# Patient Record
Sex: MALE | Race: WHITE | NOT HISPANIC OR LATINO | Employment: OTHER | URBAN - METROPOLITAN AREA
[De-identification: names, ages, dates, MRNs, and addresses within clinical notes are randomized per-mention and may not be internally consistent; named-entity substitution may affect disease eponyms.]

---

## 2023-08-15 ENCOUNTER — OFFICE VISIT (OUTPATIENT)
Dept: PRIMARY CARE | Facility: CLINIC | Age: 78
End: 2023-08-15
Payer: MEDICARE

## 2023-08-15 ENCOUNTER — LAB (OUTPATIENT)
Dept: LAB | Facility: LAB | Age: 78
End: 2023-08-15
Payer: MEDICARE

## 2023-08-15 VITALS
TEMPERATURE: 97.9 F | SYSTOLIC BLOOD PRESSURE: 138 MMHG | HEIGHT: 68 IN | DIASTOLIC BLOOD PRESSURE: 70 MMHG | HEART RATE: 73 BPM | WEIGHT: 219 LBS | OXYGEN SATURATION: 94 % | BODY MASS INDEX: 33.19 KG/M2

## 2023-08-15 DIAGNOSIS — R60.9 EDEMA, UNSPECIFIED TYPE: Primary | ICD-10-CM

## 2023-08-15 DIAGNOSIS — R60.9 EDEMA, UNSPECIFIED TYPE: ICD-10-CM

## 2023-08-15 PROBLEM — M17.0 OSTEOARTHRITIS OF KNEES, BILATERAL: Status: ACTIVE | Noted: 2023-08-15

## 2023-08-15 PROBLEM — M48.061 SPINAL STENOSIS OF LUMBAR REGION WITH RADICULOPATHY: Status: ACTIVE | Noted: 2023-08-15

## 2023-08-15 PROBLEM — M10.9 ACUTE GOUTY ARTHROPATHY: Status: ACTIVE | Noted: 2018-12-05

## 2023-08-15 PROBLEM — E78.5 HLD (HYPERLIPIDEMIA): Status: ACTIVE | Noted: 2023-08-15

## 2023-08-15 PROBLEM — I10 ESSENTIAL HYPERTENSION: Status: ACTIVE | Noted: 2023-08-15

## 2023-08-15 PROBLEM — M54.16 SPINAL STENOSIS OF LUMBAR REGION WITH RADICULOPATHY: Status: ACTIVE | Noted: 2023-08-15

## 2023-08-15 PROBLEM — I25.119 CORONARY ARTERY DISEASE INVOLVING NATIVE CORONARY ARTERY OF NATIVE HEART WITH ANGINA PECTORIS (CMS-HCC): Status: ACTIVE | Noted: 2023-08-15

## 2023-08-15 LAB
ANION GAP IN SER/PLAS: 12 MMOL/L (ref 10–20)
CALCIUM (MG/DL) IN SER/PLAS: 9.2 MG/DL (ref 8.6–10.6)
CARBON DIOXIDE, TOTAL (MMOL/L) IN SER/PLAS: 25 MMOL/L (ref 21–32)
CHLORIDE (MMOL/L) IN SER/PLAS: 107 MMOL/L (ref 98–107)
CREATININE (MG/DL) IN SER/PLAS: 1.24 MG/DL (ref 0.5–1.3)
GFR MALE: 59 ML/MIN/1.73M2
GLUCOSE (MG/DL) IN SER/PLAS: 89 MG/DL (ref 74–99)
POTASSIUM (MMOL/L) IN SER/PLAS: 4 MMOL/L (ref 3.5–5.3)
SODIUM (MMOL/L) IN SER/PLAS: 140 MMOL/L (ref 136–145)
UREA NITROGEN (MG/DL) IN SER/PLAS: 15 MG/DL (ref 6–23)

## 2023-08-15 PROCEDURE — 36415 COLL VENOUS BLD VENIPUNCTURE: CPT

## 2023-08-15 PROCEDURE — 1159F MED LIST DOCD IN RCRD: CPT | Performed by: FAMILY MEDICINE

## 2023-08-15 PROCEDURE — 1160F RVW MEDS BY RX/DR IN RCRD: CPT | Performed by: FAMILY MEDICINE

## 2023-08-15 PROCEDURE — 99214 OFFICE O/P EST MOD 30 MIN: CPT | Performed by: FAMILY MEDICINE

## 2023-08-15 PROCEDURE — 3075F SYST BP GE 130 - 139MM HG: CPT | Performed by: FAMILY MEDICINE

## 2023-08-15 PROCEDURE — 80048 BASIC METABOLIC PNL TOTAL CA: CPT

## 2023-08-15 PROCEDURE — 1036F TOBACCO NON-USER: CPT | Performed by: FAMILY MEDICINE

## 2023-08-15 PROCEDURE — 3078F DIAST BP <80 MM HG: CPT | Performed by: FAMILY MEDICINE

## 2023-08-15 RX ORDER — FUROSEMIDE 40 MG/1
1 TABLET ORAL DAILY
COMMUNITY
Start: 2017-08-13

## 2023-08-15 RX ORDER — ATORVASTATIN CALCIUM 10 MG/1
1 TABLET, FILM COATED ORAL DAILY
COMMUNITY
Start: 2016-05-06

## 2023-08-15 RX ORDER — METOPROLOL SUCCINATE 25 MG/1
25 TABLET, EXTENDED RELEASE ORAL DAILY
COMMUNITY
Start: 2018-04-26

## 2023-08-15 RX ORDER — PANTOPRAZOLE SODIUM 40 MG/1
1 TABLET, DELAYED RELEASE ORAL 2 TIMES DAILY
COMMUNITY
Start: 2016-05-06

## 2023-08-15 RX ORDER — PREGABALIN 150 MG/1
150 CAPSULE ORAL 2 TIMES DAILY
COMMUNITY
Start: 2023-05-09 | End: 2024-01-31 | Stop reason: ALTCHOICE

## 2023-08-15 RX ORDER — RAMIPRIL 10 MG/1
1 CAPSULE ORAL DAILY
COMMUNITY
Start: 2016-05-06

## 2023-08-15 RX ORDER — NITROGLYCERIN 0.4 MG/1
0.4 TABLET SUBLINGUAL EVERY 5 MIN PRN
COMMUNITY
Start: 2016-12-19

## 2023-08-15 ASSESSMENT — LIFESTYLE VARIABLES
SKIP TO QUESTIONS 9-10: 1
AUDIT-C TOTAL SCORE: 4
HOW OFTEN DO YOU HAVE A DRINK CONTAINING ALCOHOL: 4 OR MORE TIMES A WEEK
HOW MANY STANDARD DRINKS CONTAINING ALCOHOL DO YOU HAVE ON A TYPICAL DAY: 1 OR 2
HOW OFTEN DO YOU HAVE SIX OR MORE DRINKS ON ONE OCCASION: NEVER

## 2023-08-15 ASSESSMENT — PATIENT HEALTH QUESTIONNAIRE - PHQ9
2. FEELING DOWN, DEPRESSED OR HOPELESS: NOT AT ALL
1. LITTLE INTEREST OR PLEASURE IN DOING THINGS: NOT AT ALL
SUM OF ALL RESPONSES TO PHQ9 QUESTIONS 1 & 2: 0

## 2023-08-15 NOTE — PROGRESS NOTES
"      /70   Pulse 73   Temp 36.6 °C (97.9 °F)   Ht 1.727 m (5' 8\")   Wt 99.3 kg (219 lb)   SpO2 94%   BMI 33.30 kg/m²       Appears comfortable  No retractions  Skin without pallor petechia icterus cyanosis  Neck without JVD thyromegaly bruits  Chest wall nontender  Chest clear to auscultation without rales rhonchi wheeze  Heart regular rate and rhythm without murmur  Abdomen soft nondistended nontender without organomegaly or mass  Extremities without erythema edema Homans or cord  Peripheral pulses palpable  Neuro intact      "

## 2023-10-05 ENCOUNTER — TELEPHONE (OUTPATIENT)
Dept: PRIMARY CARE | Facility: CLINIC | Age: 78
End: 2023-10-05
Payer: MEDICARE

## 2023-10-05 NOTE — TELEPHONE ENCOUNTER
Patient still having swelling in his legs, he would like to know what should he do for this since his labs came back as normal

## 2023-10-09 DIAGNOSIS — M10.9 GOUT, UNSPECIFIED CAUSE, UNSPECIFIED CHRONICITY, UNSPECIFIED SITE: ICD-10-CM

## 2023-10-10 RX ORDER — ALLOPURINOL 300 MG/1
TABLET ORAL
Qty: 90 TABLET | Refills: 1 | Status: SHIPPED | OUTPATIENT
Start: 2023-10-10 | End: 2024-01-31 | Stop reason: SDUPTHER

## 2023-10-27 PROBLEM — N18.2 STAGE 2 CHRONIC KIDNEY DISEASE: Status: ACTIVE | Noted: 2017-11-21

## 2023-10-27 PROBLEM — K43.2 INCISIONAL HERNIA: Status: ACTIVE | Noted: 2023-10-27

## 2023-10-27 PROBLEM — L03.116 CELLULITIS OF FOOT, LEFT: Status: ACTIVE | Noted: 2023-10-27

## 2023-10-27 PROBLEM — K21.9 ACID REFLUX: Status: ACTIVE | Noted: 2023-10-27

## 2023-10-27 PROBLEM — M51.26 LUMBAR HERNIATED DISC: Status: ACTIVE | Noted: 2023-10-27

## 2023-10-27 PROBLEM — I27.21 PULMONARY ARTERIAL HYPERTENSION (MULTI): Status: ACTIVE | Noted: 2023-10-27

## 2023-10-27 PROBLEM — J44.9 CHRONIC OBSTRUCTIVE PULMONARY DISEASE (MULTI): Status: ACTIVE | Noted: 2017-11-21

## 2023-10-27 PROBLEM — L02.11 ABSCESS, NECK: Status: ACTIVE | Noted: 2023-10-27

## 2023-10-27 PROBLEM — M54.16 LUMBAR RADICULOPATHY: Status: ACTIVE | Noted: 2023-10-27

## 2023-10-27 PROBLEM — J43.1: Status: ACTIVE | Noted: 2017-11-21

## 2023-10-27 PROBLEM — L03.114 CELLULITIS OF LEFT HAND: Status: ACTIVE | Noted: 2018-12-05

## 2023-10-27 PROBLEM — I21.9 HEART ATTACK (MULTI): Status: ACTIVE | Noted: 2023-10-27

## 2023-10-27 RX ORDER — PREDNISONE 50 MG/1
1 TABLET ORAL DAILY
COMMUNITY
Start: 2022-04-10 | End: 2023-10-30 | Stop reason: ALTCHOICE

## 2023-10-27 RX ORDER — CELECOXIB 200 MG/1
1 CAPSULE ORAL AS NEEDED
COMMUNITY
End: 2023-10-30 | Stop reason: ALTCHOICE

## 2023-10-27 RX ORDER — CLOPIDOGREL BISULFATE 75 MG/1
1 TABLET ORAL DAILY
COMMUNITY
End: 2023-10-30 | Stop reason: ALTCHOICE

## 2023-10-27 RX ORDER — TRIAMCINOLONE ACETONIDE 1 MG/G
CREAM TOPICAL 2 TIMES DAILY
COMMUNITY
Start: 2019-02-06 | End: 2023-10-30 | Stop reason: ALTCHOICE

## 2023-10-27 RX ORDER — GABAPENTIN 300 MG/1
2 CAPSULE ORAL 3 TIMES DAILY
COMMUNITY
Start: 2022-04-12 | End: 2023-12-20 | Stop reason: ALTCHOICE

## 2023-10-27 RX ORDER — METOPROLOL TARTRATE 25 MG/1
1 TABLET, FILM COATED ORAL DAILY
COMMUNITY
End: 2023-10-30 | Stop reason: ALTCHOICE

## 2023-10-27 RX ORDER — PREDNISONE 10 MG/1
1 TABLET ORAL DAILY
COMMUNITY
Start: 2022-04-08 | End: 2023-10-30 | Stop reason: ALTCHOICE

## 2023-10-27 RX ORDER — CYCLOBENZAPRINE HCL 10 MG
1 TABLET ORAL 3 TIMES DAILY PRN
COMMUNITY
Start: 2022-04-10 | End: 2023-10-30 | Stop reason: ALTCHOICE

## 2023-10-27 RX ORDER — ACETAMINOPHEN AND CODEINE PHOSPHATE 300; 30 MG/1; MG/1
1 TABLET ORAL EVERY 8 HOURS
COMMUNITY
End: 2023-10-30 | Stop reason: ALTCHOICE

## 2023-10-27 RX ORDER — TIZANIDINE 2 MG/1
1-2 TABLET ORAL 3 TIMES DAILY PRN
COMMUNITY
Start: 2022-04-12 | End: 2023-10-30 | Stop reason: ALTCHOICE

## 2023-10-27 RX ORDER — CIPROFLOXACIN 500 MG/1
1 TABLET ORAL 2 TIMES DAILY
COMMUNITY
Start: 2023-05-19 | End: 2023-10-30 | Stop reason: ALTCHOICE

## 2023-10-27 RX ORDER — ATENOLOL 25 MG/1
TABLET ORAL DAILY
COMMUNITY
Start: 2017-01-29 | End: 2023-10-30 | Stop reason: ALTCHOICE

## 2023-10-27 RX ORDER — METHYLPREDNISOLONE 4 MG/1
TABLET ORAL
COMMUNITY
Start: 2023-04-11 | End: 2023-10-30 | Stop reason: ALTCHOICE

## 2023-10-27 RX ORDER — TRAMADOL HYDROCHLORIDE 50 MG/1
1 TABLET ORAL EVERY 6 HOURS PRN
COMMUNITY
Start: 2022-04-10 | End: 2023-10-30 | Stop reason: ALTCHOICE

## 2023-10-27 RX ORDER — ASPIRIN 325 MG
1 TABLET ORAL DAILY
COMMUNITY
End: 2023-10-30 | Stop reason: ALTCHOICE

## 2023-10-27 RX ORDER — LIDOCAINE HYDROCHLORIDE 20 MG/ML
JELLY TOPICAL
COMMUNITY
Start: 2018-09-18 | End: 2023-10-30 | Stop reason: ALTCHOICE

## 2023-10-30 ENCOUNTER — ANCILLARY PROCEDURE (OUTPATIENT)
Dept: RADIOLOGY | Facility: CLINIC | Age: 78
End: 2023-10-30
Payer: MEDICARE

## 2023-10-30 ENCOUNTER — OFFICE VISIT (OUTPATIENT)
Dept: PRIMARY CARE | Facility: CLINIC | Age: 78
End: 2023-10-30
Payer: MEDICARE

## 2023-10-30 ENCOUNTER — LAB (OUTPATIENT)
Dept: LAB | Facility: LAB | Age: 78
End: 2023-10-30
Payer: MEDICARE

## 2023-10-30 VITALS
OXYGEN SATURATION: 93 % | TEMPERATURE: 97.3 F | SYSTOLIC BLOOD PRESSURE: 136 MMHG | HEIGHT: 68 IN | DIASTOLIC BLOOD PRESSURE: 79 MMHG | WEIGHT: 221 LBS | HEART RATE: 75 BPM | BODY MASS INDEX: 33.49 KG/M2

## 2023-10-30 DIAGNOSIS — M25.472 LEFT ANKLE SWELLING: ICD-10-CM

## 2023-10-30 DIAGNOSIS — M10.9 GOUT OF FOOT, UNSPECIFIED CAUSE, UNSPECIFIED CHRONICITY, UNSPECIFIED LATERALITY: ICD-10-CM

## 2023-10-30 DIAGNOSIS — M25.472 LEFT ANKLE SWELLING: Primary | ICD-10-CM

## 2023-10-30 LAB — URATE SERPL-MCNC: 2.5 MG/DL (ref 4–7.5)

## 2023-10-30 PROCEDURE — 3078F DIAST BP <80 MM HG: CPT | Performed by: FAMILY MEDICINE

## 2023-10-30 PROCEDURE — 3075F SYST BP GE 130 - 139MM HG: CPT | Performed by: FAMILY MEDICINE

## 2023-10-30 PROCEDURE — 73610 X-RAY EXAM OF ANKLE: CPT | Mod: LEFT SIDE | Performed by: RADIOLOGY

## 2023-10-30 PROCEDURE — 99214 OFFICE O/P EST MOD 30 MIN: CPT | Performed by: FAMILY MEDICINE

## 2023-10-30 PROCEDURE — 1036F TOBACCO NON-USER: CPT | Performed by: FAMILY MEDICINE

## 2023-10-30 PROCEDURE — 84550 ASSAY OF BLOOD/URIC ACID: CPT

## 2023-10-30 PROCEDURE — 1160F RVW MEDS BY RX/DR IN RCRD: CPT | Performed by: FAMILY MEDICINE

## 2023-10-30 PROCEDURE — 1159F MED LIST DOCD IN RCRD: CPT | Performed by: FAMILY MEDICINE

## 2023-10-30 PROCEDURE — 73610 X-RAY EXAM OF ANKLE: CPT | Mod: LT,FY

## 2023-10-30 PROCEDURE — 36415 COLL VENOUS BLD VENIPUNCTURE: CPT

## 2023-10-30 RX ORDER — ASPIRIN 81 MG/1
81 TABLET ORAL DAILY
COMMUNITY
End: 2024-01-03 | Stop reason: HOSPADM

## 2023-10-30 ASSESSMENT — PATIENT HEALTH QUESTIONNAIRE - PHQ9
2. FEELING DOWN, DEPRESSED OR HOPELESS: NOT AT ALL
SUM OF ALL RESPONSES TO PHQ9 QUESTIONS 1 & 2: 0
1. LITTLE INTEREST OR PLEASURE IN DOING THINGS: NOT AT ALL

## 2023-10-30 ASSESSMENT — COLUMBIA-SUICIDE SEVERITY RATING SCALE - C-SSRS
2. HAVE YOU ACTUALLY HAD ANY THOUGHTS OF KILLING YOURSELF?: NO
6. HAVE YOU EVER DONE ANYTHING, STARTED TO DO ANYTHING, OR PREPARED TO DO ANYTHING TO END YOUR LIFE?: NO
1. IN THE PAST MONTH, HAVE YOU WISHED YOU WERE DEAD OR WISHED YOU COULD GO TO SLEEP AND NOT WAKE UP?: NO

## 2023-10-30 NOTE — PROGRESS NOTES
"78-year-old male with a history of left lower extremity DVT over a year ago treated with Eliquis for 3 months complaining of left ankle swelling for months.  Without redness calf pain chest pain shortness of breath or hemoptysis.  He has a history of hypertension coronary artery disease status post stent placement and sees cardiology on a regular basis was recently evaluated for clearance for left total knee arthroplasty.  Apparently left lower extremity ultrasound was done which was negative for DVT he also had a stress test and a echocardiogram done.  These were all reportedly negative although I do not have access to these results.  He used compression stockings without improvement.  He denies orthopnea dyspnea on exertion chest pain diaphoresis palpitations syncope presyncope joint pain or swelling.  He had hyaluronic acid injections left knee 2 months ago he noticed the swelling became worse after the injection but denied redness or swelling of the knee or ankle      /79   Pulse 75   Temp 36.3 °C (97.3 °F)   Ht 1.727 m (5' 8\")   Wt 100 kg (221 lb)   SpO2 93%   BMI 33.60 kg/m²       Appears comfortable  No retractions  Skin without pallor petechia icterus cyanosis  Neck without JVD thyromegaly bruits  Chest wall nontender  Chest clear to auscultation without rales rhonchi wheeze  Heart regular rate and rhythm without murmur  Abdomen soft nondistended nontender without organomegaly or mass  Extremities without erythema or cord bilaterally  A few healed scabs without erythema bogginess or drainage on the left lower extremity  He has unilateral left ankle edema without tenderness erythema there is some synovial thickening  Drawer negative bony tenderness ankle  Peripheral pulses palpable  Neuro intact    Left lower extremity swelling likely related to post thrombotic syndrome  "

## 2023-10-30 NOTE — PATIENT INSTRUCTIONS
Records from his cardiologist.  Labs as ordered  X-ray left ankle  Compression stockings still recommended

## 2023-11-01 ENCOUNTER — TELEPHONE (OUTPATIENT)
Dept: PRIMARY CARE | Facility: CLINIC | Age: 78
End: 2023-11-01
Payer: MEDICARE

## 2023-11-01 NOTE — TELEPHONE ENCOUNTER
Lm to return my call.        ----- Message from Josué Kay MD sent at 10/31/2023  3:33 PM EDT -----  Notify no acute bony abnormality.

## 2023-11-05 ENCOUNTER — TRANSCRIBE ORDERS (OUTPATIENT)
Dept: OPERATING ROOM | Facility: HOSPITAL | Age: 78
End: 2023-11-05
Payer: MEDICARE

## 2023-11-05 DIAGNOSIS — M17.12 UNILATERAL PRIMARY OSTEOARTHRITIS, LEFT KNEE: Primary | ICD-10-CM

## 2023-11-06 PROBLEM — M17.12 UNILATERAL PRIMARY OSTEOARTHRITIS, LEFT KNEE: Status: ACTIVE | Noted: 2023-11-05

## 2023-11-16 ENCOUNTER — APPOINTMENT (OUTPATIENT)
Dept: ORTHOPEDIC SURGERY | Facility: CLINIC | Age: 78
End: 2023-11-16
Payer: MEDICARE

## 2023-11-17 ENCOUNTER — OFFICE VISIT (OUTPATIENT)
Dept: ORTHOPEDIC SURGERY | Facility: CLINIC | Age: 78
End: 2023-11-17
Payer: MEDICARE

## 2023-11-17 ENCOUNTER — ANCILLARY PROCEDURE (OUTPATIENT)
Dept: RADIOLOGY | Facility: CLINIC | Age: 78
End: 2023-11-17
Payer: MEDICARE

## 2023-11-17 DIAGNOSIS — M89.8X5 PAIN OF LEFT FEMUR: ICD-10-CM

## 2023-11-17 DIAGNOSIS — M17.12 PRIMARY OSTEOARTHRITIS OF LEFT KNEE: Primary | ICD-10-CM

## 2023-11-17 PROCEDURE — 1036F TOBACCO NON-USER: CPT | Performed by: ORTHOPAEDIC SURGERY

## 2023-11-17 PROCEDURE — 99214 OFFICE O/P EST MOD 30 MIN: CPT | Performed by: ORTHOPAEDIC SURGERY

## 2023-11-17 PROCEDURE — 73552 X-RAY EXAM OF FEMUR 2/>: CPT | Mod: LEFT SIDE | Performed by: RADIOLOGY

## 2023-11-17 PROCEDURE — 1159F MED LIST DOCD IN RCRD: CPT | Performed by: ORTHOPAEDIC SURGERY

## 2023-11-17 PROCEDURE — 73552 X-RAY EXAM OF FEMUR 2/>: CPT | Mod: LT,FY

## 2023-11-17 PROCEDURE — 1160F RVW MEDS BY RX/DR IN RCRD: CPT | Performed by: ORTHOPAEDIC SURGERY

## 2023-11-17 PROCEDURE — 99214 OFFICE O/P EST MOD 30 MIN: CPT | Mod: 25 | Performed by: ORTHOPAEDIC SURGERY

## 2023-11-17 NOTE — PROGRESS NOTES
Patient is a 70-year-old gentleman who presents today for discussion of upcoming left total knee arthroplasty.  Is now failed greater than 3-month trial reasonable conservative treatment including Tylenol, cortisone injections and viscosupplementation.  He has difficulty walking certain distance going downstairs.  He is feeling fed up with his quality of life.  He would like to proceed with total knee arthroplasty which is indicated at this time.  He does have a history of a femur fracture.  Hardware removed many years ago.  We will obtain full-length femur films today.  He does have a significant cardiac history including 7 stents.  He is currently not on anticoagulation.  He has seen his cardiologist recently.    AAOx3, NAD, walks with a moderate antalgic gait  Varus allignment  Range of motion lacks 10 degrees of full extension and flexes to 110 degrees  Stable to varus/valgus/anterior/posterior stress through out the range of motion  Slight laxity with varus stress  Diffuse medial  joint line tenderness to palpation  Moderate effusion  SILT in a pawan/saph/per/tib distribution  5/5 knee extension/df/pf/ehl  ½ dorsalis pedis and posterior tibial pulse  no popliteal lymphadenopathy  no other overlying lesions  mood: euthymic  Respirations non labored    Plain films were reviewed by myself in clinic today.  They have advanced osteoarthritis of their knee with significant joint space narrowing, bone on bone changes, underlying sclerosis and tricompartmental osteophytic change.    Patient is now failed a greater than 3-month trial of reasonable conservative treatment and wishes proceed with surgery which is indicated at this time.  Discussed the risk benefits alternatives to surgery.  All of their questions were answered.    Procedure: left total knee  Location: [facility]  ICD10: M17.12  CPT: 72413  Stay: overnight  Equipment: [employer]    I talked with the patient at length about risks, limitations, benefits  and alternatives to total knee replacement today. I reviewed concerns about implant wear, loosening, breakage, infection and infection prophylaxis, DVT, PE, death and other medical and anesthetic complications of surgery. We talked about the potential for persistent pain following surgery since there are many possible causes for knee and leg pain. The patient was advised that knee replacement will only relieve pain that is coming from the knee. We talked about limited range of motion following knee replacement and the importance of physical therapy and their motivation. We talked about improvements in pain management post-operatively and our accelerated rehab program. We talked about wound healing issues and neurovascular complications of surgery. I reviewed functional and activity restrictions in detail. We discussed the possible need for a homologous blood transfusion. We discussed the fact that many of our patients are able to go home in 1 day or less depending on their health, mobility, pre-op preparation, individual home situation and personal preference.  The patient has identified their personal goals of their joint replacement surgery and recovery and we have discussed them. These are documented in our [employer] patient engagement platform. In addition, we have discussed the advantages and disadvantages of various implant and fixation options, as well as various surgical approaches.  The basic concepts of the joint replacement procedure has been reviewed with the patient and the patient has been provided the opportunity to see an actual implant either in the office or in our pre-op education class.  All of the patients questions were answered. The patient can call my office to schedule surgery and the pre-op teaching class. I told the patient that they should contact their primary care physician to discuss fitness for surgery.    This note was created using voice recognition software and was not corrected  for typographical or grammatical errors.

## 2023-12-05 ENCOUNTER — TELEPHONE (OUTPATIENT)
Dept: PREADMISSION TESTING | Facility: HOSPITAL | Age: 78
End: 2023-12-05
Payer: MEDICARE

## 2023-12-06 NOTE — TELEPHONE ENCOUNTER
Pt wants to schedule PAT but not reviewed explained scheduling December cases currently.  When RN review we will call to schedule

## 2023-12-08 ENCOUNTER — TELEPHONE (OUTPATIENT)
Dept: PREADMISSION TESTING | Facility: HOSPITAL | Age: 78
End: 2023-12-08
Payer: MEDICARE

## 2023-12-08 DIAGNOSIS — M17.12 UNILATERAL PRIMARY OSTEOARTHRITIS, LEFT KNEE: Primary | ICD-10-CM

## 2023-12-14 ENCOUNTER — HOSPITAL ENCOUNTER (OUTPATIENT)
Dept: RADIOLOGY | Facility: HOSPITAL | Age: 78
Discharge: HOME | End: 2023-12-14
Payer: MEDICARE

## 2023-12-14 DIAGNOSIS — M17.12 UNILATERAL PRIMARY OSTEOARTHRITIS, LEFT KNEE: ICD-10-CM

## 2023-12-14 PROCEDURE — 77073 BONE LENGTH STUDIES: CPT | Performed by: RADIOLOGY

## 2023-12-14 PROCEDURE — 73562 X-RAY EXAM OF KNEE 3: CPT | Mod: LT,FY

## 2023-12-14 PROCEDURE — 77073 BONE LENGTH STUDIES: CPT | Mod: FY

## 2023-12-20 ENCOUNTER — TELEMEDICINE CLINICAL SUPPORT (OUTPATIENT)
Dept: PREADMISSION TESTING | Facility: HOSPITAL | Age: 78
End: 2023-12-20
Payer: MEDICARE

## 2023-12-26 ENCOUNTER — LAB (OUTPATIENT)
Dept: LAB | Facility: LAB | Age: 78
End: 2023-12-26
Payer: MEDICARE

## 2023-12-26 ENCOUNTER — ANCILLARY PROCEDURE (OUTPATIENT)
Dept: RADIOLOGY | Facility: CLINIC | Age: 78
End: 2023-12-26
Payer: MEDICARE

## 2023-12-26 ENCOUNTER — PRE-ADMISSION TESTING (OUTPATIENT)
Dept: PREADMISSION TESTING | Facility: HOSPITAL | Age: 78
End: 2023-12-26
Payer: MEDICARE

## 2023-12-26 VITALS
BODY MASS INDEX: 32.63 KG/M2 | DIASTOLIC BLOOD PRESSURE: 51 MMHG | OXYGEN SATURATION: 96 % | WEIGHT: 207.89 LBS | RESPIRATION RATE: 18 BRPM | SYSTOLIC BLOOD PRESSURE: 110 MMHG | HEIGHT: 67 IN | TEMPERATURE: 97.3 F | HEART RATE: 66 BPM

## 2023-12-26 DIAGNOSIS — Z01.818 PREOP EXAMINATION: ICD-10-CM

## 2023-12-26 DIAGNOSIS — Z01.818 PREOP EXAMINATION: Primary | ICD-10-CM

## 2023-12-26 LAB
ANION GAP SERPL CALC-SCNC: 13 MMOL/L (ref 10–20)
ATRIAL RATE: 68 BPM
BASOPHILS # BLD AUTO: 0.08 X10*3/UL (ref 0–0.1)
BASOPHILS NFR BLD AUTO: 1 %
BUN SERPL-MCNC: 33 MG/DL (ref 6–23)
CALCIUM SERPL-MCNC: 9 MG/DL (ref 8.6–10.3)
CHLORIDE SERPL-SCNC: 102 MMOL/L (ref 98–107)
CO2 SERPL-SCNC: 25 MMOL/L (ref 21–32)
CREAT SERPL-MCNC: 1.35 MG/DL (ref 0.5–1.3)
EOSINOPHIL # BLD AUTO: 0.07 X10*3/UL (ref 0–0.4)
EOSINOPHIL NFR BLD AUTO: 0.9 %
ERYTHROCYTE [DISTWIDTH] IN BLOOD BY AUTOMATED COUNT: 13.2 % (ref 11.5–14.5)
GFR SERPL CREATININE-BSD FRML MDRD: 54 ML/MIN/1.73M*2
GLUCOSE SERPL-MCNC: 111 MG/DL (ref 74–99)
HCT VFR BLD AUTO: 44.4 % (ref 41–52)
HGB BLD-MCNC: 15 G/DL (ref 13.5–17.5)
IMM GRANULOCYTES # BLD AUTO: 0.04 X10*3/UL (ref 0–0.5)
IMM GRANULOCYTES NFR BLD AUTO: 0.5 % (ref 0–0.9)
LYMPHOCYTES # BLD AUTO: 1.97 X10*3/UL (ref 0.8–3)
LYMPHOCYTES NFR BLD AUTO: 25.5 %
MCH RBC QN AUTO: 33.4 PG (ref 26–34)
MCHC RBC AUTO-ENTMCNC: 33.8 G/DL (ref 32–36)
MCV RBC AUTO: 99 FL (ref 80–100)
MONOCYTES # BLD AUTO: 0.64 X10*3/UL (ref 0.05–0.8)
MONOCYTES NFR BLD AUTO: 8.3 %
NEUTROPHILS # BLD AUTO: 4.92 X10*3/UL (ref 1.6–5.5)
NEUTROPHILS NFR BLD AUTO: 63.8 %
NRBC BLD-RTO: 0 /100 WBCS (ref 0–0)
P AXIS: -9 DEGREES
P OFFSET: 184 MS
P ONSET: 126 MS
PLATELET # BLD AUTO: 245 X10*3/UL (ref 150–450)
POTASSIUM SERPL-SCNC: 4.9 MMOL/L (ref 3.5–5.3)
PR INTERVAL: 170 MS
Q ONSET: 211 MS
QRS COUNT: 11 BEATS
QRS DURATION: 132 MS
QT INTERVAL: 426 MS
QTC CALCULATION(BAZETT): 452 MS
QTC FREDERICIA: 444 MS
R AXIS: -56 DEGREES
RBC # BLD AUTO: 4.49 X10*6/UL (ref 4.5–5.9)
SODIUM SERPL-SCNC: 135 MMOL/L (ref 136–145)
T AXIS: 34 DEGREES
T OFFSET: 424 MS
VENTRICULAR RATE: 68 BPM
WBC # BLD AUTO: 7.7 X10*3/UL (ref 4.4–11.3)

## 2023-12-26 PROCEDURE — 93010 ELECTROCARDIOGRAM REPORT: CPT | Performed by: INTERNAL MEDICINE

## 2023-12-26 PROCEDURE — 80048 BASIC METABOLIC PNL TOTAL CA: CPT

## 2023-12-26 PROCEDURE — 85025 COMPLETE CBC W/AUTO DIFF WBC: CPT

## 2023-12-26 PROCEDURE — 87081 CULTURE SCREEN ONLY: CPT | Mod: AHULAB | Performed by: ORTHOPAEDIC SURGERY

## 2023-12-26 PROCEDURE — 36415 COLL VENOUS BLD VENIPUNCTURE: CPT

## 2023-12-26 PROCEDURE — 99204 OFFICE O/P NEW MOD 45 MIN: CPT | Performed by: NURSE PRACTITIONER

## 2023-12-26 PROCEDURE — 93005 ELECTROCARDIOGRAM TRACING: CPT | Performed by: NURSE PRACTITIONER

## 2023-12-26 RX ORDER — CHLORHEXIDINE GLUCONATE ORAL RINSE 1.2 MG/ML
15 SOLUTION DENTAL 2 TIMES DAILY
Qty: 473 ML | Refills: 0 | Status: ON HOLD | OUTPATIENT
Start: 2023-12-26 | End: 2024-01-03 | Stop reason: ENTERED-IN-ERROR

## 2023-12-26 ASSESSMENT — ENCOUNTER SYMPTOMS
RESPIRATORY NEGATIVE: 1
ENDOCRINE NEGATIVE: 1
GASTROINTESTINAL NEGATIVE: 1
BRUISES/BLEEDS EASILY: 1
EYES NEGATIVE: 1
NECK NEGATIVE: 1
CONSTITUTIONAL NEGATIVE: 1
NEUROLOGICAL NEGATIVE: 1
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
## 2023-12-26 NOTE — PREPROCEDURE INSTRUCTIONS
Medication List            Accurate as of December 26, 2023  8:41 AM. Always use your most recent med list.                allopurinol 300 mg tablet  Commonly known as: Zyloprim  TAKE 1 TABLET EVERY DAY  Medication Adjustments for Surgery: Take morning of surgery with sip of water, no other fluids     aspirin 81 mg EC tablet  Medication Adjustments for Surgery: Take morning of surgery with sip of water, no other fluids     atorvastatin 10 mg tablet  Commonly known as: Lipitor  Medication Adjustments for Surgery: Take morning of surgery with sip of water, no other fluids     chlorhexidine 0.12 % solution  Commonly known as: Peridex  Use 15 mL in the mouth or throat 2 times a day. Use night before surgery and morning of surgery Swish and spit  Medication Adjustments for Surgery: Take morning of surgery with sip of water, no other fluids     furosemide 40 mg tablet  Commonly known as: Lasix  Medication Adjustments for Surgery: Continue until night before surgery     metoprolol succinate XL 25 mg 24 hr tablet  Commonly known as: Toprol-XL  Medication Adjustments for Surgery: Take morning of surgery with sip of water, no other fluids     nitroglycerin 0.4 mg SL tablet  Commonly known as: Nitrostat  Medication Adjustments for Surgery: Take morning of surgery with sip of water, no other fluids     pantoprazole 40 mg EC tablet  Commonly known as: ProtoNix  Medication Adjustments for Surgery: Take morning of surgery with sip of water, no other fluids     pregabalin 50 mg capsule  Commonly known as: Lyrica  Medication Adjustments for Surgery: Take morning of surgery with sip of water, no other fluids     ramipril 10 mg capsule  Commonly known as: Altace  Medication Adjustments for Surgery: Continue until night before surgery                      CONTACT SURGEON'S OFFICE IF YOU DEVELOP:  * Fever = 100.4 F   * New respiratory symptoms (e.g. cough, shortness of breath, respiratory distress, sore throat)  * Recent loss of  taste or smell  *Flu like symptoms such as headache, fatigue or gastrointestinal symptoms  * You develop any open sores, shingles, burning or painful urination   AND/OR:  * You no longer wish to have the surgery.  * Any other personal circumstances change that may lead to the need to cancel or defer this surgery.  *You were admitted to any hospital within one week of your planned procedure.    SMOKING:  *Quitting smoking can make a huge difference to your health and recovery from surgery.    *If you need help with quitting, call 2-601-QUIT-NOW.    THE DAY BEFORE SURGERY:  *Do not eat any food after midnight the night before surgery.   *You are permitted to drink clear liquids (i.e. water, black coffee, tea, clear broth, apple juice) up to 2 hours before your surgery.    DIABETICS:  If diabetic, nothing by mouth (no solids or liquids) for 8 hours prior to surgery.   Please check fasting blood sugar upon waking up.  If fasting sugar is <80 mg/dl, please drink 100ml/3oz of apple juice no later than 2 hours prior to surgery.      SURGICAL TIME  *You will be contacted between 2 p.m. and 6 p.m. the business day before your surgery with your arrival time.  *If you haven't received a call by 6pm, call [phone].  *Scheduled surgery times may change and you will be notified if this occurs-check your personal voicemail for any updates.    ON THE MORNING OF SURGERY:  *Wear comfortable, loose fitting clothing.   *Do not use moisturizers, creams, lotions or perfume.  *All jewelry and valuables should be left at home.  *Prosthetic devices such as contact lenses, hearing aids, dentures, eyelash extensions, hairpins and body piercing must be removed before surgery.    BRING WITH YOU:  *Photo ID and insurance card  *Current list of medicines and allergies  *Pacemaker/Defibrillator/Heart stent cards  *CPAP machine and mask  *Slings/splints/crutches  *Copy of your complete Advanced Directive/DHPOA-if applicable  *Neurostimulator  implant remote    PARKING AND ARRIVAL:  *Check in at the Main Entrance desk and let them know you are here for surgery.  *You will be directed to the 2nd floor surgical waiting area.    AFTER OUTPATIENT SURGERY:  *A responsible adult MUST accompany you at the time of discharge and stay with you for 24 hours after your surgery.  *You may NOT drive yourself home after surgery.  *You may use a taxi or ride sharing service ([employer], [employer]) to return home ONLY if you are accompanied by a friend or family member.  *Instructions for resuming your medications will be provided by your surgeon.

## 2023-12-28 LAB — STAPHYLOCOCCUS SPEC CULT: ABNORMAL

## 2023-12-29 ENCOUNTER — ANCILLARY PROCEDURE (OUTPATIENT)
Dept: RADIOLOGY | Facility: CLINIC | Age: 78
End: 2023-12-29
Payer: MEDICARE

## 2023-12-29 PROCEDURE — 73562 X-RAY EXAM OF KNEE 3: CPT | Mod: LT

## 2023-12-29 PROCEDURE — 77073 BONE LENGTH STUDIES: CPT | Performed by: RADIOLOGY

## 2023-12-29 PROCEDURE — 77073 BONE LENGTH STUDIES: CPT

## 2023-12-29 PROCEDURE — 73562 X-RAY EXAM OF KNEE 3: CPT | Mod: LEFT SIDE | Performed by: RADIOLOGY

## 2023-12-31 ENCOUNTER — ANESTHESIA EVENT (OUTPATIENT)
Dept: OPERATING ROOM | Facility: HOSPITAL | Age: 78
End: 2023-12-31
Payer: MEDICARE

## 2023-12-31 NOTE — ANESTHESIA PREPROCEDURE EVALUATION
Patient: Mikhail Moses    Procedure Information       Date/Time: 01/02/24 1100    Procedure: Left Knee Total Replacement (Left: Knee) - **Patient is MRSA positive and needs Vancomycin in pre-op**              1 min  SF    Location: [facility] OR  / [facility] A OR    Surgeons: Emmanuel Bradford MD            Relevant Problems   Anesthesia (within normal limits)      Cardiovascular   (+) Coronary artery disease involving native coronary artery of native heart with angina pectoris (CMS/HCC)   (+) Essential hypertension   (+) Familial hypercholesterolemia   (+) HLD (hyperlipidemia)   (+) Heart attack (CMS/HCC)   (+) Old myocardial infarction   (+) Pulmonary arterial hypertension (CMS/HCC)      Endocrine   (+) Obesity      GI   (+) Acid reflux      /Renal   (+) Stage 2 chronic kidney disease      Neuro/Psych   (+) Lumbar radiculopathy      Pulmonary   (+) Chronic obstructive pulmonary disease (CMS/HCC)   (+) Panacinar emphysema (CMS/HCC)   (+) Pulmonary arterial hypertension (CMS/HCC)      Musculoskeletal   (+) Lumbar herniated disc   (+) Osteoarthritis of knees, bilateral   (+) Spinal stenosis of lumbar region with radiculopathy   (+) Unilateral primary osteoarthritis, left knee      Other   (+) Acute gouty arthropathy       Clinical information reviewed:                   NPO Detail:  No data recorded     Physical Exam    Airway  Mallampati: II     Cardiovascular   Rhythm: regular  Rate: normal     Dental    Pulmonary   Breath sounds clear to auscultation     Abdominal            Anesthesia Plan    ASA 3     regional and spinal     Anesthetic plan and risks discussed with patient.    Plan discussed with CRNA and CAA.

## 2024-01-02 ENCOUNTER — ANESTHESIA (OUTPATIENT)
Dept: OPERATING ROOM | Facility: HOSPITAL | Age: 79
End: 2024-01-02
Payer: MEDICARE

## 2024-01-02 ENCOUNTER — DOCUMENTATION (OUTPATIENT)
Dept: HOME HEALTH SERVICES | Facility: HOME HEALTH | Age: 79
End: 2024-01-02

## 2024-01-02 ENCOUNTER — HOSPITAL ENCOUNTER (OUTPATIENT)
Facility: HOSPITAL | Age: 79
Discharge: HOME | End: 2024-01-03
Attending: ORTHOPAEDIC SURGERY | Admitting: ORTHOPAEDIC SURGERY
Payer: MEDICARE

## 2024-01-02 ENCOUNTER — HOME HEALTH ADMISSION (OUTPATIENT)
Dept: HOME HEALTH SERVICES | Facility: HOME HEALTH | Age: 79
End: 2024-01-02
Payer: MEDICARE

## 2024-01-02 DIAGNOSIS — M17.12 UNILATERAL PRIMARY OSTEOARTHRITIS, LEFT KNEE: ICD-10-CM

## 2024-01-02 DIAGNOSIS — M17.12 ARTHRITIS OF LEFT KNEE: Primary | ICD-10-CM

## 2024-01-02 PROCEDURE — 3700000001 HC GENERAL ANESTHESIA TIME - INITIAL BASE CHARGE: Performed by: ORTHOPAEDIC SURGERY

## 2024-01-02 PROCEDURE — 99100 ANES PT EXTEME AGE<1 YR&>70: CPT | Performed by: ANESTHESIOLOGY

## 2024-01-02 PROCEDURE — 2500000001 HC RX 250 WO HCPCS SELF ADMINISTERED DRUGS (ALT 637 FOR MEDICARE OP): Performed by: STUDENT IN AN ORGANIZED HEALTH CARE EDUCATION/TRAINING PROGRAM

## 2024-01-02 PROCEDURE — 2500000004 HC RX 250 GENERAL PHARMACY W/ HCPCS (ALT 636 FOR OP/ED): Performed by: ORTHOPAEDIC SURGERY

## 2024-01-02 PROCEDURE — G0378 HOSPITAL OBSERVATION PER HR: HCPCS

## 2024-01-02 PROCEDURE — A4217 STERILE WATER/SALINE, 500 ML: HCPCS | Performed by: ORTHOPAEDIC SURGERY

## 2024-01-02 PROCEDURE — C1776 JOINT DEVICE (IMPLANTABLE): HCPCS | Performed by: ORTHOPAEDIC SURGERY

## 2024-01-02 PROCEDURE — 2780000003 HC OR 278 NO HCPCS: Performed by: ORTHOPAEDIC SURGERY

## 2024-01-02 PROCEDURE — 2500000004 HC RX 250 GENERAL PHARMACY W/ HCPCS (ALT 636 FOR OP/ED): Performed by: ANESTHESIOLOGY

## 2024-01-02 PROCEDURE — RXMED WILLOW AMBULATORY MEDICATION CHARGE

## 2024-01-02 PROCEDURE — 2500000004 HC RX 250 GENERAL PHARMACY W/ HCPCS (ALT 636 FOR OP/ED): Performed by: STUDENT IN AN ORGANIZED HEALTH CARE EDUCATION/TRAINING PROGRAM

## 2024-01-02 PROCEDURE — 2500000005 HC RX 250 GENERAL PHARMACY W/O HCPCS: Performed by: ORTHOPAEDIC SURGERY

## 2024-01-02 PROCEDURE — 2720000007 HC OR 272 NO HCPCS: Performed by: ORTHOPAEDIC SURGERY

## 2024-01-02 PROCEDURE — A27447 PR TOTAL KNEE ARTHROPLASTY: Performed by: ANESTHESIOLOGY

## 2024-01-02 PROCEDURE — 97161 PT EVAL LOW COMPLEX 20 MIN: CPT | Mod: GP

## 2024-01-02 PROCEDURE — 7100000001 HC RECOVERY ROOM TIME - INITIAL BASE CHARGE: Performed by: ORTHOPAEDIC SURGERY

## 2024-01-02 PROCEDURE — 3600000005 HC OR TIME - INITIAL BASE CHARGE - PROCEDURE LEVEL FIVE: Performed by: ORTHOPAEDIC SURGERY

## 2024-01-02 PROCEDURE — A6213 FOAM DRG >16<=48 SQ IN W/BDR: HCPCS | Performed by: ORTHOPAEDIC SURGERY

## 2024-01-02 PROCEDURE — A27447 PR TOTAL KNEE ARTHROPLASTY: Performed by: ANESTHESIOLOGIST ASSISTANT

## 2024-01-02 PROCEDURE — 2500000005 HC RX 250 GENERAL PHARMACY W/O HCPCS: Performed by: PHYSICIAN ASSISTANT

## 2024-01-02 PROCEDURE — 7100000002 HC RECOVERY ROOM TIME - EACH INCREMENTAL 1 MINUTE: Performed by: ORTHOPAEDIC SURGERY

## 2024-01-02 PROCEDURE — 2500000004 HC RX 250 GENERAL PHARMACY W/ HCPCS (ALT 636 FOR OP/ED): Performed by: ANESTHESIOLOGIST ASSISTANT

## 2024-01-02 PROCEDURE — 2500000001 HC RX 250 WO HCPCS SELF ADMINISTERED DRUGS (ALT 637 FOR MEDICARE OP): Performed by: PHYSICIAN ASSISTANT

## 2024-01-02 PROCEDURE — 3600000010 HC OR TIME - EACH INCREMENTAL 1 MINUTE - PROCEDURE LEVEL FIVE: Performed by: ORTHOPAEDIC SURGERY

## 2024-01-02 PROCEDURE — 64447 NJX AA&/STRD FEMORAL NRV IMG: CPT | Performed by: ANESTHESIOLOGY

## 2024-01-02 PROCEDURE — 97110 THERAPEUTIC EXERCISES: CPT | Mod: GP

## 2024-01-02 PROCEDURE — 3700000002 HC GENERAL ANESTHESIA TIME - EACH INCREMENTAL 1 MINUTE: Performed by: ORTHOPAEDIC SURGERY

## 2024-01-02 PROCEDURE — 2500000001 HC RX 250 WO HCPCS SELF ADMINISTERED DRUGS (ALT 637 FOR MEDICARE OP): Performed by: ORTHOPAEDIC SURGERY

## 2024-01-02 PROCEDURE — 2500000004 HC RX 250 GENERAL PHARMACY W/ HCPCS (ALT 636 FOR OP/ED): Performed by: PHYSICIAN ASSISTANT

## 2024-01-02 PROCEDURE — 2500000005 HC RX 250 GENERAL PHARMACY W/O HCPCS: Performed by: ANESTHESIOLOGIST ASSISTANT

## 2024-01-02 PROCEDURE — 27447 TOTAL KNEE ARTHROPLASTY: CPT | Performed by: ORTHOPAEDIC SURGERY

## 2024-01-02 PROCEDURE — 97530 THERAPEUTIC ACTIVITIES: CPT | Mod: GP

## 2024-01-02 DEVICE — BONE CEMENT, SMART SET, HIGH VISCOSITY, 40GM: Type: IMPLANTABLE DEVICE | Site: KNEE | Status: FUNCTIONAL

## 2024-01-02 DEVICE — ATTUNE KNEE SYSTEM TIBIAL BASE FIXED BEARING SIZE 6 CEMENTED
Type: IMPLANTABLE DEVICE | Site: KNEE | Status: FUNCTIONAL
Brand: ATTUNE

## 2024-01-02 DEVICE — IMPLANTABLE DEVICE: Type: IMPLANTABLE DEVICE | Site: KNEE | Status: FUNCTIONAL

## 2024-01-02 DEVICE — DOME, PATELLA, MEDIALIZED, 38MM: Type: IMPLANTABLE DEVICE | Site: KNEE | Status: FUNCTIONAL

## 2024-01-02 RX ORDER — DIPHENHYDRAMINE HCL 12.5MG/5ML
12.5 LIQUID (ML) ORAL EVERY 6 HOURS PRN
Status: DISCONTINUED | OUTPATIENT
Start: 2024-01-02 | End: 2024-01-02

## 2024-01-02 RX ORDER — SODIUM CHLORIDE, SODIUM LACTATE, POTASSIUM CHLORIDE, CALCIUM CHLORIDE 600; 310; 30; 20 MG/100ML; MG/100ML; MG/100ML; MG/100ML
50 INJECTION, SOLUTION INTRAVENOUS CONTINUOUS
Status: ACTIVE | OUTPATIENT
Start: 2024-01-02 | End: 2024-01-03

## 2024-01-02 RX ORDER — ROPIVACAINE/EPI/CLONIDINE/KET 2.46-0.005
SYRINGE (ML) INJECTION AS NEEDED
Status: DISCONTINUED | OUTPATIENT
Start: 2024-01-02 | End: 2024-01-02 | Stop reason: HOSPADM

## 2024-01-02 RX ORDER — DOCUSATE SODIUM 100 MG/1
100 CAPSULE, LIQUID FILLED ORAL 2 TIMES DAILY
Status: DISCONTINUED | OUTPATIENT
Start: 2024-01-02 | End: 2024-01-03 | Stop reason: HOSPADM

## 2024-01-02 RX ORDER — BISACODYL 10 MG/1
10 SUPPOSITORY RECTAL DAILY PRN
Status: DISCONTINUED | OUTPATIENT
Start: 2024-01-02 | End: 2024-01-03 | Stop reason: HOSPADM

## 2024-01-02 RX ORDER — MELOXICAM 7.5 MG/1
7.5 TABLET ORAL ONCE
Status: COMPLETED | OUTPATIENT
Start: 2024-01-02 | End: 2024-01-02

## 2024-01-02 RX ORDER — TRANEXAMIC ACID 100 MG/ML
INJECTION, SOLUTION INTRAVENOUS AS NEEDED
Status: DISCONTINUED | OUTPATIENT
Start: 2024-01-02 | End: 2024-01-02

## 2024-01-02 RX ORDER — ONDANSETRON HYDROCHLORIDE 2 MG/ML
4 INJECTION, SOLUTION INTRAVENOUS EVERY 8 HOURS PRN
Status: DISCONTINUED | OUTPATIENT
Start: 2024-01-02 | End: 2024-01-03 | Stop reason: HOSPADM

## 2024-01-02 RX ORDER — BISACODYL 5 MG
10 TABLET, DELAYED RELEASE (ENTERIC COATED) ORAL DAILY PRN
Status: DISCONTINUED | OUTPATIENT
Start: 2024-01-02 | End: 2024-01-03 | Stop reason: HOSPADM

## 2024-01-02 RX ORDER — WATER 1 ML/ML
IRRIGANT IRRIGATION AS NEEDED
Status: DISCONTINUED | OUTPATIENT
Start: 2024-01-02 | End: 2024-01-02 | Stop reason: HOSPADM

## 2024-01-02 RX ORDER — NALOXONE HYDROCHLORIDE 0.4 MG/ML
0.2 INJECTION, SOLUTION INTRAMUSCULAR; INTRAVENOUS; SUBCUTANEOUS EVERY 5 MIN PRN
Status: DISCONTINUED | OUTPATIENT
Start: 2024-01-02 | End: 2024-01-03 | Stop reason: HOSPADM

## 2024-01-02 RX ORDER — CEFAZOLIN 1 G/1
INJECTION, POWDER, FOR SOLUTION INTRAVENOUS AS NEEDED
Status: DISCONTINUED | OUTPATIENT
Start: 2024-01-02 | End: 2024-01-02

## 2024-01-02 RX ORDER — POLYETHYLENE GLYCOL 3350 17 G/17G
17 POWDER, FOR SOLUTION ORAL DAILY
Status: DISCONTINUED | OUTPATIENT
Start: 2024-01-02 | End: 2024-01-03 | Stop reason: HOSPADM

## 2024-01-02 RX ORDER — ACETAMINOPHEN 325 MG/1
650 TABLET ORAL EVERY 6 HOURS PRN
Status: DISCONTINUED | OUTPATIENT
Start: 2024-01-02 | End: 2024-01-03 | Stop reason: HOSPADM

## 2024-01-02 RX ORDER — METOPROLOL SUCCINATE 25 MG/1
25 TABLET, EXTENDED RELEASE ORAL DAILY
Status: DISCONTINUED | OUTPATIENT
Start: 2024-01-03 | End: 2024-01-03 | Stop reason: HOSPADM

## 2024-01-02 RX ORDER — OXYCODONE HYDROCHLORIDE 5 MG/1
5 TABLET ORAL EVERY 4 HOURS PRN
Status: DISCONTINUED | OUTPATIENT
Start: 2024-01-02 | End: 2024-01-02 | Stop reason: HOSPADM

## 2024-01-02 RX ORDER — IPRATROPIUM BROMIDE 0.5 MG/2.5ML
500 SOLUTION RESPIRATORY (INHALATION) ONCE
Status: DISCONTINUED | OUTPATIENT
Start: 2024-01-02 | End: 2024-01-02 | Stop reason: HOSPADM

## 2024-01-02 RX ORDER — TRANEXAMIC ACID 650 MG/1
1950 TABLET ORAL ONCE
Status: COMPLETED | OUTPATIENT
Start: 2024-01-02 | End: 2024-01-02

## 2024-01-02 RX ORDER — MIDAZOLAM HYDROCHLORIDE 1 MG/ML
INJECTION INTRAMUSCULAR; INTRAVENOUS
Status: COMPLETED
Start: 2024-01-02 | End: 2024-01-02

## 2024-01-02 RX ORDER — TRAMADOL HYDROCHLORIDE 50 MG/1
50 TABLET ORAL EVERY 6 HOURS
Status: DISCONTINUED | OUTPATIENT
Start: 2024-01-02 | End: 2024-01-03 | Stop reason: HOSPADM

## 2024-01-02 RX ORDER — METOCLOPRAMIDE 10 MG/1
10 TABLET ORAL EVERY 6 HOURS PRN
Status: DISCONTINUED | OUTPATIENT
Start: 2024-01-02 | End: 2024-01-03 | Stop reason: HOSPADM

## 2024-01-02 RX ORDER — DIPHENHYDRAMINE HYDROCHLORIDE 50 MG/ML
12.5 INJECTION INTRAMUSCULAR; INTRAVENOUS EVERY 6 HOURS PRN
Status: DISCONTINUED | OUTPATIENT
Start: 2024-01-02 | End: 2024-01-03 | Stop reason: HOSPADM

## 2024-01-02 RX ORDER — MIDAZOLAM HYDROCHLORIDE 1 MG/ML
INJECTION INTRAMUSCULAR; INTRAVENOUS AS NEEDED
Status: DISCONTINUED | OUTPATIENT
Start: 2024-01-02 | End: 2024-01-02

## 2024-01-02 RX ORDER — PREGABALIN 75 MG/1
75 CAPSULE ORAL ONCE
Status: COMPLETED | OUTPATIENT
Start: 2024-01-02 | End: 2024-01-02

## 2024-01-02 RX ORDER — PHENYLEPHRINE HCL IN 0.9% NACL 1 MG/10 ML
SYRINGE (ML) INTRAVENOUS AS NEEDED
Status: DISCONTINUED | OUTPATIENT
Start: 2024-01-02 | End: 2024-01-02

## 2024-01-02 RX ORDER — CYCLOBENZAPRINE HCL 5 MG
5 TABLET ORAL 3 TIMES DAILY PRN
Status: DISCONTINUED | OUTPATIENT
Start: 2024-01-02 | End: 2024-01-03 | Stop reason: HOSPADM

## 2024-01-02 RX ORDER — SODIUM CHLORIDE 0.9 G/100ML
IRRIGANT IRRIGATION AS NEEDED
Status: DISCONTINUED | OUTPATIENT
Start: 2024-01-02 | End: 2024-01-02 | Stop reason: HOSPADM

## 2024-01-02 RX ORDER — MELOXICAM 7.5 MG/1
7.5 TABLET ORAL ONCE
Status: DISCONTINUED | OUTPATIENT
Start: 2024-01-02 | End: 2024-01-02

## 2024-01-02 RX ORDER — LIDOCAINE HYDROCHLORIDE 10 MG/ML
0.1 INJECTION, SOLUTION EPIDURAL; INFILTRATION; INTRACAUDAL; PERINEURAL ONCE
Status: DISCONTINUED | OUTPATIENT
Start: 2024-01-02 | End: 2024-01-02 | Stop reason: HOSPADM

## 2024-01-02 RX ORDER — PROPOFOL 10 MG/ML
INJECTION, EMULSION INTRAVENOUS CONTINUOUS PRN
Status: DISCONTINUED | OUTPATIENT
Start: 2024-01-02 | End: 2024-01-02

## 2024-01-02 RX ORDER — DIPHENHYDRAMINE HCL 25 MG
25 CAPSULE ORAL EVERY 6 HOURS PRN
Status: DISCONTINUED | OUTPATIENT
Start: 2024-01-02 | End: 2024-01-03 | Stop reason: HOSPADM

## 2024-01-02 RX ORDER — OXYCODONE HCL 10 MG/1
10 TABLET, FILM COATED, EXTENDED RELEASE ORAL ONCE
Status: DISCONTINUED | OUTPATIENT
Start: 2024-01-02 | End: 2024-01-02 | Stop reason: HOSPADM

## 2024-01-02 RX ORDER — KETOROLAC TROMETHAMINE 30 MG/ML
15 INJECTION, SOLUTION INTRAMUSCULAR; INTRAVENOUS EVERY 6 HOURS
Status: COMPLETED | OUTPATIENT
Start: 2024-01-02 | End: 2024-01-03

## 2024-01-02 RX ORDER — METOCLOPRAMIDE HYDROCHLORIDE 5 MG/ML
10 INJECTION INTRAMUSCULAR; INTRAVENOUS EVERY 6 HOURS PRN
Status: DISCONTINUED | OUTPATIENT
Start: 2024-01-02 | End: 2024-01-03 | Stop reason: HOSPADM

## 2024-01-02 RX ORDER — POLYETHYLENE GLYCOL 3350 17 G/17G
17 POWDER, FOR SOLUTION ORAL DAILY
Qty: 238 G | Refills: 0 | Status: SHIPPED | OUTPATIENT
Start: 2024-01-02 | End: 2024-01-31 | Stop reason: ALTCHOICE

## 2024-01-02 RX ORDER — PANTOPRAZOLE SODIUM 40 MG/1
40 TABLET, DELAYED RELEASE ORAL
Status: DISCONTINUED | OUTPATIENT
Start: 2024-01-03 | End: 2024-01-03 | Stop reason: HOSPADM

## 2024-01-02 RX ORDER — ALBUTEROL SULFATE 0.83 MG/ML
2.5 SOLUTION RESPIRATORY (INHALATION) ONCE AS NEEDED
Status: DISCONTINUED | OUTPATIENT
Start: 2024-01-02 | End: 2024-01-02 | Stop reason: HOSPADM

## 2024-01-02 RX ORDER — DOCUSATE SODIUM 100 MG/1
100 CAPSULE, LIQUID FILLED ORAL 2 TIMES DAILY
Qty: 60 CAPSULE | Refills: 0 | Status: SHIPPED | OUTPATIENT
Start: 2024-01-02 | End: 2024-02-02

## 2024-01-02 RX ORDER — SODIUM CHLORIDE, SODIUM LACTATE, POTASSIUM CHLORIDE, CALCIUM CHLORIDE 600; 310; 30; 20 MG/100ML; MG/100ML; MG/100ML; MG/100ML
100 INJECTION, SOLUTION INTRAVENOUS CONTINUOUS
Status: DISCONTINUED | OUTPATIENT
Start: 2024-01-02 | End: 2024-01-02 | Stop reason: HOSPADM

## 2024-01-02 RX ORDER — ACETAMINOPHEN 325 MG/1
975 TABLET ORAL ONCE
Status: COMPLETED | OUTPATIENT
Start: 2024-01-02 | End: 2024-01-02

## 2024-01-02 RX ORDER — PREGABALIN 75 MG/1
150 CAPSULE ORAL 2 TIMES DAILY
Status: DISCONTINUED | OUTPATIENT
Start: 2024-01-02 | End: 2024-01-03 | Stop reason: HOSPADM

## 2024-01-02 RX ORDER — SCOLOPAMINE TRANSDERMAL SYSTEM 1 MG/1
1 PATCH, EXTENDED RELEASE TRANSDERMAL
Status: DISCONTINUED | OUTPATIENT
Start: 2024-01-02 | End: 2024-01-02

## 2024-01-02 RX ORDER — MIDAZOLAM HYDROCHLORIDE 1 MG/ML
INJECTION, SOLUTION INTRAMUSCULAR; INTRAVENOUS AS NEEDED
Status: DISCONTINUED | OUTPATIENT
Start: 2024-01-02 | End: 2024-01-02

## 2024-01-02 RX ORDER — SODIUM CHLORIDE, SODIUM LACTATE, POTASSIUM CHLORIDE, CALCIUM CHLORIDE 600; 310; 30; 20 MG/100ML; MG/100ML; MG/100ML; MG/100ML
75 INJECTION, SOLUTION INTRAVENOUS CONTINUOUS
Status: DISCONTINUED | OUTPATIENT
Start: 2024-01-02 | End: 2024-01-02

## 2024-01-02 RX ORDER — VANCOMYCIN HYDROCHLORIDE 1 G/200ML
1 INJECTION, SOLUTION INTRAVENOUS ONCE
Status: COMPLETED | OUTPATIENT
Start: 2024-01-02 | End: 2024-01-02

## 2024-01-02 RX ORDER — OXYCODONE HYDROCHLORIDE 5 MG/1
10 TABLET ORAL EVERY 4 HOURS PRN
Status: DISCONTINUED | OUTPATIENT
Start: 2024-01-02 | End: 2024-01-03 | Stop reason: HOSPADM

## 2024-01-02 RX ORDER — ONDANSETRON 4 MG/1
4 TABLET, FILM COATED ORAL EVERY 8 HOURS PRN
Status: DISCONTINUED | OUTPATIENT
Start: 2024-01-02 | End: 2024-01-03 | Stop reason: HOSPADM

## 2024-01-02 RX ORDER — TRAMADOL HYDROCHLORIDE 50 MG/1
50 TABLET ORAL EVERY 6 HOURS PRN
Qty: 28 TABLET | Refills: 0 | Status: SHIPPED | OUTPATIENT
Start: 2024-01-02 | End: 2024-01-09 | Stop reason: SDUPTHER

## 2024-01-02 RX ORDER — OXYCODONE HYDROCHLORIDE 5 MG/1
5 TABLET ORAL EVERY 6 HOURS PRN
Qty: 28 TABLET | Refills: 0 | Status: SHIPPED | OUTPATIENT
Start: 2024-01-02 | End: 2024-01-09 | Stop reason: SDUPTHER

## 2024-01-02 RX ORDER — NITROGLYCERIN 0.4 MG/1
0.4 TABLET SUBLINGUAL EVERY 5 MIN PRN
Status: DISCONTINUED | OUTPATIENT
Start: 2024-01-02 | End: 2024-01-03 | Stop reason: HOSPADM

## 2024-01-02 RX ORDER — CEFAZOLIN SODIUM 2 G/100ML
2 INJECTION, SOLUTION INTRAVENOUS EVERY 6 HOURS
Status: COMPLETED | OUTPATIENT
Start: 2024-01-02 | End: 2024-01-03

## 2024-01-02 RX ORDER — DEXAMETHASONE SODIUM PHOSPHATE 4 MG/ML
INJECTION, SOLUTION INTRA-ARTICULAR; INTRALESIONAL; INTRAMUSCULAR; INTRAVENOUS; SOFT TISSUE
Status: DISPENSED
Start: 2024-01-02 | End: 2024-01-02

## 2024-01-02 RX ORDER — ACETAMINOPHEN 500 MG
1000 TABLET ORAL EVERY 6 HOURS PRN
Qty: 224 TABLET | Refills: 0 | Status: SHIPPED | OUTPATIENT
Start: 2024-01-02 | End: 2024-01-31

## 2024-01-02 RX ORDER — BUPIVACAINE HYDROCHLORIDE 7.5 MG/ML
INJECTION, SOLUTION EPIDURAL; RETROBULBAR
Status: DISPENSED
Start: 2024-01-02 | End: 2024-01-02

## 2024-01-02 RX ORDER — ACETAMINOPHEN 325 MG/1
650 TABLET ORAL EVERY 4 HOURS PRN
Status: DISCONTINUED | OUTPATIENT
Start: 2024-01-02 | End: 2024-01-02 | Stop reason: HOSPADM

## 2024-01-02 RX ORDER — FENTANYL CITRATE 50 UG/ML
INJECTION, SOLUTION INTRAMUSCULAR; INTRAVENOUS AS NEEDED
Status: DISCONTINUED | OUTPATIENT
Start: 2024-01-02 | End: 2024-01-02

## 2024-01-02 RX ORDER — ONDANSETRON HYDROCHLORIDE 2 MG/ML
4 INJECTION, SOLUTION INTRAVENOUS ONCE AS NEEDED
Status: DISCONTINUED | OUTPATIENT
Start: 2024-01-02 | End: 2024-01-02 | Stop reason: HOSPADM

## 2024-01-02 RX ORDER — MELOXICAM 15 MG/1
15 TABLET ORAL DAILY
Qty: 30 TABLET | Refills: 0 | Status: SHIPPED | OUTPATIENT
Start: 2024-01-02 | End: 2024-01-03 | Stop reason: HOSPADM

## 2024-01-02 RX ORDER — CEFAZOLIN SODIUM 2 G/100ML
2 INJECTION, SOLUTION INTRAVENOUS ONCE
Status: DISCONTINUED | OUTPATIENT
Start: 2024-01-02 | End: 2024-01-02 | Stop reason: HOSPADM

## 2024-01-02 RX ORDER — ASPIRIN 81 MG/1
81 TABLET ORAL 2 TIMES DAILY
Qty: 60 TABLET | Refills: 0 | Status: SHIPPED | OUTPATIENT
Start: 2024-01-02 | End: 2024-01-13 | Stop reason: HOSPADM

## 2024-01-02 RX ORDER — OXYCODONE HYDROCHLORIDE 5 MG/1
5 TABLET ORAL EVERY 4 HOURS PRN
Status: DISCONTINUED | OUTPATIENT
Start: 2024-01-02 | End: 2024-01-03 | Stop reason: HOSPADM

## 2024-01-02 RX ORDER — ALLOPURINOL 300 MG/1
300 TABLET ORAL DAILY
Status: DISCONTINUED | OUTPATIENT
Start: 2024-01-02 | End: 2024-01-03 | Stop reason: HOSPADM

## 2024-01-02 RX ORDER — ATORVASTATIN CALCIUM 10 MG/1
10 TABLET, FILM COATED ORAL DAILY
Status: DISCONTINUED | OUTPATIENT
Start: 2024-01-02 | End: 2024-01-03 | Stop reason: HOSPADM

## 2024-01-02 RX ORDER — HYDROMORPHONE HYDROCHLORIDE 1 MG/ML
1 INJECTION, SOLUTION INTRAMUSCULAR; INTRAVENOUS; SUBCUTANEOUS EVERY 2 HOUR PRN
Status: DISCONTINUED | OUTPATIENT
Start: 2024-01-02 | End: 2024-01-03 | Stop reason: HOSPADM

## 2024-01-02 RX ORDER — ASPIRIN 81 MG/1
81 TABLET ORAL 2 TIMES DAILY
Status: DISCONTINUED | OUTPATIENT
Start: 2024-01-03 | End: 2024-01-03 | Stop reason: HOSPADM

## 2024-01-02 RX ADMIN — DOCUSATE SODIUM 100 MG: 100 CAPSULE, LIQUID FILLED ORAL at 20:47

## 2024-01-02 RX ADMIN — HYDROMORPHONE HYDROCHLORIDE 0.5 MG: 1 INJECTION, SOLUTION INTRAMUSCULAR; INTRAVENOUS; SUBCUTANEOUS at 14:15

## 2024-01-02 RX ADMIN — KETOROLAC TROMETHAMINE 15 MG: 30 INJECTION, SOLUTION INTRAMUSCULAR; INTRAVENOUS at 20:47

## 2024-01-02 RX ADMIN — OXYCODONE HYDROCHLORIDE 10 MG: 5 TABLET ORAL at 22:11

## 2024-01-02 RX ADMIN — PROPOFOL 100 MCG/KG/MIN: 10 INJECTION, EMULSION INTRAVENOUS at 11:33

## 2024-01-02 RX ADMIN — FENTANYL CITRATE 50 MCG: 50 INJECTION, SOLUTION INTRAMUSCULAR; INTRAVENOUS at 11:19

## 2024-01-02 RX ADMIN — DIPHENHYDRAMINE HYDROCHLORIDE 25 MG: 25 CAPSULE ORAL at 18:17

## 2024-01-02 RX ADMIN — CEFAZOLIN 2 G: 1 INJECTION, POWDER, FOR SOLUTION INTRAMUSCULAR; INTRAVENOUS at 11:33

## 2024-01-02 RX ADMIN — TRAMADOL HYDROCHLORIDE 50 MG: 50 TABLET, COATED ORAL at 16:15

## 2024-01-02 RX ADMIN — Medication 100 MCG: at 11:39

## 2024-01-02 RX ADMIN — TRANEXAMIC ACID 1000 MG: 1 INJECTION, SOLUTION INTRAVENOUS at 11:33

## 2024-01-02 RX ADMIN — MIDAZOLAM HYDROCHLORIDE 2 MG: 1 INJECTION, SOLUTION INTRAMUSCULAR; INTRAVENOUS at 11:01

## 2024-01-02 RX ADMIN — VANCOMYCIN HYDROCHLORIDE 1 G: 1 INJECTION, SOLUTION INTRAVENOUS at 09:39

## 2024-01-02 RX ADMIN — KETOROLAC TROMETHAMINE 15 MG: 30 INJECTION, SOLUTION INTRAMUSCULAR; INTRAVENOUS at 16:13

## 2024-01-02 RX ADMIN — OXYCODONE HYDROCHLORIDE 10 MG: 5 TABLET ORAL at 16:14

## 2024-01-02 RX ADMIN — POLYETHYLENE GLYCOL 3350 17 G: 17 POWDER, FOR SOLUTION ORAL at 16:13

## 2024-01-02 RX ADMIN — CEFAZOLIN SODIUM 2 G: 2 INJECTION, SOLUTION INTRAVENOUS at 18:17

## 2024-01-02 RX ADMIN — MELOXICAM 7.5 MG: 7.5 TABLET ORAL at 09:19

## 2024-01-02 RX ADMIN — TRANEXAMIC ACID 1950 MG: 650 TABLET ORAL at 16:16

## 2024-01-02 RX ADMIN — FENTANYL CITRATE 25 MCG: 50 INJECTION, SOLUTION INTRAMUSCULAR; INTRAVENOUS at 11:32

## 2024-01-02 RX ADMIN — POVIDONE-IODINE 1 APPLICATION: 5 SOLUTION TOPICAL at 09:18

## 2024-01-02 RX ADMIN — PREGABALIN 150 MG: 75 CAPSULE ORAL at 20:47

## 2024-01-02 RX ADMIN — MIDAZOLAM HYDROCHLORIDE 2 MG: 1 INJECTION, SOLUTION INTRAMUSCULAR; INTRAVENOUS at 11:19

## 2024-01-02 RX ADMIN — TRAMADOL HYDROCHLORIDE 50 MG: 50 TABLET, COATED ORAL at 20:47

## 2024-01-02 RX ADMIN — ATORVASTATIN CALCIUM 10 MG: 10 TABLET, FILM COATED ORAL at 16:14

## 2024-01-02 RX ADMIN — FENTANYL CITRATE 25 MCG: 50 INJECTION, SOLUTION INTRAMUSCULAR; INTRAVENOUS at 13:09

## 2024-01-02 RX ADMIN — SODIUM CHLORIDE, POTASSIUM CHLORIDE, SODIUM LACTATE AND CALCIUM CHLORIDE 50 ML/HR: 600; 310; 30; 20 INJECTION, SOLUTION INTRAVENOUS at 16:20

## 2024-01-02 RX ADMIN — PREGABALIN 75 MG: 75 CAPSULE ORAL at 09:18

## 2024-01-02 RX ADMIN — SODIUM CHLORIDE, POTASSIUM CHLORIDE, SODIUM LACTATE AND CALCIUM CHLORIDE 75 ML/HR: 600; 310; 30; 20 INJECTION, SOLUTION INTRAVENOUS at 09:19

## 2024-01-02 RX ADMIN — ACETAMINOPHEN 975 MG: 325 TABLET ORAL at 09:18

## 2024-01-02 RX ADMIN — SCOPALAMINE 1 PATCH: 1 PATCH, EXTENDED RELEASE TRANSDERMAL at 09:19

## 2024-01-02 RX ADMIN — ALLOPURINOL 300 MG: 300 TABLET ORAL at 16:15

## 2024-01-02 SDOH — SOCIAL STABILITY: SOCIAL INSECURITY: DOES ANYONE TRY TO KEEP YOU FROM HAVING/CONTACTING OTHER FRIENDS OR DOING THINGS OUTSIDE YOUR HOME?: NO

## 2024-01-02 SDOH — SOCIAL STABILITY: SOCIAL INSECURITY: DO YOU FEEL UNSAFE GOING BACK TO THE PLACE WHERE YOU ARE LIVING?: NO

## 2024-01-02 SDOH — SOCIAL STABILITY: SOCIAL INSECURITY: ABUSE: ADULT

## 2024-01-02 SDOH — SOCIAL STABILITY: SOCIAL INSECURITY: ARE THERE ANY APPARENT SIGNS OF INJURIES/BEHAVIORS THAT COULD BE RELATED TO ABUSE/NEGLECT?: NO

## 2024-01-02 SDOH — SOCIAL STABILITY: SOCIAL INSECURITY: ARE YOU OR HAVE YOU BEEN THREATENED OR ABUSED PHYSICALLY, EMOTIONALLY, OR SEXUALLY BY ANYONE?: NO

## 2024-01-02 SDOH — SOCIAL STABILITY: SOCIAL INSECURITY: HAVE YOU HAD THOUGHTS OF HARMING ANYONE ELSE?: NO

## 2024-01-02 SDOH — SOCIAL STABILITY: SOCIAL INSECURITY: HAS ANYONE EVER THREATENED TO HURT YOUR FAMILY OR YOUR PETS?: NO

## 2024-01-02 SDOH — SOCIAL STABILITY: SOCIAL INSECURITY: DO YOU FEEL ANYONE HAS EXPLOITED OR TAKEN ADVANTAGE OF YOU FINANCIALLY OR OF YOUR PERSONAL PROPERTY?: NO

## 2024-01-02 SDOH — SOCIAL STABILITY: SOCIAL INSECURITY: WERE YOU ABLE TO COMPLETE ALL THE BEHAVIORAL HEALTH SCREENINGS?: YES

## 2024-01-02 ASSESSMENT — ACTIVITIES OF DAILY LIVING (ADL)
JUDGMENT_ADEQUATE_SAFELY_COMPLETE_DAILY_ACTIVITIES: YES
LACK_OF_TRANSPORTATION: PATIENT DECLINED
WALKS IN HOME: INDEPENDENT
ADL_ASSISTANCE: INDEPENDENT
ASSISTIVE_DEVICE: EYEGLASSES
HEARING - RIGHT EAR: FUNCTIONAL
TOILETING: INDEPENDENT
HEARING - LEFT EAR: FUNCTIONAL
GROOMING: INDEPENDENT
FEEDING YOURSELF: INDEPENDENT
BATHING: NEEDS ASSISTANCE
DRESSING YOURSELF: NEEDS ASSISTANCE
PATIENT'S MEMORY ADEQUATE TO SAFELY COMPLETE DAILY ACTIVITIES?: YES
ADEQUATE_TO_COMPLETE_ADL: YES

## 2024-01-02 ASSESSMENT — COGNITIVE AND FUNCTIONAL STATUS - GENERAL
MOBILITY SCORE: 19
TOILETING: A LITTLE
MOVING TO AND FROM BED TO CHAIR: A LITTLE
CLIMB 3 TO 5 STEPS WITH RAILING: TOTAL
TOILETING: A LITTLE
DAILY ACTIVITIY SCORE: 20
HELP NEEDED FOR BATHING: A LITTLE
MOBILITY SCORE: 12
TURNING FROM BACK TO SIDE WHILE IN FLAT BAD: A LITTLE
WALKING IN HOSPITAL ROOM: A LOT
WALKING IN HOSPITAL ROOM: TOTAL
MOBILITY SCORE: 16
CLIMB 3 TO 5 STEPS WITH RAILING: A LITTLE
STANDING UP FROM CHAIR USING ARMS: A LITTLE
TURNING FROM BACK TO SIDE WHILE IN FLAT BAD: A LITTLE
STANDING UP FROM CHAIR USING ARMS: A LOT
DRESSING REGULAR LOWER BODY CLOTHING: A LOT
CLIMB 3 TO 5 STEPS WITH RAILING: TOTAL
PATIENT BASELINE BEDBOUND: NO
MOVING TO AND FROM BED TO CHAIR: A LITTLE
MOVING FROM LYING ON BACK TO SITTING ON SIDE OF FLAT BED WITH BEDRAILS: A LITTLE
WALKING IN HOSPITAL ROOM: A LITTLE
DAILY ACTIVITIY SCORE: 20
HELP NEEDED FOR BATHING: A LITTLE
TURNING FROM BACK TO SIDE WHILE IN FLAT BAD: A LITTLE
MOVING TO AND FROM BED TO CHAIR: A LOT
DRESSING REGULAR LOWER BODY CLOTHING: A LOT
STANDING UP FROM CHAIR USING ARMS: A LITTLE

## 2024-01-02 ASSESSMENT — PAIN SCALES - GENERAL
PAINLEVEL_OUTOF10: 7
PAINLEVEL_OUTOF10: 0 - NO PAIN
PAINLEVEL_OUTOF10: 4
PAINLEVEL_OUTOF10: 5 - MODERATE PAIN
PAINLEVEL_OUTOF10: 0 - NO PAIN
PAINLEVEL_OUTOF10: 0 - NO PAIN
PAINLEVEL_OUTOF10: 8
PAINLEVEL_OUTOF10: 7
PAINLEVEL_OUTOF10: 5 - MODERATE PAIN
PAINLEVEL_OUTOF10: 6
PAINLEVEL_OUTOF10: 8
PAINLEVEL_OUTOF10: 0 - NO PAIN

## 2024-01-02 ASSESSMENT — LIFESTYLE VARIABLES
PRESCIPTION_ABUSE_PAST_12_MONTHS: NO
HOW OFTEN DURING THE LAST YEAR HAVE YOU FAILED TO DO WHAT WAS NORMALLY EXPECTED FROM YOU BECAUSE OF DRINKING: NEVER
HAVE YOU OR SOMEONE ELSE BEEN INJURED AS A RESULT OF YOUR DRINKING: NO
HOW OFTEN DO YOU HAVE A DRINK CONTAINING ALCOHOL: 4 OR MORE TIMES A WEEK
AUDIT-C TOTAL SCORE: 7
AUDIT TOTAL SCORE: 7
HOW OFTEN DURING THE LAST YEAR HAVE YOU FOUND THAT YOU WERE NOT ABLE TO STOP DRINKING ONCE YOU HAD STARTED: NEVER
HOW OFTEN DO YOU HAVE 6 OR MORE DRINKS ON ONE OCCASION: MONTHLY
HAS A RELATIVE, FRIEND, DOCTOR, OR ANOTHER HEALTH PROFESSIONAL EXPRESSED CONCERN ABOUT YOUR DRINKING OR SUGGESTED YOU CUT DOWN: NO
AUDIT TOTAL SCORE: 0
HOW OFTEN DURING THE LAST YEAR HAVE YOU NEEDED AN ALCOHOLIC DRINK FIRST THING IN THE MORNING TO GET YOURSELF GOING AFTER A NIGHT OF HEAVY DRINKING: NEVER
SUBSTANCE_ABUSE_PAST_12_MONTHS: NO
HOW OFTEN DURING THE LAST YEAR HAVE YOU BEEN UNABLE TO REMEMBER WHAT HAPPENED THE NIGHT BEFORE BECAUSE YOU HAD BEEN DRINKING: NEVER
SKIP TO QUESTIONS 9-10: 0
HOW MANY STANDARD DRINKS CONTAINING ALCOHOL DO YOU HAVE ON A TYPICAL DAY: 3 OR 4
HOW OFTEN DURING THE LAST YEAR HAVE YOU HAD A FEELING OF GUILT OR REMORSE AFTER DRINKING: NEVER
AUDIT-C TOTAL SCORE: 7

## 2024-01-02 ASSESSMENT — PAIN DESCRIPTION - ORIENTATION: ORIENTATION: LEFT

## 2024-01-02 ASSESSMENT — PATIENT HEALTH QUESTIONNAIRE - PHQ9
SUM OF ALL RESPONSES TO PHQ9 QUESTIONS 1 & 2: 1
2. FEELING DOWN, DEPRESSED OR HOPELESS: NOT AT ALL
1. LITTLE INTEREST OR PLEASURE IN DOING THINGS: SEVERAL DAYS

## 2024-01-02 ASSESSMENT — PAIN DESCRIPTION - LOCATION: LOCATION: KNEE

## 2024-01-02 NOTE — ANESTHESIA PROCEDURE NOTES
Spinal Block    Patient location during procedure: OR  Start time: 1/2/2024 11:23 AM  End time: 1/2/2024 11:28 AM  Reason for block: primary anesthetic  Staffing  Performed: NELLI   Authorized by: Jeremi Marley MD    Performed by: NELLI Lares    Preanesthetic Checklist  Completed: patient identified, IV checked, monitors and equipment checked, timeout performed and sterile techniques followed  Block Timeout  RN/Licensed healthcare professional reads aloud to the Anesthesia provider and entire team: Patient identity, procedure with side and site, patient position, and as applicable the availability of implants/special equipment/special requirements.  Patient on coagulant treatment: no  Timeout performed at: 1/2/2024 11:23 AM  Spinal Block  Patient position: sitting  Prep: Betadine  Sterility prep: drape, cap, gloves and mask  Sedation level: light sedation  Patient monitoring: continuous pulse oximetry  Approach: midline  Vertebral space: L3-4  Injection technique: single-shot  Needle  Needle type: pencil-point   Needle gauge: 24 G  Needle length: 4 in  Free flowing CSF: yes    Assessment  Sensory level: T6  Procedure assessment: patient tolerated procedure well with no immediate complications  Additional Notes  3.5 mL 1.5% mepivacaine

## 2024-01-02 NOTE — DISCHARGE INSTRUCTIONS
MD Hamida Prince MPAS, FREDRICK, ATC  Adult Reconstruction and Joint Replacement Surgery  Phone: [phone]     Fax:[phone]              DISCHARGE INSTRUCTIONS      PLEASE READ CAREFULLY BEFORE CONTACTING YOUR PROVIDER.    WE WORK COLLABORATIVELY AS A TEAM. CALLING MULTIPLE STAFF MEMBERS REGARDING THE SAME ISSUE WILL DELAY YOUR CARE.    [employer] IS THE PREFERRED COMMUNICATION FOR ALL TEAM MEMBERS.    POSTOPERATIVE INSTRUCTIONS: TOTAL HIP & TOTAL KNEE ARTHROPLASTY    JOINT CARE TEAM  Please use the information below to contact your care team following surgery.  If you are leaving a message or using the [employer] Chart portal, please include your full name, date of birth and date of surgery so that we can correctly identify you.  Your call will be returned within 1-2 business days, please do not leave multiple messages with other staff regarding a single issue while you are awaiting a return call.     Who to call Contact Information Matters needing handled   Hamida Padilla PA-C  Physician Assistant [facility]  [phone] opt. 2 Prescription Refills   Medical questions/concerns       Lashonda Han MBA, BSN, RN-BC  Ortho Coordinator Alida Nuno, RN, BSN  Ortho Coordinator Ann DURANN-BC  Ortho Nurse Navigator   [phone] Nursing, medical question related questions or concerns within 6 weeks of surgery   Orders for Outpatient Physical Therapy  Prescription refills         Marshall Medel-    [email or web address]           [phone]  opt. 2    [phone] fax  [phone]         Prescription Refills  Scheduling office Visits  Medical questions/concerns  Leave of Absence or other paperwork  Any concerns more than 6 weeks from surgery - an appointment will need to be made     MEDICATION REFILLS - Hamida Padilla PA-C ([employer], pharmacy request or Main Office [phone].    -You  will NOT receive a call indicating that your prescription has been filled.  Please contact your pharmacy with any questions.    Medication refills will be filled Monday-Friday 7am to 1pm ONLY. Please call the office or send a Edgar message for a refill request.  Any requests received outside of this timeframe will be handled on the next business day.  Please do not call multiple times or call other members of the care team for medication needs, this will cause the refill to take longer.    Per State and Institutional policy, pain medications can only be refilled every 7 days for up to six weeks following surgery.    My Chart Portal: If you are using the My Chart portal and are requesting a medication refill, please list what type of surgery you had and left or right side, medication that needs refilled, and pharmacy you would like your medication sent.     WEIGHT BEARING as tolerated left lower extremity    ACTIVITY-As Tolerated    DRIVING & TRAVEL AFTER SURGERY   Patients should anticipate waiting at least 4-6 weeks before traveling long distances after surgery.  You will need to stop to walk around ever 1 hour during your travel to help with blood clot prevention.    Patients may not drive until cleared by the joint nurse or the office and you are off of all narcotics.    DENTAL PROCEDURES & CLEANINGS  You must wait a minimum of 3 months for elective dental appointments after a total joint replacement, including routine cleanings or dental work including bridges, crowns, extractions, etc.. Unless, it is an emergency. You will need a prophylactic antibiotic lifelong prior to any dental visit, cleaning or procedure. Your surgeons office or your dentist may provide a prescription antibiotic. Antibiotics are a lifelong need before dental appointments.      You do not need antibiotics for endoscopic procedures such as colononoscopy or EGD, dematologic biopsies or eye surgeries.    WOUND CARE  If you experience  continued drainage or bleeding, you may cover with abdominal/Maxi pads (purchase at local drug store).  Knee replacements should wrap with an ace wrap.  You may shower with waterproof dressing on. Your surgical bandage will be removed by your home therapist 1 week after surgery. If you have staples intact, home care will remove in 2 weeks. If you have sutures intact, you will need to return to the office in 2 weeks for suture removal. Once the dressing is removed by home care, you may continue to shower. Let soap and water wash over the wound. DO NOT SCRUB.  Steri-strips under the bandage will remain in place until they fall off on their own.  If they are loose, you may gently remove.  If they have not fallen off in two weeks, gently peel them off. Do not remove if pulling causes resistance against the incision.  You will see suture tails sticking out of the ends of the incision.  DO NOT CUT THEM.  They will fall off when the sutures dissolve.  If they are bothersome, cover with a band aid.  Do not soak in a bath tub, hot tub, pool or lake until you are 8 weeks out from surgery.  Do not apply lotions, creams or ointments until you are 6 weeks out from surgery,    PAIN, SWELLING, BRUISING & CLICKING  Pain and swelling are a natural part of your recovery which is considered normal for up to a year after surgery.  Symptoms may be treated with movement, ice, compression stockings, elevating your leg, and by following the pain medication regimen as prescribed.  Bruising is normal for several weeks after surgery. You may also have leg swelling and pain in your shin.  You may ice areas that are tender to help with discomfort.  You are required to wear the provided compression stockings, every day, for 4 weeks following surgery.  Remove the stockings at night and place them back on in the morning.  Pain and swelling may temporarily increase with an increase in activity or exercise.  Use ice after activity.  Audible clicking  with movement or exercises is considered normal following joint replacement.  If this persists at 6 months or 1 year, please notify your surgeon.  You may also feel decreased sensation or numbness near the incision site.  This is normal and sensation may or may not return.    PERSONAL HYGIENE  You may shower upon discharging from the hospital.  Soap and water is permitted to run over the surgical dressing, steri-strips and incision.  Do not scrub directly over these items.  DO NOT soak your incision in a bath, hot tub, pool or pond/lake for a minimum of 8 weeks following your surgery.  DO NOT use lotions, creams, ointments on your wound for a minimum of 6 weeks following your surgery. At that time you may use vitamin E to assist with softening of your incision.      RESTARTING HOME ROUTINE - DIET & MEDICATIONS  Post-operative constipation can result due to a combination of inactivity, anesthesia and pain medication. To help prevent this, you should increase your water and fiber intake. Physical activity such as walking will also help stimulate the bowels.   You may resume your normal diet when you discharge home.  Choose foods that help promote good bowel habits and prevent constipation, such as foods high in fiber.  You may restart your home medications the following day after your surgery UNLESS you have been given alternate instructions.  Follow the instructions given to you on your hospital discharge instructions for more information regarding your home medications.      IN-HOME PHYSICAL THERAPY & OUTPATIENT PHYSICAL THERAPY  In-home physical therapy will start 1-2 days after you get home from the hospital.    The home care agency will call within the first 24-48 hours to set up their first visit.  Please do not call your care team to inquire during this timeframe.  Continue the exercises you were given in the hospital until you have been seen by in-home therapy.  Make sure to provide a phone number with the  ability for the home care staff to leave a message if you do not answer your phone.    Outpatient physical therapy following knee replacement surgery should begin 2-3 weeks after surgery.  You will be given physical therapy order prior to discharge from the hospital. You should call to schedule this appointment ASAP if not already scheduled before surgery.  Waiting until you are ready for outpatient physical therapy will cause a delay in your care.  You may choose any outpatient physical therapy location.      EMERGENCIES - WHEN TO CONTACT THE SURGEON'S OFFICE IMMEDIATELY  Fever >101 with chills that has been present for at least 48 hours.   Excessive bleeding from incision that will not slow down. A small amount of drainage is normal and expected.  Once pressure is applied and the area is covered, do not continue to check the area regularly.  This will remove pressure and bleeding will continue.  Leave in place for 4-6 hours.  Signs of infection of incision-excessive drainage that is soaking through your dressing (especially if it is pus-like), redness that is spreading out from the edges of your incision, or increased warmth around the area.  Excruciating pain for which the pain medication, taken as instructed, is not helping.  Severe calf pain.  Go directly to the emergency room or call 911, if you are experiencing chest pain or difficulty breathing.    ICE & COLD THERAPY INSTRUCTIONS    To assist with pain control and post-op swelling, you should be using ice regularly throughout recovery, especially for the first 6 weeks, regardless of the cold therapy method you use.      Always make sure there is a layer of protection between the cold pad and your skin.    If you are using ICE PACKS or GEL PACKS, you will need to alternate 20 minutes on, 20 minutes off twice per hour.    If you are using an ICE MACHINE, please follow the provided ice machine instructions.  These devices differ from ice or ice packs whereas  the mechanism circulates water through tubing and a pad to provide longer periods of cold therapy to the desired site.  You can use your cold devices around the clock for optimal comfort.  We recommend using cold therapy after working with therapy or completing exercises on your own.  There is no set schedule in which you must follow while using cold therapy.  Below are a few points to remember when using a cold therapy device:    You do not need to need to use the 20 on, 20 off method.  Detach the pad from the cooler and ambulate at least once every hour.  You can check your skin under the pad at this time.  You may wear the cold therapy device during periods of sleep including overnight.  If you wake up during the night, you can check the skin at this time.  You do not need to wake up specifically to perform skin checks.  Empty the cooler and pad when device is not in use.  Follow 's instructions for cleaning your cold therapy device.    Travel Program-If you are a [employer], [place], [employer] travel patient, the above instructions apply BUT you will need to follow-up with your surgeon 1 week prior to traveling home. If you have any questions, please reach out to Corina Miramontes RN, BSN,ONC at [phone].    DISCHARGE MEDICATIONS - Please reference the sample schedule on the reverse side for instructions on how to best schedule medications.    PAIN MEDICATION    ___X_ Tramadol / Oxycodone  Tramadol and Oxycodone have been prescribed for post-operative pain control.    These medications will only be refilled ONCE every 7 days for a period of up to 6 weeks following surgery.  After 6 weeks, you will transition to acetaminophen and over -the- counter anti-inflammatories such as Ibuprofen, Advil or Aleve in conjunction with ICE/COLD THERAPY.   Side effects may be constipation and nausea, vomiting, sleepiness, dizziness, lightheadedness, headache, blurred vision, dry mouth sweating, itching (if you have itching,  over-the -counter Benadryl can be used as needed).  You may NOT operate a motor vehicle while taking these medications or have been cleared by your care team.     ___X_ Acetaminophen (Tylenol)  Acetaminophen has been prescribed as an adjunct for pain control. Take two 500 mg tablets every 6 hours for 4 weeks. You will not receive a refill on this medication.  Do not exceed 4000mg of acetaminophen within a 24 hour period.  Side effects may include nausea, heartburn, drowsiness, and headache.    ___X_ Meloxicam (Mobic)-Meloxicam has been prescribed as an adjunct anti-inflammatory to assist in pain control.    Take one 15mg tablet once daily for 4 weeks.  You will not receive refills on this medication.   Side effects may include nausea.  May not be prescribed if you are on a more potent blood thinner than aspirin or have chronic kidney disease.    BLOOD THINNER    ___X_ Blood Thinner   A blood thinner has been prescribed to prevent blood clots in your leg or lungs. Take as prescribed on the bottle for 4 weeks. You will not receive a refill on this medication.        ANTI NAUSEA    ___X_ Proton Pump Inhibitor (PPI)-Stomach Acid Reduction Medication  If you are already on a PPI, you will continue your regular medication. If you are not, you will be prescribed Pantoprazole to help with nausea and protect your stomach while taking pain medication.  You will not receive a refill on this medication.    STOOL SOFTENERS    ___X_ Colace (Docusate Sodium) & Miralax (polyethylene glycol)  Take both medications to help with constipation while using the Oxycodone and Tramadol for pain control.  You will not receive a refill on this medication.    Continued Constipation  If you continue to be constipated despite daily use of Miralax and colace, you try an over-the-counter Dulcolax Suppository and use per instructions on the package.     SPECIAL INSTRUCTIONS   None    You will not receive refills on the following medications.    Acetaminophen (Tylenol  Meloxicam  Miralax  Colace  Pantoprazole  Blood Thinner  Pain Medication Refills -[phone] or MyChart- Monday through Friday 7am-1pm    FOLLOW-UP- You should have an appointment with either Dr. Bradford or MURPHY Davalos in 6 weeks.         SAMPLE              The times below are an example of how to organize medications to optimize pain control  Your actual medication schedule may vary based on your last dose taken IN THE HOSPITAL      Time 3:00 am 6:00 am 9:00 am 12:00 pm 3:00 pm 6:00 pm 9:00 pm 12:00 am   Medications Tramadol Tylenol  Oxycodone  Miralax   Blood Thinner  Colace  Pantoprazole or other PPI  Tramadol  Meloxicam Tylenol  Oxycodone Tramadol Tylenol  Oxycodone  Miralax Blood Thinner  Colace  Tramadol   Tylenol  Oxycodone            You may begin to wean off the pain medication as your pain remains controlled with increased activity.  The schedules provided are meant to serve as an example.  You may wean off based on your pain control.  Please note that pain medications are not filled beyond 6 weeks after surgery.              The times below are an example of how to WEAN OFF medications WHILE CONTINUING TO OPTIMIZE PAIN CONTROL.  Your actual medication schedule may vary based on your last dose taken.  Time 12:00am 4:00am 8:00am 12:00pm 4:00pm 8:00pm   Med Tramadol Oxycodone   Tramadol Oxycodone Tramadol Oxycodone     Time 12:00am 6:00am 12:00pm 6:00pm   Med Tramadol Oxycodone   Tramadol Oxycodone     Time 12:00am 8:00am 4:00pm   Med Tramadol Oxycodone   Tramadol     Time 12:00am 12:00pm   Med Tramadol Tramadol

## 2024-01-02 NOTE — OP NOTE
Left Knee Total Replacement (L) Operative Note     Date: 2024  OR Location: [facility] A OR    Name: Mikhail Moses, : 1945, Age: 78 y.o., MRN: [id number], Sex: male    Diagnosis  Pre-op Diagnosis     * Unilateral primary osteoarthritis, left knee [M17.12] Post-op Diagnosis     * Unilateral primary osteoarthritis, left knee [M17.12]     Procedures  Left Knee Total Replacement  51841 - ND ARTHRP KNE CONDYLE&PLATU MEDIAL&LAT COMPARTMENTS      Surgeons      * Emmanuel Bradford - Primary    Resident/Fellow/Other Assistant:  Surgeon(s) and Role:     * Almas Guzman MD - Resident - Assisting    Procedure Summary  Anesthesia: Consult  ASA: III  Anesthesia Staff: Anesthesiologist: Jeremi Marley MD  CRNA: RENEA Caballero-CRNA  C-AA: NELLI Lares  Estimated Blood Loss: 25mL  Intra-op Medications:   Medication Name Total Dose   ropivacaine-epinephrine-clonidine-ketorolac 2.46-0.005- 0.0008-0.3mg/mL periarticular syringe 50 mL   sodium chloride 0.9 % irrigation solution 4,000 mL   sterile water irrigation solution 1,000 mL   lactated Ringer's infusion Cannot be calculated              Anesthesia Record               Intraprocedure I/O Totals          Intake    Propofol Drip 0.00 mL    The total shown is the total volume documented since Anesthesia Start was filed.    Total Intake 0 mL          Specimen: No specimens collected     Staff:   Circulator: Tony Jack RN  Relief Circulator: Oly VOSS RN  Scrub Person: Tato Nye; Mehreen Page         Drains and/or Catheters:   Closed/Suction Drain Left;Anterior Knee Accordion 10 Fr. (Active)       Urethral Catheter (Active)       Tourniquet Times:   * Missing tourniquet times found for documented tourniquets in lo *     Implants:  Implants       Type Name Action Serial No.      Joint Knee BONE CEMENT, SMART SET, HIGH VISCOSITY, 40GM - SN/A - [id number] Implanted N/A     Joint Knee INSERT, ATTUNE PS FB, SZ 8, 8MM - SN/A - [id number]  Implanted N/A     Joint Knee DOME, PATELLA, MEDIALIZED, 38MM - SN/A - [id number] Implanted N/A     Joint Knee FEMORAL, ATTUNE PS, RAJINDER, SZ 8, LT - SN/A - [id number] Implanted N/A     Joint Knee TIBAL BASE ATTUNE FB, SZ 6 RAJINDER - SN/A - [id number] Implanted N/A              Findings: advanced OA    Indications: Mikhail Moses is an 78 y.o. male who is having surgery for Unilateral primary osteoarthritis, left knee [M17.12].     The patient was seen in the preoperative area. The risks, benefits, complications, treatment options, non-operative alternatives, expected recovery and outcomes were discussed with the patient. The possibilities of reaction to medication, pulmonary aspiration, injury to surrounding structures, bleeding, recurrent infection, the need for additional procedures, failure to diagnose a condition, and creating a complication requiring transfusion or operation were discussed with the patient. The patient concurred with the proposed plan, giving informed consent.  The site of surgery was properly noted/marked if necessary per policy. The patient has been actively warmed in preoperative area. Preoperative antibiotics have been ordered and given within 1 hours of incision. Venous thrombosis prophylaxis have been ordered including bilateral sequential compression devices    Procedure Details: L TKA  Complications:  None; patient tolerated the procedure well.    Disposition: PACU - hemodynamically stable.  Condition: stable     PREOPERATIVE DIAGNOSIS:  left knee osteoarthritis     POSTOPERATIVE DIAGNOSIS:  left knee osteoarthritis     OPERATION/PROCEDURE:  left total knee arthroplasty     SURGEON:  Emmanuel Bradford MD     ASSISTANT(S):  Thomas    ANESTHESIA:  spinal     LOCATION:  [facility]     ESTIMATED BLOOD LOSS AND INTRAVENOUS FLUIDS:  Please see Anesthesia record.     COMPONENTS USED:  Optisenseuy Manpacksune knee system  1. 8 femur  2. 6 tibia  3. 38 patella  4. 8mm insert     BRIEF CLINICAL NOTE:  The patient is a  77 yo male with advanced osteoarthritis of  their left knee.  He has a history of a remote midshaft femur fracture with minimal angular deformity. They failed conservative treatment and wished to proceed with total knee arthroplasty which is indicated at this time.  We discussed the risks, benefits, and alternatives of surgery  including but not limited to infection, damage to vessel or nerve,  bleeding, soft tissue pain, DVT, PE, problems with anesthesia, lack  of range of motion, continued soft tissue pain, need for further  surgery, etc.  Consent was obtained.  They were taken to the operating  room in order to undergo the procedure.    PRE-OP ROM: 15-95     OPERATIVE REPORT:  The patient was transferred to the operating room table.  Time-out  was performed confirming patient name, medical record number,  surgical site, and adequate and appropriate imaging.  The patient  received appropriate IV antibiotics as well as tranexamic acid.  Once we were prepped and draped,midline skin incision was performed.  Hemostasis was obtained using electrocautery.  Underlying extensor mechanism was easily identified and entered using a standard medial parapatellar arthrotomy performedsharply.  The infrapatellar and suprapatellar fat pads were debrided.Initial medial release was performed.  The patella was subluxed laterally.  Distal femur was entered using a step drill.  We were able to instrument the canal with a  standard guide.  Distal  femoral cut was performed at 7 degrees based of pre-operative templating. The femur was sized and prepared.  The tibia was subluxed forward using a double-prong PCL retractor.  The proximal tibia was cut in neutral mechanical axis using an  extramedullary tibial guide.  We then used the lamina  to clear out the posterior osteophytes and clear the meniscal remnants. We then sized the tibia and prepared it. We cut the patella freehand using a caliper to restore the native patellar  height. We then trialed with multiple polyethylene inserts.  Once I was happy with the position of components and stability of the knee, all trial components were removed.  The  cut bony surfaces were thoroughly irrigated and dried.  We then cemented into place the real components.  The knee was held in extension until all cement was hardened.  All extraneous cement was  removed.  We then closed the extensor mechanism over a drain using interrupted #2 Ethibond as well as interrupted 0 Vicryl.  The subcu was closed with interrupted 2-0 Vicryl.  The skin was closed with  running 3-0 Biosyn followed by Dermabond and Steri-Strips.  Dry sterile dressing was placed.  The patient was transferred back to the hospital bed without incident or complication.  She will be  weightbearing as tolerated.  They will be on ASA and SCDs for DVT  prophylaxis.     Additional Details: WBAT, ASA    Attending Attestation: I was present and scrubbed for the key portions of the procedure.

## 2024-01-02 NOTE — ANESTHESIA PROCEDURE NOTES
Peripheral Block    Patient location during procedure: pre-op  Start time: 1/2/2024 10:02 AM  End time: 1/2/2024 10:12 AM  Reason for block: procedure for pain, at surgeon's request and post-op pain management  Staffing  Performed: attending   Authorized by: Jeremi Marley MD    Performed by: Jeremi Marley MD  Preanesthetic Checklist  Completed: patient identified, IV checked, site marked, risks and benefits discussed, surgical consent, monitors and equipment checked, pre-op evaluation and timeout performed   Timeout performed at: 1/2/2024 10:02 AM  Peripheral Block  Patient position: laying flat  Prep: alcohol swabs  Patient monitoring: continuous pulse ox  Block type: adductor canal  Injection technique: single-shot  Guidance: ultrasound guided  Local infiltration: lidocaine  Needle  Needle type: short-bevel   Needle gauge: 22 G  Needle length: 8 cm  Needle localization: ultrasound guidance     image stored in chart  Test dose: negative  Assessment  Injection assessment: negative aspiration for heme, no paresthesia on injection, incremental injection and local visualized surrounding nerve on ultrasound  Heart rate change: no  Slow fractionated injection: yes  Additional Notes  40 ml 0.375% bupivacaine with 1:200K epi and 4 mg dexamethasone injected

## 2024-01-02 NOTE — PROGRESS NOTES
Orthopaedic Joints Service Progress Note    Subjective:  Patient was evaluated on the floor postoperatively. Pain controlled on current regimen. Numbness and tingling still present but improving.    Objective:  Vitals:  Vitals:    01/02/24 1516   BP: 159/88   Pulse: 62   Resp: 18   Temp: 36.1 °C (97 °F)   SpO2: 93%       Physical Examination:  - Constitutional: no acute distress, alert, cooperative  - Eyes: anicteric  - Head/Neck: normocephalic, atraumatic  - Respiratory/Thorax: normal work of breathing  - Cardiovascular: extremities warm and well perfused  - Gastrointestinal: non-distended  - Psychological: appropriate mood/behavior  - Skin: warm and dry; additional findings in musculoskeletal evaluation  - Musculoskeletal:    LEFT Lower Extremity Exam:  -Appearance: dressing clean/dry/intact; exposed skin intact; no gross deformity, ecchymosis, erythema  -HV drain to suction with bloody output  -Appropriately TTP about surgical site  -Motor intact DF/PF/EHL/FHL  -SILT in saph/sural/SPN/DPN/tibial nerve distributions (slightly diminished in setting of spinal anesthetic)  -Foot wwp, 2+ DP/PT pulse    Imaging:  No postoperative imaging obtained     Assessment: 78 y.o. male with PMHx HTN, HLD, CAD, gout, GERD, COPD, urethral stricture, pulmonary arterial HTN, CKD 2 with L knee arthritis s/p L TKA by Dr. Bradford on 1/2/24, with routine postoperative recovery.    Plan:  - Weightbearing status: WBAT LLE  - Precautions: none  - Imaging: none indicated  - Pain: multimodal regimen  - Perioperative antibiotics: vancomycin preop, Ancef 2g q8h x24 hrs  - DVT prophylaxis: SCDs, ASA 81 mg BID  - Dressing: maintain mepilex dressing  - Drain: HV drain x1, please empty and record output every 8 hours. Plan to remove POD 1.  - Paul: not indicated from orthopaedic standpoint  - FEN: maintenance IV fluids; HLIV with good PO intake  - Diet: ADAT to regular diet  - Pulm: encourage IS, maintain O2 sat >92%  - Bowel Regimen  -  Continue home medications  - PT/OT consult  - Daily CBC, BMP    Dispo: anticipate home with [facility] POD 1 pending PT/OT    Patient was staffed with attending surgeon, Dr. Bradford.    Almas Guzman MD  Orthopaedic Surgery PGY-4    Please Vocera the on-call Orthopaedic HEATHER with questions/concerns.    Orthopaedic Joints Service  Mik De Leon MD (PGY-1)  Francisco Hemphill MD (PGY-2)  Almas Guzman MD (PGY-4)  Available by Epic Chat

## 2024-01-02 NOTE — HH CARE COORDINATION
Home Care received a Referral for Physical Therapy and Occupational Therapy. We have processed the referral for a Start of Care on 1/3/24.     If you have any questions or concerns, please feel free to contact us at [phone]. Follow the prompts, enter your five digit zip code, and you will be directed to your care team on WEST 3.

## 2024-01-02 NOTE — DISCHARGE SUMMARY
MD Hamida Prince, DAGMARS, PALevarC, ATC  Adult Reconstruction and Joint Replacement Surgery  Phone: [phone]     Fax:[phone]                      Discharge Summary    Discharge Diagnosis  Left Total Knee Arthroplasty    Issues Requiring Follow-Up  Home care services to start within 48 hours. Outpatient PT to start 2 weeks  S/P total Joint for Knees only.    Test Results Pending At Discharge  Pending Labs       No current pending labs.            Hospital Course  Patient underwent Left Total Knee Arthroplasty on 1/2/2024 without complications. The patient was then taken to the PACU in stable condition. Patient was then transferred to the or.  Pain was appropriately controlled. Diet was advanced as tolerated. Patient progressed adequately through their recovery during hospital stay including PT/ OT and were recommended for discharge. Patient was then discharged on  to home in stable condition. Patient had uneventful hospital course. Patient was instructed on the use of pain medications as needed for pain. The signs and symptoms of infection were discussed and the patient was given our number to call should they have any questions or concerns following discharge.    Based on my clinical judgment, the patient was provided with a 7-day prescription for opioid medication at 30 MED, indicated for treatment of acute pain in the setting of recent Total Joint Arthroplasty. OARRS report was run and has demonstrated an appropriate time course.  The patient has been provided with counseling pertaining to safe use of opioid medication.    Patient may use operative extremity WBAT with use of walker for assistance with ambulation .  Mepilex dressing to be removed POD # 7 by home care and incision left open to air  OAC for DVT prophylaxis started on POD #1 and to be taken for 30 days    Patient is to follow-up in 6 weeks at scheduled post-op visit.     Face-to Face  Pertinent Physical Exam At  Time of Discharge  Physical Exam  Vitals and nursing note reviewed. VSS, Afebrile  Constitutional:       Appearance: Normal appearance, awake and alert.  HENT:      Head: Normocephalic and atraumatic.       Pupils: Pupils are equal, round, and reactive to light.   Cardiovascular:      Rate and Rhythm: Normal rate and regular rhythm.   Pulmonary:      Effort: Pulmonary effort is normal.     Abdominal:         Palpations: Abdomen is soft.   Musculoskeletal:   Sensation intact bilaterally, sural/saph/sp/tibal n.  Motor intact flexion/extension/DF/PF/EHL/FHL bilaterally. Palpable symmetric DP/PT pulse bilaterally.    Skin:      Bulky Dressing intact to the surgical extremity. No signs of gross bloody or purulent drainage.     General: Skin is warm and dry.      Capillary Refill: Capillary refill takes less than 2 seconds.   Neurological:      General: No focal deficit present.      Mental Status: She is alert and oriented to person, place, and time. Mental status is at baseline.   Psychiatric:         Mood and Affect: Mood normal.        Home Medications  Scheduled medications    Current Facility-Administered Medications:     acetaminophen (Tylenol) tablet 650 mg, 650 mg, oral, q6h PRN, Almas Guzman MD    allopurinol (Zyloprim) tablet 300 mg, 300 mg, oral, Daily, Almas Guzman MD, 300 mg at 01/02/24 1615    aspirin EC tablet 81 mg, 81 mg, oral, BID, Almas Guzman MD    atorvastatin (Lipitor) tablet 10 mg, 10 mg, oral, Daily, Almas Guzman MD, 10 mg at 01/02/24 1614    benzocaine-menthol (Cepastat Sore Throat) 15-3.6 mg lozenge 1 lozenge, 1 lozenge, Mouth/Throat, q4h PRN, Almas Guzman MD    bisacodyl (Dulcolax) EC tablet 10 mg, 10 mg, oral, Daily PRN, Almas Guzman MD    bisacodyl (Dulcolax) suppository 10 mg, 10 mg, rectal, Daily PRN, Almas Guzman MD    cyclobenzaprine (Flexeril) tablet 5 mg, 5 mg, oral, TID PRN, Almas Guzman MD    diphenhydrAMINE (BENADryl) capsule 25 mg, 25 mg, oral, q6h PRN,  Emmanuel Bradford MD, 25 mg at 01/02/24 1817    diphenhydrAMINE (BENADryl) injection 12.5 mg, 12.5 mg, intravenous, q6h PRN, Almas Guzman MD    docusate sodium (Colace) capsule 100 mg, 100 mg, oral, BID, Almas Guzman MD, 100 mg at 01/02/24 2047    HYDROmorphone (Dilaudid) injection 1 mg, 1 mg, intravenous, q2h PRN, Almas Guzman MD    ketorolac (Toradol) injection 15 mg, 15 mg, intravenous, q6h, Almas Guzman MD, 15 mg at 01/03/24 0411    lactated Ringer's infusion, 50 mL/hr, intravenous, Continuous, Almas Guzman MD, Last Rate: 50 mL/hr at 01/03/24 0552, 50 mL/hr at 01/03/24 0552    metoclopramide (Reglan) tablet 10 mg, 10 mg, oral, q6h PRN **OR** metoclopramide (Reglan) injection 10 mg, 10 mg, intravenous, q6h PRN, Almas Guzman MD    metoprolol succinate XL (Toprol-XL) 24 hr tablet 25 mg, 25 mg, oral, Daily, Almas Guzman MD    naloxone (Narcan) injection 0.2 mg, 0.2 mg, intravenous, q5 min PRN, Alams Guzman MD    nitroglycerin (Nitrostat) SL tablet 0.4 mg, 0.4 mg, sublingual, q5 min PRN, Almas Guzman MD    ondansetron (Zofran) tablet 4 mg, 4 mg, oral, q8h PRN **OR** ondansetron (Zofran) injection 4 mg, 4 mg, intravenous, q8h PRN, Almas Guzman MD    oxyCODONE (Roxicodone) immediate release tablet 10 mg, 10 mg, oral, q4h PRN, Almas Guzman MD, 10 mg at 01/03/24 0228    oxyCODONE (Roxicodone) immediate release tablet 5 mg, 5 mg, oral, q4h PRN, Almas Guzman MD    oxygen (O2) therapy, 2 L/min, inhalation, Continuous, Almas Guzman MD, Stopped at 01/02/24 1515    pantoprazole (ProtoNix) EC tablet 40 mg, 40 mg, oral, Daily before breakfast, Almas Guzman MD, 40 mg at 01/03/24 0602    polyethylene glycol (Glycolax, Miralax) packet 17 g, 17 g, oral, Daily, Almas Guzman MD, 17 g at 01/02/24 1613    pregabalin (Lyrica) capsule 150 mg, 150 mg, oral, BID, Almas Guzman MD, 150 mg at 01/02/24 2047    traMADol (Ultram) tablet 50 mg, 50 mg, oral, q6h, Almas Guzman MD, 50 mg at  01/03/24 0411     PRN medications      Discharge medications     Your medication list        START taking these medications        Instructions Last Dose Given Next Dose Due   acetaminophen 500 mg tablet  Commonly known as: Tylenol      Take 2 tablets (1,000 mg) by mouth every 6 hours if needed for mild pain (1 - 3) for up to 28 days.       docusate sodium 100 mg capsule  Commonly known as: Colace      Take 1 capsule (100 mg) by mouth 2 times a day.       meloxicam 15 mg tablet  Commonly known as: Mobic      Take 1 tablet (15 mg) by mouth once daily.       oxyCODONE 5 mg immediate release tablet  Commonly known as: Roxicodone      Take 1 tablet (5 mg) by mouth every 6 hours if needed for severe pain (7 - 10) for up to 7 days.       polyethylene glycol 17 gram/dose powder  Commonly known as: Glycolax, Miralax      Mix 1 capful (17 g) in 8 oz of water and drink by mouth once daily       traMADol 50 mg tablet  Commonly known as: Ultram      Take 1 tablet (50 mg) by mouth every 6 hours if needed for severe pain (7 - 10) for up to 7 days.              CHANGE how you take these medications        Instructions Last Dose Given Next Dose Due   aspirin 81 mg EC tablet  What changed: when to take this      Take 1 tablet (81 mg) by mouth 2 times a day.              CONTINUE taking these medications        Instructions Last Dose Given Next Dose Due   allopurinol 300 mg tablet  Commonly known as: Zyloprim      TAKE 1 TABLET EVERY DAY       atorvastatin 10 mg tablet  Commonly known as: Lipitor           furosemide 40 mg tablet  Commonly known as: Lasix           metoprolol succinate XL 25 mg 24 hr tablet  Commonly known as: Toprol-XL           nitroglycerin 0.4 mg SL tablet  Commonly known as: Nitrostat           pantoprazole 40 mg EC tablet  Commonly known as: ProtoNix           pregabalin 50 mg capsule  Commonly known as: Lyrica           ramipril 10 mg capsule  Commonly known as: Altace                  STOP taking these  medications      chlorhexidine 0.12 % solution  Commonly known as: Peridex                  Where to Get Your Medications        These medications were sent to [facility]  [address] , [address], [facility] [address]      Hours: 8 AM to 6 PM Mon-Fri, 9 AM to 1 PM Saturday Phone: [phone]   acetaminophen 500 mg tablet  aspirin 81 mg EC tablet  docusate sodium 100 mg capsule  meloxicam 15 mg tablet  oxyCODONE 5 mg immediate release tablet  polyethylene glycol 17 gram/dose powder  traMADol 50 mg tablet         You have not been prescribed any medications.     Review of Systems   Musculoskeletal:         Left knee discomfort   All other systems reviewed and are negative.      Outpatient Follow-Up  Patient to follow-up with /Hamida Padilla PA-C.  Thank you for trusting us with your care. You should be scheduled for a follow-up post-surgical visit in 6 weeks.    Special Instructions  None    Please read discharge instructions provided by your surgeon before calling with questions as this will delay care.    Medication refills-Oxycodone and Tramadol will be refilled every 7 days per state law. [facility] All medication requests may take up to 72 hours to refill and refills after Friday 1pm will be refilled on the next business day.    Almas Guzman MD  Orthopaedic Surgery PGY-4

## 2024-01-02 NOTE — PERIOPERATIVE NURSING NOTE
1321- PHASE I - Patient to PACU bay at this time in stable condition. Bedside monitors applied to patient upon arrival to PACU. Report received from OR Team at bedside.     1330- Patient tolerating ice chips at this time. Denies nausea    1450- Called and sent report to room 729 RN.     1455- Called patient's wife and gave updates at this time    1459- Patient transported to floor at this time in stable condition and with all belongings via stretcher.

## 2024-01-02 NOTE — ANESTHESIA POSTPROCEDURE EVALUATION
Patient: Mikhail Moses    Procedure Summary       Date: 01/02/24 Room / Location: [facility] A OR 11 / Virtual U A OR    Anesthesia Start: 1105 Anesthesia Stop: 1317    Procedure: Left Knee Total Replacement (Left: Knee) Diagnosis:       Unilateral primary osteoarthritis, left knee      (Unilateral primary osteoarthritis, left knee [M17.12])    Surgeons: Emmanuel Bradford MD Responsible Provider: Jeremi Marley MD    Anesthesia Type: regional, spinal ASA Status: 3            Anesthesia Type: regional, spinal    Vitals Value Taken Time   BP  01/02/24 1334   Temp  01/02/24 1334   Pulse  01/02/24 1334   Resp  01/02/24 1334   SpO2  01/02/24 1334       Anesthesia Post Evaluation    Patient location during evaluation: bedside  Patient participation: complete - patient participated  Level of consciousness: awake  Pain management: adequate  Airway patency: patent  Cardiovascular status: acceptable  Respiratory status: acceptable  Hydration status: acceptable  Postoperative Nausea and Vomiting: none        No notable events documented.

## 2024-01-02 NOTE — PROGRESS NOTES
Physical Therapy    Physical Therapy Evaluation & Treatment    Patient Name: Mikhail Moses  MRN: [id number]  Today's Date: 1/2/2024   Time Calculation  Start Time: 1632  Stop Time: 1714  Time Calculation (min): 42 min    Assessment/Plan   PT Assessment  PT Assessment Results: Decreased strength, Decreased range of motion, Impaired balance, Decreased mobility, Orthopedic restrictions, Pain  Rehab Prognosis: Excellent  Evaluation/Treatment Tolerance: Patient tolerated treatment well  Medical Staff Made Aware: Yes  Strengths: Ability to acquire knowledge  End of Session Communication: Bedside nurse  Assessment Comment: Pt is POD #0 s/p L TKA with total knee precautions and WBAT. The pt presents with decreased safety and decreased independence with bed mobility, transfers, and gait. The pt is also unable to ascend/descend stairs this session. Contributing to these impairment are post-op pain, decreased L knee ROM, decreased L knee strength, decreased balance, and poor stability. The pt would benefit from continued PT to assess progress and further therapy needs, address the above functional limitations and impairments to improve independence and safety.  End of Session Patient Position: Up in chair, Alarm on   IP OR SWING BED PT PLAN  Inpatient or Swing Bed: Inpatient  PT Plan  Treatment/Interventions: Bed mobility, Transfer training, Gait training, Stair training, Balance training, Neuromuscular re-education, Strengthening, Range of motion, Therapeutic exercise, Therapeutic activity, Home exercise program  PT Plan: Skilled PT  PT Frequency: BID  PT Discharge Recommendations: Low intensity level of continued care  PT Recommended Transfer Status: Assist x1  PT - OK to Discharge: Yes (PT POC established)      Subjective     General Visit Information:  General  Reason for Referral: Pt seen POD #0 s/p L TKR.  Past Medical History Relevant to Rehab: CAD, CKD, COPD, GERD, HLD, gout, HLD, HTN, MI  Family/Caregiver Present:  No  Prior to Session Communication: Bedside nurse  Patient Position Received: Bed, 3 rail up, Alarm on  Preferred Learning Style: auditory  General Comment: Pt is pleasant and cooperative, agreeable to PT session.  Home Living:  Home Living  Type of Home: House  Lives With: Spouse  Home Adaptive Equipment: Walker rolling or standard  Home Layout: Able to live on main level with bedroom/bathroom, 1/2 bath on main level (pt stays in basement usually)  Home Access: Stairs to enter with rails  Entrance Stairs-Rails: Left  Entrance Stairs-Number of Steps: 3  Home Living Comments: 10 steps to basement with HR  Prior Level of Function:  Prior Function Per Pt/Caregiver Report  Level of [place]: Independent with ADLs and functional transfers, Independent with homemaking with ambulation  ADL Assistance: Independent  Homemaking Assistance: Independent  Ambulatory Assistance: Independent  Precautions:  Precautions  LE Weight Bearing Status: Weight Bearing as Tolerated  Post-Surgical Precautions: Left total knee precautions  Precautions Comment: hemovac, IV  Vital Signs:  Vital Signs  BP: 138/68 (fwdxehd=948/71; standing=118/57; sitting 139/71)  BP Location: Left arm  Patient Position: Lying    Objective   Pain:  Pain Assessment  Pain Assessment: 0-10  Pain Score: 7 (decreased to 5/10 after PT)  Pain Type: Surgical pain  Pain Location: Knee  Pain Orientation: Left  Pain Interventions: Medication (See MAR), Cold applied, Therapeutic touch  Cognition:  Cognition  Overall Cognitive Status: Within Functional Limits    General Assessments:     Activity Tolerance  Endurance: Endurance does not limit participation in activity    Coordination  Movements are Fluid and Coordinated: Yes    Postural Control  Postural Control: Within Functional Limits    Static Sitting Balance  Static Sitting-Balance Support: Feet supported, Bilateral upper extremity supported  Static Sitting-Level of Assistance: Distant supervision    Static Standing  Balance  Static Standing-Balance Support: Bilateral upper extremity supported  Static Standing-Level of Assistance: Contact guard  Dynamic Standing Balance  Dynamic Standing-Balance Support: Bilateral upper extremity supported  Dynamic Standing-Balance: Turning  Dynamic Standing-Comments: CGA  Functional Assessments:  Bed Mobility  Bed Mobility: Yes  Bed Mobility 1  Bed Mobility 1: Supine to sitting  Level of Assistance 1: Close supervision  Bed Mobility Comments 1: Supine to Sit- Cueing to initially prop up onto elbows then come to long seated position with bilateral elbows fully extended.  Cueing for hand placement posterior to COG to advance hips to the EOB.    Transfers  Transfer: Yes  Transfer 1  Technique 1: Sit to stand  Transfer Device 1: Walker, Gait belt  Transfer Level of Assistance 1: Minimum assistance, Minimal verbal cues, Minimal tactile cues  Trials/Comments 1: Sit to Stand- Education on correct technique with sit to standing with emphasis on scooting towards the edge of the surface, bringing non-operative LE back while keeping the operative LE forward, and leaning slightly forward while coming up to a standing position.    Extremity/Trunk Assessments:  RUE   RUE : Within Functional Limits  LUE   LUE: Within Functional Limits  RLE   RLE : Within Functional Limits (although pt states he needs to get his right knee replaced too)  LLE   LLE : Exceptions to WFL  AROM LLE (degrees)  L Knee Flexion 0-130: 75  L Knee Extension 0-130: 3  Strength LLE  LLE Overall Strength: Greater than or equal to 3/5 as evidenced by functional mobility  Treatments:  Therapeutic Exercise  Therapeutic Exercise Performed: Yes  Therapeutic Exercise Activity 1: Therex per TKR protocol, handout issued/reviewed:  Ankle Pumps- x 10 reps bilaterally with cueing for full AROM and slow pace   Quad Sets- x 10 reps LLE with cueing for maximum quad contractility with a hold x 3-5 seconds to assist with gaining knee extension.   Glut  Sets- x 10 reps with cueing for technique/hold time of 3-5 seconds   Heelslides- x 10 reps LLE with CGA and cueing for relaxation, avoiding holding breath, and for correct hip/knee/foot alignment during ROM.   SAQ- x 10 reps LLE without assist and cueing for full knee extension, to avoid lifting upper thigh off of the pillow/roll which is under the patients posterior distal thigh, and for hold time 3-5 seconds.  SLR- x 10 reps LLE without assist  LAQ- x 10 reps LLE without assist with cues to avoid lifting upper thigh off surface  knee flexion- x 10 reps    Ambulation/Gait Training  Ambulation/Gait Training Performed: Yes  Ambulation/Gait Training 1  Surface 1: Level tile  Device 1: Rolling walker  Gait Support Devices: Gait belt  Assistance 1: Contact guard, Minimal verbal cues  Quality of Gait 1: Decreased step length (no knee buckling noted)  Comments/Distance (ft) 1: 3' sidestep to chair (Pt c/o feeling warm and starting to sweat. Deferred amb away from bed/chair at this time to assess pt. Pt felt better after sitting in chair, but deferred further amb at this time.)  Transfers 2  Technique 2: Stand to sit  Transfer Device 2: Walker  Transfer Level of Assistance 2: Minimum assistance, Minimal verbal cues, Minimal tactile cues  Trials/Comments 2: Stand to Sit- Pt encouraged to feel for the surface of the bed on the posterior aspect of the legs prior to sit down, bring the operative leg forward while reaching back to the surface he/she is about to sit on for safety.  Outcome Measures:  [facility] Basic Mobility  Turning from your back to your side while in a flat bed without using bedrails: None  Moving from lying on your back to sitting on the side of a flat bed without using bedrails: A little  Moving to and from bed to chair (including a wheelchair): A little  Standing up from a chair using your arms (e.g. wheelchair or bedside chair): A little  To walk in hospital room: A little  Climbing 3-5 steps with railing: A  sukhi  Basic Mobility - Total Score: 19    Encounter Problems       Encounter Problems (Active)       Balance       STG - Maintains dynamic standing balance without upper extremity support x 3 min with mod indep       Start:  01/02/24    Expected End:  01/04/24       INTERVENTIONS:  1. Practice standing with minimal support.  2. Educate patient about standing tolerance.  3. Educate patient about independence with gait, transfers, and ADL's.  4. Educate patient about use of assistive device.  5. Educate patient about self-directed care.            Mobility       STG - Patient will ambulate with ww with mod indep x >150'       Start:  01/02/24    Expected End:  01/04/24            STG - Patient will ascend and descend at least 4 steps with 1 HR with mod indep       Start:  01/02/24    Expected End:  01/04/24            Pt will complete TKR HEP x 20 reps indep to increase LLE strength       Start:  01/02/24    Expected End:  01/04/24            Pt will increase L knee ROM to 0-0-90       Start:  01/02/24    Expected End:  01/04/24               Transfers       STG - Patient to transfer to and from sit to supine indep       Start:  01/02/24    Expected End:  01/04/24            STG - Patient will transfer sit to and from stand to ww with mod indep       Start:  01/02/24    Expected End:  01/04/24                   Education Documentation  Handouts, taught by Mary Golden, PT at 1/2/2024  5:41 PM.  Learner: Patient  Readiness: Acceptance  Method: Explanation, Demonstration, Handout  Response: Verbalizes Understanding, Demonstrated Understanding    Precautions, taught by Mary Golden, PT at 1/2/2024  5:41 PM.  Learner: Patient  Readiness: Acceptance  Method: Explanation, Demonstration, Handout  Response: Verbalizes Understanding, Demonstrated Understanding    Body Mechanics, taught by Mary Golden, PT at 1/2/2024  5:41 PM.  Learner: Patient  Readiness: Acceptance  Method: Explanation, Demonstration,  Handout  Response: Verbalizes Understanding, Demonstrated Understanding    Home Exercise Program, taught by Mary Golden, PT at 1/2/2024  5:41 PM.  Learner: Patient  Readiness: Acceptance  Method: Explanation, Demonstration, Handout  Response: Verbalizes Understanding, Demonstrated Understanding    Mobility Training, taught by Mary Golden, PT at 1/2/2024  5:41 PM.  Learner: Patient  Readiness: Acceptance  Method: Explanation, Demonstration, Handout  Response: Verbalizes Understanding, Demonstrated Understanding    Education Comments  No comments found.

## 2024-01-03 ENCOUNTER — PHARMACY VISIT (OUTPATIENT)
Dept: PHARMACY | Facility: CLINIC | Age: 79
End: 2024-01-03
Payer: COMMERCIAL

## 2024-01-03 VITALS
WEIGHT: 214.95 LBS | BODY MASS INDEX: 32.58 KG/M2 | TEMPERATURE: 98.8 F | HEART RATE: 62 BPM | DIASTOLIC BLOOD PRESSURE: 63 MMHG | HEIGHT: 68 IN | SYSTOLIC BLOOD PRESSURE: 124 MMHG | RESPIRATION RATE: 16 BRPM | OXYGEN SATURATION: 94 %

## 2024-01-03 LAB
ANION GAP SERPL CALC-SCNC: 16 MMOL/L (ref 10–20)
BUN SERPL-MCNC: 32 MG/DL (ref 6–23)
CALCIUM SERPL-MCNC: 8.7 MG/DL (ref 8.6–10.3)
CHLORIDE SERPL-SCNC: 100 MMOL/L (ref 98–107)
CO2 SERPL-SCNC: 23 MMOL/L (ref 21–32)
CREAT SERPL-MCNC: 1.56 MG/DL (ref 0.5–1.3)
ERYTHROCYTE [DISTWIDTH] IN BLOOD BY AUTOMATED COUNT: 13.2 % (ref 11.5–14.5)
GFR SERPL CREATININE-BSD FRML MDRD: 45 ML/MIN/1.73M*2
GLUCOSE SERPL-MCNC: 154 MG/DL (ref 74–99)
HCT VFR BLD AUTO: 35.4 % (ref 41–52)
HGB BLD-MCNC: 11.8 G/DL (ref 13.5–17.5)
HOLD SPECIMEN: NORMAL
HOLD SPECIMEN: NORMAL
MCH RBC QN AUTO: 33.1 PG (ref 26–34)
MCHC RBC AUTO-ENTMCNC: 33.3 G/DL (ref 32–36)
MCV RBC AUTO: 99 FL (ref 80–100)
NRBC BLD-RTO: 0 /100 WBCS (ref 0–0)
PLATELET # BLD AUTO: 180 X10*3/UL (ref 150–450)
POTASSIUM SERPL-SCNC: 5 MMOL/L (ref 3.5–5.3)
RBC # BLD AUTO: 3.56 X10*6/UL (ref 4.5–5.9)
SODIUM SERPL-SCNC: 134 MMOL/L (ref 136–145)
WBC # BLD AUTO: 11.6 X10*3/UL (ref 4.4–11.3)

## 2024-01-03 PROCEDURE — 7100000011 HC EXTENDED STAY RECOVERY HOURLY - NURSING UNIT

## 2024-01-03 PROCEDURE — 97116 GAIT TRAINING THERAPY: CPT | Mod: GP,CQ

## 2024-01-03 PROCEDURE — 94760 N-INVAS EAR/PLS OXIMETRY 1: CPT

## 2024-01-03 PROCEDURE — G0378 HOSPITAL OBSERVATION PER HR: HCPCS

## 2024-01-03 PROCEDURE — RXMED WILLOW AMBULATORY MEDICATION CHARGE

## 2024-01-03 PROCEDURE — 2500000004 HC RX 250 GENERAL PHARMACY W/ HCPCS (ALT 636 FOR OP/ED): Performed by: STUDENT IN AN ORGANIZED HEALTH CARE EDUCATION/TRAINING PROGRAM

## 2024-01-03 PROCEDURE — 99232 SBSQ HOSP IP/OBS MODERATE 35: CPT | Performed by: NURSE PRACTITIONER

## 2024-01-03 PROCEDURE — 2500000001 HC RX 250 WO HCPCS SELF ADMINISTERED DRUGS (ALT 637 FOR MEDICARE OP): Performed by: STUDENT IN AN ORGANIZED HEALTH CARE EDUCATION/TRAINING PROGRAM

## 2024-01-03 PROCEDURE — 97530 THERAPEUTIC ACTIVITIES: CPT | Mod: GP,CQ

## 2024-01-03 PROCEDURE — 36415 COLL VENOUS BLD VENIPUNCTURE: CPT | Performed by: STUDENT IN AN ORGANIZED HEALTH CARE EDUCATION/TRAINING PROGRAM

## 2024-01-03 PROCEDURE — 85027 COMPLETE CBC AUTOMATED: CPT | Performed by: STUDENT IN AN ORGANIZED HEALTH CARE EDUCATION/TRAINING PROGRAM

## 2024-01-03 PROCEDURE — 97110 THERAPEUTIC EXERCISES: CPT | Mod: GP,CQ

## 2024-01-03 PROCEDURE — 80048 BASIC METABOLIC PNL TOTAL CA: CPT | Performed by: STUDENT IN AN ORGANIZED HEALTH CARE EDUCATION/TRAINING PROGRAM

## 2024-01-03 RX ADMIN — TRAMADOL HYDROCHLORIDE 50 MG: 50 TABLET, COATED ORAL at 04:11

## 2024-01-03 RX ADMIN — POLYETHYLENE GLYCOL 3350 17 G: 17 POWDER, FOR SOLUTION ORAL at 09:04

## 2024-01-03 RX ADMIN — KETOROLAC TROMETHAMINE 15 MG: 30 INJECTION, SOLUTION INTRAMUSCULAR; INTRAVENOUS at 04:11

## 2024-01-03 RX ADMIN — TRAMADOL HYDROCHLORIDE 50 MG: 50 TABLET, COATED ORAL at 15:27

## 2024-01-03 RX ADMIN — TRAMADOL HYDROCHLORIDE 50 MG: 50 TABLET, COATED ORAL at 09:04

## 2024-01-03 RX ADMIN — ASPIRIN 81 MG: 81 TABLET, COATED ORAL at 09:04

## 2024-01-03 RX ADMIN — ATORVASTATIN CALCIUM 10 MG: 10 TABLET, FILM COATED ORAL at 09:05

## 2024-01-03 RX ADMIN — PANTOPRAZOLE SODIUM 40 MG: 40 TABLET, DELAYED RELEASE ORAL at 06:02

## 2024-01-03 RX ADMIN — OXYCODONE HYDROCHLORIDE 10 MG: 5 TABLET ORAL at 10:32

## 2024-01-03 RX ADMIN — METOPROLOL SUCCINATE 25 MG: 25 TABLET, EXTENDED RELEASE ORAL at 09:04

## 2024-01-03 RX ADMIN — DOCUSATE SODIUM 100 MG: 100 CAPSULE, LIQUID FILLED ORAL at 09:04

## 2024-01-03 RX ADMIN — OXYCODONE HYDROCHLORIDE 10 MG: 5 TABLET ORAL at 02:28

## 2024-01-03 RX ADMIN — PREGABALIN 150 MG: 75 CAPSULE ORAL at 09:04

## 2024-01-03 RX ADMIN — ALLOPURINOL 300 MG: 300 TABLET ORAL at 09:05

## 2024-01-03 RX ADMIN — CEFAZOLIN SODIUM 2 G: 2 INJECTION, SOLUTION INTRAVENOUS at 05:53

## 2024-01-03 RX ADMIN — OXYCODONE HYDROCHLORIDE 5 MG: 5 TABLET ORAL at 15:27

## 2024-01-03 RX ADMIN — KETOROLAC TROMETHAMINE 15 MG: 30 INJECTION, SOLUTION INTRAMUSCULAR; INTRAVENOUS at 09:04

## 2024-01-03 RX ADMIN — CEFAZOLIN SODIUM 2 G: 2 INJECTION, SOLUTION INTRAVENOUS at 00:11

## 2024-01-03 RX ADMIN — CYCLOBENZAPRINE HYDROCHLORIDE 5 MG: 5 TABLET, FILM COATED ORAL at 09:04

## 2024-01-03 ASSESSMENT — COGNITIVE AND FUNCTIONAL STATUS - GENERAL
TURNING FROM BACK TO SIDE WHILE IN FLAT BAD: A LITTLE
DRESSING REGULAR UPPER BODY CLOTHING: A LITTLE
MOBILITY SCORE: 19
CLIMB 3 TO 5 STEPS WITH RAILING: A LITTLE
STANDING UP FROM CHAIR USING ARMS: A LITTLE
WALKING IN HOSPITAL ROOM: A LITTLE
DAILY ACTIVITIY SCORE: 19
WALKING IN HOSPITAL ROOM: A LITTLE
MOBILITY SCORE: 19
CLIMB 3 TO 5 STEPS WITH RAILING: A LITTLE
MOVING TO AND FROM BED TO CHAIR: A LITTLE
TURNING FROM BACK TO SIDE WHILE IN FLAT BAD: A LITTLE
PERSONAL GROOMING: A LITTLE
TOILETING: A LITTLE
MOVING TO AND FROM BED TO CHAIR: A LITTLE
STANDING UP FROM CHAIR USING ARMS: A LITTLE
DRESSING REGULAR LOWER BODY CLOTHING: A LITTLE
HELP NEEDED FOR BATHING: A LITTLE

## 2024-01-03 ASSESSMENT — PAIN SCALES - GENERAL
PAINLEVEL_OUTOF10: 4
PAINLEVEL_OUTOF10: 5 - MODERATE PAIN
PAINLEVEL_OUTOF10: 7
PAINLEVEL_OUTOF10: 8

## 2024-01-03 ASSESSMENT — PAIN - FUNCTIONAL ASSESSMENT
PAIN_FUNCTIONAL_ASSESSMENT: 0-10
PAIN_FUNCTIONAL_ASSESSMENT: 0-10

## 2024-01-03 NOTE — PROGRESS NOTES
01/03/24 1321   Discharge Planning   Living Arrangements Spouse/significant other   Support Systems Spouse/significant other   Assistance Needed Independent   Type of Residence Private residence   Number of Stairs to Enter Residence 3   Do you have animals or pets at home? Yes   Type of Animals or Pets dog   Home or Post Acute Services In home services   Type of Home Care Services Home PT   Patient expects to be discharged to: Home   Does the patient need discharge transport arranged? No     Met with patient at the bedside to discuss discharge.  Patient to be discharged with [facility] for PT when medically stable.  Referral placed for home care.  Possible ADOD today.  Wife will transport home when medically cleared.  Marsha Stephenson RN

## 2024-01-03 NOTE — NURSING NOTE
Interdisciplinary team present: NP, PT, NM, CC, SW, Orthopedic Coordinator, and bedside RN.  Pain - not controlled. Going to try flexeril today  Nausea - none  Discharge barrier - pending PT/ pain control  Discharge plan - home with home care  Discharge date/time - possibly today pending PT

## 2024-01-03 NOTE — PROGRESS NOTES
01/03/24 1324   [facility] Disability Status   Are you deaf or do you have serious difficulty hearing? N   Are you blind or do you have serious difficulty seeing, even when wearing glasses? N   Because of a physical, mental, or emotional condition, do you have serious difficulty concentrating, remembering, or making decisions? (5 years old or older) N   Do you have serious difficulty walking or climbing stairs? N   Do you have serious difficulty dressing or bathing? N   Because of a physical, mental, or emotional condition, do you have serious difficulty doing errands alone such as visiting the doctor? N

## 2024-01-03 NOTE — CARE PLAN
The patient's goals for the shift include      The clinical goals for the shift include pain management

## 2024-01-03 NOTE — DISCHARGE SUMMARY
Discharge Diagnosis  Unilateral primary osteoarthritis, left knee    Issues Requiring Follow-Up  Follow up with Dr Bradford    Test Results Pending At Discharge  Pending Labs       No current pending labs.            Hospital Course   .78 yr old male with Left knee OA s/p Left TKA on 1/2/2024 with Dr. Bradford. Please see operative report for full details. Patient tolerated the procedure well and recovered briefly in PACU before being transitioned to regular nursing floor. Post-op course was uncomplicated. Diet was advanced as tolerated.  IV medication transitioned to oral as diet advanced. On the day of discharge, the pt was tolerating a diet, pain was controlled on PO pain medication, and they were ambulating with FWW and voiding spontaneously. He was cleared by discharge by PT/OT. They were discharged home with [facility] in stable condition with instructions to follow up as outpatient.       Pertinent Physical Exam At Time of Discharge  Physical Exam - see daily note     Home Medications     Medication List      START taking these medications     acetaminophen 500 mg tablet; Commonly known as: Tylenol; Take 2 tablets   (1,000 mg) by mouth every 6 hours if needed for mild pain (1 - 3) for up   to 28 days.   docusate sodium 100 mg capsule; Commonly known as: Colace; Take 1   capsule (100 mg) by mouth 2 times a day.   oxyCODONE 5 mg immediate release tablet; Commonly known as: Roxicodone;   Take 1 tablet (5 mg) by mouth every 6 hours if needed for severe pain (7 -   10) for up to 7 days.   polyethylene glycol 17 gram/dose powder; Commonly known as: Glycolax,   Miralax; Mix 1 capful (17 g) in 8 oz of water and drink by mouth once   daily   traMADol 50 mg tablet; Commonly known as: Ultram; Take 1 tablet (50 mg)   by mouth every 6 hours if needed for severe pain (7 - 10) for up to 7   days.     CHANGE how you take these medications     aspirin 81 mg EC tablet; Take 1 tablet (81 mg) by mouth 2 times a day.;   What  changed: when to take this     CONTINUE taking these medications     allopurinol 300 mg tablet; Commonly known as: Zyloprim; TAKE 1 TABLET   EVERY DAY   atorvastatin 10 mg tablet; Commonly known as: Lipitor   furosemide 40 mg tablet; Commonly known as: Lasix   metoprolol succinate XL 25 mg 24 hr tablet; Commonly known as: Toprol-XL   nitroglycerin 0.4 mg SL tablet; Commonly known as: Nitrostat   pantoprazole 40 mg EC tablet; Commonly known as: ProtoNix   pregabalin 50 mg capsule; Commonly known as: Lyrica   ramipril 10 mg capsule; Commonly known as: Altace     STOP taking these medications     chlorhexidine 0.12 % solution; Commonly known as: Peridex       Outpatient Follow-Up  Future Appointments   Date Time Provider Department Center   1/4/2024 11:45 AM Arturo Patel, PT [facility]   1/5/2024 To Be Determined Nicol Gaines, SURINDER [facility]   1/16/2024  9:15 AM Den Butt, PT [id number] [place]   1/19/2024  9:15 AM Den Butt, PT [id number] [place]   1/23/2024  9:15 AM Denabel Butt, PT [id number] [place]   1/26/2024  9:15 AM Denabel Butt, PT [id number] [place]   1/30/2024  9:15 AM Denabel Butt, PT [id number] [place]   2/2/2024  9:15 AM Den Butt, PT [id number] [place]   2/15/2024 11:20 AM Hamida Padilla PA-C [id number] [facility]       Anais Lindsey, APRN-CNP

## 2024-01-03 NOTE — PROGRESS NOTES
01/03/24 1324   Current Planned Discharge Disposition   Current Planned Discharge Disposition Home H

## 2024-01-03 NOTE — PROGRESS NOTES
Pharmacy Medication History Review    Mikhail Moses is a 78 y.o. male admitted for Unilateral primary osteoarthritis, left knee. Pharmacy reviewed the patient's [id number] medications and allergies for accuracy.    The list below reflectives the updated PTA list. Please review each medication in order reconciliation for additional clarification and justification.  Medications Prior to Admission   Medication Sig Dispense Refill Last Dose    allopurinol (Zyloprim) 300 mg tablet TAKE 1 TABLET EVERY DAY (Patient taking differently: Take 1 tablet (300 mg) by mouth once daily.) 90 tablet 1 1/1/2024    aspirin 81 mg EC tablet Take 1 tablet (81 mg) by mouth once daily.   1/2/2024    furosemide (Lasix) 40 mg tablet Take 1 tablet (40 mg) by mouth once daily.   1/1/2024    metoprolol succinate XL (Toprol-XL) 25 mg 24 hr tablet Take 1 tablet (25 mg) by mouth once daily.   1/2/2024    pantoprazole (ProtoNix) 40 mg EC tablet Take 1 tablet (40 mg) by mouth 2 times a day.   1/2/2024    pregabalin (Lyrica) 150 mg capsule Take 1 capsule (150 mg) by mouth 2 times a day.   1/1/2024    ramipril (Altace) 10 mg capsule Take 1 capsule (10 mg) by mouth once daily.   1/1/2024    [DISCONTINUED] chlorhexidine (Peridex) 0.12 % solution Use 15 mL in the mouth or throat 2 times a day. Use night before surgery and morning of surgery Swish and spit 473 mL 0 1/2/2024    atorvastatin (Lipitor) 10 mg tablet Take 1 tablet (10 mg) by mouth once daily.       nitroglycerin (Nitrostat) 0.4 mg SL tablet Place 1 tablet (0.4 mg) under the tongue every 5 minutes if needed.   Unknown        The list below reflectives the updated allergy list. Please review each documented allergy for additional clarification and justification.  Allergies  Reviewed by Dolores Mayers RN on 1/2/2024   No Known Allergies         Below are additional concerns with the patient's PTA list.  Prior to Admission Medications   Prescriptions Last Dose Informant Patient Reported?  Taking?   allopurinol (Zyloprim) 300 mg tablet 1/1/2024  No Yes   Sig: TAKE 1 TABLET EVERY DAY   Patient taking differently: Take 1 tablet (300 mg) by mouth once daily.   aspirin 81 mg EC tablet 1/2/2024  Yes Yes   Sig: Take 1 tablet (81 mg) by mouth once daily.   atorvastatin (Lipitor) 10 mg tablet   Yes No   Sig: Take 1 tablet (10 mg) by mouth once daily.   chlorhexidine (Peridex) 0.12 % solution 1/2/2024  No Yes   Sig: Use 15 mL in the mouth or throat 2 times a day. Use night before surgery and morning of surgery Swish and spit   furosemide (Lasix) 40 mg tablet 1/1/2024  Yes Yes   Sig: Take 1 tablet (40 mg) by mouth once daily.   metoprolol succinate XL (Toprol-XL) 25 mg 24 hr tablet 1/2/2024  Yes Yes   Sig: Take 1 tablet (25 mg) by mouth once daily.   nitroglycerin (Nitrostat) 0.4 mg SL tablet Unknown  Yes No   Sig: Place 1 tablet (0.4 mg) under the tongue every 5 minutes if needed.   pantoprazole (ProtoNix) 40 mg EC tablet 1/2/2024  Yes Yes   Sig: Take 1 tablet (40 mg) by mouth 2 times a day.   pregabalin (Lyrica) 150 mg capsule 1/1/2024  Yes Yes   Sig: Take 1 capsule (150 mg) by mouth 2 times a day.   ramipril (Altace) 10 mg capsule 1/1/2024  Yes Yes   Sig: Take 1 capsule (10 mg) by mouth once daily.      Facility-Administered Medications: None      Per pharmacy.     Quiana Cruz, CORAhT

## 2024-01-04 ENCOUNTER — HOME CARE VISIT (OUTPATIENT)
Dept: HOME HEALTH SERVICES | Facility: HOME HEALTH | Age: 79
End: 2024-01-04
Payer: MEDICARE

## 2024-01-04 VITALS — DIASTOLIC BLOOD PRESSURE: 60 MMHG | HEART RATE: 72 BPM | OXYGEN SATURATION: 93 % | SYSTOLIC BLOOD PRESSURE: 140 MMHG

## 2024-01-04 VITALS — HEART RATE: 77 BPM | TEMPERATURE: 97.3 F

## 2024-01-04 PROCEDURE — G0151 HHCP-SERV OF PT,EA 15 MIN: HCPCS | Mod: HHH

## 2024-01-04 PROCEDURE — 169592 NO-PAY CLAIM PROCEDURE

## 2024-01-04 PROCEDURE — 1090000002 HH PPS REVENUE DEBIT

## 2024-01-04 PROCEDURE — G0152 HHCP-SERV OF OT,EA 15 MIN: HCPCS | Mod: HHH

## 2024-01-04 PROCEDURE — 1090000001 HH PPS REVENUE CREDIT

## 2024-01-04 PROCEDURE — 0023 HH SOC

## 2024-01-04 ASSESSMENT — ENCOUNTER SYMPTOMS
PAIN: 1
PERSON REPORTING PAIN: PATIENT
PAIN: 1
PAIN LOCATION - PAIN SEVERITY: 7/10
HIGHEST PAIN SEVERITY IN PAST 24 HOURS: 10/10
PAIN LOCATION - RELIEVING FACTORS: REST
PAIN LOCATION - PAIN SEVERITY: 7/10
LOWEST PAIN SEVERITY IN PAST 24 HOURS: 3/10
PAIN LOCATION - PAIN FREQUENCY: CONSTANT
OCCASIONAL FEELINGS OF UNSTEADINESS: 0
HIGHEST PAIN SEVERITY IN PAST 24 HOURS: 10/10
PAIN LOCATION: LEFT KNEE
PAIN LOCATION - EXACERBATING FACTORS: MOVEMENT
SUBJECTIVE PAIN PROGRESSION: GRADUALLY IMPROVING
PAIN LOCATION: LEFT KNEE
PERSON REPORTING PAIN: PATIENT

## 2024-01-04 ASSESSMENT — ACTIVITIES OF DAILY LIVING (ADL)
ENTERING_EXITING_HOME: NEEDS ASSISTANCE
TOILETING: 1
AMBULATION ASSISTANCE ON FLAT SURFACES: 1
BATHING ASSESSED: 1
TOILETING: SUPERVISION
OASIS_M1830: 03
TOILETING: MINIMUM ASSIST
DRESSING_LB_CURRENT_FUNCTION: MINIMUM ASSIST
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
## 2024-01-04 NOTE — HOME HEALTH
Left TKA performed 1/2 with outpatient therapy to begin 1/26/2024. PMH significant for L femur fracture and L hip external rotation, back injections every 2 months, and patient stated he has a catheter at this time. Previously independent in the home without a device, but now requires walker for safe mobility in the home.    Patient presented with L knee ROM 3-74 degrees, decreased L quad strength, pain, swelling, surgical incision covered with bandage, slow gait speed with left step-to gait pattern. Required use of arm rest for safe sit to stand transfers and verbal cues for hand placement on armrest during stand to sit transfer. Tolerated home exercise program well and was instructed to continue ice and pain medication regimen. Patient in agreement with plan of care 1w1 3w1 prior to start of outpatient.

## 2024-01-05 ENCOUNTER — TELEPHONE (OUTPATIENT)
Dept: ORTHOPEDICS | Facility: HOSPITAL | Age: 79
End: 2024-01-05
Payer: MEDICARE

## 2024-01-05 PROCEDURE — 1090000001 HH PPS REVENUE CREDIT

## 2024-01-05 PROCEDURE — 1090000002 HH PPS REVENUE DEBIT

## 2024-01-05 NOTE — TELEPHONE ENCOUNTER
Spoke with patient and wife for follow up call and the wife stated patient fell yesterday. He was stepping backwards and fell on his bottom. He did not hit his head.  His surgical leg was straight out when he fell.  He did not twist his surgical leg or have any new pain or swelling.  There therapist was seeing him the same day and took a look at him and did not notice any injury or issue with his surgical leg. He worked with therapy and did well.  Today he was feeling sore but no new symptoms or pain was noted.  He has not had a BM yet and was instructed to try taking the Miralax twice a day. He is taking the colace as well. If he has continued constipation to contact the surgeon.

## 2024-01-06 PROCEDURE — 1090000001 HH PPS REVENUE CREDIT

## 2024-01-06 PROCEDURE — 1090000002 HH PPS REVENUE DEBIT

## 2024-01-07 PROCEDURE — 1090000001 HH PPS REVENUE CREDIT

## 2024-01-07 PROCEDURE — 1090000002 HH PPS REVENUE DEBIT

## 2024-01-08 ENCOUNTER — HOME CARE VISIT (OUTPATIENT)
Dept: HOME HEALTH SERVICES | Facility: HOME HEALTH | Age: 79
End: 2024-01-08
Payer: MEDICARE

## 2024-01-08 VITALS — TEMPERATURE: 96.5 F

## 2024-01-08 PROCEDURE — G0151 HHCP-SERV OF PT,EA 15 MIN: HCPCS | Mod: HHH

## 2024-01-08 PROCEDURE — 1090000002 HH PPS REVENUE DEBIT

## 2024-01-08 PROCEDURE — 1090000001 HH PPS REVENUE CREDIT

## 2024-01-08 ASSESSMENT — ENCOUNTER SYMPTOMS
PAIN: 1
SUBJECTIVE PAIN PROGRESSION: GRADUALLY IMPROVING
PAIN LOCATION - PAIN SEVERITY: 6/10
LIMITED RANGE OF MOTION: 1
MUSCLE WEAKNESS: 1
LOWEST PAIN SEVERITY IN PAST 24 HOURS: 4/10
PERSON REPORTING PAIN: PATIENT
HIGHEST PAIN SEVERITY IN PAST 24 HOURS: 10/10
PAIN LOCATION: LEFT KNEE

## 2024-01-09 ENCOUNTER — TELEPHONE (OUTPATIENT)
Dept: ORTHOPEDIC SURGERY | Facility: HOSPITAL | Age: 79
End: 2024-01-09
Payer: MEDICARE

## 2024-01-09 DIAGNOSIS — M17.12 ARTHRITIS OF LEFT KNEE: ICD-10-CM

## 2024-01-09 PROCEDURE — 1090000002 HH PPS REVENUE DEBIT

## 2024-01-09 PROCEDURE — 1090000001 HH PPS REVENUE CREDIT

## 2024-01-09 RX ORDER — TRAMADOL HYDROCHLORIDE 50 MG/1
50 TABLET ORAL EVERY 6 HOURS PRN
Qty: 28 TABLET | Refills: 0 | Status: SHIPPED | OUTPATIENT
Start: 2024-01-09 | End: 2024-01-10 | Stop reason: SDUPTHER

## 2024-01-09 RX ORDER — OXYCODONE HYDROCHLORIDE 5 MG/1
5 TABLET ORAL EVERY 6 HOURS PRN
Qty: 28 TABLET | Refills: 0 | Status: SHIPPED | OUTPATIENT
Start: 2024-01-09 | End: 2024-01-10 | Stop reason: SDUPTHER

## 2024-01-09 NOTE — TELEPHONE ENCOUNTER
Please call the patient's spouse to discuss the progress of this patient who had L TKA on 1/02/2024.  Mrs. Moses really need a call back as the patient is not progressing well.    Thanks,  Marshall Medrano

## 2024-01-09 NOTE — TELEPHONE ENCOUNTER
Mikhail's wife Cris emailed Marion Nuno (previous ortho coordinator) and Marion forwarded pt's email to me. Refill requests pended and sent to [facility].

## 2024-01-10 ENCOUNTER — APPOINTMENT (OUTPATIENT)
Dept: CARDIOLOGY | Facility: HOSPITAL | Age: 79
DRG: 565 | End: 2024-01-10
Payer: MEDICARE

## 2024-01-10 ENCOUNTER — APPOINTMENT (OUTPATIENT)
Dept: RADIOLOGY | Facility: HOSPITAL | Age: 79
DRG: 565 | End: 2024-01-10
Payer: MEDICARE

## 2024-01-10 ENCOUNTER — HOSPITAL ENCOUNTER (INPATIENT)
Facility: HOSPITAL | Age: 79
LOS: 2 days | Discharge: HOME | DRG: 565 | End: 2024-01-13
Attending: INTERNAL MEDICINE | Admitting: INTERNAL MEDICINE
Payer: MEDICARE

## 2024-01-10 ENCOUNTER — HOME CARE VISIT (OUTPATIENT)
Dept: HOME HEALTH SERVICES | Facility: HOME HEALTH | Age: 79
End: 2024-01-10
Payer: MEDICARE

## 2024-01-10 VITALS — RESPIRATION RATE: 20 BRPM | TEMPERATURE: 99.8 F | HEART RATE: 81 BPM

## 2024-01-10 DIAGNOSIS — T81.72XA POSTOPERATIVE ACUTE DEEP VEIN THROMBOSIS (DVT) OF LOWER EXTREMITY, INITIAL ENCOUNTER (MULTI): ICD-10-CM

## 2024-01-10 DIAGNOSIS — Z96.652 S/P TKR (TOTAL KNEE REPLACEMENT) USING CEMENT, LEFT: Primary | ICD-10-CM

## 2024-01-10 DIAGNOSIS — R42 DIZZINESS: ICD-10-CM

## 2024-01-10 DIAGNOSIS — R11.2 NAUSEA AND VOMITING, UNSPECIFIED VOMITING TYPE: ICD-10-CM

## 2024-01-10 DIAGNOSIS — M79.89 LEG SWELLING: ICD-10-CM

## 2024-01-10 DIAGNOSIS — I82.412 ACUTE DEEP VEIN THROMBOSIS (DVT) OF LEFT FEMORAL VEIN (MULTI): Primary | ICD-10-CM

## 2024-01-10 DIAGNOSIS — R53.1 GENERALIZED WEAKNESS: ICD-10-CM

## 2024-01-10 DIAGNOSIS — M17.12 ARTHRITIS OF LEFT KNEE: ICD-10-CM

## 2024-01-10 DIAGNOSIS — I82.409 POSTOPERATIVE ACUTE DEEP VEIN THROMBOSIS (DVT) OF LOWER EXTREMITY, INITIAL ENCOUNTER (MULTI): ICD-10-CM

## 2024-01-10 LAB
ALBUMIN SERPL BCP-MCNC: 3.6 G/DL (ref 3.4–5)
ALP SERPL-CCNC: 139 U/L (ref 33–136)
ALT SERPL W P-5'-P-CCNC: 18 U/L (ref 10–52)
ANION GAP SERPL CALC-SCNC: 17 MMOL/L (ref 10–20)
APPEARANCE UR: ABNORMAL
AST SERPL W P-5'-P-CCNC: 28 U/L (ref 9–39)
BASOPHILS # BLD AUTO: 0.08 X10*3/UL (ref 0–0.1)
BASOPHILS NFR BLD AUTO: 0.7 %
BILIRUB SERPL-MCNC: 1.2 MG/DL (ref 0–1.2)
BILIRUB UR STRIP.AUTO-MCNC: NEGATIVE MG/DL
BUN SERPL-MCNC: 23 MG/DL (ref 6–23)
CALCIUM SERPL-MCNC: 10 MG/DL (ref 8.6–10.3)
CARDIAC TROPONIN I PNL SERPL HS: 15 NG/L (ref 0–20)
CHLORIDE SERPL-SCNC: 101 MMOL/L (ref 98–107)
CO2 SERPL-SCNC: 25 MMOL/L (ref 21–32)
COLOR UR: ABNORMAL
CREAT SERPL-MCNC: 1.31 MG/DL (ref 0.5–1.3)
EGFRCR SERPLBLD CKD-EPI 2021: 56 ML/MIN/1.73M*2
EOSINOPHIL # BLD AUTO: 0.07 X10*3/UL (ref 0–0.4)
EOSINOPHIL NFR BLD AUTO: 0.6 %
ERYTHROCYTE [DISTWIDTH] IN BLOOD BY AUTOMATED COUNT: 13.9 % (ref 11.5–14.5)
FLUAV RNA RESP QL NAA+PROBE: NOT DETECTED
FLUBV RNA RESP QL NAA+PROBE: NOT DETECTED
GLUCOSE SERPL-MCNC: 126 MG/DL (ref 74–99)
GLUCOSE UR STRIP.AUTO-MCNC: NEGATIVE MG/DL
HCT VFR BLD AUTO: 39.9 % (ref 41–52)
HGB BLD-MCNC: 13.2 G/DL (ref 13.5–17.5)
HYALINE CASTS #/AREA URNS AUTO: ABNORMAL /LPF
IMM GRANULOCYTES # BLD AUTO: 0.22 X10*3/UL (ref 0–0.5)
IMM GRANULOCYTES NFR BLD AUTO: 2 % (ref 0–0.9)
INR PPP: 1.2 (ref 0.9–1.1)
KETONES UR STRIP.AUTO-MCNC: NEGATIVE MG/DL
LEUKOCYTE ESTERASE UR QL STRIP.AUTO: ABNORMAL
LIPASE SERPL-CCNC: 25 U/L (ref 9–82)
LYMPHOCYTES # BLD AUTO: 1.7 X10*3/UL (ref 0.8–3)
LYMPHOCYTES NFR BLD AUTO: 15.4 %
MCH RBC QN AUTO: 33 PG (ref 26–34)
MCHC RBC AUTO-ENTMCNC: 33.1 G/DL (ref 32–36)
MCV RBC AUTO: 100 FL (ref 80–100)
MONOCYTES # BLD AUTO: 0.92 X10*3/UL (ref 0.05–0.8)
MONOCYTES NFR BLD AUTO: 8.3 %
MUCOUS THREADS #/AREA URNS AUTO: ABNORMAL /LPF
NEUTROPHILS # BLD AUTO: 8.06 X10*3/UL (ref 1.6–5.5)
NEUTROPHILS NFR BLD AUTO: 73 %
NITRITE UR QL STRIP.AUTO: NEGATIVE
NRBC BLD-RTO: 0 /100 WBCS (ref 0–0)
PH UR STRIP.AUTO: 5 [PH]
PLATELET # BLD AUTO: 311 X10*3/UL (ref 150–450)
POTASSIUM SERPL-SCNC: 4.1 MMOL/L (ref 3.5–5.3)
PROT SERPL-MCNC: 7.5 G/DL (ref 6.4–8.2)
PROT UR STRIP.AUTO-MCNC: NEGATIVE MG/DL
PROTHROMBIN TIME: 13.9 SECONDS (ref 9.8–12.8)
RBC # BLD AUTO: 4 X10*6/UL (ref 4.5–5.9)
RBC # UR STRIP.AUTO: NEGATIVE /UL
RBC #/AREA URNS AUTO: ABNORMAL /HPF
SARS-COV-2 RNA RESP QL NAA+PROBE: NOT DETECTED
SODIUM SERPL-SCNC: 139 MMOL/L (ref 136–145)
SP GR UR STRIP.AUTO: 1.02
SQUAMOUS #/AREA URNS AUTO: ABNORMAL /HPF
UROBILINOGEN UR STRIP.AUTO-MCNC: 2 MG/DL
WBC # BLD AUTO: 11.1 X10*3/UL (ref 4.4–11.3)
WBC #/AREA URNS AUTO: ABNORMAL /HPF

## 2024-01-10 PROCEDURE — 73130 X-RAY EXAM OF HAND: CPT | Mod: LT,FR

## 2024-01-10 PROCEDURE — 90715 TDAP VACCINE 7 YRS/> IM: CPT | Performed by: NURSE PRACTITIONER

## 2024-01-10 PROCEDURE — 2500000001 HC RX 250 WO HCPCS SELF ADMINISTERED DRUGS (ALT 637 FOR MEDICARE OP): Performed by: NURSE PRACTITIONER

## 2024-01-10 PROCEDURE — 93971 EXTREMITY STUDY: CPT | Mod: FOREIGN READ | Performed by: RADIOLOGY

## 2024-01-10 PROCEDURE — G0378 HOSPITAL OBSERVATION PER HR: HCPCS

## 2024-01-10 PROCEDURE — 1090000001 HH PPS REVENUE CREDIT

## 2024-01-10 PROCEDURE — 73564 X-RAY EXAM KNEE 4 OR MORE: CPT | Mod: LT,FR

## 2024-01-10 PROCEDURE — G0151 HHCP-SERV OF PT,EA 15 MIN: HCPCS | Mod: HHH

## 2024-01-10 PROCEDURE — 93005 ELECTROCARDIOGRAM TRACING: CPT

## 2024-01-10 PROCEDURE — 96365 THER/PROPH/DIAG IV INF INIT: CPT

## 2024-01-10 PROCEDURE — 83690 ASSAY OF LIPASE: CPT | Performed by: STUDENT IN AN ORGANIZED HEALTH CARE EDUCATION/TRAINING PROGRAM

## 2024-01-10 PROCEDURE — 2500000004 HC RX 250 GENERAL PHARMACY W/ HCPCS (ALT 636 FOR OP/ED): Performed by: NURSE PRACTITIONER

## 2024-01-10 PROCEDURE — 96361 HYDRATE IV INFUSION ADD-ON: CPT

## 2024-01-10 PROCEDURE — 93971 EXTREMITY STUDY: CPT | Mod: FR

## 2024-01-10 PROCEDURE — 2500000005 HC RX 250 GENERAL PHARMACY W/O HCPCS: Performed by: INTERNAL MEDICINE

## 2024-01-10 PROCEDURE — 85610 PROTHROMBIN TIME: CPT | Performed by: INTERNAL MEDICINE

## 2024-01-10 PROCEDURE — 96366 THER/PROPH/DIAG IV INF ADDON: CPT

## 2024-01-10 PROCEDURE — 99221 1ST HOSP IP/OBS SF/LOW 40: CPT

## 2024-01-10 PROCEDURE — 73564 X-RAY EXAM KNEE 4 OR MORE: CPT | Mod: LEFT SIDE | Performed by: RADIOLOGY

## 2024-01-10 PROCEDURE — 99285 EMERGENCY DEPT VISIT HI MDM: CPT | Performed by: INTERNAL MEDICINE

## 2024-01-10 PROCEDURE — 87636 SARSCOV2 & INF A&B AMP PRB: CPT | Performed by: NURSE PRACTITIONER

## 2024-01-10 PROCEDURE — 99222 1ST HOSP IP/OBS MODERATE 55: CPT | Performed by: NURSE PRACTITIONER

## 2024-01-10 PROCEDURE — 73130 X-RAY EXAM OF HAND: CPT | Mod: LEFT SIDE | Performed by: RADIOLOGY

## 2024-01-10 PROCEDURE — 87086 URINE CULTURE/COLONY COUNT: CPT | Mod: AHULAB | Performed by: INTERNAL MEDICINE

## 2024-01-10 PROCEDURE — 2500000001 HC RX 250 WO HCPCS SELF ADMINISTERED DRUGS (ALT 637 FOR MEDICARE OP): Performed by: INTERNAL MEDICINE

## 2024-01-10 PROCEDURE — 90471 IMMUNIZATION ADMIN: CPT | Performed by: NURSE PRACTITIONER

## 2024-01-10 PROCEDURE — 84484 ASSAY OF TROPONIN QUANT: CPT | Performed by: NURSE PRACTITIONER

## 2024-01-10 PROCEDURE — 85025 COMPLETE CBC W/AUTO DIFF WBC: CPT | Performed by: INTERNAL MEDICINE

## 2024-01-10 PROCEDURE — 1090000002 HH PPS REVENUE DEBIT

## 2024-01-10 PROCEDURE — 81001 URINALYSIS AUTO W/SCOPE: CPT | Performed by: INTERNAL MEDICINE

## 2024-01-10 PROCEDURE — 96375 TX/PRO/DX INJ NEW DRUG ADDON: CPT

## 2024-01-10 PROCEDURE — 36415 COLL VENOUS BLD VENIPUNCTURE: CPT | Performed by: STUDENT IN AN ORGANIZED HEALTH CARE EDUCATION/TRAINING PROGRAM

## 2024-01-10 PROCEDURE — 80053 COMPREHEN METABOLIC PANEL: CPT | Performed by: STUDENT IN AN ORGANIZED HEALTH CARE EDUCATION/TRAINING PROGRAM

## 2024-01-10 PROCEDURE — 83690 ASSAY OF LIPASE: CPT | Performed by: INTERNAL MEDICINE

## 2024-01-10 PROCEDURE — 2500000004 HC RX 250 GENERAL PHARMACY W/ HCPCS (ALT 636 FOR OP/ED): Performed by: INTERNAL MEDICINE

## 2024-01-10 PROCEDURE — 80053 COMPREHEN METABOLIC PANEL: CPT | Performed by: INTERNAL MEDICINE

## 2024-01-10 PROCEDURE — 93010 ELECTROCARDIOGRAM REPORT: CPT | Performed by: STUDENT IN AN ORGANIZED HEALTH CARE EDUCATION/TRAINING PROGRAM

## 2024-01-10 PROCEDURE — 36415 COLL VENOUS BLD VENIPUNCTURE: CPT | Performed by: NURSE PRACTITIONER

## 2024-01-10 PROCEDURE — 85025 COMPLETE CBC W/AUTO DIFF WBC: CPT | Performed by: STUDENT IN AN ORGANIZED HEALTH CARE EDUCATION/TRAINING PROGRAM

## 2024-01-10 RX ORDER — LISINOPRIL 20 MG/1
20 TABLET ORAL DAILY
Status: DISCONTINUED | OUTPATIENT
Start: 2024-01-10 | End: 2024-01-13 | Stop reason: HOSPADM

## 2024-01-10 RX ORDER — PREGABALIN 75 MG/1
150 CAPSULE ORAL 2 TIMES DAILY
Status: DISCONTINUED | OUTPATIENT
Start: 2024-01-10 | End: 2024-01-13 | Stop reason: HOSPADM

## 2024-01-10 RX ORDER — FUROSEMIDE 40 MG/1
40 TABLET ORAL DAILY
Status: DISCONTINUED | OUTPATIENT
Start: 2024-01-10 | End: 2024-01-13 | Stop reason: HOSPADM

## 2024-01-10 RX ORDER — HEPARIN SODIUM 10000 [USP'U]/100ML
0-4500 INJECTION, SOLUTION INTRAVENOUS CONTINUOUS
Status: DISCONTINUED | OUTPATIENT
Start: 2024-01-10 | End: 2024-01-13

## 2024-01-10 RX ORDER — TRAMADOL HYDROCHLORIDE 50 MG/1
50 TABLET ORAL EVERY 6 HOURS PRN
Qty: 28 TABLET | Refills: 0 | Status: ON HOLD | OUTPATIENT
Start: 2024-01-10 | End: 2024-01-12 | Stop reason: SDUPTHER

## 2024-01-10 RX ORDER — OXYCODONE HYDROCHLORIDE 5 MG/1
5 TABLET ORAL EVERY 6 HOURS PRN
Qty: 28 TABLET | Refills: 0 | Status: ON HOLD | OUTPATIENT
Start: 2024-01-10 | End: 2024-01-12 | Stop reason: SDUPTHER

## 2024-01-10 RX ORDER — HEPARIN SODIUM 5000 [USP'U]/ML
3000-6000 INJECTION, SOLUTION INTRAVENOUS; SUBCUTANEOUS EVERY 4 HOURS PRN
Status: DISCONTINUED | OUTPATIENT
Start: 2024-01-10 | End: 2024-01-13

## 2024-01-10 RX ORDER — ONDANSETRON HYDROCHLORIDE 2 MG/ML
4 INJECTION, SOLUTION INTRAVENOUS EVERY 8 HOURS PRN
Status: DISCONTINUED | OUTPATIENT
Start: 2024-01-10 | End: 2024-01-13 | Stop reason: HOSPADM

## 2024-01-10 RX ORDER — ASPIRIN 81 MG/1
81 TABLET ORAL 2 TIMES DAILY
Status: DISCONTINUED | OUTPATIENT
Start: 2024-01-10 | End: 2024-01-11

## 2024-01-10 RX ORDER — ACETAMINOPHEN 650 MG/1
650 SUPPOSITORY RECTAL EVERY 4 HOURS PRN
Status: DISCONTINUED | OUTPATIENT
Start: 2024-01-10 | End: 2024-01-13 | Stop reason: HOSPADM

## 2024-01-10 RX ORDER — TALC
3 POWDER (GRAM) TOPICAL NIGHTLY
Status: DISCONTINUED | OUTPATIENT
Start: 2024-01-10 | End: 2024-01-13 | Stop reason: HOSPADM

## 2024-01-10 RX ORDER — ONDANSETRON 4 MG/1
4 TABLET, ORALLY DISINTEGRATING ORAL ONCE
Status: COMPLETED | OUTPATIENT
Start: 2024-01-10 | End: 2024-01-10

## 2024-01-10 RX ORDER — METOPROLOL SUCCINATE 25 MG/1
25 TABLET, EXTENDED RELEASE ORAL DAILY
Status: DISCONTINUED | OUTPATIENT
Start: 2024-01-10 | End: 2024-01-13 | Stop reason: HOSPADM

## 2024-01-10 RX ORDER — ONDANSETRON 4 MG/1
4 TABLET, FILM COATED ORAL EVERY 8 HOURS PRN
Status: DISCONTINUED | OUTPATIENT
Start: 2024-01-10 | End: 2024-01-13 | Stop reason: HOSPADM

## 2024-01-10 RX ORDER — ATORVASTATIN CALCIUM 10 MG/1
10 TABLET, FILM COATED ORAL NIGHTLY
Status: DISCONTINUED | OUTPATIENT
Start: 2024-01-10 | End: 2024-01-13 | Stop reason: HOSPADM

## 2024-01-10 RX ORDER — ACETAMINOPHEN 160 MG/5ML
650 SOLUTION ORAL EVERY 4 HOURS PRN
Status: DISCONTINUED | OUTPATIENT
Start: 2024-01-10 | End: 2024-01-13 | Stop reason: HOSPADM

## 2024-01-10 RX ORDER — SODIUM CHLORIDE 9 MG/ML
75 INJECTION, SOLUTION INTRAVENOUS CONTINUOUS
Status: DISCONTINUED | OUTPATIENT
Start: 2024-01-10 | End: 2024-01-13 | Stop reason: HOSPADM

## 2024-01-10 RX ORDER — HEPARIN SODIUM 5000 [USP'U]/ML
5000 INJECTION, SOLUTION INTRAVENOUS; SUBCUTANEOUS EVERY 8 HOURS
Status: DISCONTINUED | OUTPATIENT
Start: 2024-01-10 | End: 2024-01-10

## 2024-01-10 RX ORDER — OXYCODONE AND ACETAMINOPHEN 5; 325 MG/1; MG/1
1 TABLET ORAL ONCE
Status: COMPLETED | OUTPATIENT
Start: 2024-01-10 | End: 2024-01-10

## 2024-01-10 RX ORDER — POLYETHYLENE GLYCOL 3350 17 G/17G
17 POWDER, FOR SOLUTION ORAL DAILY
Status: DISCONTINUED | OUTPATIENT
Start: 2024-01-10 | End: 2024-01-13 | Stop reason: HOSPADM

## 2024-01-10 RX ORDER — ACETAMINOPHEN 325 MG/1
650 TABLET ORAL EVERY 4 HOURS PRN
Status: DISCONTINUED | OUTPATIENT
Start: 2024-01-10 | End: 2024-01-13 | Stop reason: HOSPADM

## 2024-01-10 RX ORDER — HEPARIN SODIUM 5000 [USP'U]/ML
80 INJECTION, SOLUTION INTRAVENOUS; SUBCUTANEOUS ONCE
Status: COMPLETED | OUTPATIENT
Start: 2024-01-10 | End: 2024-01-10

## 2024-01-10 RX ORDER — ALLOPURINOL 300 MG/1
300 TABLET ORAL DAILY
Status: DISCONTINUED | OUTPATIENT
Start: 2024-01-10 | End: 2024-01-13 | Stop reason: HOSPADM

## 2024-01-10 RX ORDER — PANTOPRAZOLE SODIUM 40 MG/1
40 TABLET, DELAYED RELEASE ORAL 2 TIMES DAILY
Status: DISCONTINUED | OUTPATIENT
Start: 2024-01-10 | End: 2024-01-13 | Stop reason: HOSPADM

## 2024-01-10 RX ORDER — DOCUSATE SODIUM 100 MG/1
100 CAPSULE, LIQUID FILLED ORAL 2 TIMES DAILY
Status: DISCONTINUED | OUTPATIENT
Start: 2024-01-10 | End: 2024-01-13 | Stop reason: HOSPADM

## 2024-01-10 RX ADMIN — METOPROLOL SUCCINATE 25 MG: 25 TABLET, EXTENDED RELEASE ORAL at 21:10

## 2024-01-10 RX ADMIN — ASPIRIN 81 MG: 81 TABLET, COATED ORAL at 21:10

## 2024-01-10 RX ADMIN — DOCUSATE SODIUM 100 MG: 100 CAPSULE, LIQUID FILLED ORAL at 21:10

## 2024-01-10 RX ADMIN — OXYCODONE HYDROCHLORIDE AND ACETAMINOPHEN 1 TABLET: 5; 325 TABLET ORAL at 15:14

## 2024-01-10 RX ADMIN — HEPARIN SODIUM 7500 UNITS: 5000 INJECTION INTRAVENOUS; SUBCUTANEOUS at 21:19

## 2024-01-10 RX ADMIN — HEPARIN SODIUM 1700 UNITS/HR: 10000 INJECTION, SOLUTION INTRAVENOUS at 21:18

## 2024-01-10 RX ADMIN — TETANUS TOXOID, REDUCED DIPHTHERIA TOXOID AND ACELLULAR PERTUSSIS VACCINE, ADSORBED 0.5 ML: 5; 2.5; 8; 8; 2.5 SUSPENSION INTRAMUSCULAR at 14:14

## 2024-01-10 RX ADMIN — ONDANSETRON 4 MG: 4 TABLET, ORALLY DISINTEGRATING ORAL at 15:14

## 2024-01-10 RX ADMIN — PANTOPRAZOLE SODIUM 40 MG: 40 TABLET, DELAYED RELEASE ORAL at 21:11

## 2024-01-10 RX ADMIN — PREGABALIN 150 MG: 75 CAPSULE ORAL at 21:10

## 2024-01-10 RX ADMIN — ATORVASTATIN CALCIUM 10 MG: 20 TABLET, FILM COATED ORAL at 21:11

## 2024-01-10 RX ADMIN — HYDROMORPHONE HYDROCHLORIDE 0.4 MG: 1 INJECTION, SOLUTION INTRAMUSCULAR; INTRAVENOUS; SUBCUTANEOUS at 19:12

## 2024-01-10 RX ADMIN — ALLOPURINOL 300 MG: 100 TABLET ORAL at 21:11

## 2024-01-10 RX ADMIN — LISINOPRIL 20 MG: 20 TABLET ORAL at 21:10

## 2024-01-10 RX ADMIN — SODIUM CHLORIDE 1000 ML: 9 INJECTION, SOLUTION INTRAVENOUS at 15:13

## 2024-01-10 RX ADMIN — Medication 3 MG: at 21:11

## 2024-01-10 RX ADMIN — POLYETHYLENE GLYCOL 3350 17 G: 17 POWDER, FOR SOLUTION ORAL at 21:12

## 2024-01-10 ASSESSMENT — ENCOUNTER SYMPTOMS
PERSON REPORTING PAIN: PATIENT
PAIN SEVERITY GOAL: 1/10
SUBJECTIVE PAIN PROGRESSION: GRADUALLY IMPROVING
HIGHEST PAIN SEVERITY IN PAST 24 HOURS: 8/10
LOWEST PAIN SEVERITY IN PAST 24 HOURS: 4/10
PAIN LOCATION: LEFT KNEE
PAIN: 1
PAIN LOCATION - PAIN SEVERITY: 6/10

## 2024-01-10 ASSESSMENT — PAIN SCALES - GENERAL: PAINLEVEL_OUTOF10: 7

## 2024-01-10 ASSESSMENT — COLUMBIA-SUICIDE SEVERITY RATING SCALE - C-SSRS
1. IN THE PAST MONTH, HAVE YOU WISHED YOU WERE DEAD OR WISHED YOU COULD GO TO SLEEP AND NOT WAKE UP?: NO
6. HAVE YOU EVER DONE ANYTHING, STARTED TO DO ANYTHING, OR PREPARED TO DO ANYTHING TO END YOUR LIFE?: NO
2. HAVE YOU ACTUALLY HAD ANY THOUGHTS OF KILLING YOURSELF?: NO

## 2024-01-10 NOTE — ED PROVIDER NOTES
HPI     CC: Weakness, Gen; Vomiting; and Nausea     HPI: Mikhail Moses is a 78 y.o. male with a history of HTN, HLD, CKD, COPD, CAD, GERD, PAH, OA s/p L TKA 1/2/2024 with Dr. Bradford presents with generalized weakness, dizziness, anorexia, and vomiting.  Patient presents with his wife who provides most of the history.  She states that patient has not been eating, drinking, or really moving around much at all since being home from his surgery.  He gets lightheaded when walking with occasional dry heaves/spit ups.  He did fall last week and scraped his left hand, landed on his left knee.  He denies head strike or LOC.  He is alternating oxycodone and tramadol for pain.  It was suggested by the Ortho team that the wife cut the oxycodone in half and she does think that this helped his symptoms somewhat.  She thinks some depression is contributing to his symptoms.  She has a lot of health problems herself and has been having a hard time caring for him on her own.  He has trouble even transferring from the bed to the couch.  He has been doing well with PT but the rest of the time not so much.  He is only eating 3 peanut butter crackers with peanut butter each day and barely drinking fluids.  She is concerned he is dehydrated.  Patient himself is very minimizing of his symptoms.  He is denying any complaints at this time.  He has been able to bear weight on his left leg, denies any fevers, was told by PT today that the incision site looked good.  His left lower extremity is chronically diffusely swollen, has not worsened lately.  He has not noticed any warmth, redness or drainage.  He denies any chest pain, shortness of breath, abdominal pain, cough, urinary or other symptoms.    ROS: 10-point review of systems was performed and is otherwise negative except as noted in HPI.    Limitations to history: Patient somewhat minimizing of symptoms    Independent Historians: Wife at bedside    External Records Reviewed:  "N/A    Past Medical History: Noncontributory except per HPI     Past Surgical History: Noncontributory except per HPI     Family History: Reviewed and noncontributory     Social History:  Denies tobacco. Denies ETOH. Denies illicit drugs.    Social Determinants Affecting Care: N/A    No Known Allergies    Home Meds:   Current Outpatient Medications   Medication Instructions    acetaminophen (TYLENOL) 1,000 mg, oral, Every 6 hours PRN    allopurinol (Zyloprim) 300 mg tablet TAKE 1 TABLET EVERY DAY    aspirin 81 mg, oral, 2 times daily    atorvastatin (Lipitor) 10 mg tablet 1 tablet, oral, Daily    docusate sodium (COLACE) 100 mg, oral, 2 times daily    furosemide (Lasix) 40 mg tablet 1 tablet, oral, Daily    metoprolol succinate XL (TOPROL-XL) 25 mg, oral, Daily    nitroglycerin (NITROSTAT) 0.4 mg, sublingual, Every 5 min PRN    oxyCODONE (ROXICODONE) 5 mg, oral, Every 6 hours PRN    pantoprazole (ProtoNix) 40 mg EC tablet 1 tablet, oral, 2 times daily    polyethylene glycol (Glycolax, Miralax) 17 gram/dose powder Mix 1 capful (17 g) in 8 oz of water and drink by mouth once daily    pregabalin (LYRICA) 150 mg, oral, 2 times daily    ramipril (Altace) 10 mg capsule 1 capsule, oral, Daily    traMADol (ULTRAM) 50 mg, oral, Every 6 hours PRN        Physical Exam     ED Triage Vitals [01/10/24 1124]   Temp Heart Rate Resp BP   36.4 °C (97.6 °F) 95 17 141/84      SpO2 Temp src Heart Rate Source Patient Position   96 % -- -- --      BP Location FiO2 (%)     -- --         Heart Rate:  [70-95]   Temp:  [36.4 °C (97.6 °F)-37.7 °C (99.8 °F)]   Resp:  [17-20]   BP: (134-161)/(62-98)   Height:  [172.7 cm (5' 8\")]   Weight:  [93 kg (205 lb)]   SpO2:  [94 %-99 %]      Physical Exam  Vitals and nursing note reviewed.     CONSTITUTIONAL: Well appearing, well nourished, in no acute distress.   HENMT: Head atraumatic. Airway patent. Nasal mucosa clear. Mouth with normal mucosa, clear oropharynx. Uvula midline. Neck supple.    EYES: " Clear bilaterally, pupils equally round and reactive to light.   CARDIOVASCULAR: Normal rate, regular rhythm.  Heart sounds S1, S2.  No murmurs, rubs or gallops. Normal pulses. Capillary refill < 2 sec.   RESPIRATORY: No increased work of breathing. Breath sounds clear and equal bilaterally.  GASTROINTESTINAL: Abdomen soft, non-distended, non-tender. No rebound, no guarding. Normal bowel sounds. No palpable masses.  GENITOURINARY:  No CVA tenderness.  MUSCULOSKELETAL: Skin tear left hand dorsum.  Scattered ecchymosis bilateral upper extremities.  LLE diffusely swollen, some mild erythema around the incision site but no drainage or significant warmth, slightly reduced ROM at knee due to pain but able to flex/extend independently.  Spine appears normal, range of motion is not limited, no muscle or joint tenderness.  NEUROLOGICAL: Alert and oriented, no asymmetry, moving all extremities equally.  SKIN: Warm, dry and intact. No rash or notable lesions.  PSYCHIATRIC: Normal mood and affect.  HEME/LYMPH: No adenopathy or splenomegaly.    Diagnostic Results      ECG: ECGs read and interpreted by me. See ED Course, below, for interpretation.    Labs Reviewed   CBC WITH AUTO DIFFERENTIAL - Abnormal       Result Value    WBC 11.1      nRBC 0.0      RBC 4.00 (*)     Hemoglobin 13.2 (*)     Hematocrit 39.9 (*)           MCH 33.0      MCHC 33.1      RDW 13.9      Platelets 311      Neutrophils % 73.0      Immature Granulocytes %, Automated 2.0 (*)     Lymphocytes % 15.4      Monocytes % 8.3      Eosinophils % 0.6      Basophils % 0.7      Neutrophils Absolute 8.06 (*)     Immature Granulocytes Absolute, Automated 0.22      Lymphocytes Absolute 1.70      Monocytes Absolute 0.92 (*)     Eosinophils Absolute 0.07      Basophils Absolute 0.08     COMPREHENSIVE METABOLIC PANEL - Abnormal    Glucose 126 (*)     Sodium 139      Potassium 4.1      Chloride 101      Bicarbonate 25      Anion Gap 17      Urea Nitrogen 23       Creatinine 1.31 (*)     eGFR 56 (*)     Calcium 10.0      Albumin 3.6      Alkaline Phosphatase 139 (*)     Total Protein 7.5      AST 28      Bilirubin, Total 1.2      ALT 18     URINALYSIS WITH REFLEX CULTURE AND MICROSCOPIC - Abnormal    Color, Urine Pam (*)     Appearance, Urine Hazy (*)     Specific Gravity, Urine 1.023      pH, Urine 5.0      Protein, Urine NEGATIVE      Glucose, Urine NEGATIVE      Blood, Urine NEGATIVE      Ketones, Urine NEGATIVE      Bilirubin, Urine NEGATIVE      Urobilinogen, Urine 2.0 (*)     Nitrite, Urine NEGATIVE      Leukocyte Esterase, Urine TRACE (*)    MICROSCOPIC ONLY, URINE - Abnormal    WBC, Urine 1-5      RBC, Urine 1-2      Squamous Epithelial Cells, Urine 1-9 (SPARSE)      Mucus, Urine 1+      Hyaline Casts, Urine 2+ (*)    LIPASE - Normal    Lipase 25      Narrative:     Venipuncture immediately after or during the administration of Metamizole may lead to falsely low results. Testing should be performed immediately prior to Metamizole dosing.   SARS-COV-2 AND INFLUENZA A/B PCR - Normal    Flu A Result Not Detected      Flu B Result Not Detected      Coronavirus 2019, PCR Not Detected      Narrative:     This assay has received FDA Emergency Use Authorization (EUA) and  is only authorized for the duration of time that circumstances exist to justify the authorization of the emergency use of in vitro diagnostic tests for the detection of SARS-CoV-2 virus and/or diagnosis of COVID-19 infection under section 564(b)(1) of the Act, 21 U.S.C. 360bbb-3(b)(1). Testing for SARS-CoV-2 is only recommended for patients who meet current clinical and/or epidemiological criteria as defined by federal, state, or local public health directives. This assay is an in vitro diagnostic nucleic acid amplification test for the qualitative detection of SARS-CoV-2, Influenza A, and Influenza B from nasopharyngeal specimens and has been validated for use at [facility].  Negative results do not preclude COVID-19 infections or Influenza A/B infections, and should not be used as the sole basis for diagnosis, treatment, or other management decisions. If Influenza A/B and RSV PCR results are negative, testing for Parainfluenza virus, Adenovirus and Metapneumovirus is routinely performed for [facility] [place] in[place], and is available on other patients by placing an add-on request.    TROPONIN I, HIGH SENSITIVITY - Normal    Troponin I, High Sensitivity 15      Narrative:     Less than 99th percentile of normal range cutoff-  Female and children under 18 years old <14 ng/L; Male <21 ng/L: Negative  Repeat testing should be performed if clinically indicated.     Female and children under 18 years old 14-50 ng/L; Male 21-50 ng/L:  Consistent with possible cardiac damage and possible increased clinical   risk. Serial measurements may help to assess extent of myocardial damage.     >50 ng/L: Consistent with cardiac damage, increased clinical risk and  myocardial infarction. Serial measurements may help assess extent of   myocardial damage.      NOTE: Children less than 1 year old may have higher baseline troponin   levels and results should be interpreted in conjunction with the overall   clinical context.     NOTE: Troponin I testing is performed using a different   testing methodology at [facility] than at other   [facility]. Direct result comparisons should only   be made within the same method.   URINE CULTURE   URINALYSIS WITH REFLEX CULTURE AND MICROSCOPIC    Narrative:     The following orders were created for panel order Urinalysis with Reflex Culture and Microscopic.  Procedure                               Abnormality         Status                     ---------                               -----------         ------                     Urinalysis with Reflex C...[[id number]]  Abnormal            Final result               Extra Urine  Grant Tube[[id number]]                                                         Please view results for these tests on the individual orders.   EXTRA URINE GRAY TUBE         XR hand left 3+ views   Final Result   1.  No acute osseous abnormalities.   2.  Degenerative changes of the basal joint as described above..   Signed by Boby Godwin MD      XR knee left 4+ views   Final Result   Satisfactory knee replacement, with small joint effusion suspected.   Chronic fracture deformity distal femoral shaft.   Signed by Miki Masterson MD      Vascular US lower extremity venous duplex left    (Results Pending)                 Lux Coma Scale Score: 15                  Procedure  Procedures    ED Course & MDM   Assessment/Plan:   Mikhail Moses is a 78 y.o. male with a history of HTN, HLD, CKD, COPD, CAD, GERD, PAH, OA s/p L TKA 1/2/2024 with Dr. Bradford presents with generalized weakness, dizziness, anorexia, and vomiting since his total knee replacement last week.  Suspect symptoms may be related to his pain medications, poorly controlled pain, and possible underlying depressive symptoms.  He appears well on exam, has normal vitals, does not have any localizing complaints.  His incision site looks good, low suspicion septic joint.  Workup was initiated in triage with labs for infectious, metabolic derangement.  X-rays of the hand and knee have already been completed and showed no traumatic injury.  I did order him for a liter of normal saline, a dose of Zofran and dose of his Percocet at his request.  If workup is unrevealing, patient may benefit from PT evaluation for skilled rehab as wife is having a difficult time caring for him at home. See below for details of ED course and ultimate disposition.    Medications   heparin (porcine) injection 5,000 Units (has no administration in time range)   diphth,pertus(acell),tetanus (BoostRIX) 2.5-8-5 Lf-mcg-Lf/0.5mL vaccine 0.5 mL (0.5 mL intramuscular Given 1/10/24 1414)    oxyCODONE-acetaminophen (Percocet) 5-325 mg per tablet 1 tablet (1 tablet oral Given 1/10/24 1514)   ondansetron ODT (Zofran-ODT) disintegrating tablet 4 mg (4 mg oral Given 1/10/24 1514)   sodium chloride 0.9 % bolus 1,000 mL (0 mL intravenous Stopped 1/10/24 1637)        ED Course as of 01/11/24 1552   Wed Ricardo 10, 2024   1521 ECG read and interpreted by me.  Normal sinus rhythm, rate 90.  Bifascicular block.  No ST or T wave derangements.  Unchanged from prior. [CG]   1556 Labs are notable for stably elevated creatinine 1.31, mildly elevated alkaline phosphatase otherwise normal CMP, CBC without leukocytosis, mild stable anemia 13.2, normal platelets, normal lipase, negative troponin, negative viral swabs. [CG]   1557 Patient was admitted for further management. [CG]   2020 DVT ultrasound notable for new DVT within the left femoral vein.  Patient confirms no chest pain or shortness of breath.  He was updated on his findings and ordered for heparin drip.  Inpatient team updated. [CG]   2021 Repeat ECG shows sinus rhythm with PACs in a pattern of bigeminy, rate 81.  Left axis deviation.  Stable RBBB.  No ST or T wave changes.  Confirmed pulse at bedside around 80 as heart rate had briefly been picking up in the 40s on the monitor. [CG]      ED Course User Index  [CG] Loren Ortega MD         Diagnoses as of 01/11/24 1552   Generalized weakness   Dizziness   Nausea and vomiting, unspecified vomiting type   Postoperative acute deep vein thrombosis (DVT) of lower extremity, initial encounter (CMS/[facility])   Acute deep vein thrombosis (DVT) of left femoral vein (CMS/[facility])       Disposition:   Admitted to CDU team, discussed differential and findings with patient as well as any family members at bedside.      ED Prescriptions    None         Loren Ortega MD  EM/IM/Peds    This note was dictated by speech recognition. Minor errors in transcription may be present.     Loren Ortega MD  01/11/24 1552

## 2024-01-10 NOTE — ED TRIAGE NOTES
TRIAGE NOTE   I saw the patient as the Clinician in Triage and performed a brief history and physical exam, established acuity, and ordered appropriate tests to develop basic plan of care. Patient will be seen by an HEATHER, resident and/or physician who will independently evaluate the patient. Please see subsequent provider notes for further details and disposition.     Brief HPI: In brief, Mikhail Moses is a 78 y.o. male that presents for with a history of left knee replacement.  Wife is concerned because he is laying in bed all day and he appears very weak and he has stopped eating or drinking fluids.  They called Dr. Bradford the orthopedic surgeon and he recommended they come immediately to the emergency department.  Patient has been taking oxycodone and tramadol daily for his pain.  He endorses a feeling of fever and chills.  He endorses a feeling of nausea but he has not vomited.  Wife interrupts and mentions that he vomited once 2 days ago.  He denies any recent head injury or melena, hematochezia, or hematuria.  He is to see physical therapy but 2 days ago he fell causing a skin tear to his left posterior aspect of his hand.  Posterior aspect of his hand.  He endorses erythema and swelling of his left knee.  When he fell he landed between his bed and his nightstand and he was stuck between bed and nightstand to wife could help about.  He has a Paul in place for urination for urinary retention which she keeps capped off.  His wife notes that were not to change out the Paul because the urologist says it is a special type of Paul and there would be problems if the emergency room change Paul..     Focused Physical exam:   He has a skin tear to the left hand.  He left knee is erythematous and swollen.  Remaining physical exam appears normal.  Paul catheter is in place but is not connected to a bag. Plan/MDM:    There is concern for UTI and a urinalysis was ordered.  COVID and flu swab ordered.  Basic labs  were ordered.  EKG was ordered.  Chest x-ray ordered.  Please see subsequent provider note for further details and disposition

## 2024-01-10 NOTE — PROGRESS NOTES
Spoke with patient today. He has been struggling with food and liquid intake since surgery and staying in bed due to weakness and fatigue. Patient is following  multimodal pain management protocol and having limited pain. PT going well at home. After a long discussion, determined that it would be in the patient best interest to be medically evaluated at [facility] ER for dehydration.

## 2024-01-10 NOTE — H&P
History Of Present Illness  Mikhail Moses is a 78 y.o. male presenting with increased weakness.  Patient recently had a knee replacement on January 2, 2024.  He was discharged home on January 3, 2024.  He was having a lot of pain in his left knee after the surgery was taking pain medications.  He has not really been eating and drinking much.  He has been laying in bed doing some physical therapy but not as much as he should be.  Patient's been putting ice to the knee.  He started becoming nauseated and vomiting over the past 24 hours.  Patient has a history of hypertension, hyperlipidemia, CKD, COPD, GERD, osteoarthritis.  He denies any chest pain or shortness of breath.  Patient tried to get up today and became very weak and fell onto his left knee and his left hand.  Both were x-rayed and normal in the ER.  Patient was admitted for dehydration.     Past Medical History  He has a past medical history of CAD (coronary artery disease), CKD (chronic kidney disease), COPD (chronic obstructive pulmonary disease) (CMS/HCC), Degeneration of lumbar intervertebral disc, GERD (gastroesophageal reflux disease), Gout, HLD (hyperlipidemia), HTN (hypertension), OA (osteoarthritis), Old myocardial infarction, PAH (pulmonary artery hypertension) (CMS/[facility], located within [facility]), Presence of coronary angioplasty implant and graft, and Urethral stricture.    Surgical History  He has a past surgical history that includes Other surgical history (06/20/2018); Splenectomy, total; Coronary angioplasty with stent; Urethroplasty; Fracture surgery; Hernia repair; and Hand surgery (Right).     Social History  He reports that he quit smoking about 19 years ago. His smoking use included cigarettes. He smoked an average of 1.5 packs per day. He has never used smokeless tobacco. He reports current alcohol use of about 14.0 standard drinks of alcohol per week. He reports that he does not use drugs.    Family History  Family History   Problem Relation Name Age of  Onset    No Known Problems Mother      Heart attack Father      No Known Problems Sister          Allergies  Patient has no known allergies.    Review of Systems   Gen: POSITIVE fatigue, fever, sweats.  Head: No headache, trauma.  Eyes: No vision loss, double vision, drainage, eye pain.  ENT: No hearing changes, pain, epistaxis, congestion  Cardiac: No chest pain  Pulmonary: No shortness of breath,  pleuritic pain,   Heme/lymph: No swollen glands  GI: No abdominal pain, POSITIVE nausea, POSITIVE vomiting, diarrhea  : No  dysuria, frequency, urgency, hematuria  Musculoskeletal: No limb pain, joint pain, back pain, POSITIVE left knee joint swelling or stiffness.  Skin: No rashes, pruritus, lumps, lesions. POSITIVE incision to left knee from knee replacement steri strips intact  Neuro: No Numbness, tingling, POSITIVE weakness.  Psych: No  anxiety     Review of systems is otherwise negative unless stated above or in history of present illness.    Physical Exam   General: Vital signs stable, Pt is alert, no acute distress  Eyes: Conjunctiva normal, PERRL, EOMs intact  HENMT: Normocephalic, atraumatic, external ears and nose normal, no scars or masses.  No mastoid tenderness. Trachea is midline. No meningeal signs, negative Kernig and Brudzinski, moves neck freely.  No sinus tenderness  Resp: Respiratory effort is normal, no retractions, no stridor. Lungs CTA, no wheezes or rhonchi  CV: Heart is regular rate and rhythm.   GI: No abdominal pain on palpation, positive bowel sounds  : voiding  Skin: POSITIVE incision to left knee from knee replacement steri strips intact  Skel:  positive left knee swelling, erythema and warmth to touch with pain upon palpation  Neuro: Unsteady gait, CN II-XII intact, no motor or sensory changes.  Psych: Alert and oriented ×3, judgment is appropriate, normal mood and affect     Last Recorded Vitals  /83   Pulse 71   Temp 36.4 °C (97.6 °F)   Resp 18   Wt 93 kg (205 lb)   SpO2  95%     Relevant Results        Results for orders placed or performed during the hospital encounter of 01/10/24 (from the past 24 hour(s))   CBC with Differential   Result Value Ref Range    WBC 11.1 4.4 - 11.3 x10*3/uL    nRBC 0.0 0.0 - 0.0 /100 WBCs    RBC 4.00 (L) 4.50 - 5.90 x10*6/uL    Hemoglobin 13.2 (L) 13.5 - 17.5 g/dL    Hematocrit 39.9 (L) 41.0 - 52.0 %     80 - 100 fL    MCH 33.0 26.0 - 34.0 pg    MCHC 33.1 32.0 - 36.0 g/dL    RDW 13.9 11.5 - 14.5 %    Platelets 311 150 - 450 x10*3/uL    Neutrophils % 73.0 40.0 - 80.0 %    Immature Granulocytes %, Automated 2.0 (H) 0.0 - 0.9 %    Lymphocytes % 15.4 13.0 - 44.0 %    Monocytes % 8.3 2.0 - 10.0 %    Eosinophils % 0.6 0.0 - 6.0 %    Basophils % 0.7 0.0 - 2.0 %    Neutrophils Absolute 8.06 (H) 1.60 - 5.50 x10*3/uL    Immature Granulocytes Absolute, Automated 0.22 0.00 - 0.50 x10*3/uL    Lymphocytes Absolute 1.70 0.80 - 3.00 x10*3/uL    Monocytes Absolute 0.92 (H) 0.05 - 0.80 x10*3/uL    Eosinophils Absolute 0.07 0.00 - 0.40 x10*3/uL    Basophils Absolute 0.08 0.00 - 0.10 x10*3/uL   Comprehensive Metabolic Panel   Result Value Ref Range    Glucose 126 (H) 74 - 99 mg/dL    Sodium 139 136 - 145 mmol/L    Potassium 4.1 3.5 - 5.3 mmol/L    Chloride 101 98 - 107 mmol/L    Bicarbonate 25 21 - 32 mmol/L    Anion Gap 17 10 - 20 mmol/L    Urea Nitrogen 23 6 - 23 mg/dL    Creatinine 1.31 (H) 0.50 - 1.30 mg/dL    eGFR 56 (L) >60 mL/min/1.73m*2    Calcium 10.0 8.6 - 10.3 mg/dL    Albumin 3.6 3.4 - 5.0 g/dL    Alkaline Phosphatase 139 (H) 33 - 136 U/L    Total Protein 7.5 6.4 - 8.2 g/dL    AST 28 9 - 39 U/L    Bilirubin, Total 1.2 0.0 - 1.2 mg/dL    ALT 18 10 - 52 U/L   Lipase   Result Value Ref Range    Lipase 25 9 - 82 U/L   Sars-CoV-2 and Influenza A/B PCR   Result Value Ref Range    Flu A Result Not Detected Not Detected    Flu B Result Not Detected Not Detected    Coronavirus 2019, PCR Not Detected Not Detected   Troponin I, High Sensitivity   Result Value Ref  "Range    Troponin I, High Sensitivity 15 0 - 20 ng/L   Urinalysis with Reflex Culture and Microscopic   Result Value Ref Range    Color, Urine Pam (N) Straw, Yellow    Appearance, Urine Hazy (N) Clear    Specific Gravity, Urine 1.023 1.005 - 1.035    pH, Urine 5.0 5.0, 5.5, 6.0, 6.5, 7.0, 7.5, 8.0    Protein, Urine NEGATIVE NEGATIVE mg/dL    Glucose, Urine NEGATIVE NEGATIVE mg/dL    Blood, Urine NEGATIVE NEGATIVE    Ketones, Urine NEGATIVE NEGATIVE mg/dL    Bilirubin, Urine NEGATIVE NEGATIVE    Urobilinogen, Urine 2.0 (N) <2.0 mg/dL    Nitrite, Urine NEGATIVE NEGATIVE    Leukocyte Esterase, Urine TRACE (A) NEGATIVE   Microscopic Only, Urine   Result Value Ref Range    WBC, Urine 1-5 1-5, NONE /HPF    RBC, Urine 1-2 NONE, 1-2, 3-5 /HPF    Squamous Epithelial Cells, Urine 1-9 (SPARSE) Reference range not established. /HPF    Mucus, Urine 1+ Reference range not established. /LPF    Hyaline Casts, Urine 2+ (A) NONE /LPF     XR knee left 4+ views    Result Date: 1/10/2024  STUDY: Knee Radiographs; 1/10/2024 1:44 PM INDICATION: Left knee replacement swollen and erythematous. COMPARISON: XR left knee 12/29/2023. ACCESSION NUMBER(S): [id number] ORDERING CLINICIAN: PONCHO SWEENEY TECHNIQUE:  Four view(s) of the left knee. FINDINGS: The total knee arthroplasty is in anatomic alignment.  There is no displaced fracture. There is chronic deformity of the distal femoral shaft which is partially visualized consistent with previous fracture. A small joint effusion may be present. There is no evidence of orthopedic hardware complications.  No soft tissue abnormality is seen.    Satisfactory knee replacement, with small joint effusion suspected. Chronic fracture deformity distal femoral shaft. Signed by Miki Masterson MD    XR hand left 3+ views    Result Date: 1/10/2024  }\"01/10/2024 12:44 PM. INDICATION: Left hand injury. COMPARISON: None Available. ACCESSION NUMBER(S): [id number] ORDERING CLINICIAN: PONCHO SWEENEY " TECHNIQUE:  Three view(s) of the left hand. FINDINGS:  There is no displaced fracture.  The alignment is anatomic. Degenerative changes noted involving the basal joint.  No soft tissue abnormality is seen.    1.  No acute osseous abnormalities. 2.  Degenerative changes of the basal joint as described above.. Signed by Boby Godwin MD    XR lower extremity leg lengevaluation    Result Date: 12/30/2023  Interpreted By:  Ev Watson, STUDY: Single AP view of the bilateral lower extremities.   INDICATION: Signs/Symptoms:m17.12.   COMPARISON: 12/14/2023.   ACCESSION NUMBER(S): [id number]   ORDERING CLINICIAN: CAITLYN GANDARA   FINDINGS: No significant leg length discrepancy. There is mild varus angulation of the left knee. Severe medial compartment degenerative changes of the bilateral knees noted with joint space loss and osteophytes. Mild bilateral hip joint degenerative changes. Heterotopic ossification superior to the left greater trochanter. Remote healed fracture deformity of the left femur with cortical thickening and irregularity.       As above.   Signed by: Ev Watson 12/30/2023 9:08 AM Dictation workstation:   [id number]    XR knee left 3 views    Result Date: 12/30/2023  Interpreted By:  Ev Watson, STUDY: Left knee, 3 views.   INDICATION: Signs/Symptoms:m17.12.   COMPARISON: 12/14/2023.   ACCESSION NUMBER(S): [id number]   ORDERING CLINICIAN: CAITLYN GANDARA   FINDINGS: No acute fracture. No malalignment. Severe medial compartment osteoarthrosis with joint space loss and osteophytes. Remote healed fracture deformity of the distal femur. Small knee joint effusion       1. Severe medial compartment osteoarthrosis of the left knee.   MACRO: None.   Signed by: Ev Watson 12/30/2023 9:07 AM Dictation workstation:   [id number]    ECG 12 lead (Clinic Performed)    Result Date: 12/26/2023  Sinus rhythm with Fusion complexes Left axis deviation Right bundle branch block Minimal voltage  criteria for LVH, may be normal variant Inferior infarct , age undetermined Anterolateral infarct , age undetermined Abnormal ECG No previous ECGs available Confirmed by Twyla Ríos ([id number]) on 12/26/2023 6:36:38 PM    XR lower extremity leg lengevaluation    Result Date: 12/15/2023  Interpreted By:  Dawood Davey, STUDY: XR LOWER EXTREMITY LEG LENGTH EVALUATION; ;  12/14/2023 9:09 am   INDICATION: Signs/Symptoms:preop TKA protocol.   COMPARISON: None.   ACCESSION NUMBER(S): [id number]   ORDERING CLINICIAN: MARIAM PAULINO   FINDINGS: Standing AP views of bilateral lower extremities. There is bilateral mild genu varum. There is no leg length discrepancy. The study is limited by underpenetration. Note is made of a remote distal femoral fracture deformity on the left. Severe medial compartment arthritis bilaterally in the knees       Mild bilateral genu varum deformity. No leg length discrepancy.     MACRO: None   Signed by: Dawood Davey 12/15/2023 6:21 PM Dictation workstation:   [id number]    XR knee left 3 views    Result Date: 12/15/2023  Interpreted By:  Jas Scott, STUDY: XR KNEE LEFT 3 VIEWS;  12/14/2023 8:54 am   INDICATION: Signs/Symptoms:preop TKA protocol w markers.   COMPARISON: 06/02/2023.   ACCESSION NUMBER(S): [id number]   ORDERING CLINICIAN: MARIAM PAULINO   TECHNIQUE: Left knee series performed with 4 images.   FINDINGS: Generalized osteopenia is present. Tricompartmental degenerative changes of the left knee are present including severe joint space narrowing of the medial compartment and tricompartmental mild marginal spurring. No acute fracture or subluxation is seen. Small suprapatellar joint effusion is present. Dense vascular calcifications are seen posteriorly.       Osteopenia and tricompartmental degenerative changes most severe in the medial compartment.   MACRO: None.   Signed by: Jas Scott 12/15/2023 9:04 AM Dictation workstation:   [id number]         Assessment/Plan   Principal Problem:    Weakness    Dehydration    Fall  -CBC white blood count 11.1  -CMP creatinine 1.31  -COVID flu negative  -Urinalysis trace of white blood cells  -X-ray knee small effusion hardware intact  -X-ray wrist no fracture or dislocation  -Normal saline at 75 an hour  -Antiemetics  -Pain medication  -US left leg r/o dvt  -ortho consult s/p knee replacement with fall    HTN/HLD  -continue Home meds    DVT prophylaxis  -Heparin subcutaneous    Labs/Testing reviewed    Hydrate with IV fluids overnight  Reevaluate in the morning  Ortho to see for knee replacement  Ultrasound lower extremity pending    * 45 minutes total spent on patient's care today; >50% time spent on counseling/coordination of care            Marion Lindsay, APRN-CNP

## 2024-01-11 ENCOUNTER — APPOINTMENT (OUTPATIENT)
Dept: RADIOLOGY | Facility: HOSPITAL | Age: 79
DRG: 565 | End: 2024-01-11
Payer: MEDICARE

## 2024-01-11 PROBLEM — I82.412 ACUTE DEEP VEIN THROMBOSIS (DVT) OF LEFT FEMORAL VEIN (MULTI): Status: ACTIVE | Noted: 2024-01-11

## 2024-01-11 LAB
ANION GAP SERPL CALC-SCNC: 14 MMOL/L (ref 10–20)
APTT PPP: 131 SECONDS (ref 27–38)
APTT PPP: 41 SECONDS (ref 27–38)
ATRIAL RATE: 81 BPM
ATRIAL RATE: 90 BPM
BASOPHILS # BLD AUTO: 0.08 X10*3/UL (ref 0–0.1)
BASOPHILS NFR BLD AUTO: 0.9 %
BUN SERPL-MCNC: 26 MG/DL (ref 6–23)
CALCIUM SERPL-MCNC: 8.7 MG/DL (ref 8.6–10.3)
CHLORIDE SERPL-SCNC: 107 MMOL/L (ref 98–107)
CO2 SERPL-SCNC: 22 MMOL/L (ref 21–32)
CREAT SERPL-MCNC: 1.35 MG/DL (ref 0.5–1.3)
EGFRCR SERPLBLD CKD-EPI 2021: 54 ML/MIN/1.73M*2
EOSINOPHIL # BLD AUTO: 0.11 X10*3/UL (ref 0–0.4)
EOSINOPHIL NFR BLD AUTO: 1.3 %
ERYTHROCYTE [DISTWIDTH] IN BLOOD BY AUTOMATED COUNT: 14.2 % (ref 11.5–14.5)
GLUCOSE SERPL-MCNC: 110 MG/DL (ref 74–99)
HCT VFR BLD AUTO: 34.4 % (ref 41–52)
HGB BLD-MCNC: 10.8 G/DL (ref 13.5–17.5)
IMM GRANULOCYTES # BLD AUTO: 0.16 X10*3/UL (ref 0–0.5)
IMM GRANULOCYTES NFR BLD AUTO: 1.8 % (ref 0–0.9)
LYMPHOCYTES # BLD AUTO: 1.97 X10*3/UL (ref 0.8–3)
LYMPHOCYTES NFR BLD AUTO: 22.6 %
MCH RBC QN AUTO: 32.4 PG (ref 26–34)
MCHC RBC AUTO-ENTMCNC: 31.4 G/DL (ref 32–36)
MCV RBC AUTO: 103 FL (ref 80–100)
MONOCYTES # BLD AUTO: 0.82 X10*3/UL (ref 0.05–0.8)
MONOCYTES NFR BLD AUTO: 9.4 %
NEUTROPHILS # BLD AUTO: 5.59 X10*3/UL (ref 1.6–5.5)
NEUTROPHILS NFR BLD AUTO: 64 %
NRBC BLD-RTO: 0 /100 WBCS (ref 0–0)
P AXIS: -1 DEGREES
P AXIS: 37 DEGREES
P OFFSET: 174 MS
P OFFSET: 187 MS
P ONSET: 142 MS
P ONSET: 143 MS
PLATELET # BLD AUTO: 263 X10*3/UL (ref 150–450)
POTASSIUM SERPL-SCNC: 4.9 MMOL/L (ref 3.5–5.3)
PR INTERVAL: 130 MS
PR INTERVAL: 138 MS
Q ONSET: 207 MS
Q ONSET: 212 MS
QRS COUNT: 14 BEATS
QRS COUNT: 15 BEATS
QRS DURATION: 120 MS
QRS DURATION: 128 MS
QT INTERVAL: 390 MS
QT INTERVAL: 460 MS
QTC CALCULATION(BAZETT): 477 MS
QTC CALCULATION(BAZETT): 534 MS
QTC FREDERICIA: 446 MS
QTC FREDERICIA: 508 MS
R AXIS: -42 DEGREES
R AXIS: -58 DEGREES
RBC # BLD AUTO: 3.33 X10*6/UL (ref 4.5–5.9)
SODIUM SERPL-SCNC: 138 MMOL/L (ref 136–145)
T AXIS: 22 DEGREES
T AXIS: 48 DEGREES
T OFFSET: 407 MS
T OFFSET: 437 MS
UFH PPP CHRO-ACNC: 0.1 IU/ML
UFH PPP CHRO-ACNC: 0.5 IU/ML
UFH PPP CHRO-ACNC: 0.5 IU/ML
UFH PPP CHRO-ACNC: 1 IU/ML
UFH PPP CHRO-ACNC: 1.4 IU/ML
VENTRICULAR RATE: 81 BPM
VENTRICULAR RATE: 90 BPM
WBC # BLD AUTO: 8.7 X10*3/UL (ref 4.4–11.3)

## 2024-01-11 PROCEDURE — 36415 COLL VENOUS BLD VENIPUNCTURE: CPT | Performed by: INTERNAL MEDICINE

## 2024-01-11 PROCEDURE — 1200000002 HC GENERAL ROOM WITH TELEMETRY DAILY

## 2024-01-11 PROCEDURE — 2500000001 HC RX 250 WO HCPCS SELF ADMINISTERED DRUGS (ALT 637 FOR MEDICARE OP): Performed by: INTERNAL MEDICINE

## 2024-01-11 PROCEDURE — 78830 RP LOCLZJ TUM SPECT W/CT 1: CPT | Performed by: STUDENT IN AN ORGANIZED HEALTH CARE EDUCATION/TRAINING PROGRAM

## 2024-01-11 PROCEDURE — 2500000004 HC RX 250 GENERAL PHARMACY W/ HCPCS (ALT 636 FOR OP/ED): Performed by: INTERNAL MEDICINE

## 2024-01-11 PROCEDURE — 85520 HEPARIN ASSAY: CPT | Mod: MUE | Performed by: NURSE PRACTITIONER

## 2024-01-11 PROCEDURE — 3430000001 HC RX 343 DIAGNOSTIC RADIOPHARMACEUTICALS: Performed by: NURSE PRACTITIONER

## 2024-01-11 PROCEDURE — 85520 HEPARIN ASSAY: CPT | Performed by: INTERNAL MEDICINE

## 2024-01-11 PROCEDURE — 85730 THROMBOPLASTIN TIME PARTIAL: CPT | Performed by: INTERNAL MEDICINE

## 2024-01-11 PROCEDURE — 82374 ASSAY BLOOD CARBON DIOXIDE: CPT | Performed by: NURSE PRACTITIONER

## 2024-01-11 PROCEDURE — 99233 SBSQ HOSP IP/OBS HIGH 50: CPT | Performed by: NURSE PRACTITIONER

## 2024-01-11 PROCEDURE — 1090000001 HH PPS REVENUE CREDIT

## 2024-01-11 PROCEDURE — 96376 TX/PRO/DX INJ SAME DRUG ADON: CPT

## 2024-01-11 PROCEDURE — 2500000004 HC RX 250 GENERAL PHARMACY W/ HCPCS (ALT 636 FOR OP/ED): Performed by: NURSE PRACTITIONER

## 2024-01-11 PROCEDURE — 85025 COMPLETE CBC W/AUTO DIFF WBC: CPT | Performed by: NURSE PRACTITIONER

## 2024-01-11 PROCEDURE — 2500000001 HC RX 250 WO HCPCS SELF ADMINISTERED DRUGS (ALT 637 FOR MEDICARE OP): Performed by: NURSE PRACTITIONER

## 2024-01-11 PROCEDURE — 1090000002 HH PPS REVENUE DEBIT

## 2024-01-11 PROCEDURE — 78830 RP LOCLZJ TUM SPECT W/CT 1: CPT

## 2024-01-11 PROCEDURE — A9540 TC99M MAA: HCPCS | Performed by: NURSE PRACTITIONER

## 2024-01-11 PROCEDURE — 94760 N-INVAS EAR/PLS OXIMETRY 1: CPT

## 2024-01-11 RX ORDER — ASPIRIN 81 MG/1
81 TABLET ORAL DAILY
Status: DISCONTINUED | OUTPATIENT
Start: 2024-01-12 | End: 2024-01-11

## 2024-01-11 RX ORDER — HYDROCORTISONE 25 MG/G
CREAM TOPICAL 2 TIMES DAILY
Status: DISCONTINUED | OUTPATIENT
Start: 2024-01-11 | End: 2024-01-13 | Stop reason: HOSPADM

## 2024-01-11 RX ADMIN — DOCUSATE SODIUM 100 MG: 100 CAPSULE, LIQUID FILLED ORAL at 09:06

## 2024-01-11 RX ADMIN — LISINOPRIL 20 MG: 20 TABLET ORAL at 09:06

## 2024-01-11 RX ADMIN — HEPARIN SODIUM 14 UNITS/HR: 10000 INJECTION, SOLUTION INTRAVENOUS at 16:16

## 2024-01-11 RX ADMIN — HYDROMORPHONE HYDROCHLORIDE 0.4 MG: 1 INJECTION, SOLUTION INTRAMUSCULAR; INTRAVENOUS; SUBCUTANEOUS at 15:31

## 2024-01-11 RX ADMIN — HYDROMORPHONE HYDROCHLORIDE 0.4 MG: 1 INJECTION, SOLUTION INTRAMUSCULAR; INTRAVENOUS; SUBCUTANEOUS at 20:30

## 2024-01-11 RX ADMIN — HYDROMORPHONE HYDROCHLORIDE 0.4 MG: 1 INJECTION, SOLUTION INTRAMUSCULAR; INTRAVENOUS; SUBCUTANEOUS at 04:08

## 2024-01-11 RX ADMIN — PANTOPRAZOLE SODIUM 40 MG: 40 TABLET, DELAYED RELEASE ORAL at 09:06

## 2024-01-11 RX ADMIN — HYDROMORPHONE HYDROCHLORIDE 0.4 MG: 1 INJECTION, SOLUTION INTRAMUSCULAR; INTRAVENOUS; SUBCUTANEOUS at 00:23

## 2024-01-11 RX ADMIN — HYDROMORPHONE HYDROCHLORIDE 0.4 MG: 1 INJECTION, SOLUTION INTRAMUSCULAR; INTRAVENOUS; SUBCUTANEOUS at 08:58

## 2024-01-11 RX ADMIN — ATORVASTATIN CALCIUM 10 MG: 20 TABLET, FILM COATED ORAL at 20:30

## 2024-01-11 RX ADMIN — ASPIRIN 81 MG: 81 TABLET, COATED ORAL at 09:06

## 2024-01-11 RX ADMIN — PANTOPRAZOLE SODIUM 40 MG: 40 TABLET, DELAYED RELEASE ORAL at 20:30

## 2024-01-11 RX ADMIN — SODIUM CHLORIDE 75 ML/HR: 9 INJECTION, SOLUTION INTRAVENOUS at 12:48

## 2024-01-11 RX ADMIN — HYDROCORTISONE: 25 CREAM TOPICAL at 06:17

## 2024-01-11 RX ADMIN — HEPARIN SODIUM 1300 UNITS/HR: 10000 INJECTION, SOLUTION INTRAVENOUS at 09:41

## 2024-01-11 RX ADMIN — METOPROLOL SUCCINATE 25 MG: 25 TABLET, EXTENDED RELEASE ORAL at 09:06

## 2024-01-11 RX ADMIN — POLYETHYLENE GLYCOL 3350 17 G: 17 POWDER, FOR SOLUTION ORAL at 09:06

## 2024-01-11 RX ADMIN — DOCUSATE SODIUM 100 MG: 100 CAPSULE, LIQUID FILLED ORAL at 20:30

## 2024-01-11 RX ADMIN — SODIUM CHLORIDE 75 ML/HR: 9 INJECTION, SOLUTION INTRAVENOUS at 23:39

## 2024-01-11 RX ADMIN — ALLOPURINOL 300 MG: 100 TABLET ORAL at 09:06

## 2024-01-11 RX ADMIN — ONDANSETRON 4 MG: 2 INJECTION INTRAMUSCULAR; INTRAVENOUS at 00:24

## 2024-01-11 RX ADMIN — PREGABALIN 150 MG: 75 CAPSULE ORAL at 20:30

## 2024-01-11 RX ADMIN — KIT FOR THE PREPARATION OF TECHNETIUM TC 99M ALBUMIN AGGREGATED 4.4 MILLICURIE: 2.5 INJECTION, POWDER, FOR SOLUTION INTRAVENOUS at 10:46

## 2024-01-11 RX ADMIN — PREGABALIN 150 MG: 75 CAPSULE ORAL at 09:06

## 2024-01-11 RX ADMIN — Medication 3 MG: at 20:30

## 2024-01-11 SDOH — SOCIAL STABILITY: SOCIAL INSECURITY: DOES ANYONE TRY TO KEEP YOU FROM HAVING/CONTACTING OTHER FRIENDS OR DOING THINGS OUTSIDE YOUR HOME?: NO

## 2024-01-11 SDOH — ECONOMIC STABILITY: INCOME INSECURITY: HOW HARD IS IT FOR YOU TO PAY FOR THE VERY BASICS LIKE FOOD, HOUSING, MEDICAL CARE, AND HEATING?: NOT VERY HARD

## 2024-01-11 SDOH — ECONOMIC STABILITY: TRANSPORTATION INSECURITY
IN THE PAST 12 MONTHS, HAS THE LACK OF TRANSPORTATION KEPT YOU FROM MEDICAL APPOINTMENTS OR FROM GETTING MEDICATIONS?: NO

## 2024-01-11 SDOH — SOCIAL STABILITY: SOCIAL INSECURITY: ABUSE: ADULT

## 2024-01-11 SDOH — ECONOMIC STABILITY: INCOME INSECURITY: IN THE LAST 12 MONTHS, WAS THERE A TIME WHEN YOU WERE NOT ABLE TO PAY THE MORTGAGE OR RENT ON TIME?: NO

## 2024-01-11 SDOH — SOCIAL STABILITY: SOCIAL INSECURITY: ARE YOU OR HAVE YOU BEEN THREATENED OR ABUSED PHYSICALLY, EMOTIONALLY, OR SEXUALLY BY ANYONE?: NO

## 2024-01-11 SDOH — ECONOMIC STABILITY: HOUSING INSECURITY
IN THE LAST 12 MONTHS, WAS THERE A TIME WHEN YOU DID NOT HAVE A STEADY PLACE TO SLEEP OR SLEPT IN A SHELTER (INCLUDING NOW)?: NO

## 2024-01-11 SDOH — ECONOMIC STABILITY: TRANSPORTATION INSECURITY
IN THE PAST 12 MONTHS, HAS LACK OF TRANSPORTATION KEPT YOU FROM MEETINGS, WORK, OR FROM GETTING THINGS NEEDED FOR DAILY LIVING?: NO

## 2024-01-11 SDOH — SOCIAL STABILITY: SOCIAL INSECURITY: HAS ANYONE EVER THREATENED TO HURT YOUR FAMILY OR YOUR PETS?: NO

## 2024-01-11 SDOH — SOCIAL STABILITY: SOCIAL INSECURITY: HAVE YOU HAD THOUGHTS OF HARMING ANYONE ELSE?: NO

## 2024-01-11 SDOH — SOCIAL STABILITY: SOCIAL INSECURITY: DO YOU FEEL ANYONE HAS EXPLOITED OR TAKEN ADVANTAGE OF YOU FINANCIALLY OR OF YOUR PERSONAL PROPERTY?: NO

## 2024-01-11 SDOH — SOCIAL STABILITY: SOCIAL INSECURITY: ARE THERE ANY APPARENT SIGNS OF INJURIES/BEHAVIORS THAT COULD BE RELATED TO ABUSE/NEGLECT?: NO

## 2024-01-11 SDOH — SOCIAL STABILITY: SOCIAL INSECURITY: WERE YOU ABLE TO COMPLETE ALL THE BEHAVIORAL HEALTH SCREENINGS?: YES

## 2024-01-11 SDOH — SOCIAL STABILITY: SOCIAL INSECURITY: DO YOU FEEL UNSAFE GOING BACK TO THE PLACE WHERE YOU ARE LIVING?: NO

## 2024-01-11 SDOH — ECONOMIC STABILITY: HOUSING INSECURITY: IN THE LAST 12 MONTHS, HOW MANY PLACES HAVE YOU LIVED?: 1

## 2024-01-11 ASSESSMENT — COGNITIVE AND FUNCTIONAL STATUS - GENERAL
WALKING IN HOSPITAL ROOM: A LOT
STANDING UP FROM CHAIR USING ARMS: A LITTLE
HELP NEEDED FOR BATHING: A LITTLE
PERSONAL GROOMING: A LITTLE
PERSONAL GROOMING: A LITTLE
DRESSING REGULAR UPPER BODY CLOTHING: A LITTLE
WALKING IN HOSPITAL ROOM: A LOT
MOBILITY SCORE: 16
TOILETING: A LOT
TURNING FROM BACK TO SIDE WHILE IN FLAT BAD: A LITTLE
WALKING IN HOSPITAL ROOM: A LOT
TOILETING: A LITTLE
DAILY ACTIVITIY SCORE: 17
WALKING IN HOSPITAL ROOM: A LOT
MOBILITY SCORE: 16
STANDING UP FROM CHAIR USING ARMS: A LITTLE
HELP NEEDED FOR BATHING: A LOT
HELP NEEDED FOR BATHING: A LOT
TOILETING: A LOT
CLIMB 3 TO 5 STEPS WITH RAILING: A LOT
STANDING UP FROM CHAIR USING ARMS: A LITTLE
MOVING FROM LYING ON BACK TO SITTING ON SIDE OF FLAT BED WITH BEDRAILS: A LITTLE
MOVING TO AND FROM BED TO CHAIR: A LITTLE
PERSONAL GROOMING: A LITTLE
MOVING TO AND FROM BED TO CHAIR: A LITTLE
MOVING FROM LYING ON BACK TO SITTING ON SIDE OF FLAT BED WITH BEDRAILS: A LITTLE
TURNING FROM BACK TO SIDE WHILE IN FLAT BAD: A LITTLE
DRESSING REGULAR UPPER BODY CLOTHING: A LITTLE
CLIMB 3 TO 5 STEPS WITH RAILING: A LOT
WALKING IN HOSPITAL ROOM: A LOT
DRESSING REGULAR UPPER BODY CLOTHING: A LITTLE
STANDING UP FROM CHAIR USING ARMS: A LITTLE
WALKING IN HOSPITAL ROOM: A LOT
HELP NEEDED FOR BATHING: A LOT
MOVING FROM LYING ON BACK TO SITTING ON SIDE OF FLAT BED WITH BEDRAILS: A LITTLE
WALKING IN HOSPITAL ROOM: A LOT
MOVING FROM LYING ON BACK TO SITTING ON SIDE OF FLAT BED WITH BEDRAILS: A LITTLE
PATIENT BASELINE BEDBOUND: NO
DAILY ACTIVITIY SCORE: 17
CLIMB 3 TO 5 STEPS WITH RAILING: A LOT
TURNING FROM BACK TO SIDE WHILE IN FLAT BAD: A LITTLE
HELP NEEDED FOR BATHING: A LOT
MOVING FROM LYING ON BACK TO SITTING ON SIDE OF FLAT BED WITH BEDRAILS: A LITTLE
STANDING UP FROM CHAIR USING ARMS: A LITTLE
PERSONAL GROOMING: A LITTLE
TURNING FROM BACK TO SIDE WHILE IN FLAT BAD: A LITTLE
DRESSING REGULAR UPPER BODY CLOTHING: A LITTLE
HELP NEEDED FOR BATHING: A LOT
DAILY ACTIVITIY SCORE: 22
MOVING TO AND FROM BED TO CHAIR: A LITTLE
CLIMB 3 TO 5 STEPS WITH RAILING: A LOT
MOVING FROM LYING ON BACK TO SITTING ON SIDE OF FLAT BED WITH BEDRAILS: A LITTLE
CLIMB 3 TO 5 STEPS WITH RAILING: A LOT
MOVING TO AND FROM BED TO CHAIR: A LITTLE
MOBILITY SCORE: 16
STANDING UP FROM CHAIR USING ARMS: A LITTLE
DRESSING REGULAR UPPER BODY CLOTHING: A LITTLE
TOILETING: A LOT
TURNING FROM BACK TO SIDE WHILE IN FLAT BAD: A LITTLE
TOILETING: A LOT
HELP NEEDED FOR BATHING: A LOT
PERSONAL GROOMING: A LITTLE
MOVING FROM LYING ON BACK TO SITTING ON SIDE OF FLAT BED WITH BEDRAILS: A LITTLE
STANDING UP FROM CHAIR USING ARMS: A LITTLE
DRESSING REGULAR LOWER BODY CLOTHING: A LITTLE
CLIMB 3 TO 5 STEPS WITH RAILING: A LOT
TOILETING: A LOT
MOVING TO AND FROM BED TO CHAIR: A LITTLE
MOVING TO AND FROM BED TO CHAIR: A LITTLE
TURNING FROM BACK TO SIDE WHILE IN FLAT BAD: A LITTLE
DRESSING REGULAR LOWER BODY CLOTHING: A LITTLE
DRESSING REGULAR UPPER BODY CLOTHING: A LITTLE
MOVING TO AND FROM BED TO CHAIR: A LITTLE
CLIMB 3 TO 5 STEPS WITH RAILING: A LOT
PERSONAL GROOMING: A LITTLE
TURNING FROM BACK TO SIDE WHILE IN FLAT BAD: A LITTLE
DRESSING REGULAR LOWER BODY CLOTHING: A LITTLE
TOILETING: A LOT

## 2024-01-11 ASSESSMENT — PAIN SCALES - GENERAL
PAINLEVEL_OUTOF10: 6
PAINLEVEL_OUTOF10: 7
PAINLEVEL_OUTOF10: 8

## 2024-01-11 ASSESSMENT — ACTIVITIES OF DAILY LIVING (ADL)
JUDGMENT_ADEQUATE_SAFELY_COMPLETE_DAILY_ACTIVITIES: YES
BATHING: NEEDS ASSISTANCE
GROOMING: INDEPENDENT
HEARING - RIGHT EAR: FUNCTIONAL
WALKS IN HOME: NEEDS ASSISTANCE
LACK_OF_TRANSPORTATION: NO
ADEQUATE_TO_COMPLETE_ADL: YES
HEARING - LEFT EAR: FUNCTIONAL
LACK_OF_TRANSPORTATION: NO
DRESSING YOURSELF: NEEDS ASSISTANCE
PATIENT'S MEMORY ADEQUATE TO SAFELY COMPLETE DAILY ACTIVITIES?: YES
ASSISTIVE_DEVICE: WALKER
TOILETING: INDEPENDENT
FEEDING YOURSELF: INDEPENDENT

## 2024-01-11 ASSESSMENT — LIFESTYLE VARIABLES
AUDIT TOTAL SCORE: 7
HAVE YOU OR SOMEONE ELSE BEEN INJURED AS A RESULT OF YOUR DRINKING: NO
SKIP TO QUESTIONS 9-10: 0
HAS A RELATIVE, FRIEND, DOCTOR, OR ANOTHER HEALTH PROFESSIONAL EXPRESSED CONCERN ABOUT YOUR DRINKING OR SUGGESTED YOU CUT DOWN: NO
SUBSTANCE_ABUSE_PAST_12_MONTHS: NO
PRESCIPTION_ABUSE_PAST_12_MONTHS: NO
AUDIT-C TOTAL SCORE: 7
HOW OFTEN DO YOU HAVE 6 OR MORE DRINKS ON ONE OCCASION: MONTHLY
AUDIT TOTAL SCORE: 0
HOW OFTEN DURING THE LAST YEAR HAVE YOU HAD A FEELING OF GUILT OR REMORSE AFTER DRINKING: NEVER
AUDIT-C TOTAL SCORE: 7
HOW OFTEN DO YOU HAVE A DRINK CONTAINING ALCOHOL: 4 OR MORE TIMES A WEEK
HOW OFTEN DURING THE LAST YEAR HAVE YOU FAILED TO DO WHAT WAS NORMALLY EXPECTED FROM YOU BECAUSE OF DRINKING: NEVER
HOW OFTEN DURING THE LAST YEAR HAVE YOU BEEN UNABLE TO REMEMBER WHAT HAPPENED THE NIGHT BEFORE BECAUSE YOU HAD BEEN DRINKING: NEVER
HOW OFTEN DURING THE LAST YEAR HAVE YOU FOUND THAT YOU WERE NOT ABLE TO STOP DRINKING ONCE YOU HAD STARTED: NEVER
HOW OFTEN DURING THE LAST YEAR HAVE YOU NEEDED AN ALCOHOLIC DRINK FIRST THING IN THE MORNING TO GET YOURSELF GOING AFTER A NIGHT OF HEAVY DRINKING: NEVER
HOW MANY STANDARD DRINKS CONTAINING ALCOHOL DO YOU HAVE ON A TYPICAL DAY: 3 OR 4

## 2024-01-11 ASSESSMENT — PAIN - FUNCTIONAL ASSESSMENT
PAIN_FUNCTIONAL_ASSESSMENT: 0-10

## 2024-01-11 ASSESSMENT — PAIN DESCRIPTION - ORIENTATION
ORIENTATION: LEFT

## 2024-01-11 ASSESSMENT — PAIN DESCRIPTION - LOCATION
LOCATION: KNEE

## 2024-01-11 ASSESSMENT — PATIENT HEALTH QUESTIONNAIRE - PHQ9
2. FEELING DOWN, DEPRESSED OR HOPELESS: NOT AT ALL
1. LITTLE INTEREST OR PLEASURE IN DOING THINGS: SEVERAL DAYS
SUM OF ALL RESPONSES TO PHQ9 QUESTIONS 1 & 2: 1

## 2024-01-11 NOTE — PROGRESS NOTES
Mikhail Moses is a 78 y.o. male on day 0 of admission presenting with Weakness.    Subjective   US lower extremity showed DVT, Pt now sob on oxygen dropped sats to 88%.  On 2 L nc new for patient.  In talking to patient he said he has had a DVT 8 months ago and was on eliquis but taken off and put on asa bid after repeat US showed no longer having DVT.     Gen: No fatigue, fever, sweats.  Head: No headache, trauma.  Eyes: No vision loss, double vision, drainage, eye pain.  ENT: No hearing changes, pain, epistaxis, congestion  Cardiac: No chest pain  Pulmonary: POSITIVE shortness of breath,  pleuritic pain,   Heme/lymph: No swollen glands  GI: No abdominal pain, nausea, vomiting, diarrhea  : No  dysuria, frequency, urgency, hematuria  Musculoskeletal: No limb pain, joint pain, back pain, POSITIVE left knee joint swelling or stiffness.  Skin: POSITIVE incision to left knee with steristrips  Neuro: No Numbness, tingling, or weakness.  Psych: No  anxiety     Review of systems is otherwise negative unless stated above or in history of present illness.    Objective     Physical Exam  General: Vital signs stable, Pt is alert, no acute distress  Eyes: Conjunctiva normal, PERRL, EOMs intact  HENMT: Normocephalic, atraumatic, external ears and nose normal, no scars or masses.  No mastoid tenderness. Trachea is midline. No meningeal signs, negative Kernig and Brudzinski, moves neck freely.  No sinus tenderness  Resp: Respiratory effort is normal, no retractions, no stridor. Lungs CTA, no wheezes or rhonchi On O2 2 L NC new for patient  CV: Heart is regular rate and rhythm.   Skin: POSITIVE incision left knee with steri strips and swelling with erythema  Skel: POSITIVE left lower leg swelling and erythema to knee at surgery site  Neuro: Normal gait, CN II-XII intact, no motor or sensory changes.  Psych: Alert and oriented ×3, judgment is appropriate, normal mood and affect     Last Recorded Vitals  Blood pressure 106/66,  "pulse 61, temperature 36.3 °C (97.4 °F), temperature source Temporal, resp. rate 18, height 1.727 m (5' 8\"), weight 98.9 kg (218 lb 0.6 oz), SpO2 95 %.  Intake/Output last 3 Shifts:  I/O last 3 completed shifts:  In: 1086 (11 mL/kg) [I.V.:86 (0.9 mL/kg); IV Piggyback:1000]  Out: 150 (1.5 mL/kg) [Urine:150 (0 mL/kg/hr)]  Weight: 98.9 kg     Relevant Results  Results for orders placed or performed during the hospital encounter of 01/10/24 (from the past 24 hour(s))   CBC with Differential   Result Value Ref Range    WBC 11.1 4.4 - 11.3 x10*3/uL    nRBC 0.0 0.0 - 0.0 /100 WBCs    RBC 4.00 (L) 4.50 - 5.90 x10*6/uL    Hemoglobin 13.2 (L) 13.5 - 17.5 g/dL    Hematocrit 39.9 (L) 41.0 - 52.0 %     80 - 100 fL    MCH 33.0 26.0 - 34.0 pg    MCHC 33.1 32.0 - 36.0 g/dL    RDW 13.9 11.5 - 14.5 %    Platelets 311 150 - 450 x10*3/uL    Neutrophils % 73.0 40.0 - 80.0 %    Immature Granulocytes %, Automated 2.0 (H) 0.0 - 0.9 %    Lymphocytes % 15.4 13.0 - 44.0 %    Monocytes % 8.3 2.0 - 10.0 %    Eosinophils % 0.6 0.0 - 6.0 %    Basophils % 0.7 0.0 - 2.0 %    Neutrophils Absolute 8.06 (H) 1.60 - 5.50 x10*3/uL    Immature Granulocytes Absolute, Automated 0.22 0.00 - 0.50 x10*3/uL    Lymphocytes Absolute 1.70 0.80 - 3.00 x10*3/uL    Monocytes Absolute 0.92 (H) 0.05 - 0.80 x10*3/uL    Eosinophils Absolute 0.07 0.00 - 0.40 x10*3/uL    Basophils Absolute 0.08 0.00 - 0.10 x10*3/uL   Comprehensive Metabolic Panel   Result Value Ref Range    Glucose 126 (H) 74 - 99 mg/dL    Sodium 139 136 - 145 mmol/L    Potassium 4.1 3.5 - 5.3 mmol/L    Chloride 101 98 - 107 mmol/L    Bicarbonate 25 21 - 32 mmol/L    Anion Gap 17 10 - 20 mmol/L    Urea Nitrogen 23 6 - 23 mg/dL    Creatinine 1.31 (H) 0.50 - 1.30 mg/dL    eGFR 56 (L) >60 mL/min/1.73m*2    Calcium 10.0 8.6 - 10.3 mg/dL    Albumin 3.6 3.4 - 5.0 g/dL    Alkaline Phosphatase 139 (H) 33 - 136 U/L    Total Protein 7.5 6.4 - 8.2 g/dL    AST 28 9 - 39 U/L    Bilirubin, Total 1.2 0.0 - 1.2 " mg/dL    ALT 18 10 - 52 U/L   Lipase   Result Value Ref Range    Lipase 25 9 - 82 U/L   Sars-CoV-2 and Influenza A/B PCR   Result Value Ref Range    Flu A Result Not Detected Not Detected    Flu B Result Not Detected Not Detected    Coronavirus 2019, PCR Not Detected Not Detected   Troponin I, High Sensitivity   Result Value Ref Range    Troponin I, High Sensitivity 15 0 - 20 ng/L   Urinalysis with Reflex Culture and Microscopic   Result Value Ref Range    Color, Urine Pam (N) Straw, Yellow    Appearance, Urine Hazy (N) Clear    Specific Gravity, Urine 1.023 1.005 - 1.035    pH, Urine 5.0 5.0, 5.5, 6.0, 6.5, 7.0, 7.5, 8.0    Protein, Urine NEGATIVE NEGATIVE mg/dL    Glucose, Urine NEGATIVE NEGATIVE mg/dL    Blood, Urine NEGATIVE NEGATIVE    Ketones, Urine NEGATIVE NEGATIVE mg/dL    Bilirubin, Urine NEGATIVE NEGATIVE    Urobilinogen, Urine 2.0 (N) <2.0 mg/dL    Nitrite, Urine NEGATIVE NEGATIVE    Leukocyte Esterase, Urine TRACE (A) NEGATIVE   Microscopic Only, Urine   Result Value Ref Range    WBC, Urine 1-5 1-5, NONE /HPF    RBC, Urine 1-2 NONE, 1-2, 3-5 /HPF    Squamous Epithelial Cells, Urine 1-9 (SPARSE) Reference range not established. /HPF    Mucus, Urine 1+ Reference range not established. /LPF    Hyaline Casts, Urine 2+ (A) NONE /LPF   Protime-INR   Result Value Ref Range    Protime 13.9 (H) 9.8 - 12.8 seconds    INR 1.2 (H) 0.9 - 1.1   Heparin Assay, UFH   Result Value Ref Range    Heparin Unfractionated 1.4 (HH) See Comment Below for Therapeutic Ranges IU/mL   aPTT - baseline   Result Value Ref Range    aPTT 131 (HH) 27 - 38 seconds   Heparin Assay, UFH   Result Value Ref Range    Heparin Unfractionated 1.0 See Comment Below for Therapeutic Ranges IU/mL   aPTT - baseline   Result Value Ref Range    aPTT 41 (H) 27 - 38 seconds   Basic Metabolic Panel   Result Value Ref Range    Glucose 110 (H) 74 - 99 mg/dL    Sodium 138 136 - 145 mmol/L    Potassium 4.9 3.5 - 5.3 mmol/L    Chloride 107 98 - 107 mmol/L     Bicarbonate 22 21 - 32 mmol/L    Anion Gap 14 10 - 20 mmol/L    Urea Nitrogen 26 (H) 6 - 23 mg/dL    Creatinine 1.35 (H) 0.50 - 1.30 mg/dL    eGFR 54 (L) >60 mL/min/1.73m*2    Calcium 8.7 8.6 - 10.3 mg/dL   CBC and Auto Differential   Result Value Ref Range    WBC 8.7 4.4 - 11.3 x10*3/uL    nRBC 0.0 0.0 - 0.0 /100 WBCs    RBC 3.33 (L) 4.50 - 5.90 x10*6/uL    Hemoglobin 10.8 (L) 13.5 - 17.5 g/dL    Hematocrit 34.4 (L) 41.0 - 52.0 %     (H) 80 - 100 fL    MCH 32.4 26.0 - 34.0 pg    MCHC 31.4 (L) 32.0 - 36.0 g/dL    RDW 14.2 11.5 - 14.5 %    Platelets 263 150 - 450 x10*3/uL    Neutrophils % 64.0 40.0 - 80.0 %    Immature Granulocytes %, Automated 1.8 (H) 0.0 - 0.9 %    Lymphocytes % 22.6 13.0 - 44.0 %    Monocytes % 9.4 2.0 - 10.0 %    Eosinophils % 1.3 0.0 - 6.0 %    Basophils % 0.9 0.0 - 2.0 %    Neutrophils Absolute 5.59 (H) 1.60 - 5.50 x10*3/uL    Immature Granulocytes Absolute, Automated 0.16 0.00 - 0.50 x10*3/uL    Lymphocytes Absolute 1.97 0.80 - 3.00 x10*3/uL    Monocytes Absolute 0.82 (H) 0.05 - 0.80 x10*3/uL    Eosinophils Absolute 0.11 0.00 - 0.40 x10*3/uL    Basophils Absolute 0.08 0.00 - 0.10 x10*3/uL   Heparin Assay, UFH   Result Value Ref Range    Heparin Unfractionated 0.1 See Comment Below for Therapeutic Ranges IU/mL       Imaging Results  ECG 12 lead    Result Date: 1/11/2024  Sinus rhythm with Premature atrial complexes with Aberrant conduction Left axis deviation Right bundle branch block Inferior infarct (cited on or before 26-DEC-2023) Anterolateral infarct (cited on or before 26-DEC-2023) Abnormal ECG When compared with ECG of 10-GLORIA-2024 11:27, (unconfirmed) Aberrant conduction is now Present QT has lengthened See ED provider note for full interpretation and clinical correlation Confirmed by Agnes Hernández ([id number]) on 1/11/2024 10:06:30 AM    Vascular US lower extremity venous duplex left    Result Date: 1/10/2024  STUDY: Left Lower Extremity Venous Doppler Ultrasound; Completed time:  "1/10/2024 6:51 PM. INDICATION: Left leg edema since knee replacement 01/02. COMPARISON: None available ACCESSION NUMBER(S): [id number] ORDERING CLINICIAN: FLORA BE TECHNIQUE:  Real-time grayscale, color, and spectral doppler ultrasound imaging of the left lower veins was performed. FINDINGS: There is acute nonocclusive DVT demonstrated within the left femoral vein. The left common femoral, profunda, and popliteal veins demonstrated normal compressibility, normal phasic venous flow and normal response to augmentation.  There is no evidence for echogenic thrombi.  The visualized deep calf veins are patent.  The contralateral common femoral vein is free of thrombosis.    Evidence for acute nonocclusive DVT within the left femoral vein. Findings were discussed by telephone as a critical result with Dr.Cristina Royal on 1/10/2024 at 1915 hours. Signed by Karel Terrell MD    XR knee left 4+ views    Result Date: 1/10/2024  STUDY: Knee Radiographs; 1/10/2024 1:44 PM INDICATION: Left knee replacement swollen and erythematous. COMPARISON: XR left knee 12/29/2023. ACCESSION NUMBER(S): [id number] ORDERING CLINICIAN: PONCHO SWEENEY TECHNIQUE:  Four view(s) of the left knee. FINDINGS: The total knee arthroplasty is in anatomic alignment.  There is no displaced fracture. There is chronic deformity of the distal femoral shaft which is partially visualized consistent with previous fracture. A small joint effusion may be present. There is no evidence of orthopedic hardware complications.  No soft tissue abnormality is seen.    Satisfactory knee replacement, with small joint effusion suspected. Chronic fracture deformity distal femoral shaft. Signed by Miki Masterson MD    XR hand left 3+ views    Result Date: 1/10/2024  }\"01/10/2024 12:44 PM. INDICATION: Left hand injury. COMPARISON: None Available. ACCESSION NUMBER(S): [id number] ORDERING CLINICIAN: PONCHO SWEENEY TECHNIQUE:  Three view(s) of the left hand. " FINDINGS:  There is no displaced fracture.  The alignment is anatomic. Degenerative changes noted involving the basal joint.  No soft tissue abnormality is seen.    1.  No acute osseous abnormalities. 2.  Degenerative changes of the basal joint as described above.. Signed by Boby Godwin MD        Medications:  allopurinol, 300 mg, oral, Daily  [START ON 1/12/2024] aspirin, 81 mg, oral, Daily  atorvastatin, 10 mg, oral, Nightly  docusate sodium, 100 mg, oral, BID  [Held by provider] furosemide, 40 mg, oral, Daily  hydrocortisone, , Topical, BID  lisinopril, 20 mg, oral, Daily  melatonin, 3 mg, oral, Nightly  metoprolol succinate XL, 25 mg, oral, Daily  pantoprazole, 40 mg, oral, BID  polyethylene glycol, 17 g, oral, Daily  pregabalin, 150 mg, oral, BID       PRN medications: acetaminophen **OR** acetaminophen **OR** acetaminophen, heparin, HYDROmorphone, HYDROmorphone, ondansetron **OR** ondansetron, oxygen     Assessment/Plan   Principal Problem:    Weakness    Dehydration    Fall  -CBC white blood count 11.1  -CMP creatinine 1.31  -COVID flu negative  -Urinalysis trace of white blood cells  -X-ray knee small effusion hardware intact  -X-ray wrist no fracture or dislocation  -Normal saline at 75 an hour  -Antiemetics  -Pain medication  -US left leg r/o dvt POSITIVE left leg dvt  -ortho consult s/p knee replacement with fall  -Heparin gtt started overnight  -VQ scan ordered      HTN/HLD  -continue Home meds     DVT prophylaxis  -heparin gtt for DVT left leg    Labs/Testing reviewed    Interdisciplinary team rounding completed with hospitalist, nurse, TCC    NP discussed plan and lab/testing results with Dr. Chiu    Patient to have VQ scan r/o PE  Heparin gtt to be on for 48 hours then transition to Eliquis bid    * 45 minutes total spent on patient's care today; >50% time spent on counseling/coordination of care    RENEA Fletcher-CNP

## 2024-01-11 NOTE — NURSING NOTE
Heparin blood came back 1.4.  Per MAR stopped heparin for one hour and draw new assay.  This Rn followed protocol and is waiting for new draw results.  MD was notified for fluids requesting if he wanted both heparin and fluids to go, MD said to await till am to see if they want him to have continuous IV.

## 2024-01-11 NOTE — PROGRESS NOTES
01/11/24 1025   Discharge Planning   Living Arrangements Spouse/significant other   Support Systems Spouse/significant other   Assistance Needed uses a cane post knee replacement   Type of Residence Private residence   Number of Stairs to Enter Residence 3   Home or Post Acute Services In home services   Type of Home Care Services Home PT;Home OT   Patient expects to be discharged to: Home resume [facility]   Does the patient need discharge transport arranged? Yes   RoundTrip coordination needed? Yes   Financial Resource Strain   How hard is it for you to pay for the very basics like food, housing, medical care, and heating? Not very   Housing Stability   In the last 12 months, was there a time when you were not able to pay the mortgage or rent on time? N   In the last 12 months, how many places have you lived? 1   In the last 12 months, was there a time when you did not have a steady place to sleep or slept in a shelter (including now)? N   Transportation Needs   In the past 12 months, has lack of transportation kept you from medical appointments or from getting medications? no   In the past 12 months, has lack of transportation kept you from meetings, work, or from getting things needed for daily living? No   Patient Choice   Patient / Family choosing to utilize agency / facility established prior to hospitalization Yes     Met with patient at bedside to  discuss his preferences for care upon discharge. Discussed how patient manages health at home. Lives with his wife in a house .  Independent in all ADLs.  No home O2 or dialysis.  Patient currently uses a cane since his knee replacement on January 2, 2024. Patient will return home and resume [facility].  Patient encouraged to get up as much as he can.  Reminded him to push the call light when  he is ready to get out of bed.  No additional resources or needs identified. Reviewed today's plan of care.  Patient verbalized understanding as evidenced by teach back method.     ROGER LunaN RN TCC

## 2024-01-11 NOTE — CARE PLAN
Problem: Pain - Adult  Goal: Verbalizes/displays adequate comfort level or baseline comfort level  Outcome: Progressing     Problem: Safety - Adult  Goal: Free from fall injury  Outcome: Progressing     Problem: Pain  Goal: Takes deep breaths with improved pain control throughout the shift  Outcome: Progressing  Goal: Turns in bed with improved pain control throughout the shift  Outcome: Progressing  Goal: Free from opioid side effects throughout the shift  Outcome: Progressing  Goal: Free from acute confusion related to pain meds throughout the shift  Outcome: Progressing   The patient's goals for the shift include control pain    The clinical goals for the shift include control pain    Over the shift, the patient did not make progress toward the following goals. Goal met

## 2024-01-11 NOTE — PROGRESS NOTES
01/11/24 1029   Current Planned Discharge Disposition   Current Planned Discharge Disposition Home Health

## 2024-01-11 NOTE — CARE PLAN
The patient's goals for the shift include control pain    The clinical goals for the shift include control pain    Over the shift, the patient did  make progress toward the following goals: Pain control with current regimen.

## 2024-01-11 NOTE — NURSING NOTE
Transferred to  631. Report given to Judie CHAVEZ who was made aware that next assay is due at 1745. Pt's. Wife aware of new rm assignment.

## 2024-01-11 NOTE — PROGRESS NOTES
Pharmacy Medication History Review    Mikhail Moses is a 78 y.o. male admitted for Weakness. Pharmacy reviewed the patient's [id number] medications and allergies for accuracy.    Below are additional concerns with the patient's PTA list.  List compiled from discharge summary on 1/3    The list below reflectives the updated PTA list. Please review each medication in order reconciliation for additional clarification and justification.  Medications Prior to Admission   Medication Sig Dispense Refill Last Dose    acetaminophen (Tylenol) 500 mg tablet Take 2 tablets (1,000 mg) by mouth every 6 hours if needed for mild pain (1 - 3) for up to 28 days. (Patient not taking: Reported on 1/11/2024) 224 tablet 0 More than a month    allopurinol (Zyloprim) 300 mg tablet TAKE 1 TABLET EVERY DAY (Patient taking differently: Take 1 tablet (300 mg) by mouth once daily.) 90 tablet 1 1400    aspirin 81 mg EC tablet Take 1 tablet (81 mg) by mouth 2 times a day. (Patient taking differently: Take 1 tablet (81 mg) by mouth once daily.) 60 tablet 0 1400    atorvastatin (Lipitor) 10 mg tablet Take 1 tablet (10 mg) by mouth once daily.   1/10/2024 at 1600    docusate sodium (Colace) 100 mg capsule Take 1 capsule (100 mg) by mouth 2 times a day. 60 capsule 0 1/10/2024 at 1600    furosemide (Lasix) 40 mg tablet Take 1 tablet (40 mg) by mouth once daily.   1/10/2024 at 0900    metoprolol succinate XL (Toprol-XL) 25 mg 24 hr tablet Take 1 tablet (25 mg) by mouth once daily.   1/10/2024 at 0900    nitroglycerin (Nitrostat) 0.4 mg SL tablet Place 1 tablet (0.4 mg) under the tongue every 5 minutes if needed.   More than a month    oxyCODONE (Roxicodone) 5 mg immediate release tablet Take 1 tablet (5 mg) by mouth every 6 hours if needed for severe pain (7 - 10) for up to 7 days. 28 tablet 0 Past Week    pantoprazole (ProtoNix) 40 mg EC tablet Take 1 tablet (40 mg) by mouth 2 times a day.   1/10/2024 at 1400    polyethylene glycol  (Glycolax, Miralax) 17 gram/dose powder Mix 1 capful (17 g) in 8 oz of water and drink by mouth once daily 238 g 0 Past Week    pregabalin (Lyrica) 150 mg capsule Take 1 capsule (150 mg) by mouth 2 times a day.   1/10/2024 at 1400    ramipril (Altace) 10 mg capsule Take 1 capsule (10 mg) by mouth once daily.   1/10/2024 at 0900    traMADol (Ultram) 50 mg tablet Take 1 tablet (50 mg) by mouth every 6 hours if needed for severe pain (7 - 10) for up to 7 days. 28 tablet 0 Past Week                Jade Peres, PharmD

## 2024-01-11 NOTE — CONSULTS
Reason For Consult  Recent left knee replacement, left knee swollen and erythematous     History Of Present Illness  Mikhail Moses is a 78 y.o. male presenting with increasing weakness, anorexia, and occasional nausea and vomiting. Of note, he underwent a L TKA with Dr. Bradford on 1/2/24 2/2 OA. He was sent home with [facility] on 1/3/24. He states that he has had no energy and has been laying in bed. He did fall on 1/4 due to weakness, but did not hurt his knee at that time. His wife reports that he has been sleeping a lot. He does work with PT at home, but states he is only able to bear little weight with a walker. He denies any numbness or tingling in the LLE. He is able to bend his left knee, but states he is not able to bend it as much as usual. In the ED, he is afebrile. WBC 11.1, H/H 13.2/39.9. XR left knee demonstrated satisfactory knee replacement, with small joint effusion suspected. Orthopaedics was consulted.       Past Medical History  He has a past medical history of CAD (coronary artery disease), CKD (chronic kidney disease), COPD (chronic obstructive pulmonary disease) (CMS/[facility]), Degeneration of lumbar intervertebral disc, GERD (gastroesophageal reflux disease), Gout, HLD (hyperlipidemia), HTN (hypertension), OA (osteoarthritis), Old myocardial infarction, PAH (pulmonary artery hypertension) (CMS/[facility]), Presence of coronary angioplasty implant and graft, and Urethral stricture.    Surgical History  He has a past surgical history that includes Other surgical history (06/20/2018); Splenectomy, total; Coronary angioplasty with stent; Urethroplasty; Fracture surgery; Hernia repair; and Hand surgery (Right).     Social History  He reports that he quit smoking about 19 years ago. His smoking use included cigarettes. He smoked an average of 1.5 packs per day. He has never used smokeless tobacco. He reports current alcohol use of about 14.0 standard drinks of alcohol per week. He reports that he does not use  "drugs.    Family History  Family History   Problem Relation Name Age of Onset    No Known Problems Mother      Heart attack Father      No Known Problems Sister          Allergies  Patient has no known allergies.    Review of Systems  MUSK: + pain, decreased ROM, swelling.     Physical Exam  Constitutional: Awake/alert/oriented x3, no distress, cooperative.  Skin: Warm and dry.  Eyes: EOMI, clear sclera.  ENMT: Mucus membranes moist, no apparent injury.  Head/Neck: Neck supple, no apparent injury.  Respiratory: Unlabored breathing via NC.  Cardiovascular: RRR.  Musculoskeletal: Left knee incision present with steri-strips C/D/I. Left knee swollen but non-tender to palpation. Mildly ecchymosis noted on left knee. Able to bend left knee with some difficulty. Left calf also swollen. Compartments soft and compressible. 2+ DP pulse palpated on LLE. Able to wiggle toes on LLE without difficulty. Denies numbness/tingling.   Neurological: Alert and oriented x3, no focal deficits.  Psychological: Flat affect.     Last Recorded Vitals  Blood pressure 176/81, pulse 79, temperature 36.4 °C (97.6 °F), resp. rate 20, height 1.727 m (5' 8\"), weight 93 kg (205 lb), SpO2 95 %.    Relevant Results    Results for orders placed or performed during the hospital encounter of 01/10/24 (from the past 24 hour(s))   CBC with Differential   Result Value Ref Range    WBC 11.1 4.4 - 11.3 x10*3/uL    nRBC 0.0 0.0 - 0.0 /100 WBCs    RBC 4.00 (L) 4.50 - 5.90 x10*6/uL    Hemoglobin 13.2 (L) 13.5 - 17.5 g/dL    Hematocrit 39.9 (L) 41.0 - 52.0 %     80 - 100 fL    MCH 33.0 26.0 - 34.0 pg    MCHC 33.1 32.0 - 36.0 g/dL    RDW 13.9 11.5 - 14.5 %    Platelets 311 150 - 450 x10*3/uL    Neutrophils % 73.0 40.0 - 80.0 %    Immature Granulocytes %, Automated 2.0 (H) 0.0 - 0.9 %    Lymphocytes % 15.4 13.0 - 44.0 %    Monocytes % 8.3 2.0 - 10.0 %    Eosinophils % 0.6 0.0 - 6.0 %    Basophils % 0.7 0.0 - 2.0 %    Neutrophils Absolute 8.06 (H) 1.60 - 5.50 " x10*3/uL    Immature Granulocytes Absolute, Automated 0.22 0.00 - 0.50 x10*3/uL    Lymphocytes Absolute 1.70 0.80 - 3.00 x10*3/uL    Monocytes Absolute 0.92 (H) 0.05 - 0.80 x10*3/uL    Eosinophils Absolute 0.07 0.00 - 0.40 x10*3/uL    Basophils Absolute 0.08 0.00 - 0.10 x10*3/uL   Comprehensive Metabolic Panel   Result Value Ref Range    Glucose 126 (H) 74 - 99 mg/dL    Sodium 139 136 - 145 mmol/L    Potassium 4.1 3.5 - 5.3 mmol/L    Chloride 101 98 - 107 mmol/L    Bicarbonate 25 21 - 32 mmol/L    Anion Gap 17 10 - 20 mmol/L    Urea Nitrogen 23 6 - 23 mg/dL    Creatinine 1.31 (H) 0.50 - 1.30 mg/dL    eGFR 56 (L) >60 mL/min/1.73m*2    Calcium 10.0 8.6 - 10.3 mg/dL    Albumin 3.6 3.4 - 5.0 g/dL    Alkaline Phosphatase 139 (H) 33 - 136 U/L    Total Protein 7.5 6.4 - 8.2 g/dL    AST 28 9 - 39 U/L    Bilirubin, Total 1.2 0.0 - 1.2 mg/dL    ALT 18 10 - 52 U/L   Lipase   Result Value Ref Range    Lipase 25 9 - 82 U/L   Sars-CoV-2 and Influenza A/B PCR   Result Value Ref Range    Flu A Result Not Detected Not Detected    Flu B Result Not Detected Not Detected    Coronavirus 2019, PCR Not Detected Not Detected   Troponin I, High Sensitivity   Result Value Ref Range    Troponin I, High Sensitivity 15 0 - 20 ng/L   Urinalysis with Reflex Culture and Microscopic   Result Value Ref Range    Color, Urine Pam (N) Straw, Yellow    Appearance, Urine Hazy (N) Clear    Specific Gravity, Urine 1.023 1.005 - 1.035    pH, Urine 5.0 5.0, 5.5, 6.0, 6.5, 7.0, 7.5, 8.0    Protein, Urine NEGATIVE NEGATIVE mg/dL    Glucose, Urine NEGATIVE NEGATIVE mg/dL    Blood, Urine NEGATIVE NEGATIVE    Ketones, Urine NEGATIVE NEGATIVE mg/dL    Bilirubin, Urine NEGATIVE NEGATIVE    Urobilinogen, Urine 2.0 (N) <2.0 mg/dL    Nitrite, Urine NEGATIVE NEGATIVE    Leukocyte Esterase, Urine TRACE (A) NEGATIVE   Microscopic Only, Urine   Result Value Ref Range    WBC, Urine 1-5 1-5, NONE /HPF    RBC, Urine 1-2 NONE, 1-2, 3-5 /HPF    Squamous Epithelial Cells,  Urine 1-9 (SPARSE) Reference range not established. /HPF    Mucus, Urine 1+ Reference range not established. /LPF    Hyaline Casts, Urine 2+ (A) NONE /LPF       .Vascular US lower extremity venous duplex left    Result Date: 1/10/2024  STUDY: Left Lower Extremity Venous Doppler Ultrasound; Completed time: 1/10/2024 6:51 PM. INDICATION: Left leg edema since knee replacement 01/02. COMPARISON: None available ACCESSION NUMBER(S): [id number] ORDERING CLINICIAN: FLORA BE TECHNIQUE:  Real-time grayscale, color, and spectral doppler ultrasound imaging of the left lower veins was performed. FINDINGS: There is acute nonocclusive DVT demonstrated within the left femoral vein. The left common femoral, profunda, and popliteal veins demonstrated normal compressibility, normal phasic venous flow and normal response to augmentation.  There is no evidence for echogenic thrombi.  The visualized deep calf veins are patent.  The contralateral common femoral vein is free of thrombosis.    Evidence for acute nonocclusive DVT within the left femoral vein. Findings were discussed by telephone as a critical result with Dr.Cristina Royal on 1/10/2024 at 1915 hours. Signed by Karel Terrell MD    XR knee left 4+ views    Result Date: 1/10/2024  STUDY: Knee Radiographs; 1/10/2024 1:44 PM INDICATION: Left knee replacement swollen and erythematous. COMPARISON: XR left knee 12/29/2023. ACCESSION NUMBER(S): [id number] ORDERING CLINICIAN: PONCHO SWEENEY TECHNIQUE:  Four view(s) of the left knee. FINDINGS: The total knee arthroplasty is in anatomic alignment.  There is no displaced fracture. There is chronic deformity of the distal femoral shaft which is partially visualized consistent with previous fracture. A small joint effusion may be present. There is no evidence of orthopedic hardware complications.  No soft tissue abnormality is seen.    Satisfactory knee replacement, with small joint effusion suspected. Chronic fracture  "deformity distal femoral shaft. Signed by Miki Masterson MD    XR hand left 3+ views    Result Date: 1/10/2024  }\"01/10/2024 12:44 PM. INDICATION: Left hand injury. COMPARISON: None Available. ACCESSION NUMBER(S): [id number] ORDERING CLINICIAN: PONCHO SWEENEY TECHNIQUE:  Three view(s) of the left hand. FINDINGS:  There is no displaced fracture.  The alignment is anatomic. Degenerative changes noted involving the basal joint.  No soft tissue abnormality is seen.    1.  No acute osseous abnormalities. 2.  Degenerative changes of the basal joint as described above.. Signed by Boby Godwin MD    Electrocardiogram, 12-lead PRN ACS symptoms    Result Date: 1/10/2024  Normal sinus rhythm Right bundle branch block Left anterior fascicular block Bifascicular block Inferior infarct (cited on or before 26-DEC-2023) Anterolateral infarct (cited on or before 26-DEC-2023) Abnormal ECG When compared with ECG of 26-DEC-2023 08:45, Fusion complexes are no longer Present Questionable change in initial forces of Inferior leads    XR lower extremity leg lengevaluation    Result Date: 12/30/2023  Interpreted By:  Ev Watson, STUDY: Single AP view of the bilateral lower extremities.   INDICATION: Signs/Symptoms:m17.12.   COMPARISON: 12/14/2023.   ACCESSION NUMBER(S): [id number]   ORDERING CLINICIAN: CAITLYN GANDARA   FINDINGS: No significant leg length discrepancy. There is mild varus angulation of the left knee. Severe medial compartment degenerative changes of the bilateral knees noted with joint space loss and osteophytes. Mild bilateral hip joint degenerative changes. Heterotopic ossification superior to the left greater trochanter. Remote healed fracture deformity of the left femur with cortical thickening and irregularity.       As above.   Signed by: Ev Watson 12/30/2023 9:08 AM Dictation workstation:   [id number]    XR knee left 3 views    Result Date: 12/30/2023  Interpreted By:  Ev Watson, STUDY: " Left knee, 3 views.   INDICATION: Signs/Symptoms:m17.12.   COMPARISON: 12/14/2023.   ACCESSION NUMBER(S): [id number]   ORDERING CLINICIAN: CAITLYN GANDARA   FINDINGS: No acute fracture. No malalignment. Severe medial compartment osteoarthrosis with joint space loss and osteophytes. Remote healed fracture deformity of the distal femur. Small knee joint effusion       1. Severe medial compartment osteoarthrosis of the left knee.   MACRO: None.   Signed by: Ev Watson 12/30/2023 9:07 AM Dictation workstation:   [id number]    ECG 12 lead (Clinic Performed)    Result Date: 12/26/2023  Sinus rhythm with Fusion complexes Left axis deviation Right bundle branch block Minimal voltage criteria for LVH, may be normal variant Inferior infarct , age undetermined Anterolateral infarct , age undetermined Abnormal ECG No previous ECGs available Confirmed by Twyla Ríos ([id number]) on 12/26/2023 6:36:38 PM    XR lower extremity leg lengevaluation    Result Date: 12/15/2023  Interpreted By:  Daowod Davey, STUDY: XR LOWER EXTREMITY LEG LENGTH EVALUATION; ;  12/14/2023 9:09 am   INDICATION: Signs/Symptoms:preop TKA protocol.   COMPARISON: None.   ACCESSION NUMBER(S): [id number]   ORDERING CLINICIAN: MARIAM PAULINO   FINDINGS: Standing AP views of bilateral lower extremities. There is bilateral mild genu varum. There is no leg length discrepancy. The study is limited by underpenetration. Note is made of a remote distal femoral fracture deformity on the left. Severe medial compartment arthritis bilaterally in the knees       Mild bilateral genu varum deformity. No leg length discrepancy.     MACRO: None   Signed by: Dawood Davey 12/15/2023 6:21 PM Dictation workstation:   [id number]    XR knee left 3 views    Result Date: 12/15/2023  Interpreted By:  Jas Scott, STUDY: XR KNEE LEFT 3 VIEWS;  12/14/2023 8:54 am   INDICATION: Signs/Symptoms:preop TKA protocol w markers.   COMPARISON: 06/02/2023.   ACCESSION  NUMBER(S): [id number]   ORDERING CLINICIAN: MARIAM PAULINO   TECHNIQUE: Left knee series performed with 4 images.   FINDINGS: Generalized osteopenia is present. Tricompartmental degenerative changes of the left knee are present including severe joint space narrowing of the medial compartment and tricompartmental mild marginal spurring. No acute fracture or subluxation is seen. Small suprapatellar joint effusion is present. Dense vascular calcifications are seen posteriorly.       Osteopenia and tricompartmental degenerative changes most severe in the medial compartment.   MACRO: None.   Signed by: Jas Scott 12/15/2023 9:04 AM Dictation workstation:   [id number]      Assessment/Plan L knee swelling/pain s/p TKA  - Left knee incision present with steri-strips C/D/I. Left knee swollen but non-tender to palpation. Mildly ecchymosis noted on left knee. Able to bend left knee with some difficulty. Left calf also swollen. Compartments soft and compressible. 2+ DP pulse palpated on LLE. Able to wiggle toes on LLE without difficulty. Denies numbness/tingling.   - Pain control  - PRN antiemetic  - WBAT LLE  - PT/OT  - No acute orthopaedic surgical intervention at this time. Patient should keep his post-op appointment with Dr. Bradford.    Dispo: WBAT LLE. PT/OT. No acute orthopaedic surgical intervention at this time. Discussed with Dr. Bradford. Orthopaedics to sign off at this time. Please reach out with any further questions or concerns.       Kathy Reynoso, RENEA-CNP

## 2024-01-12 ENCOUNTER — PHARMACY VISIT (OUTPATIENT)
Dept: PHARMACY | Facility: CLINIC | Age: 79
End: 2024-01-12
Payer: COMMERCIAL

## 2024-01-12 ENCOUNTER — HOME CARE VISIT (OUTPATIENT)
Dept: HOME HEALTH SERVICES | Facility: HOME HEALTH | Age: 79
End: 2024-01-12
Payer: MEDICARE

## 2024-01-12 DIAGNOSIS — Z96.652 S/P TKR (TOTAL KNEE REPLACEMENT) USING CEMENT, LEFT: Primary | ICD-10-CM

## 2024-01-12 LAB
ANION GAP SERPL CALC-SCNC: 13 MMOL/L (ref 10–20)
BACTERIA UR CULT: NO GROWTH
BUN SERPL-MCNC: 23 MG/DL (ref 6–23)
CALCIUM SERPL-MCNC: 8.6 MG/DL (ref 8.6–10.3)
CHLORIDE SERPL-SCNC: 107 MMOL/L (ref 98–107)
CO2 SERPL-SCNC: 23 MMOL/L (ref 21–32)
CREAT SERPL-MCNC: 1.25 MG/DL (ref 0.5–1.3)
EGFRCR SERPLBLD CKD-EPI 2021: 59 ML/MIN/1.73M*2
ERYTHROCYTE [DISTWIDTH] IN BLOOD BY AUTOMATED COUNT: 14.2 % (ref 11.5–14.5)
GLUCOSE SERPL-MCNC: 93 MG/DL (ref 74–99)
HCT VFR BLD AUTO: 34.2 % (ref 41–52)
HGB BLD-MCNC: 11.3 G/DL (ref 13.5–17.5)
MCH RBC QN AUTO: 33.9 PG (ref 26–34)
MCHC RBC AUTO-ENTMCNC: 33 G/DL (ref 32–36)
MCV RBC AUTO: 103 FL (ref 80–100)
NRBC BLD-RTO: 0 /100 WBCS (ref 0–0)
PLATELET # BLD AUTO: 274 X10*3/UL (ref 150–450)
POTASSIUM SERPL-SCNC: 3.8 MMOL/L (ref 3.5–5.3)
RBC # BLD AUTO: 3.33 X10*6/UL (ref 4.5–5.9)
SODIUM SERPL-SCNC: 139 MMOL/L (ref 136–145)
UFH PPP CHRO-ACNC: 0.7 IU/ML
WBC # BLD AUTO: 7.7 X10*3/UL (ref 4.4–11.3)

## 2024-01-12 PROCEDURE — 85027 COMPLETE CBC AUTOMATED: CPT | Performed by: NURSE PRACTITIONER

## 2024-01-12 PROCEDURE — 36415 COLL VENOUS BLD VENIPUNCTURE: CPT | Performed by: NURSE PRACTITIONER

## 2024-01-12 PROCEDURE — 2500000004 HC RX 250 GENERAL PHARMACY W/ HCPCS (ALT 636 FOR OP/ED): Performed by: NURSE PRACTITIONER

## 2024-01-12 PROCEDURE — 1200000002 HC GENERAL ROOM WITH TELEMETRY DAILY

## 2024-01-12 PROCEDURE — 1090000001 HH PPS REVENUE CREDIT

## 2024-01-12 PROCEDURE — 85520 HEPARIN ASSAY: CPT | Performed by: FAMILY MEDICINE

## 2024-01-12 PROCEDURE — 1090000002 HH PPS REVENUE DEBIT

## 2024-01-12 PROCEDURE — 2500000001 HC RX 250 WO HCPCS SELF ADMINISTERED DRUGS (ALT 637 FOR MEDICARE OP): Performed by: NURSE PRACTITIONER

## 2024-01-12 PROCEDURE — RXMED WILLOW AMBULATORY MEDICATION CHARGE

## 2024-01-12 PROCEDURE — 80048 BASIC METABOLIC PNL TOTAL CA: CPT | Performed by: NURSE PRACTITIONER

## 2024-01-12 RX ORDER — OXYCODONE HYDROCHLORIDE 5 MG/1
5 TABLET ORAL EVERY 6 HOURS PRN
Qty: 28 TABLET | Refills: 0 | Status: SHIPPED | OUTPATIENT
Start: 2024-01-12 | End: 2024-01-19

## 2024-01-12 RX ORDER — APIXABAN 5 MG (74)
KIT ORAL SEE ADMIN INSTRUCTIONS
Qty: 74 TABLET | Refills: 0 | Status: SHIPPED | OUTPATIENT
Start: 2024-01-12 | End: 2024-01-31 | Stop reason: DRUGHIGH

## 2024-01-12 RX ORDER — TRAMADOL HYDROCHLORIDE 50 MG/1
50 TABLET ORAL EVERY 6 HOURS PRN
Qty: 28 TABLET | Refills: 0 | Status: SHIPPED | OUTPATIENT
Start: 2024-01-12 | End: 2024-01-19

## 2024-01-12 RX ADMIN — DOCUSATE SODIUM 100 MG: 100 CAPSULE, LIQUID FILLED ORAL at 08:26

## 2024-01-12 RX ADMIN — PANTOPRAZOLE SODIUM 40 MG: 40 TABLET, DELAYED RELEASE ORAL at 20:19

## 2024-01-12 RX ADMIN — HYDROMORPHONE HYDROCHLORIDE 0.2 MG: 0.2 INJECTION, SOLUTION INTRAMUSCULAR; INTRAVENOUS; SUBCUTANEOUS at 18:08

## 2024-01-12 RX ADMIN — Medication 3 MG: at 20:19

## 2024-01-12 RX ADMIN — PREGABALIN 150 MG: 75 CAPSULE ORAL at 08:26

## 2024-01-12 RX ADMIN — HEPARIN SODIUM 13 UNITS/HR: 10000 INJECTION, SOLUTION INTRAVENOUS at 19:22

## 2024-01-12 RX ADMIN — METOPROLOL SUCCINATE 25 MG: 25 TABLET, EXTENDED RELEASE ORAL at 08:26

## 2024-01-12 RX ADMIN — HYDROMORPHONE HYDROCHLORIDE 0.4 MG: 1 INJECTION, SOLUTION INTRAMUSCULAR; INTRAVENOUS; SUBCUTANEOUS at 09:57

## 2024-01-12 RX ADMIN — PANTOPRAZOLE SODIUM 40 MG: 40 TABLET, DELAYED RELEASE ORAL at 08:26

## 2024-01-12 RX ADMIN — HYDROMORPHONE HYDROCHLORIDE 0.4 MG: 1 INJECTION, SOLUTION INTRAMUSCULAR; INTRAVENOUS; SUBCUTANEOUS at 05:24

## 2024-01-12 RX ADMIN — POLYETHYLENE GLYCOL 3350 17 G: 17 POWDER, FOR SOLUTION ORAL at 08:26

## 2024-01-12 RX ADMIN — HEPARIN SODIUM 11 UNITS/HR: 10000 INJECTION, SOLUTION INTRAVENOUS at 09:28

## 2024-01-12 RX ADMIN — DOCUSATE SODIUM 100 MG: 100 CAPSULE, LIQUID FILLED ORAL at 21:00

## 2024-01-12 RX ADMIN — ATORVASTATIN CALCIUM 10 MG: 20 TABLET, FILM COATED ORAL at 20:19

## 2024-01-12 RX ADMIN — LISINOPRIL 20 MG: 20 TABLET ORAL at 17:08

## 2024-01-12 RX ADMIN — PREGABALIN 150 MG: 75 CAPSULE ORAL at 20:18

## 2024-01-12 RX ADMIN — HYDROMORPHONE HYDROCHLORIDE 0.4 MG: 1 INJECTION, SOLUTION INTRAMUSCULAR; INTRAVENOUS; SUBCUTANEOUS at 15:46

## 2024-01-12 RX ADMIN — ACETAMINOPHEN 650 MG: 325 TABLET ORAL at 18:08

## 2024-01-12 RX ADMIN — ALLOPURINOL 300 MG: 100 TABLET ORAL at 20:19

## 2024-01-12 RX ADMIN — HYDROMORPHONE HYDROCHLORIDE 0.4 MG: 1 INJECTION, SOLUTION INTRAMUSCULAR; INTRAVENOUS; SUBCUTANEOUS at 21:09

## 2024-01-12 ASSESSMENT — COGNITIVE AND FUNCTIONAL STATUS - GENERAL
MOVING TO AND FROM BED TO CHAIR: TOTAL
CLIMB 3 TO 5 STEPS WITH RAILING: A LOT
TURNING FROM BACK TO SIDE WHILE IN FLAT BAD: A LITTLE
STANDING UP FROM CHAIR USING ARMS: A LITTLE
WALKING IN HOSPITAL ROOM: A LOT
MOBILITY SCORE: 15
DAILY ACTIVITIY SCORE: 24

## 2024-01-12 ASSESSMENT — PAIN - FUNCTIONAL ASSESSMENT
PAIN_FUNCTIONAL_ASSESSMENT: 0-10

## 2024-01-12 ASSESSMENT — PAIN SCALES - GENERAL
PAINLEVEL_OUTOF10: 0 - NO PAIN
PAINLEVEL_OUTOF10: 7
PAINLEVEL_OUTOF10: 7
PAINLEVEL_OUTOF10: 0 - NO PAIN
PAINLEVEL_OUTOF10: 4
PAINLEVEL_OUTOF10: 7

## 2024-01-12 NOTE — PROGRESS NOTES
Care Coordinator Note:    Plan: meds to bed to price check Eliquis and Xarelto preferred pharmacy listed/[facility]  Disposition: home, patient will need to resume home care with [facility] PT/OT physician notified  ADOD today    Claudia HODGES, RN TCC

## 2024-01-12 NOTE — CARE PLAN
The patient's goals for the shift include control pain    The clinical goals for the shift include control pain    Over the shift, the patient did not make progress toward the following goals. Barriers to progression include ***. Recommendations to address these barriers include ***.

## 2024-01-12 NOTE — PROGRESS NOTES
Mikhail Moses is a 78 y.o. male on day 1 of admission presenting with Weakness.      Subjective     Pt transferred to our service  Feels well  No new complaints  Pain is controlled  Ambulating around room          Objective     Last Recorded Vitals  /74   Pulse 66   Temp 36.4 °C (97.6 °F)   Resp 18   Wt 98.9 kg (218 lb 0.6 oz)   SpO2 92%   Intake/Output last 3 Shifts:    Intake/Output Summary (Last 24 hours) at 1/12/2024 0824  Last data filed at 1/12/2024 0527  Gross per 24 hour   Intake 857.5 ml   Output 860 ml   Net -2.5 ml       Admission Weight  Weight: 93 kg (205 lb) (01/10/24 1124)    Daily Weight  01/11/24 : 98.9 kg (218 lb 0.6 oz)    Image Results    Noted   Physical Exam  Constitutional:       Appearance: Normal appearance.   Pulmonary:      Effort: Pulmonary effort is normal.   Abdominal:      General: Bowel sounds are normal.      Palpations: Abdomen is soft.   Musculoskeletal:         General: Normal range of motion.      Cervical back: Normal range of motion.   Skin:     General: Skin is warm.      Capillary Refill: Capillary refill takes less than 2 seconds.   Neurological:      General: No focal deficit present.      Mental Status: He is alert and oriented to person, place, and time.       No current facility-administered medications on file prior to encounter.     Current Outpatient Medications on File Prior to Encounter   Medication Sig Dispense Refill    acetaminophen (Tylenol) 500 mg tablet Take 2 tablets (1,000 mg) by mouth every 6 hours if needed for mild pain (1 - 3) for up to 28 days. (Patient not taking: Reported on 1/11/2024) 224 tablet 0    allopurinol (Zyloprim) 300 mg tablet TAKE 1 TABLET EVERY DAY (Patient taking differently: Take 1 tablet (300 mg) by mouth once daily.) 90 tablet 1    aspirin 81 mg EC tablet Take 1 tablet (81 mg) by mouth 2 times a day. (Patient taking differently: Take 1 tablet (81 mg) by mouth once daily.) 60 tablet 0    atorvastatin (Lipitor) 10 mg tablet  Take 1 tablet (10 mg) by mouth once daily.      docusate sodium (Colace) 100 mg capsule Take 1 capsule (100 mg) by mouth 2 times a day. 60 capsule 0    furosemide (Lasix) 40 mg tablet Take 1 tablet (40 mg) by mouth once daily.      metoprolol succinate XL (Toprol-XL) 25 mg 24 hr tablet Take 1 tablet (25 mg) by mouth once daily.      nitroglycerin (Nitrostat) 0.4 mg SL tablet Place 1 tablet (0.4 mg) under the tongue every 5 minutes if needed.      pantoprazole (ProtoNix) 40 mg EC tablet Take 1 tablet (40 mg) by mouth 2 times a day.      polyethylene glycol (Glycolax, Miralax) 17 gram/dose powder Mix 1 capful (17 g) in 8 oz of water and drink by mouth once daily 238 g 0    pregabalin (Lyrica) 150 mg capsule Take 1 capsule (150 mg) by mouth 2 times a day.      ramipril (Altace) 10 mg capsule Take 1 capsule (10 mg) by mouth once daily.      [DISCONTINUED] oxyCODONE (Roxicodone) 5 mg immediate release tablet Take 1 tablet (5 mg) by mouth every 6 hours if needed for severe pain (7 - 10) for up to 7 days. 28 tablet 0    [DISCONTINUED] oxyCODONE (Roxicodone) 5 mg immediate release tablet Take 1 tablet (5 mg) by mouth every 6 hours if needed for severe pain (7 - 10) for up to 7 days. 28 tablet 0    [DISCONTINUED] oxyCODONE (Roxicodone) 5 mg immediate release tablet Take 1 tablet (5 mg) by mouth every 6 hours if needed for severe pain (7 - 10) for up to 7 days. 28 tablet 0    [DISCONTINUED] traMADol (Ultram) 50 mg tablet Take 1 tablet (50 mg) by mouth every 6 hours if needed for severe pain (7 - 10) for up to 7 days. 28 tablet 0    [DISCONTINUED] traMADol (Ultram) 50 mg tablet Take 1 tablet (50 mg) by mouth every 6 hours if needed for severe pain (7 - 10) for up to 7 days. 28 tablet 0    [DISCONTINUED] traMADol (Ultram) 50 mg tablet Take 1 tablet (50 mg) by mouth every 6 hours if needed for severe pain (7 - 10) for up to 7 days. 28 tablet 0     Results for orders placed or performed during the hospital encounter of 01/10/24  (from the past 24 hour(s))   Heparin Assay, UFH   Result Value Ref Range    Heparin Unfractionated 0.5 See Comment Below for Therapeutic Ranges IU/mL   CBC   Result Value Ref Range    WBC 7.7 4.4 - 11.3 x10*3/uL    nRBC 0.0 0.0 - 0.0 /100 WBCs    RBC 3.33 (L) 4.50 - 5.90 x10*6/uL    Hemoglobin 11.3 (L) 13.5 - 17.5 g/dL    Hematocrit 34.2 (L) 41.0 - 52.0 %     (H) 80 - 100 fL    MCH 33.9 26.0 - 34.0 pg    MCHC 33.0 32.0 - 36.0 g/dL    RDW 14.2 11.5 - 14.5 %    Platelets 274 150 - 450 x10*3/uL   Basic metabolic panel   Result Value Ref Range    Glucose 93 74 - 99 mg/dL    Sodium 139 136 - 145 mmol/L    Potassium 3.8 3.5 - 5.3 mmol/L    Chloride 107 98 - 107 mmol/L    Bicarbonate 23 21 - 32 mmol/L    Anion Gap 13 10 - 20 mmol/L    Urea Nitrogen 23 6 - 23 mg/dL    Creatinine 1.25 0.50 - 1.30 mg/dL    eGFR 59 (L) >60 mL/min/1.73m*2    Calcium 8.6 8.6 - 10.3 mg/dL   Heparin Assay   Result Value Ref Range    Heparin Unfractionated 0.7 See Comment Below for Therapeutic Ranges IU/mL       Relevant Results               Assessment/Plan      Principal Problem:    Weakness  Active Problems:    Acute deep vein thrombosis (DVT) of left femoral vein (CMS/HCC)    Input from ortho noted  No further tx needed  WBAT LLE. PT/OT  Follow up with ortho as out patient     VQ low prob for PE    Pt to go home on xalelto vs eliquis, will need to price check  Per pt Eliquis has been $600/mo when on it in past         HTN/HLD  -continue Home med       -Continue current treatment as ordered. Will make adjustments as necessary.    Consider discharge later today     Patient case and plan of care discussed with Dr. NATE May.    RENEA Mayo - CNP  -In collaboration with Dr. NATE May    [facility]  Office: [phone]  Fax: [phone]  I have reviewed the above note obtained and documented by the NP/PA and I personally participated in the key components. I have discussed the case and  management of the patient's care. Changes made to the note, and all key components of history and physical/progress note done by me.   nursing  Will try xarelto on discharge  Juliano May MD

## 2024-01-13 ENCOUNTER — HOME CARE VISIT (OUTPATIENT)
Dept: HOME HEALTH SERVICES | Facility: HOME HEALTH | Age: 79
End: 2024-01-13
Payer: MEDICARE

## 2024-01-13 VITALS
HEIGHT: 68 IN | TEMPERATURE: 98.4 F | HEART RATE: 65 BPM | BODY MASS INDEX: 33.04 KG/M2 | SYSTOLIC BLOOD PRESSURE: 99 MMHG | RESPIRATION RATE: 18 BRPM | WEIGHT: 218.03 LBS | DIASTOLIC BLOOD PRESSURE: 59 MMHG | OXYGEN SATURATION: 95 %

## 2024-01-13 LAB
ANION GAP SERPL CALC-SCNC: 11 MMOL/L (ref 10–20)
BUN SERPL-MCNC: 16 MG/DL (ref 6–23)
CALCIUM SERPL-MCNC: 8.2 MG/DL (ref 8.6–10.3)
CHLORIDE SERPL-SCNC: 110 MMOL/L (ref 98–107)
CO2 SERPL-SCNC: 23 MMOL/L (ref 21–32)
CREAT SERPL-MCNC: 1.06 MG/DL (ref 0.5–1.3)
EGFRCR SERPLBLD CKD-EPI 2021: 72 ML/MIN/1.73M*2
ERYTHROCYTE [DISTWIDTH] IN BLOOD BY AUTOMATED COUNT: 14.5 % (ref 11.5–14.5)
GLUCOSE SERPL-MCNC: 94 MG/DL (ref 74–99)
HCT VFR BLD AUTO: 30.1 % (ref 41–52)
HGB BLD-MCNC: 9.7 G/DL (ref 13.5–17.5)
MCH RBC QN AUTO: 32.6 PG (ref 26–34)
MCHC RBC AUTO-ENTMCNC: 32.2 G/DL (ref 32–36)
MCV RBC AUTO: 101 FL (ref 80–100)
NRBC BLD-RTO: 0 /100 WBCS (ref 0–0)
PLATELET # BLD AUTO: 255 X10*3/UL (ref 150–450)
POTASSIUM SERPL-SCNC: 3.8 MMOL/L (ref 3.5–5.3)
RBC # BLD AUTO: 2.98 X10*6/UL (ref 4.5–5.9)
SODIUM SERPL-SCNC: 140 MMOL/L (ref 136–145)
UFH PPP CHRO-ACNC: 0.6 IU/ML
WBC # BLD AUTO: 6.6 X10*3/UL (ref 4.4–11.3)

## 2024-01-13 PROCEDURE — 85520 HEPARIN ASSAY: CPT | Performed by: NURSE PRACTITIONER

## 2024-01-13 PROCEDURE — 1090000002 HH PPS REVENUE DEBIT

## 2024-01-13 PROCEDURE — 36415 COLL VENOUS BLD VENIPUNCTURE: CPT | Performed by: NURSE PRACTITIONER

## 2024-01-13 PROCEDURE — 2500000004 HC RX 250 GENERAL PHARMACY W/ HCPCS (ALT 636 FOR OP/ED): Performed by: NURSE PRACTITIONER

## 2024-01-13 PROCEDURE — 99239 HOSP IP/OBS DSCHRG MGMT >30: CPT | Performed by: INTERNAL MEDICINE

## 2024-01-13 PROCEDURE — 2500000001 HC RX 250 WO HCPCS SELF ADMINISTERED DRUGS (ALT 637 FOR MEDICARE OP): Performed by: NURSE PRACTITIONER

## 2024-01-13 PROCEDURE — 2500000004 HC RX 250 GENERAL PHARMACY W/ HCPCS (ALT 636 FOR OP/ED): Performed by: FAMILY MEDICINE

## 2024-01-13 PROCEDURE — 1090000001 HH PPS REVENUE CREDIT

## 2024-01-13 PROCEDURE — 80048 BASIC METABOLIC PNL TOTAL CA: CPT | Performed by: NURSE PRACTITIONER

## 2024-01-13 PROCEDURE — 85027 COMPLETE CBC AUTOMATED: CPT | Performed by: NURSE PRACTITIONER

## 2024-01-13 RX ADMIN — HYDROMORPHONE HYDROCHLORIDE 0.5 MG: 1 INJECTION, SOLUTION INTRAMUSCULAR; INTRAVENOUS; SUBCUTANEOUS at 03:44

## 2024-01-13 RX ADMIN — SODIUM CHLORIDE 75 ML/HR: 9 INJECTION, SOLUTION INTRAVENOUS at 00:39

## 2024-01-13 RX ADMIN — HEPARIN SODIUM 13 UNITS/HR: 10000 INJECTION, SOLUTION INTRAVENOUS at 05:25

## 2024-01-13 RX ADMIN — PANTOPRAZOLE SODIUM 40 MG: 40 TABLET, DELAYED RELEASE ORAL at 10:45

## 2024-01-13 RX ADMIN — METOPROLOL SUCCINATE 25 MG: 25 TABLET, EXTENDED RELEASE ORAL at 10:46

## 2024-01-13 RX ADMIN — HYDROMORPHONE HYDROCHLORIDE 0.5 MG: 1 INJECTION, SOLUTION INTRAMUSCULAR; INTRAVENOUS; SUBCUTANEOUS at 10:45

## 2024-01-13 RX ADMIN — PREGABALIN 150 MG: 75 CAPSULE ORAL at 10:46

## 2024-01-13 RX ADMIN — HYDROMORPHONE HYDROCHLORIDE 0.5 MG: 1 INJECTION, SOLUTION INTRAMUSCULAR; INTRAVENOUS; SUBCUTANEOUS at 00:35

## 2024-01-13 RX ADMIN — HYDROMORPHONE HYDROCHLORIDE 0.5 MG: 1 INJECTION, SOLUTION INTRAMUSCULAR; INTRAVENOUS; SUBCUTANEOUS at 06:50

## 2024-01-13 ASSESSMENT — PAIN SCALES - GENERAL
PAINLEVEL_OUTOF10: 7
PAINLEVEL_OUTOF10: 7
PAINLEVEL_OUTOF10: 6
PAINLEVEL_OUTOF10: 6

## 2024-01-13 ASSESSMENT — PAIN - FUNCTIONAL ASSESSMENT: PAIN_FUNCTIONAL_ASSESSMENT: 0-10

## 2024-01-13 ASSESSMENT — PAIN DESCRIPTION - LOCATION: LOCATION: KNEE

## 2024-01-13 NOTE — NURSING NOTE
Discharge instructions discussed with pt and pt's wife. Pt and pt's wife verbalize understanding of instructions, follow up, medications, and when to return to ED. Pt taken via w/c to main lobby for discharge.

## 2024-01-13 NOTE — DISCHARGE SUMMARY
Discharge Diagnosis  Weakness    Issues Requiring Follow-Up  Follow-up with PCP    Test Results Pending At Discharge  Pending Labs       Order Current Status    Extra Urine Gray Tube Collected (01/10/24 1404)    Urinalysis with Reflex Culture and Microscopic In process            Hospital Course     Mikhail Moses is a 78 y.o. male presenting with increased weakness.  Patient recently had a knee replacement on January 2, 2024.  He was discharged home on January 3, 2024.  He was having a lot of pain in his left knee after the surgery was taking pain medications.  He has not really been eating and drinking much.  He has been laying in bed doing some physical therapy but not as much as he should be.  Patient's been putting ice to the knee.  He started becoming nauseated and vomiting over the past 24 hours.  Patient has a history of hypertension, hyperlipidemia, CKD, COPD, GERD, osteoarthritis.  He denies any chest pain or shortness of breath.  Patient tried to get up today and became very weak and fell onto his left knee and his left hand.  Both were x-rayed and normal in the ER   Workup was positive for acute DVT  Pulmonary embolism ruled out  Started on a heparin drip  Eliquis was placed by pharmacy and came back at $45 per month  Patient is comfortable with that kim  Will DC the heparin drip and discharged home on Eliquis  Will follow-up with orthopedics as an outpatient for post total knee replacement follow-up      Pertinent Physical Exam At Time of Discharge  Physical Exam      Constitutional   General appearance: Alert and in no acute distress.     Pulmonary   Respiratory assessment: No respiratory distress, normal respiratory rhythm and effort.    Auscultation of Lungs: Clear bilateral breath sounds.   Cardiovascular   Auscultation of heart: Apical pulse normal, heart rate and rhythm normal, normal S1 and S2, no murmurs and no pericardial rub.    Exam for edema: Left leg edema with Homans' sign  Abdomen    Abdominal Exam: No bruits, normal bowel sounds, soft, non-tender, no abdominal mass palpated.    Liver and Spleen exam: No hepato-splenomegaly.   Musculoskeletal   Examination of gait: Normal.    Inspection of digits and nails: No clubbing or cyanosis of the fingernails.    Inspection/palpation of joints, bones and muscles: No joint swelling. Normal movement of all extremities.   Skin   Skin inspection: Normal skin color and pigmentation, normal skin turgor and no visible rash.   Neurologic   Cranial nerves: Nerves 2-12 were intact, no focal neuro defects.       Home Medications     Medication List      START taking these medications     * Eliquis DVT-PE Treat 30D Start 5 mg (74 tabs) tablet; Generic drug:   apixaban; Take 2 tablets (10 mg) by mouth 2 times a day for 7 days, then   take 1 tablet (5 mg) by mouth 2 times a day.   * apixaban 5 mg tablet; Commonly known as: Eliquis; Take 2 tablets (10   mg) by mouth 2 times a day for 14 doses.   * apixaban 5 mg tablet; Commonly known as: Eliquis; Take 1 tablet (5 mg)   by mouth 2 times a day. Do not start before January 20, 2024.; Start   taking on: January 20, 2024  * This list has 3 medication(s) that are the same as other medications   prescribed for you. Read the directions carefully, and ask your doctor or   other care provider to review them with you.     CONTINUE taking these medications     acetaminophen 500 mg tablet; Commonly known as: Tylenol; Take 2 tablets   (1,000 mg) by mouth every 6 hours if needed for mild pain (1 - 3) for up   to 28 days.   allopurinol 300 mg tablet; Commonly known as: Zyloprim; TAKE 1 TABLET   EVERY DAY   atorvastatin 10 mg tablet; Commonly known as: Lipitor   docusate sodium 100 mg capsule; Commonly known as: Colace; Take 1   capsule (100 mg) by mouth 2 times a day.   furosemide 40 mg tablet; Commonly known as: Lasix   metoprolol succinate XL 25 mg 24 hr tablet; Commonly known as: Toprol-XL   nitroglycerin 0.4 mg SL tablet;  Commonly known as: Nitrostat   oxyCODONE 5 mg immediate release tablet; Commonly known as: Roxicodone;   Take 1 tablet (5 mg) by mouth every 6 hours if needed for severe pain (7 -   10) for up to 7 days.   pantoprazole 40 mg EC tablet; Commonly known as: ProtoNix   polyethylene glycol 17 gram/dose powder; Commonly known as: Glycolax,   Miralax; Mix 1 capful (17 g) in 8 oz of water and drink by mouth once   daily   pregabalin 150 mg capsule; Commonly known as: Lyrica   ramipril 10 mg capsule; Commonly known as: Altace   traMADol 50 mg tablet; Commonly known as: Ultram; Take 1 tablet (50 mg)   by mouth every 6 hours if needed for severe pain (7 - 10) for up to 7   days.     STOP taking these medications     aspirin 81 mg EC tablet       Outpatient Follow-Up  Future Appointments   Date Time Provider Department Center   2/15/2024 11:20 AM Hamida Padilla PA-C [id number]       Patient seen at bedside. Events from the last visit reviewed. Discussed with staff. Results of tests and investigations from last visit reviewed and discussed with patient/Family. Electronic chart on [facility] reviewed. Input / Recommendations  from consultants  appreciated and reviewed and agreed with.     discharge summary and profile completed. medications reviewed and discussed with patient and family.  scripts completed and signed.     total discharge time in excess of 30 minutes.    Christina Carrizales MD

## 2024-01-14 PROCEDURE — 1090000002 HH PPS REVENUE DEBIT

## 2024-01-14 PROCEDURE — 1090000001 HH PPS REVENUE CREDIT

## 2024-01-15 ENCOUNTER — PATIENT OUTREACH (OUTPATIENT)
Dept: PRIMARY CARE | Facility: CLINIC | Age: 79
End: 2024-01-15
Payer: MEDICARE

## 2024-01-15 PROCEDURE — 1090000002 HH PPS REVENUE DEBIT

## 2024-01-15 PROCEDURE — 1090000001 HH PPS REVENUE CREDIT

## 2024-01-15 NOTE — PROGRESS NOTES
Discharge Facility: [facility]  Discharge Diagnosis: Weakness   Admission Date: 1/11/24  Discharge Date:  1/13/24    PCP Appointment Date: Patient declined.   Specialist Appointment Date:   - Dr. Bradford (Ortho): D  Hospital Encounter and Summary: Linked  See discharge assessment below for further details    Medications  Medications reviewed with patient/caregiver?: Yes (new/changes only) (1/15/2024  3:33 PM)  Is the patient having any side effects they believe may be caused by any medication additions or changes?: No (1/15/2024  3:33 PM)  Does the patient have all medications ordered at discharge?: Yes (1/15/2024  3:33 PM)  Care Management Interventions: No intervention needed (1/15/2024  3:33 PM)  Prescription Comments: START: eliquis  STOP: aspirin (1/15/2024  3:33 PM)  Is the patient taking all medications as directed (includes completed medication regime)?: Yes (1/15/2024  3:33 PM)  Care Management Interventions: Provided patient education (1/15/2024  3:33 PM)    Appointments  Does the patient have a primary care provider?: Yes (1/15/2024  3:33 PM)  Care Management Interventions: Advised patient to make appointment (1/15/2024  3:33 PM)    Self Management  Has home health visited the patient within 72 hours of discharge?: Not applicable (1/15/2024  3:33 PM)    Patient Teaching  Does the patient have access to their discharge instructions?: Yes (1/15/2024  3:33 PM)  Care Management Interventions: Reviewed instructions with patient (1/15/2024  3:33 PM)  What is the patient's perception of their health status since discharge?: Improving (1/15/2024  3:33 PM)  Is the patient/caregiver able to teach back the hierarchy of who to call/visit for symptoms/problems? PCP, Specialist, Home Health nurse, Urgent Care, ED, 911: Yes (1/15/2024  3:33 PM)  Patient/Caregiver Education Comments: Spoke with patient who states he is doing a bit better but has discomfort in his leg from the blood clot and has had a hard time getting  comfortable to sleep. Declines follow up with PCP or TCM at this time. States he will be seeing the surgeon. Denies additional needs at this time. (1/15/2024  3:33 PM)

## 2024-01-16 ENCOUNTER — DOCUMENTATION (OUTPATIENT)
Dept: HOME HEALTH SERVICES | Facility: HOME HEALTH | Age: 79
End: 2024-01-16
Payer: MEDICARE

## 2024-01-16 ENCOUNTER — APPOINTMENT (OUTPATIENT)
Dept: PHYSICAL THERAPY | Facility: CLINIC | Age: 79
End: 2024-01-16
Payer: MEDICARE

## 2024-01-16 PROCEDURE — 1090000001 HH PPS REVENUE CREDIT

## 2024-01-16 PROCEDURE — 1090000002 HH PPS REVENUE DEBIT

## 2024-01-16 NOTE — HH CARE COORDINATION
Home Care received a Referral to Resume Care for Physical Therapy and Occupational Therapy. We have processed the referral for a Resumption of Care on 1/16/24.     If you have any questions or concerns, please feel free to contact us at [phone]. Follow the prompts, enter your five digit zip code, and you will be directed to your care team on [facility].

## 2024-01-17 PROCEDURE — 1090000002 HH PPS REVENUE DEBIT

## 2024-01-17 PROCEDURE — 1090000001 HH PPS REVENUE CREDIT

## 2024-01-18 ENCOUNTER — HOME CARE VISIT (OUTPATIENT)
Dept: HOME HEALTH SERVICES | Facility: HOME HEALTH | Age: 79
End: 2024-01-18
Payer: MEDICARE

## 2024-01-18 VITALS — DIASTOLIC BLOOD PRESSURE: 60 MMHG | SYSTOLIC BLOOD PRESSURE: 114 MMHG | OXYGEN SATURATION: 99 % | HEART RATE: 91 BPM

## 2024-01-18 PROCEDURE — G0151 HHCP-SERV OF PT,EA 15 MIN: HCPCS | Mod: HHH

## 2024-01-18 PROCEDURE — 1090000002 HH PPS REVENUE DEBIT

## 2024-01-18 PROCEDURE — 1090000001 HH PPS REVENUE CREDIT

## 2024-01-18 ASSESSMENT — GAIT ASSESSMENTS
INITIATION OF GAIT IMMEDIATELY AFTER GO: 1 - NO HESITANCY
BALANCE AND GAIT SCORE: 13
TRUNK: 0 - MARKED SWAY OR USES WALKING AID
TRUNK SCORE: 0
PATH SCORE: 1
STEP SYMMETRY: 0 - RIGHT AND LEFT STEP LENGTH NOT EQUAL
WALKING STANCE: 0 - HEELS APART
GAIT SCORE: 4
STEP CONTINUITY: 0 - STOPPING OR DISCONTINUITY BETWEEN STEPS
PATH: 1 - MILD/MODERATE DEVIATION OR USES WALKING AID

## 2024-01-18 ASSESSMENT — BALANCE ASSESSMENTS
SITTING BALANCE: 1 - STEADY, SAFE
TURNING 360 DEGREES STEPS: 0 - DISCONTINUOUS STEPS
IMMEDIATE STANDING BALANCE FIRST 5 SECONDS: 1 - STEADY BUT USES WALKER OR OTHER SUPPORT
BALANCE SCORE: 9
SITTING DOWN: 1 - USES ARMS OR NOT SMOOTH MOTION
STANDING BALANCE: 1 - STEADY BUT WIDE STANCE AND USES CANE OR OTHER SUPPORT
NUDGED: 0 - BEGINS TO FALL
EYES CLOSED AT MAXIMUM POSITION NUDGED: 1 - STEADY
ARISES: 1 - ABLE, USES ARMS TO HELP
ARISING SCORE: 1
NUDGED SCORE: 0
ATTEMPTS TO ARISE: 2 - ABLE TO RISE, ONE ATTEMPT

## 2024-01-18 ASSESSMENT — ENCOUNTER SYMPTOMS
LOWEST PAIN SEVERITY IN PAST 24 HOURS: 1/10
PAIN LOCATION: RIGHT KNEE
PERSON REPORTING PAIN: PATIENT
HIGHEST PAIN SEVERITY IN PAST 24 HOURS: 7/10
PAIN: 1
PAIN LOCATION - PAIN SEVERITY: 4/10
SUBJECTIVE PAIN PROGRESSION: GRADUALLY IMPROVING

## 2024-01-18 ASSESSMENT — ACTIVITIES OF DAILY LIVING (ADL): ENTERING_EXITING_HOME: NEEDS ASSISTANCE

## 2024-01-18 NOTE — HOME HEALTH
Patient seen for resumuption of home care after short hospitalization for blood clot in left leg status post left TKA 1/2/2024. ROM continues to be limited 5-76 degrees of flexion. Demonstrated good active quadriceps strength and can complete SLR x10 without SLR. Left leg externally rotated due to prior femur fracture many years ago. Unable to progress to standing exercises this visit due to discomfort as the patient stated he was walking a lot the prior day and is more sore today. He continues to lead with his surgical leg in a 3 point gait pattern during ambulation. He will benefit from additional skilled home care PT for safe exercise progressions, improving ROM, and improving strength prior to starting outpatient PT. Instructed wife to schedule outpatient therapy the week of 1/29/2024.

## 2024-01-19 ENCOUNTER — APPOINTMENT (OUTPATIENT)
Dept: PHYSICAL THERAPY | Facility: CLINIC | Age: 79
End: 2024-01-19
Payer: MEDICARE

## 2024-01-19 PROCEDURE — 1090000002 HH PPS REVENUE DEBIT

## 2024-01-19 PROCEDURE — 1090000001 HH PPS REVENUE CREDIT

## 2024-01-20 PROCEDURE — 1090000002 HH PPS REVENUE DEBIT

## 2024-01-20 PROCEDURE — 1090000001 HH PPS REVENUE CREDIT

## 2024-01-21 PROCEDURE — 1090000001 HH PPS REVENUE CREDIT

## 2024-01-21 PROCEDURE — 1090000002 HH PPS REVENUE DEBIT

## 2024-01-22 ENCOUNTER — HOME CARE VISIT (OUTPATIENT)
Dept: HOME HEALTH SERVICES | Facility: HOME HEALTH | Age: 79
End: 2024-01-22
Payer: MEDICARE

## 2024-01-22 PROCEDURE — 1090000001 HH PPS REVENUE CREDIT

## 2024-01-22 PROCEDURE — 1090000002 HH PPS REVENUE DEBIT

## 2024-01-22 PROCEDURE — G0151 HHCP-SERV OF PT,EA 15 MIN: HCPCS | Mod: HHH

## 2024-01-22 ASSESSMENT — ENCOUNTER SYMPTOMS
LIMITED RANGE OF MOTION: 1
SUBJECTIVE PAIN PROGRESSION: GRADUALLY IMPROVING
LOWEST PAIN SEVERITY IN PAST 24 HOURS: 1/10
HIGHEST PAIN SEVERITY IN PAST 24 HOURS: 7/10
PAIN LOCATION: LEFT KNEE
PAIN: 1
PAIN LOCATION - PAIN SEVERITY: 5/10
PERSON REPORTING PAIN: PATIENT

## 2024-01-23 ENCOUNTER — APPOINTMENT (OUTPATIENT)
Dept: PHYSICAL THERAPY | Facility: CLINIC | Age: 79
End: 2024-01-23
Payer: MEDICARE

## 2024-01-23 PROCEDURE — 1090000001 HH PPS REVENUE CREDIT

## 2024-01-23 PROCEDURE — 1090000002 HH PPS REVENUE DEBIT

## 2024-01-24 PROCEDURE — 1090000002 HH PPS REVENUE DEBIT

## 2024-01-24 PROCEDURE — 1090000001 HH PPS REVENUE CREDIT

## 2024-01-25 ENCOUNTER — HOME CARE VISIT (OUTPATIENT)
Dept: HOME HEALTH SERVICES | Facility: HOME HEALTH | Age: 79
End: 2024-01-25
Payer: MEDICARE

## 2024-01-25 PROCEDURE — G0151 HHCP-SERV OF PT,EA 15 MIN: HCPCS | Mod: HHH

## 2024-01-25 PROCEDURE — 1090000001 HH PPS REVENUE CREDIT

## 2024-01-25 PROCEDURE — 1090000002 HH PPS REVENUE DEBIT

## 2024-01-25 ASSESSMENT — ENCOUNTER SYMPTOMS
PAIN LOCATION: LEFT KNEE
PAIN LOCATION - PAIN SEVERITY: 3/10
PAIN: 1
SUBJECTIVE PAIN PROGRESSION: GRADUALLY IMPROVING
PERSON REPORTING PAIN: PATIENT
OCCASIONAL FEELINGS OF UNSTEADINESS: 0

## 2024-01-25 ASSESSMENT — GAIT ASSESSMENTS
PATH SCORE: 1
STEP CONTINUITY: 1 - STEPS APPEAR CONTINUOUS
STEP SYMMETRY: 1 - RIGHT AND LEFT STEP LENGTH APPEAR EQUAL
WALKING STANCE: 0 - HEELS APART
GAIT SCORE: 9
PATH: 1 - MILD/MODERATE DEVIATION OR USES WALKING AID
TRUNK SCORE: 1
INITIATION OF GAIT IMMEDIATELY AFTER GO: 1 - NO HESITANCY
TRUNK: 1 - NO SWAY BUT FLEXION OF KNEES OR BACK OR SPREADS ARMS WHILE WALKING
BALANCE AND GAIT SCORE: 19

## 2024-01-25 ASSESSMENT — BALANCE ASSESSMENTS
ATTEMPTS TO ARISE: 1 - ABLE, REQUIRES MORE THAN ONE ATTEMPT
BALANCE SCORE: 10
SITTING BALANCE: 1 - STEADY, SAFE
EYES CLOSED AT MAXIMUM POSITION NUDGED: 1 - STEADY
IMMEDIATE STANDING BALANCE FIRST 5 SECONDS: 1 - STEADY BUT USES WALKER OR OTHER SUPPORT
NUDGED: 1 - STAGGERS, GRABS, CATCHES SELF
STANDING BALANCE: 1 - STEADY BUT WIDE STANCE AND USES CANE OR OTHER SUPPORT
TURNING 360 DEGREES STEPS: 1 - CONTINUOUS STEPS
ARISES: 1 - ABLE, USES ARMS TO HELP
ARISING SCORE: 1
NUDGED SCORE: 1
SITTING DOWN: 1 - USES ARMS OR NOT SMOOTH MOTION

## 2024-01-25 ASSESSMENT — ACTIVITIES OF DAILY LIVING (ADL)
HOME_HEALTH_OASIS: 01
OASIS_M1830: 02

## 2024-01-26 ENCOUNTER — HOME HEALTH ADMISSION (OUTPATIENT)
Dept: HOME HEALTH SERVICES | Facility: HOME HEALTH | Age: 79
End: 2024-01-26
Payer: MEDICARE

## 2024-01-26 ENCOUNTER — APPOINTMENT (OUTPATIENT)
Dept: PHYSICAL THERAPY | Facility: CLINIC | Age: 79
End: 2024-01-26
Payer: MEDICARE

## 2024-01-27 ASSESSMENT — ACTIVITIES OF DAILY LIVING (ADL): OASIS_M1830: 05
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
## 2024-01-29 ENCOUNTER — EVALUATION (OUTPATIENT)
Dept: PHYSICAL THERAPY | Facility: CLINIC | Age: 79
End: 2024-01-29
Payer: MEDICARE

## 2024-01-29 DIAGNOSIS — M17.12 ARTHRITIS OF LEFT KNEE: ICD-10-CM

## 2024-01-29 PROCEDURE — 97110 THERAPEUTIC EXERCISES: CPT | Mod: GP | Performed by: PHYSICAL THERAPIST

## 2024-01-29 PROCEDURE — 97161 PT EVAL LOW COMPLEX 20 MIN: CPT | Mod: GP | Performed by: PHYSICAL THERAPIST

## 2024-01-29 ASSESSMENT — PATIENT HEALTH QUESTIONNAIRE - PHQ9
2. FEELING DOWN, DEPRESSED OR HOPELESS: NOT AT ALL
SUM OF ALL RESPONSES TO PHQ9 QUESTIONS 1 AND 2: 0
1. LITTLE INTEREST OR PLEASURE IN DOING THINGS: NOT AT ALL
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
## 2024-01-30 ENCOUNTER — APPOINTMENT (OUTPATIENT)
Dept: PHYSICAL THERAPY | Facility: CLINIC | Age: 79
End: 2024-01-30
Payer: MEDICARE

## 2024-01-31 ENCOUNTER — OFFICE VISIT (OUTPATIENT)
Dept: PRIMARY CARE | Facility: CLINIC | Age: 79
End: 2024-01-31
Payer: MEDICARE

## 2024-01-31 ENCOUNTER — LAB (OUTPATIENT)
Dept: LAB | Facility: LAB | Age: 79
End: 2024-01-31
Payer: MEDICARE

## 2024-01-31 VITALS
OXYGEN SATURATION: 95 % | SYSTOLIC BLOOD PRESSURE: 114 MMHG | DIASTOLIC BLOOD PRESSURE: 73 MMHG | TEMPERATURE: 97.3 F | HEIGHT: 68 IN | HEART RATE: 92 BPM | WEIGHT: 199 LBS | BODY MASS INDEX: 30.16 KG/M2

## 2024-01-31 DIAGNOSIS — K21.9 GASTROESOPHAGEAL REFLUX DISEASE WITHOUT ESOPHAGITIS: ICD-10-CM

## 2024-01-31 DIAGNOSIS — R11.2 NAUSEA AND VOMITING, UNSPECIFIED VOMITING TYPE: Primary | ICD-10-CM

## 2024-01-31 DIAGNOSIS — M10.9 GOUT, UNSPECIFIED CAUSE, UNSPECIFIED CHRONICITY, UNSPECIFIED SITE: ICD-10-CM

## 2024-01-31 DIAGNOSIS — R11.2 NAUSEA AND VOMITING, UNSPECIFIED VOMITING TYPE: ICD-10-CM

## 2024-01-31 PROCEDURE — 85025 COMPLETE CBC W/AUTO DIFF WBC: CPT

## 2024-01-31 PROCEDURE — 83690 ASSAY OF LIPASE: CPT

## 2024-01-31 PROCEDURE — 1111F DSCHRG MED/CURRENT MED MERGE: CPT | Performed by: FAMILY MEDICINE

## 2024-01-31 PROCEDURE — 1125F AMNT PAIN NOTED PAIN PRSNT: CPT | Performed by: FAMILY MEDICINE

## 2024-01-31 PROCEDURE — 1036F TOBACCO NON-USER: CPT | Performed by: FAMILY MEDICINE

## 2024-01-31 PROCEDURE — 99214 OFFICE O/P EST MOD 30 MIN: CPT | Performed by: FAMILY MEDICINE

## 2024-01-31 PROCEDURE — 1159F MED LIST DOCD IN RCRD: CPT | Performed by: FAMILY MEDICINE

## 2024-01-31 PROCEDURE — 80053 COMPREHEN METABOLIC PANEL: CPT

## 2024-01-31 PROCEDURE — 3074F SYST BP LT 130 MM HG: CPT | Performed by: FAMILY MEDICINE

## 2024-01-31 PROCEDURE — 3078F DIAST BP <80 MM HG: CPT | Performed by: FAMILY MEDICINE

## 2024-01-31 PROCEDURE — 1160F RVW MEDS BY RX/DR IN RCRD: CPT | Performed by: FAMILY MEDICINE

## 2024-01-31 PROCEDURE — 36415 COLL VENOUS BLD VENIPUNCTURE: CPT

## 2024-01-31 RX ORDER — ALLOPURINOL 300 MG/1
300 TABLET ORAL DAILY
Qty: 90 TABLET | Refills: 1
Start: 2024-01-31

## 2024-01-31 ASSESSMENT — COLUMBIA-SUICIDE SEVERITY RATING SCALE - C-SSRS
6. HAVE YOU EVER DONE ANYTHING, STARTED TO DO ANYTHING, OR PREPARED TO DO ANYTHING TO END YOUR LIFE?: NO
1. IN THE PAST MONTH, HAVE YOU WISHED YOU WERE DEAD OR WISHED YOU COULD GO TO SLEEP AND NOT WAKE UP?: NO
2. HAVE YOU ACTUALLY HAD ANY THOUGHTS OF KILLING YOURSELF?: NO

## 2024-01-31 ASSESSMENT — PATIENT HEALTH QUESTIONNAIRE - PHQ9
1. LITTLE INTEREST OR PLEASURE IN DOING THINGS: NOT AT ALL
2. FEELING DOWN, DEPRESSED OR HOPELESS: NOT AT ALL
SUM OF ALL RESPONSES TO PHQ9 QUESTIONS 1 & 2: 0

## 2024-01-31 NOTE — PROGRESS NOTES
"78-year-old male with a history of left total knee arthroplasty on 1/2/2024 complicated by left lower extremity DVT on 1/10/2024 currently on Eliquis is here because of anorexia.  He does not feel hungry and he is not eating well.  He gets occasional nausea with 5 episodes of vomiting over the past month without abdominal cramping heartburn melena hematochezia hematemesis or constipation.  Last oxycodone use was 5 days ago had a normal stool yesterday..  He denies coughing with meals dysphagia early satiety night sweats.  Wife tells me he is not attempting to and relate regularly although he participates in physical therapy he is sitting most of the day.    Had EGD and colonoscopy 5 years ago    Currently on pantoprazole for reflux        /73   Pulse 92   Temp 36.3 °C (97.3 °F)   Ht 1.727 m (5' 8\")   Wt 90.3 kg (199 lb)   SpO2 95%   BMI 30.26 kg/m²       Membranes moist skin without pallor cyanosis icterus  Chest clear to auscultation  Heart regular rate and rhythm without murmur  Abdomen soft nondistended nontender without organomegaly or mass  Extremities without erythema tenderness  "

## 2024-01-31 NOTE — PATIENT INSTRUCTIONS
He should be ambulating every hour  Tylenol for pain control  Reflux diet avoid citrusy spicy foods  Lean protein sources recommended.   Ensure or boost supplementation   Continue pantoprazole  Obtain labs as ordered today  For the recommendation pending lab results

## 2024-02-01 ENCOUNTER — TELEPHONE (OUTPATIENT)
Dept: PRIMARY CARE | Facility: CLINIC | Age: 79
End: 2024-02-01
Payer: MEDICARE

## 2024-02-01 DIAGNOSIS — R79.89 ELEVATED SERUM CREATININE: Primary | ICD-10-CM

## 2024-02-01 LAB
ALBUMIN SERPL BCP-MCNC: 4.1 G/DL (ref 3.4–5)
ALP SERPL-CCNC: 156 U/L (ref 33–136)
ALT SERPL W P-5'-P-CCNC: 11 U/L (ref 10–52)
ANION GAP SERPL CALC-SCNC: 17 MMOL/L (ref 10–20)
AST SERPL W P-5'-P-CCNC: 17 U/L (ref 9–39)
BASOPHILS # BLD AUTO: 0.11 X10*3/UL (ref 0–0.1)
BASOPHILS NFR BLD AUTO: 1 %
BILIRUB SERPL-MCNC: 0.8 MG/DL (ref 0–1.2)
BUN SERPL-MCNC: 16 MG/DL (ref 6–23)
CALCIUM SERPL-MCNC: 9.7 MG/DL (ref 8.6–10.6)
CHLORIDE SERPL-SCNC: 101 MMOL/L (ref 98–107)
CO2 SERPL-SCNC: 25 MMOL/L (ref 21–32)
CREAT SERPL-MCNC: 1.52 MG/DL (ref 0.5–1.3)
EGFRCR SERPLBLD CKD-EPI 2021: 47 ML/MIN/1.73M*2
EOSINOPHIL # BLD AUTO: 0.13 X10*3/UL (ref 0–0.4)
EOSINOPHIL NFR BLD AUTO: 1.2 %
ERYTHROCYTE [DISTWIDTH] IN BLOOD BY AUTOMATED COUNT: 13.4 % (ref 11.5–14.5)
GLUCOSE SERPL-MCNC: 123 MG/DL (ref 74–99)
HCT VFR BLD AUTO: 40 % (ref 41–52)
HGB BLD-MCNC: 13.4 G/DL (ref 13.5–17.5)
IMM GRANULOCYTES # BLD AUTO: 0.07 X10*3/UL (ref 0–0.5)
IMM GRANULOCYTES NFR BLD AUTO: 0.6 % (ref 0–0.9)
LIPASE SERPL-CCNC: 23 U/L (ref 9–82)
LYMPHOCYTES # BLD AUTO: 2.38 X10*3/UL (ref 0.8–3)
LYMPHOCYTES NFR BLD AUTO: 21.9 %
MCH RBC QN AUTO: 32.4 PG (ref 26–34)
MCHC RBC AUTO-ENTMCNC: 33.5 G/DL (ref 32–36)
MCV RBC AUTO: 97 FL (ref 80–100)
MONOCYTES # BLD AUTO: 0.93 X10*3/UL (ref 0.05–0.8)
MONOCYTES NFR BLD AUTO: 8.6 %
NEUTROPHILS # BLD AUTO: 7.24 X10*3/UL (ref 1.6–5.5)
NEUTROPHILS NFR BLD AUTO: 66.7 %
NRBC BLD-RTO: 0 /100 WBCS (ref 0–0)
PLATELET # BLD AUTO: 374 X10*3/UL (ref 150–450)
POTASSIUM SERPL-SCNC: 3.8 MMOL/L (ref 3.5–5.3)
PROT SERPL-MCNC: 6.8 G/DL (ref 6.4–8.2)
RBC # BLD AUTO: 4.13 X10*6/UL (ref 4.5–5.9)
SODIUM SERPL-SCNC: 139 MMOL/L (ref 136–145)
WBC # BLD AUTO: 10.9 X10*3/UL (ref 4.4–11.3)

## 2024-02-01 NOTE — TELEPHONE ENCOUNTER
Patient notified     ----- Message from Josué Kay MD sent at 2/1/2024 10:50 AM EST -----  Glucose would not be considered elevated as this is nonfasting.  Alk phos is elevated and hemoglobin is low as expected due to recent knee surgery.  Renal function is somewhat depressed but this is probably due to low p.o. intake and relative dehydra  tion.  Increase p.o. intake as discussed.  Repeat lab in 1 month

## 2024-02-01 NOTE — RESULT ENCOUNTER NOTE
Glucose would not be considered elevated as this is nonfasting.  Alk phos is elevated and hemoglobin is low as expected due to recent knee surgery.  Renal function is somewhat depressed but this is probably due to low p.o. intake and relative dehydration.  Increase p.o. intake as discussed.  Repeat lab in 1 month

## 2024-02-02 ENCOUNTER — APPOINTMENT (OUTPATIENT)
Dept: PHYSICAL THERAPY | Facility: CLINIC | Age: 79
End: 2024-02-02
Payer: MEDICARE

## 2024-02-05 DIAGNOSIS — J44.9 CHRONIC OBSTRUCTIVE PULMONARY DISEASE, UNSPECIFIED COPD TYPE (MULTI): Primary | ICD-10-CM

## 2024-02-06 ENCOUNTER — TREATMENT (OUTPATIENT)
Dept: PHYSICAL THERAPY | Facility: CLINIC | Age: 79
End: 2024-02-06
Payer: MEDICARE

## 2024-02-06 DIAGNOSIS — M17.12 ARTHRITIS OF LEFT KNEE: ICD-10-CM

## 2024-02-06 PROCEDURE — 97110 THERAPEUTIC EXERCISES: CPT | Mod: GP,CQ
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
## 2024-02-09 ENCOUNTER — APPOINTMENT (OUTPATIENT)
Dept: PHYSICAL THERAPY | Facility: CLINIC | Age: 79
End: 2024-02-09
Payer: MEDICARE

## 2024-02-13 ENCOUNTER — TREATMENT (OUTPATIENT)
Dept: PHYSICAL THERAPY | Facility: CLINIC | Age: 79
End: 2024-02-13
Payer: MEDICARE

## 2024-02-13 DIAGNOSIS — M17.12 ARTHRITIS OF LEFT KNEE: Primary | ICD-10-CM

## 2024-02-13 PROCEDURE — 97110 THERAPEUTIC EXERCISES: CPT | Mod: GP | Performed by: PHYSICAL THERAPIST
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
## 2024-02-15 ENCOUNTER — HOSPITAL ENCOUNTER (OUTPATIENT)
Dept: RADIOLOGY | Facility: CLINIC | Age: 79
Discharge: HOME | End: 2024-02-15
Payer: MEDICARE

## 2024-02-15 ENCOUNTER — OFFICE VISIT (OUTPATIENT)
Dept: ORTHOPEDIC SURGERY | Facility: CLINIC | Age: 79
End: 2024-02-15
Payer: MEDICARE

## 2024-02-15 DIAGNOSIS — Z47.1 AFTERCARE FOLLOWING LEFT KNEE JOINT REPLACEMENT SURGERY: ICD-10-CM

## 2024-02-15 DIAGNOSIS — Z96.652 AFTERCARE FOLLOWING LEFT KNEE JOINT REPLACEMENT SURGERY: ICD-10-CM

## 2024-02-15 DIAGNOSIS — Z96.652 HISTORY OF TOTAL KNEE ARTHROPLASTY, LEFT: Primary | ICD-10-CM

## 2024-02-15 PROCEDURE — 73562 X-RAY EXAM OF KNEE 3: CPT | Mod: LT

## 2024-02-15 PROCEDURE — 1125F AMNT PAIN NOTED PAIN PRSNT: CPT | Performed by: PHYSICIAN ASSISTANT

## 2024-02-15 PROCEDURE — 1160F RVW MEDS BY RX/DR IN RCRD: CPT | Performed by: PHYSICIAN ASSISTANT

## 2024-02-15 PROCEDURE — 73562 X-RAY EXAM OF KNEE 3: CPT | Mod: LEFT SIDE | Performed by: RADIOLOGY

## 2024-02-15 PROCEDURE — 1159F MED LIST DOCD IN RCRD: CPT | Performed by: PHYSICIAN ASSISTANT

## 2024-02-15 PROCEDURE — 1036F TOBACCO NON-USER: CPT | Performed by: PHYSICIAN ASSISTANT

## 2024-02-15 PROCEDURE — 99024 POSTOP FOLLOW-UP VISIT: CPT | Performed by: PHYSICIAN ASSISTANT

## 2024-02-15 ASSESSMENT — PAIN DESCRIPTION - DESCRIPTORS: DESCRIPTORS: ACHING

## 2024-02-15 ASSESSMENT — PAIN SCALES - GENERAL: PAINLEVEL_OUTOF10: 2

## 2024-02-15 ASSESSMENT — PAIN - FUNCTIONAL ASSESSMENT: PAIN_FUNCTIONAL_ASSESSMENT: 0-10
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
## 2024-02-16 ENCOUNTER — TREATMENT (OUTPATIENT)
Dept: PHYSICAL THERAPY | Facility: CLINIC | Age: 79
End: 2024-02-16
Payer: MEDICARE

## 2024-02-16 DIAGNOSIS — M17.12 ARTHRITIS OF LEFT KNEE: Primary | ICD-10-CM

## 2024-02-16 PROCEDURE — 97110 THERAPEUTIC EXERCISES: CPT | Mod: GP | Performed by: PHYSICAL THERAPIST
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
## 2024-02-20 ENCOUNTER — TREATMENT (OUTPATIENT)
Dept: PHYSICAL THERAPY | Facility: CLINIC | Age: 79
End: 2024-02-20
Payer: MEDICARE

## 2024-02-20 DIAGNOSIS — M17.12 ARTHRITIS OF LEFT KNEE: Primary | ICD-10-CM

## 2024-02-20 PROCEDURE — 97110 THERAPEUTIC EXERCISES: CPT | Mod: GP | Performed by: PHYSICAL THERAPIST
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
## 2024-02-22 ENCOUNTER — APPOINTMENT (OUTPATIENT)
Dept: ORTHOPEDIC SURGERY | Facility: CLINIC | Age: 79
End: 2024-02-22
Payer: MEDICARE

## 2024-02-23 ENCOUNTER — TREATMENT (OUTPATIENT)
Dept: PHYSICAL THERAPY | Facility: CLINIC | Age: 79
End: 2024-02-23
Payer: MEDICARE

## 2024-02-23 DIAGNOSIS — M17.12 ARTHRITIS OF LEFT KNEE: ICD-10-CM

## 2024-02-23 PROCEDURE — 97110 THERAPEUTIC EXERCISES: CPT | Mod: GP | Performed by: PHYSICAL THERAPIST
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
## 2024-02-27 ENCOUNTER — APPOINTMENT (OUTPATIENT)
Dept: PHYSICAL THERAPY | Facility: CLINIC | Age: 79
End: 2024-02-27
Payer: MEDICARE

## 2024-03-01 ENCOUNTER — TREATMENT (OUTPATIENT)
Dept: PHYSICAL THERAPY | Facility: CLINIC | Age: 79
End: 2024-03-01
Payer: MEDICARE

## 2024-03-01 DIAGNOSIS — M17.12 ARTHRITIS OF LEFT KNEE: Primary | ICD-10-CM

## 2024-03-01 PROCEDURE — 97110 THERAPEUTIC EXERCISES: CPT | Mod: GP | Performed by: PHYSICAL THERAPIST
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
## 2024-03-05 ENCOUNTER — TREATMENT (OUTPATIENT)
Dept: PHYSICAL THERAPY | Facility: CLINIC | Age: 79
End: 2024-03-05
Payer: MEDICARE

## 2024-03-05 DIAGNOSIS — M17.12 ARTHRITIS OF LEFT KNEE: ICD-10-CM

## 2024-03-05 PROCEDURE — 97110 THERAPEUTIC EXERCISES: CPT | Mod: GP,KX,CQ
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
## 2024-03-06 DIAGNOSIS — M25.561 RIGHT KNEE PAIN, UNSPECIFIED CHRONICITY: ICD-10-CM

## 2024-03-07 ENCOUNTER — HOSPITAL ENCOUNTER (OUTPATIENT)
Dept: RADIOLOGY | Facility: CLINIC | Age: 79
Discharge: HOME | End: 2024-03-07
Payer: MEDICARE

## 2024-03-07 ENCOUNTER — OFFICE VISIT (OUTPATIENT)
Dept: ORTHOPEDIC SURGERY | Facility: CLINIC | Age: 79
End: 2024-03-07
Payer: MEDICARE

## 2024-03-07 ENCOUNTER — APPOINTMENT (OUTPATIENT)
Dept: PHYSICAL THERAPY | Facility: CLINIC | Age: 79
End: 2024-03-07
Payer: MEDICARE

## 2024-03-07 DIAGNOSIS — M17.10 ARTHRITIS OF KNEE: Primary | ICD-10-CM

## 2024-03-07 DIAGNOSIS — M25.561 RIGHT KNEE PAIN, UNSPECIFIED CHRONICITY: ICD-10-CM

## 2024-03-07 PROCEDURE — 99214 OFFICE O/P EST MOD 30 MIN: CPT | Performed by: PHYSICIAN ASSISTANT

## 2024-03-07 PROCEDURE — 1160F RVW MEDS BY RX/DR IN RCRD: CPT | Performed by: PHYSICIAN ASSISTANT

## 2024-03-07 PROCEDURE — 1125F AMNT PAIN NOTED PAIN PRSNT: CPT | Performed by: PHYSICIAN ASSISTANT

## 2024-03-07 PROCEDURE — 1159F MED LIST DOCD IN RCRD: CPT | Performed by: PHYSICIAN ASSISTANT

## 2024-03-07 PROCEDURE — 73562 X-RAY EXAM OF KNEE 3: CPT | Mod: RT

## 2024-03-07 PROCEDURE — 73562 X-RAY EXAM OF KNEE 3: CPT | Mod: RIGHT SIDE | Performed by: RADIOLOGY

## 2024-03-07 PROCEDURE — 1036F TOBACCO NON-USER: CPT | Performed by: PHYSICIAN ASSISTANT

## 2024-03-07 ASSESSMENT — ENCOUNTER SYMPTOMS
CONFUSION: 0
ABDOMINAL PAIN: 0
NERVOUS/ANXIOUS: 0
RESPIRATORY NEGATIVE: 1
COUGH: 0
PALPITATIONS: 0
FREQUENCY: 0
SHORTNESS OF BREATH: 0
HEMATURIA: 0
ALLERGIC/IMMUNOLOGIC NEGATIVE: 1
BLOOD IN STOOL: 0
CHILLS: 0
ENDOCRINE NEGATIVE: 1
LIGHT-HEADEDNESS: 0
COLOR CHANGE: 0
FATIGUE: 0
CHEST TIGHTNESS: 0
EYES NEGATIVE: 1
ARTHRALGIAS: 0
CONSTITUTIONAL NEGATIVE: 1
DIFFICULTY URINATING: 0
NAUSEA: 0
AGITATION: 0
DIZZINESS: 0
JOINT SWELLING: 1
CARDIOVASCULAR NEGATIVE: 1
FEVER: 0
WEAKNESS: 0
HEMATOLOGIC/LYMPHATIC NEGATIVE: 1
WHEEZING: 0
VOMITING: 0
ACTIVITY CHANGE: 0
WOUND: 0
BACK PAIN: 0
DYSURIA: 0

## 2024-03-07 ASSESSMENT — PAIN SCALES - GENERAL: PAINLEVEL_OUTOF10: 5 - MODERATE PAIN

## 2024-03-07 ASSESSMENT — PAIN DESCRIPTION - DESCRIPTORS: DESCRIPTORS: ACHING

## 2024-03-07 ASSESSMENT — PAIN - FUNCTIONAL ASSESSMENT: PAIN_FUNCTIONAL_ASSESSMENT: 0-10
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
## 2024-03-08 ENCOUNTER — APPOINTMENT (OUTPATIENT)
Dept: PHYSICAL THERAPY | Facility: CLINIC | Age: 79
End: 2024-03-08
Payer: MEDICARE

## 2024-03-12 ENCOUNTER — TREATMENT (OUTPATIENT)
Dept: PHYSICAL THERAPY | Facility: CLINIC | Age: 79
End: 2024-03-12
Payer: MEDICARE

## 2024-03-12 DIAGNOSIS — M17.12 ARTHRITIS OF LEFT KNEE: Primary | ICD-10-CM

## 2024-03-12 PROCEDURE — 97110 THERAPEUTIC EXERCISES: CPT | Mod: GP | Performed by: PHYSICAL THERAPIST
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
## 2024-04-16 ENCOUNTER — APPOINTMENT (OUTPATIENT)
Dept: PRIMARY CARE | Facility: CLINIC | Age: 79
End: 2024-04-16
Payer: MEDICARE

## 2024-04-17 ENCOUNTER — HOSPITAL ENCOUNTER (OUTPATIENT)
Dept: RADIOLOGY | Facility: CLINIC | Age: 79
Discharge: HOME | End: 2024-04-17
Payer: MEDICARE

## 2024-04-17 ENCOUNTER — OFFICE VISIT (OUTPATIENT)
Dept: PRIMARY CARE | Facility: CLINIC | Age: 79
End: 2024-04-17
Payer: MEDICARE

## 2024-04-17 VITALS
OXYGEN SATURATION: 93 % | WEIGHT: 199 LBS | BODY MASS INDEX: 30.16 KG/M2 | HEART RATE: 90 BPM | HEIGHT: 68 IN | SYSTOLIC BLOOD PRESSURE: 129 MMHG | DIASTOLIC BLOOD PRESSURE: 71 MMHG | TEMPERATURE: 97 F

## 2024-04-17 DIAGNOSIS — M75.21 BICEPS TENDINITIS OF RIGHT UPPER EXTREMITY: Primary | ICD-10-CM

## 2024-04-17 DIAGNOSIS — M19.011 PRIMARY OSTEOARTHRITIS OF RIGHT SHOULDER: ICD-10-CM

## 2024-04-17 PROCEDURE — 1036F TOBACCO NON-USER: CPT | Performed by: FAMILY MEDICINE

## 2024-04-17 PROCEDURE — 1159F MED LIST DOCD IN RCRD: CPT | Performed by: FAMILY MEDICINE

## 2024-04-17 PROCEDURE — 73030 X-RAY EXAM OF SHOULDER: CPT | Mod: RT

## 2024-04-17 PROCEDURE — 73030 X-RAY EXAM OF SHOULDER: CPT | Mod: RIGHT SIDE | Performed by: STUDENT IN AN ORGANIZED HEALTH CARE EDUCATION/TRAINING PROGRAM

## 2024-04-17 PROCEDURE — 99214 OFFICE O/P EST MOD 30 MIN: CPT | Performed by: FAMILY MEDICINE

## 2024-04-17 PROCEDURE — 3074F SYST BP LT 130 MM HG: CPT | Performed by: FAMILY MEDICINE

## 2024-04-17 PROCEDURE — 1160F RVW MEDS BY RX/DR IN RCRD: CPT | Performed by: FAMILY MEDICINE

## 2024-04-17 PROCEDURE — 20550 NJX 1 TENDON SHEATH/LIGAMENT: CPT | Performed by: FAMILY MEDICINE

## 2024-04-17 PROCEDURE — 3078F DIAST BP <80 MM HG: CPT | Performed by: FAMILY MEDICINE

## 2024-04-17 RX ORDER — PREGABALIN 75 MG/1
75 CAPSULE ORAL EVERY 8 HOURS
COMMUNITY
Start: 2024-04-02

## 2024-04-17 RX ORDER — TRIAMCINOLONE ACETONIDE 40 MG/ML
40 INJECTION, SUSPENSION INTRA-ARTICULAR; INTRAMUSCULAR ONCE
Status: COMPLETED | OUTPATIENT
Start: 2024-04-17 | End: 2024-04-17

## 2024-04-17 RX ORDER — MIRTAZAPINE 15 MG/1
TABLET, FILM COATED ORAL
COMMUNITY
Start: 2024-03-12

## 2024-04-17 RX ADMIN — TRIAMCINOLONE ACETONIDE 40 MG: 40 INJECTION, SUSPENSION INTRA-ARTICULAR; INTRAMUSCULAR at 14:28

## 2024-04-17 NOTE — PROGRESS NOTES
"R shoulder pain starting  1 year ago worse over last fewmonths worse with raising arm    Not associated with neck pain numbness tingling dexterity loss or weakness of arm.    Denies fever cough chest congestion wheeze shortness of breath orthopnea edema chest pain abdominal pain flank pain dysuria hematuria polyuria    /71   Pulse 90   Temp 36.1 °C (97 °F)   Ht 1.727 m (5' 8\")   Wt 90.3 kg (199 lb)   SpO2 93%   BMI 30.26 kg/m²     Normocephalic atraumatic  Neck without midline paracervical trapezius tenderness or spasm  No adenopathy    RIGHT shoulder:    Clavicle nontender without deformity or displacement    AC joint nontender with thickening    + Anterior shoulder nontender    Posterior shoulder nontender    Bicipital groove tender    + speeds sign    Negative Bryan's    Negative apprehension sign    supraspinatus weakness     infraspinatus weakness    Rotator cuff range of motion     diminished    Biceps nontender without deformity    Deltoid biceps triceps  wrist extension wrist flexion interosseous strong and equal bilaterally       Chest clear to auscultation   Heart RRR no murmur or rub  Abdomen nontender   no CVA tenderness  Verbal consent obtained  after risks and benefits discussed  R  shoulder sterilely prepped and draped in customary fashion with Betadine    1 cc of lidocaine without epi mixed with 40 mg of triamcinolone and injected biceps tendon sheathwith 3 cc syringe and 25-gauge needle.  Patient tolerated procedure well.  Blood loss none  Pressure applied Band-Aid applied  Wound instructions given  Call immediately if there is increased pain, swelling, redness or fever.    "

## 2024-05-02 ENCOUNTER — OFFICE VISIT (OUTPATIENT)
Dept: ORTHOPEDIC SURGERY | Facility: CLINIC | Age: 79
End: 2024-05-02
Payer: MEDICARE

## 2024-05-02 DIAGNOSIS — M17.11 PRIMARY OSTEOARTHRITIS OF RIGHT KNEE: Primary | ICD-10-CM

## 2024-05-02 PROCEDURE — 99214 OFFICE O/P EST MOD 30 MIN: CPT | Performed by: FAMILY MEDICINE

## 2024-05-02 PROCEDURE — 1159F MED LIST DOCD IN RCRD: CPT | Performed by: FAMILY MEDICINE

## 2024-05-02 PROCEDURE — 1160F RVW MEDS BY RX/DR IN RCRD: CPT | Performed by: FAMILY MEDICINE

## 2024-05-02 NOTE — PROGRESS NOTES
Sports Medicine Office Note    Today's Date:  05/02/2024     HPI: Mikhail Moses is a 79 y.o. retired male with chronic right knee DJD who presents today for evaluation for possible viscosupplementation injections.    Today, 5/2/2024, he presents with his wife for evaluation of chronic right knee DJD and is requesting consideration for viscosupplementation injections.  He is on the schedule for knee replacement with Dr. Bradford on 12-24 and is seeking short-term relief until then.  He takes APAP and nothing else due to Eliquis use.  He uses a cane for assisted ambulation.  He has had his left knee replaced recently.  He admits to having cortisone injections to both knees along with gel injections with Dr. Florian Malik, [facility] orthopedist with not much improvement with these injections.  He is trying to find any type of pain relief until he gets a replacement.  He denies recent injury or trauma.    He has no other complaints.    Physical Examination:     The RIGHT knee has trace to grade 1 joint effusion. Patella crepitus and grind are positive. There is minimal tenderness to the medial and lateral joint lines. Flexion and extension are without mechanical blocking. There is no instability with stress testing.   The LEFT knee has a well-healed scar.  There is no acute abnormality.  Skin - no rashes, sores, or open lesions. Strength, sensory and vascular exams are otherwise normal. There is no clubbing, cyanosis or edema.  Gait is slightly antalgic and cane assisted.    Imaging:  Radiographs of the right knee recently obtained were reviewed and revealed moderate to severe medial patellofemoral compartment arthrosis.  There are no signs of acute fractures or dislocations.  The studies were reviewed by me personally in the office today.      Problem List Items Addressed This Visit    None  Visit Diagnoses         Codes    Primary osteoarthritis of right knee    -  Primary M17.11            Assessment and Plan:     We  reviewed the exam and x-ray findings and discussed the conservative and surgical treatment options. We agreed upon pursuing insurance approval to repeat viscosupplementation at his right knee.  He cannot take NSAIDs due to his age and Eliquis use.  APAP is not enough.  He has failed recent cortisone injections.  He is scheduled for knee replacement in December and is needing relief until then.  Will see him back to start injections if he gets approval.    **This note was dictated using Dragon speech recognition software and was not corrected for spelling or grammatical errors**.    Juan J Campoverde MD  Sports Medicine Specialist  [facility] [place]

## 2024-05-21 ENCOUNTER — HOSPITAL ENCOUNTER (OUTPATIENT)
Dept: RADIOLOGY | Facility: EXTERNAL LOCATION | Age: 79
Discharge: HOME | End: 2024-05-21

## 2024-05-21 ENCOUNTER — OFFICE VISIT (OUTPATIENT)
Dept: ORTHOPEDIC SURGERY | Facility: HOSPITAL | Age: 79
End: 2024-05-21
Payer: MEDICARE

## 2024-05-21 DIAGNOSIS — M17.11 PRIMARY OSTEOARTHRITIS OF RIGHT KNEE: ICD-10-CM

## 2024-05-21 PROCEDURE — 20611 DRAIN/INJ JOINT/BURSA W/US: CPT | Mod: RT | Performed by: FAMILY MEDICINE

## 2024-05-21 PROCEDURE — 1159F MED LIST DOCD IN RCRD: CPT | Performed by: FAMILY MEDICINE

## 2024-05-21 PROCEDURE — 2500000004 HC RX 250 GENERAL PHARMACY W/ HCPCS (ALT 636 FOR OP/ED): Mod: JZ | Performed by: FAMILY MEDICINE

## 2024-05-21 PROCEDURE — 99214 OFFICE O/P EST MOD 30 MIN: CPT | Performed by: FAMILY MEDICINE

## 2024-05-21 PROCEDURE — 1160F RVW MEDS BY RX/DR IN RCRD: CPT | Performed by: FAMILY MEDICINE

## 2024-05-21 RX ADMIN — Medication 60 MG: at 11:32

## 2024-05-21 NOTE — PROGRESS NOTES
FUV: Right Knee Durolane Injection. Department provided Patient ID: Mikhail Moses is a 79 y.o. male.    L Inj/Asp: R knee on 5/21/2024 11:32 AM  Indications: pain  Details: 21 G needle, ultrasound-guided superolateral approach  Medications: 60 mg sodium hyaluronate 60 mg/3 mL  Outcome: tolerated well, no immediate complications  Procedure, treatment alternatives, risks and benefits explained, specific risks discussed. Consent was given by the patient. Immediately prior to procedure a time out was called to verify the correct patient, procedure, equipment, support staff and site/side marked as required. Patient was prepped and draped in the usual sterile fashion.       Sports Medicine Office Note    Today's Date:  05/21/2024     HPI: Mikhail Moses is a 79 y.o. retired male with chronic right knee DJD who presents today for evaluation for possible viscosupplementation injections.    On 5/2/2024, he presents with his wife for evaluation of chronic right knee DJD and is requesting consideration for viscosupplementation injections.  He is on the schedule for knee replacement with Dr. Bradford on 12-24 and is seeking short-term relief until then.  He takes APAP and nothing else due to Eliquis use.  He uses a cane for assisted ambulation.  He has had his left knee replaced recently.  He admits to having cortisone injections to both knees along with gel injections with Dr. Florian Malik, community orthopedist with not much improvement with these injections.  He is trying to find any type of pain relief until he gets a replacement.  He denies recent injury or trauma.  We agreed upon pursuing insurance approval to repeat viscosupplementation at his right knee.  He cannot take NSAIDs due to his age and Eliquis use.  APAP is not enough.  He has failed recent cortisone injections.  He is scheduled for knee replacement in December and is needing relief until then.  Will see him back to start injections if he gets  approval.    Today, 5/21/2024, he returns with his wife for 3-week follow-up of chronic right knee DJD and to start a trial of Durolane viscosupplementation.  He still planning to have joint replacement in December.  He is trying anything for pain relief until that time on the counter.  He denies interval injury or trauma.  He has had this before without much relief.    He has no other complaints.    Physical Examination:     The RIGHT knee has trace to grade 1 joint effusion. Patella crepitus and grind are positive. There is minimal tenderness to the medial and lateral joint lines. Flexion and extension are without mechanical blocking. There is no instability with stress testing.   The LEFT knee has a well-healed scar.  There is no acute abnormality.  Skin - no rashes, sores, or open lesions. Strength, sensory and vascular exams are otherwise normal. There is no clubbing, cyanosis or edema.  Gait is slightly antalgic and cane assisted.    Imaging:  Radiographs of the right knee recently obtained were reviewed and revealed moderate to severe medial patellofemoral compartment arthrosis.  There are no signs of acute fractures or dislocations.  The studies were reviewed by me personally in the office today.    Procedure:    Procedure Note:    After consent was obtained, the RIGHT knee was prepped in a sterile fashion. Ultrasound guidance was used to help insure proper needle placement into the knee joint, decrease patient discomfort, and decrease collateral damage. The joint was visualized and Durolane 60 Mg was injected without any complications. Ultrasound images were saved on an internal file for later reference. The patient tolerated the procedure well and the area was cleaned and bandaged.      Problem List Items Addressed This Visit    None  Visit Diagnoses         Codes    Primary osteoarthritis of right knee     M17.11    Relevant Orders    Point of Care Ultrasound (Completed)            Assessment and Plan:      We reviewed the exam and x-ray findings and discussed the conservative and surgical treatment options. We agreed upon a trial of Durolane viscosupplementation for his right knee DJD pain.  He tolerated this well.  Activity modifications were reviewed.  He understands it can take 6 to 8 weeks before he gets any benefit.  The likelihood that he gets significant benefit is low since he has failed this previously.  He will keep his plans for surgical intervention with Dr. Bradford in December.    **This note was dictated using Dragon speech recognition software and was not corrected for spelling or grammatical errors**.    Juan J Campoverde MD  Sports Medicine Specialist  [facility] [place]

## 2024-06-25 ENCOUNTER — APPOINTMENT (OUTPATIENT)
Dept: PRIMARY CARE | Facility: CLINIC | Age: 79
End: 2024-06-25
Payer: MEDICARE

## 2024-06-25 VITALS
DIASTOLIC BLOOD PRESSURE: 72 MMHG | HEART RATE: 66 BPM | OXYGEN SATURATION: 96 % | WEIGHT: 202 LBS | BODY MASS INDEX: 30.62 KG/M2 | TEMPERATURE: 97.2 F | HEIGHT: 68 IN | SYSTOLIC BLOOD PRESSURE: 118 MMHG

## 2024-06-25 DIAGNOSIS — M75.21 BICEPS TENDINITIS OF RIGHT UPPER EXTREMITY: Primary | ICD-10-CM

## 2024-06-25 PROCEDURE — 1158F ADVNC CARE PLAN TLK DOCD: CPT | Performed by: FAMILY MEDICINE

## 2024-06-25 PROCEDURE — 1160F RVW MEDS BY RX/DR IN RCRD: CPT | Performed by: FAMILY MEDICINE

## 2024-06-25 PROCEDURE — 1036F TOBACCO NON-USER: CPT | Performed by: FAMILY MEDICINE

## 2024-06-25 PROCEDURE — 99213 OFFICE O/P EST LOW 20 MIN: CPT | Performed by: FAMILY MEDICINE

## 2024-06-25 PROCEDURE — 1159F MED LIST DOCD IN RCRD: CPT | Performed by: FAMILY MEDICINE

## 2024-06-25 PROCEDURE — 3074F SYST BP LT 130 MM HG: CPT | Performed by: FAMILY MEDICINE

## 2024-06-25 PROCEDURE — 1123F ACP DISCUSS/DSCN MKR DOCD: CPT | Performed by: FAMILY MEDICINE

## 2024-06-25 PROCEDURE — 3078F DIAST BP <80 MM HG: CPT | Performed by: FAMILY MEDICINE

## 2024-06-25 PROCEDURE — 20550 NJX 1 TENDON SHEATH/LIGAMENT: CPT | Performed by: FAMILY MEDICINE

## 2024-06-25 RX ORDER — METHYLPREDNISOLONE ACETATE 40 MG/ML
40 INJECTION, SUSPENSION INTRA-ARTICULAR; INTRALESIONAL; INTRAMUSCULAR; SOFT TISSUE ONCE
Status: COMPLETED | OUTPATIENT
Start: 2024-06-25 | End: 2024-06-25

## 2024-06-25 RX ORDER — METHYLPREDNISOLONE ACETATE 40 MG/ML
40 INJECTION, SUSPENSION INTRA-ARTICULAR; INTRALESIONAL; INTRAMUSCULAR; SOFT TISSUE ONCE
Status: DISCONTINUED | OUTPATIENT
Start: 2024-06-25 | End: 2024-06-25

## 2024-06-25 NOTE — PROGRESS NOTES
"R shoulder pain starting over  1 year ago worse with raising arm  Has had adequate relief with previous cortisone injection  Not associated with neck pain numbness tingling dexterity loss or weakness of arm.      /71   Pulse 90   Temp 36.1 °C (97 °F)   Ht 1.727 m (5' 8\")   Wt 90.3 kg (199 lb)   SpO2 93%   BMI 30.26 kg/m²     Normocephalic atraumatic  Neck without midline paracervical trapezius tenderness or spasm  No adenopathy    RIGHT shoulder:    Clavicle nontender without deformity or displacement    AC joint nontender with thickening    + Anterior shoulder nontender    Posterior shoulder nontender    Bicipital groove tender    + speeds sign    Negative [place]'s    Negative apprehension sign    supraspinatus weakness     infraspinatus weakness    Rotator cuff range of motion     diminished    Biceps nontender without deformity    Deltoid biceps triceps  wrist extension wrist flexion interosseous strong and equal bilaterally     Verbal consent obtained  after risks and benefits discussed  R  shoulder sterilely prepped and draped in customary fashion with Betadine    1 cc of lidocaine without epi mixed with 40 mg of methylprednisolone injected biceps tendon sheathwith 3 cc syringe and 25-gauge needle.  Patient tolerated procedure well.  Blood loss none  Pressure applied Band-Aid applied  Wound instructions given  Call immediately if there is increased pain, swelling, redness or fever.  "

## 2024-07-22 ENCOUNTER — HOSPITAL ENCOUNTER (OUTPATIENT)
Dept: RADIOLOGY | Facility: CLINIC | Age: 79
Discharge: HOME | End: 2024-07-22
Payer: MEDICARE

## 2024-07-22 ENCOUNTER — OFFICE VISIT (OUTPATIENT)
Dept: ORTHOPEDIC SURGERY | Facility: CLINIC | Age: 79
End: 2024-07-22
Payer: MEDICARE

## 2024-07-22 DIAGNOSIS — M75.81 RIGHT ROTATOR CUFF TENDONITIS: Primary | ICD-10-CM

## 2024-07-22 DIAGNOSIS — M25.511 RIGHT SHOULDER PAIN, UNSPECIFIED CHRONICITY: ICD-10-CM

## 2024-07-22 PROCEDURE — 1123F ACP DISCUSS/DSCN MKR DOCD: CPT | Performed by: FAMILY MEDICINE

## 2024-07-22 PROCEDURE — 99214 OFFICE O/P EST MOD 30 MIN: CPT | Mod: 25 | Performed by: FAMILY MEDICINE

## 2024-07-22 PROCEDURE — 99214 OFFICE O/P EST MOD 30 MIN: CPT | Performed by: FAMILY MEDICINE

## 2024-07-22 PROCEDURE — 73030 X-RAY EXAM OF SHOULDER: CPT | Mod: RT

## 2024-07-22 PROCEDURE — 2500000004 HC RX 250 GENERAL PHARMACY W/ HCPCS (ALT 636 FOR OP/ED): Performed by: FAMILY MEDICINE

## 2024-07-22 PROCEDURE — 2500000005 HC RX 250 GENERAL PHARMACY W/O HCPCS: Performed by: FAMILY MEDICINE

## 2024-07-22 PROCEDURE — 20610 DRAIN/INJ JOINT/BURSA W/O US: CPT | Mod: RT | Performed by: FAMILY MEDICINE

## 2024-07-22 PROCEDURE — 73030 X-RAY EXAM OF SHOULDER: CPT | Mod: RIGHT SIDE | Performed by: RADIOLOGY

## 2024-07-22 NOTE — PROGRESS NOTES
Patient ID: Mikhail Moses is a 79 y.o. male.    L Inj/Asp: R subacromial bursa on 7/22/2024 3:07 PM  Indications: pain  Details: 22 G needle  Medications: 4 mL lidocaine 10 mg/mL (1 %); 4 mL ropivacaine 5 mg/mL (0.5 %); 80 mg methylPREDNISolone acetate 40 mg/mL  Outcome: tolerated well, no immediate complications  Procedure, treatment alternatives, risks and benefits explained, specific risks discussed. Consent was given by the patient. Immediately prior to procedure a time out was called to verify the correct patient, procedure, equipment, support staff and site/side marked as required. Patient was prepped and draped in the usual sterile fashion.           Sports Medicine Office Note    Today's Date:  07/22/2024     HPI: Mikhail Moses is a 79 y.o. retired male here today with his wife for evaluation of right shoulder pain.  I recently evaluated him for chronic right knee DJD with viscosupplementation injections.    On 5/2/2024, he presents with his wife for evaluation of chronic right knee DJD and is requesting consideration for viscosupplementation injections.  He is on the schedule for knee replacement with Dr. Bradford on 12-24 and is seeking short-term relief until then.  He takes APAP and nothing else due to Eliquis use.  He uses a cane for assisted ambulation.  He has had his left knee replaced recently.  He admits to having cortisone injections to both knees along with gel injections with Dr. Florian Malik, community orthopedist with not much improvement with these injections.  He is trying to find any type of pain relief until he gets a replacement.  He denies recent injury or trauma.  We agreed upon pursuing insurance approval to repeat viscosupplementation at his right knee.  He cannot take NSAIDs due to his age and Eliquis use.  APAP is not enough.  He has failed recent cortisone injections.  He is scheduled for knee replacement in December and is needing relief until then.  Will see him back to start  injections if he gets approval.    On 5/21/2024, he returns with his wife for 3-week follow-up of chronic right knee DJD and to start a trial of Durolane viscosupplementation.  He still planning to have joint replacement in December.  He is trying anything for pain relief until that time on the counter.  He denies interval injury or trauma.  He has had this before without much relief.  We agreed upon a trial of Durolane viscosupplementation for his right knee DJD pain.  He tolerated this well.  Activity modifications were reviewed.  He understands it can take 6 to 8 weeks before he gets any benefit.  The likelihood that he gets significant benefit is low since he has failed this previously.  He will keep his plans for surgical intervention with Dr. Bradford in December.  He has no other complaints.    Today, 7/22/2024, he presents with his wife for evaluation of chronic right shoulder pain.  He has had pain for over a year but it has progressively worsened over the past 3 to 4 months without injury or trauma.  He recently saw his PCP 3 weeks ago who diagnosed him with a biceps tendinitis and gave him a local cortisone injection.  The injection gave him no improvement at all.  He continues to complain of anterior lateral pain but no radiation down the arm.  He denies neck pain.  He denies previous injuries to this area.  And has no problems with the opposite shoulder area.     He has no other complaints.    Physical Examination:     The RIGHT shoulder is without obvious signs of acute bony deformity, swelling, erythema or ecchymosis. There is no tenderness along the joint line. There is no tenderness at the AC joint. There is no tenderness at the SC joint. Active range of motion is limited and painful in all directions especially at the shoulder level.  Passive range of motion is minimally limited.  Impingement signs are positive with nears test, Rios and crossover. Instability tests are negative with anterior  and posterior drawer, sulcus, and apprehension with relocation test. Speeds test is negative. [place]'s test is negative. Rotator cuff strength is weak and painful with resisted supraspinatus and infraspinatus testing.  The neck and opposite shoulder are otherwise normal and stable.    Imaging:  Radiographs of the right shoulder recently obtained by PCP were reviewed and revealed no signs of acute fractures or dislocations.  There is slightly elevated humeral head on the glenoid consistent with rotator cuff injury, and severe AC joint arthrosis but no significant subacromial spurring.  There is minimal glenohumeral arthrosis.  The studies were reviewed by me personally in the office today.    === 04/17/24 ===  XR SHOULDER 2+ VIEWS RIGHT  - Impression -  1. No right shoulder acute fracture or major dislocation. Apparent  osteopenia.  2. Mild right shoulder joint degenerative changes.  3. Partially visualized lower cervical spondylosis  Signed by: Chris Brar 4/18/2024 12:08 PM    Procedure:  After consent was obtained, the RIGHT posterior shoulder was prepped in sterile fashion. The area was aspirated and Depo-Medrol 80 mg with lidocaine 4 mL & ropivacaine 4 mL were injected into the subacromial space without complications. The patient tolerated the procedure well and the area was cleaned and bandaged.    Problem List Items Addressed This Visit    None  Visit Diagnoses         Codes    Right rotator cuff tendonitis    -  Primary M75.81    Relevant Orders    Referral to Physical Therapy    Right shoulder pain, unspecified chronicity     M25.511    Relevant Orders    XR shoulder right 2+ views            Assessment and Plan:     We reviewed the exam and x-ray findings and discussed the conservative and surgical treatment options. We agreed to treat his chronic rotator cuff injury conservatively at this time with a cortisone injection and referral to formal physical therapy.  Will try to avoid any need for surgical  consultation.  I will see him back in 6 to 8 weeks for recheck.    **This note was dictated using Dragon speech recognition software and was not corrected for spelling or grammatical errors**.    Juan J Campoverde MD  Sports Medicine Specialist  [facility] [place]

## 2024-07-22 NOTE — LETTER
July 23, 2024     Josué Kay MD  [address]  [address]  [facility] [address]    Patient: Mikhail Moses   YOB: 1945   Date of Visit: 7/22/2024       Dear Dr. Josué Kay MD:    Thank you for referring Mikhail Moses to me for evaluation. Below are my notes for this consultation.  If you have questions, please do not hesitate to call me. I look forward to following your patient along with you.       Sincerely,     Juan J Campoverde MD      CC: No Recipients  ______________________________________________________________________________________    Patient ID: Mikhail Moses is a 79 y.o. male.    L Inj/Asp: R subacromial bursa on 7/22/2024 3:07 PM  Indications: pain  Details: 22 G needle  Medications: 4 mL lidocaine 10 mg/mL (1 %); 4 mL ropivacaine 5 mg/mL (0.5 %); 80 mg methylPREDNISolone acetate 40 mg/mL  Outcome: tolerated well, no immediate complications  Procedure, treatment alternatives, risks and benefits explained, specific risks discussed. Consent was given by the patient. Immediately prior to procedure a time out was called to verify the correct patient, procedure, equipment, support staff and site/side marked as required. Patient was prepped and draped in the usual sterile fashion.           Sports Medicine Office Note    Today's Date:  07/22/2024     HPI: Mikhail Moses is a 79 y.o. retired male here today with his wife for evaluation of right shoulder pain.  I recently evaluated him for chronic right knee DJD with viscosupplementation injections.    On 5/2/2024, he presents with his wife for evaluation of chronic right knee DJD and is requesting consideration for viscosupplementation injections.  He is on the schedule for knee replacement with Dr. Bradford on 12-24 and is seeking short-term relief until then.  He takes APAP and nothing else due to Eliquis use.  He uses a cane for assisted ambulation.  He has had his left knee replaced recently.  He admits to having  cortisone injections to both knees along with gel injections with Dr. Florian Malik, [facility] orthopedist with not much improvement with these injections.  He is trying to find any type of pain relief until he gets a replacement.  He denies recent injury or trauma.  We agreed upon pursuing insurance approval to repeat viscosupplementation at his right knee.  He cannot take NSAIDs due to his age and Eliquis use.  APAP is not enough.  He has failed recent cortisone injections.  He is scheduled for knee replacement in December and is needing relief until then.  Will see him back to start injections if he gets approval.    On 5/21/2024, he returns with his wife for 3-week follow-up of chronic right knee DJD and to start a trial of Durolane viscosupplementation.  He still planning to have joint replacement in December.  He is trying anything for pain relief until that time on the counter.  He denies interval injury or trauma.  He has had this before without much relief.  We agreed upon a trial of Durolane viscosupplementation for his right knee DJD pain.  He tolerated this well.  Activity modifications were reviewed.  He understands it can take 6 to 8 weeks before he gets any benefit.  The likelihood that he gets significant benefit is low since he has failed this previously.  He will keep his plans for surgical intervention with Dr. Bradford in December.  He has no other complaints.    Today, 7/22/2024, he presents with his wife for evaluation of chronic right shoulder pain.  He has had pain for over a year but it has progressively worsened over the past 3 to 4 months without injury or trauma.  He recently saw his PCP 3 weeks ago who diagnosed him with a biceps tendinitis and gave him a local cortisone injection.  The injection gave him no improvement at all.  He continues to complain of anterior lateral pain but no radiation down the arm.  He denies neck pain.  He denies previous injuries to this area.  And has no  problems with the opposite shoulder area.     He has no other complaints.    Physical Examination:     The RIGHT shoulder is without obvious signs of acute bony deformity, swelling, erythema or ecchymosis. There is no tenderness along the joint line. There is no tenderness at the AC joint. There is no tenderness at the SC joint. Active range of motion is limited and painful in all directions especially at the shoulder level.  Passive range of motion is minimally limited.  Impingement signs are positive with nears test, Rios and crossover. Instability tests are negative with anterior and posterior drawer, sulcus, and apprehension with relocation test. Speeds test is negative. [place]'s test is negative. Rotator cuff strength is weak and painful with resisted supraspinatus and infraspinatus testing.  The neck and opposite shoulder are otherwise normal and stable.    Imaging:  Radiographs of the right shoulder recently obtained by PCP were reviewed and revealed no signs of acute fractures or dislocations.  There is slightly elevated humeral head on the glenoid consistent with rotator cuff injury, and severe AC joint arthrosis but no significant subacromial spurring.  There is minimal glenohumeral arthrosis.  The studies were reviewed by me personally in the office today.    === 04/17/24 ===  XR SHOULDER 2+ VIEWS RIGHT  - Impression -  1. No right shoulder acute fracture or major dislocation. Apparent  osteopenia.  2. Mild right shoulder joint degenerative changes.  3. Partially visualized lower cervical spondylosis  Signed by: Chris Brar 4/18/2024 12:08 PM    Procedure:  After consent was obtained, the RIGHT posterior shoulder was prepped in sterile fashion. The area was aspirated and Depo-Medrol 80 mg with lidocaine 4 mL & ropivacaine 4 mL were injected into the subacromial space without complications. The patient tolerated the procedure well and the area was cleaned and bandaged.    Problem List Items Addressed  This Visit    None  Visit Diagnoses         Codes    Right rotator cuff tendonitis    -  Primary M75.81    Relevant Orders    Referral to Physical Therapy    Right shoulder pain, unspecified chronicity     M25.511    Relevant Orders    XR shoulder right 2+ views            Assessment and Plan:     We reviewed the exam and x-ray findings and discussed the conservative and surgical treatment options. We agreed to treat his chronic rotator cuff injury conservatively at this time with a cortisone injection and referral to formal physical therapy.  Will try to avoid any need for surgical consultation.  I will see him back in 6 to 8 weeks for recheck.    **This note was dictated using Dragon speech recognition software and was not corrected for spelling or grammatical errors**.    Juan J Campoverde MD  Sports Medicine Specialist  [facility] [place]

## 2024-07-23 RX ORDER — ROPIVACAINE HYDROCHLORIDE 5 MG/ML
4 INJECTION, SOLUTION EPIDURAL; INFILTRATION; PERINEURAL
Status: COMPLETED | OUTPATIENT
Start: 2024-07-22 | End: 2024-07-22

## 2024-07-23 RX ORDER — LIDOCAINE HYDROCHLORIDE 10 MG/ML
4 INJECTION INFILTRATION; PERINEURAL
Status: COMPLETED | OUTPATIENT
Start: 2024-07-22 | End: 2024-07-22

## 2024-07-23 RX ORDER — METHYLPREDNISOLONE ACETATE 40 MG/ML
80 INJECTION, SUSPENSION INTRA-ARTICULAR; INTRALESIONAL; INTRAMUSCULAR; SOFT TISSUE
Status: COMPLETED | OUTPATIENT
Start: 2024-07-22 | End: 2024-07-22

## 2024-07-25 ENCOUNTER — APPOINTMENT (OUTPATIENT)
Dept: ORTHOPEDIC SURGERY | Facility: CLINIC | Age: 79
End: 2024-07-25
Payer: MEDICARE

## 2024-08-20 ENCOUNTER — EVALUATION (OUTPATIENT)
Dept: PHYSICAL THERAPY | Facility: CLINIC | Age: 79
End: 2024-08-20
Payer: MEDICARE

## 2024-08-20 DIAGNOSIS — M25.511 RIGHT SHOULDER PAIN: Primary | ICD-10-CM

## 2024-08-20 DIAGNOSIS — M75.81 RIGHT ROTATOR CUFF TENDONITIS: ICD-10-CM

## 2024-08-20 PROCEDURE — 97110 THERAPEUTIC EXERCISES: CPT | Mod: GP | Performed by: PHYSICAL THERAPIST

## 2024-08-20 PROCEDURE — 97161 PT EVAL LOW COMPLEX 20 MIN: CPT | Mod: GP | Performed by: PHYSICAL THERAPIST

## 2024-08-20 ASSESSMENT — PATIENT HEALTH QUESTIONNAIRE - PHQ9
SUM OF ALL RESPONSES TO PHQ9 QUESTIONS 1 AND 2: 0
1. LITTLE INTEREST OR PLEASURE IN DOING THINGS: NOT AT ALL
2. FEELING DOWN, DEPRESSED OR HOPELESS: NOT AT ALL
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
## 2024-08-27 ENCOUNTER — APPOINTMENT (OUTPATIENT)
Dept: PHYSICAL THERAPY | Facility: CLINIC | Age: 79
End: 2024-08-27
Payer: MEDICARE

## 2024-08-27 ENCOUNTER — TREATMENT (OUTPATIENT)
Dept: PHYSICAL THERAPY | Facility: CLINIC | Age: 79
End: 2024-08-27
Payer: MEDICARE

## 2024-08-27 DIAGNOSIS — M75.81 RIGHT ROTATOR CUFF TENDONITIS: ICD-10-CM

## 2024-08-27 DIAGNOSIS — M25.511 RIGHT SHOULDER PAIN: ICD-10-CM

## 2024-08-27 PROCEDURE — 97110 THERAPEUTIC EXERCISES: CPT | Mod: GP | Performed by: PHYSICAL THERAPIST
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
## 2024-09-03 ENCOUNTER — TREATMENT (OUTPATIENT)
Dept: PHYSICAL THERAPY | Facility: CLINIC | Age: 79
End: 2024-09-03
Payer: MEDICARE

## 2024-09-03 DIAGNOSIS — M75.81 RIGHT ROTATOR CUFF TENDONITIS: ICD-10-CM

## 2024-09-03 DIAGNOSIS — M25.511 RIGHT SHOULDER PAIN: Primary | ICD-10-CM

## 2024-09-03 PROCEDURE — 97110 THERAPEUTIC EXERCISES: CPT | Mod: GP | Performed by: PHYSICAL THERAPIST
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
## 2024-09-10 ENCOUNTER — APPOINTMENT (OUTPATIENT)
Dept: PHYSICAL THERAPY | Facility: CLINIC | Age: 79
End: 2024-09-10
Payer: MEDICARE

## 2024-09-12 ENCOUNTER — APPOINTMENT (OUTPATIENT)
Dept: ORTHOPEDIC SURGERY | Facility: CLINIC | Age: 79
End: 2024-09-12
Payer: MEDICARE

## 2024-09-17 ENCOUNTER — TREATMENT (OUTPATIENT)
Dept: PHYSICAL THERAPY | Facility: CLINIC | Age: 79
End: 2024-09-17
Payer: MEDICARE

## 2024-09-17 DIAGNOSIS — M25.511 RIGHT SHOULDER PAIN: Primary | ICD-10-CM

## 2024-09-17 DIAGNOSIS — M75.81 RIGHT ROTATOR CUFF TENDONITIS: ICD-10-CM

## 2024-09-17 PROCEDURE — 97110 THERAPEUTIC EXERCISES: CPT | Mod: GP | Performed by: PHYSICAL THERAPIST
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
## 2024-09-24 ENCOUNTER — TREATMENT (OUTPATIENT)
Dept: PHYSICAL THERAPY | Facility: CLINIC | Age: 79
End: 2024-09-24
Payer: MEDICARE

## 2024-09-24 DIAGNOSIS — M25.511 RIGHT SHOULDER PAIN: Primary | ICD-10-CM

## 2024-09-24 DIAGNOSIS — M75.81 RIGHT ROTATOR CUFF TENDONITIS: ICD-10-CM

## 2024-09-24 PROCEDURE — 97110 THERAPEUTIC EXERCISES: CPT | Mod: GP | Performed by: PHYSICAL THERAPIST
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
## 2024-10-01 ENCOUNTER — TREATMENT (OUTPATIENT)
Dept: PHYSICAL THERAPY | Facility: CLINIC | Age: 79
End: 2024-10-01
Payer: MEDICARE

## 2024-10-01 DIAGNOSIS — M75.81 RIGHT ROTATOR CUFF TENDONITIS: ICD-10-CM

## 2024-10-01 DIAGNOSIS — M25.511 RIGHT SHOULDER PAIN: Primary | ICD-10-CM

## 2024-10-01 PROCEDURE — 97110 THERAPEUTIC EXERCISES: CPT | Mod: GP | Performed by: PHYSICAL THERAPIST
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
## 2024-10-08 ENCOUNTER — APPOINTMENT (OUTPATIENT)
Dept: PRIMARY CARE | Facility: CLINIC | Age: 79
End: 2024-10-08
Payer: MEDICARE

## 2024-10-08 VITALS
BODY MASS INDEX: 33.54 KG/M2 | OXYGEN SATURATION: 92 % | HEIGHT: 68 IN | WEIGHT: 221.3 LBS | SYSTOLIC BLOOD PRESSURE: 93 MMHG | HEART RATE: 73 BPM | DIASTOLIC BLOOD PRESSURE: 56 MMHG

## 2024-10-08 DIAGNOSIS — I25.2 OLD MYOCARDIAL INFARCTION: ICD-10-CM

## 2024-10-08 DIAGNOSIS — Z86.718 H/O DEEP VENOUS THROMBOSIS: ICD-10-CM

## 2024-10-08 DIAGNOSIS — M48.02 SPINAL STENOSIS IN CERVICAL REGION: Primary | ICD-10-CM

## 2024-10-08 DIAGNOSIS — R79.89 ELEVATED LFTS: ICD-10-CM

## 2024-10-08 DIAGNOSIS — Z01.818 PRE-OP TESTING: ICD-10-CM

## 2024-10-08 DIAGNOSIS — I25.119 CORONARY ARTERY DISEASE INVOLVING NATIVE CORONARY ARTERY OF NATIVE HEART WITH ANGINA PECTORIS: ICD-10-CM

## 2024-10-08 LAB
NON-UH HIE A/G RATIO: 1.1
NON-UH HIE ALB: 3.4 G/DL (ref 3.4–5)
NON-UH HIE ALK PHOS: 100 UNIT/L (ref 45–117)
NON-UH HIE BASO COUNT: 0.07 X1000 (ref 0–0.2)
NON-UH HIE BASOS %: 0.9 %
NON-UH HIE BILIRUBIN, TOTAL: 0.5 MG/DL (ref 0.3–1.2)
NON-UH HIE BUN/CREAT RATIO: 15
NON-UH HIE BUN: 21 MG/DL (ref 9–23)
NON-UH HIE CALCIUM: 8.4 MG/DL (ref 8.7–10.4)
NON-UH HIE CALCULATED OSMOLALITY: 283 MOSM/KG (ref 275–295)
NON-UH HIE CHLORIDE: 105 MMOL/L (ref 98–107)
NON-UH HIE CO2, VENOUS: 28 MMOL/L (ref 20–31)
NON-UH HIE CREATININE: 1.4 MG/DL (ref 0.6–1.1)
NON-UH HIE DIFF?: NO
NON-UH HIE EOS COUNT: 0.11 X1000 (ref 0–0.5)
NON-UH HIE EOSIN %: 1.4 %
NON-UH HIE GFR AA: 59
NON-UH HIE GLOBULIN: 3.2 G/DL
NON-UH HIE GLOMERULAR FILTRATION RATE: 49 ML/MIN/1.73M?
NON-UH HIE GLUCOSE: 100 MG/DL (ref 74–106)
NON-UH HIE GOT: 20 UNIT/L (ref 15–37)
NON-UH HIE GPT: 12 UNIT/L (ref 10–49)
NON-UH HIE HCT: 37.3 % (ref 41–52)
NON-UH HIE HGB: 12.6 G/DL (ref 13.5–17.5)
NON-UH HIE INR: 1.4
NON-UH HIE INSTR WBC: 8
NON-UH HIE K: 3.9 MMOL/L (ref 3.5–5.1)
NON-UH HIE LYMPH %: 23.8 %
NON-UH HIE LYMPH COUNT: 1.91 X1000 (ref 1.2–4.8)
NON-UH HIE MCH: 34 PG (ref 27–34)
NON-UH HIE MCHC: 33.8 G/DL (ref 32–37)
NON-UH HIE MCV: 100.6 FL (ref 80–100)
NON-UH HIE MONO %: 8.3 %
NON-UH HIE MONO COUNT: 0.67 X1000 (ref 0.1–1)
NON-UH HIE MPV: 8.1 FL (ref 7.4–10.4)
NON-UH HIE NA: 140 MMOL/L (ref 135–145)
NON-UH HIE NEUTROPHIL %: 65.6 %
NON-UH HIE NEUTROPHIL COUNT (ANC): 5.26 X1000 (ref 1.4–8.8)
NON-UH HIE NUCLEATED RBC: 0 /100WBC
NON-UH HIE PLATELET: 215 X10 (ref 150–450)
NON-UH HIE PROTIME PATIENT: 16.2 SECONDS (ref 9.8–12.8)
NON-UH HIE RBC: 3.71 X10 (ref 4.7–6.1)
NON-UH HIE RDW: 14.3 % (ref 11.5–14.5)
NON-UH HIE TOTAL PROTEIN: 6.6 G/DL (ref 5.7–8.2)
NON-UH HIE WBC: 8 X10 (ref 4.5–11)

## 2024-10-08 PROCEDURE — 1160F RVW MEDS BY RX/DR IN RCRD: CPT | Performed by: FAMILY MEDICINE

## 2024-10-08 PROCEDURE — 3078F DIAST BP <80 MM HG: CPT | Performed by: FAMILY MEDICINE

## 2024-10-08 PROCEDURE — 1159F MED LIST DOCD IN RCRD: CPT | Performed by: FAMILY MEDICINE

## 2024-10-08 PROCEDURE — 99215 OFFICE O/P EST HI 40 MIN: CPT | Performed by: FAMILY MEDICINE

## 2024-10-08 PROCEDURE — 1123F ACP DISCUSS/DSCN MKR DOCD: CPT | Performed by: FAMILY MEDICINE

## 2024-10-08 PROCEDURE — 3074F SYST BP LT 130 MM HG: CPT | Performed by: FAMILY MEDICINE

## 2024-10-08 PROCEDURE — 93000 ELECTROCARDIOGRAM COMPLETE: CPT | Performed by: FAMILY MEDICINE

## 2024-10-08 RX ORDER — FINASTERIDE 5 MG/1
1 TABLET, FILM COATED ORAL
COMMUNITY
Start: 2024-08-02

## 2024-10-08 RX ORDER — PREGABALIN 300 MG/1
CAPSULE ORAL
COMMUNITY
Start: 2024-09-23

## 2024-10-08 ASSESSMENT — PATIENT HEALTH QUESTIONNAIRE - PHQ9
SUM OF ALL RESPONSES TO PHQ9 QUESTIONS 1 AND 2: 0
2. FEELING DOWN, DEPRESSED OR HOPELESS: NOT AT ALL
1. LITTLE INTEREST OR PLEASURE IN DOING THINGS: NOT AT ALL

## 2024-10-08 NOTE — PROGRESS NOTES
"Here for fu visit re: lumbar spinal stenosis coronary artery disease hypertension hyperlipidemia history of DVT postop after knee surgery    compliant with meds     tolerating meds    He is scheduled for lumbar spine decompression.  He is here for preadmission testing     denies chest pain SOB EID diaphoresis nausea palpitations syncope presyncope orthopnea edema leg pain dysarthria aphasia focal weakness headache numbness tingling  visual disturbance gait disturbance polydipsia polyuria weight loss tremor epistaxis melena rectal bleeding fatigue weight change temperature intolerance sore throat fevers chills sweats abdominal pain dysuria nausea vomiting diarrhea        BP 93/56   Pulse 73   Ht 1.727 m (5' 8\")   Wt 100 kg (221 lb 4.8 oz)   SpO2 92%   BMI 33.65 kg/m²       Appears comfortable  No retractions  Skin without pallor petechia icterus cyanosis  Neck without JVD thyromegaly bruits  Chest clear to auscultation without rales rhonchi wheeze  Heart regular rate and rhythm without murmur  Abdomen soft nondistended nontender without organomegaly or mass  Extremities without erythema edema Homans or cord  Peripheral pulses palpable  Neuro intact      "

## 2024-10-14 PROBLEM — M17.11 UNILATERAL PRIMARY OSTEOARTHRITIS, RIGHT KNEE: Status: ACTIVE | Noted: 2024-10-13

## 2024-10-22 ENCOUNTER — APPOINTMENT (OUTPATIENT)
Dept: ORTHOPEDIC SURGERY | Facility: CLINIC | Age: 79
End: 2024-10-22
Payer: MEDICARE

## 2024-10-24 ENCOUNTER — HOSPITAL ENCOUNTER (INPATIENT)
Facility: HOSPITAL | Age: 79
LOS: 2 days | Discharge: NURSING, REHAB OR LONG-TERM CARE | End: 2024-10-26
Attending: EMERGENCY MEDICINE | Admitting: INTERNAL MEDICINE
Payer: MEDICARE

## 2024-10-24 ENCOUNTER — APPOINTMENT (OUTPATIENT)
Dept: CARDIOLOGY | Facility: HOSPITAL | Age: 79
End: 2024-10-24
Payer: MEDICARE

## 2024-10-24 ENCOUNTER — APPOINTMENT (OUTPATIENT)
Dept: RADIOLOGY | Facility: HOSPITAL | Age: 79
End: 2024-10-24
Payer: MEDICARE

## 2024-10-24 DIAGNOSIS — J96.01 SEPSIS WITH ACUTE HYPOXIC RESPIRATORY FAILURE WITHOUT SEPTIC SHOCK, DUE TO UNSPECIFIED ORGANISM (MULTI): Primary | ICD-10-CM

## 2024-10-24 DIAGNOSIS — R65.20 SEPSIS WITH ACUTE HYPOXIC RESPIRATORY FAILURE WITHOUT SEPTIC SHOCK, DUE TO UNSPECIFIED ORGANISM (MULTI): Primary | ICD-10-CM

## 2024-10-24 DIAGNOSIS — A41.9 SEPSIS WITH ACUTE HYPOXIC RESPIRATORY FAILURE WITHOUT SEPTIC SHOCK, DUE TO UNSPECIFIED ORGANISM (MULTI): Primary | ICD-10-CM

## 2024-10-24 LAB
ALBUMIN SERPL BCP-MCNC: 3.2 G/DL (ref 3.4–5)
ALP SERPL-CCNC: 93 U/L (ref 33–136)
ALT SERPL W P-5'-P-CCNC: 10 U/L (ref 10–52)
ANION GAP BLDV CALCULATED.4IONS-SCNC: 10 MMOL/L (ref 10–25)
ANION GAP SERPL CALC-SCNC: 11 MMOL/L (ref 10–20)
AST SERPL W P-5'-P-CCNC: 14 U/L (ref 9–39)
BASE EXCESS BLDV CALC-SCNC: -0.2 MMOL/L (ref -2–3)
BASOPHILS # BLD AUTO: 0.08 X10*3/UL (ref 0–0.1)
BASOPHILS NFR BLD AUTO: 0.5 %
BILIRUB SERPL-MCNC: 0.7 MG/DL (ref 0–1.2)
BODY TEMPERATURE: 37 DEGREES CELSIUS
BUN SERPL-MCNC: 27 MG/DL (ref 6–23)
CA-I BLDV-SCNC: 1.15 MMOL/L (ref 1.1–1.33)
CALCIUM SERPL-MCNC: 8.2 MG/DL (ref 8.6–10.3)
CARDIAC TROPONIN I PNL SERPL HS: 16 NG/L (ref 0–20)
CHLORIDE BLDV-SCNC: 106 MMOL/L (ref 98–107)
CHLORIDE SERPL-SCNC: 106 MMOL/L (ref 98–107)
CO2 SERPL-SCNC: 27 MMOL/L (ref 21–32)
CREAT SERPL-MCNC: 2.27 MG/DL (ref 0.5–1.3)
EGFRCR SERPLBLD CKD-EPI 2021: 29 ML/MIN/1.73M*2
EOSINOPHIL # BLD AUTO: 0.1 X10*3/UL (ref 0–0.4)
EOSINOPHIL NFR BLD AUTO: 0.7 %
ERYTHROCYTE [DISTWIDTH] IN BLOOD BY AUTOMATED COUNT: 13.5 % (ref 11.5–14.5)
GLUCOSE BLDV-MCNC: 131 MG/DL (ref 74–99)
GLUCOSE SERPL-MCNC: 128 MG/DL (ref 74–99)
HCO3 BLDV-SCNC: 25.4 MMOL/L (ref 22–26)
HCT VFR BLD AUTO: 36 % (ref 41–52)
HCT VFR BLD EST: 30 % (ref 41–52)
HGB BLD-MCNC: 11.8 G/DL (ref 13.5–17.5)
HGB BLDV-MCNC: 10.1 G/DL (ref 13.5–17.5)
IMM GRANULOCYTES # BLD AUTO: 0.08 X10*3/UL (ref 0–0.5)
IMM GRANULOCYTES NFR BLD AUTO: 0.5 % (ref 0–0.9)
INHALED O2 CONCENTRATION: 21 %
LACTATE BLDV-SCNC: 2 MMOL/L (ref 0.4–2)
LACTATE SERPL-SCNC: 1.6 MMOL/L (ref 0.4–2)
LYMPHOCYTES # BLD AUTO: 1.1 X10*3/UL (ref 0.8–3)
LYMPHOCYTES NFR BLD AUTO: 7.2 %
MCH RBC QN AUTO: 33.6 PG (ref 26–34)
MCHC RBC AUTO-ENTMCNC: 32.8 G/DL (ref 32–36)
MCV RBC AUTO: 103 FL (ref 80–100)
MONOCYTES # BLD AUTO: 1.05 X10*3/UL (ref 0.05–0.8)
MONOCYTES NFR BLD AUTO: 6.9 %
NEUTROPHILS # BLD AUTO: 12.8 X10*3/UL (ref 1.6–5.5)
NEUTROPHILS NFR BLD AUTO: 84.2 %
NRBC BLD-RTO: 0 /100 WBCS (ref 0–0)
OXYHGB MFR BLDV: 41.9 % (ref 45–75)
PCO2 BLDV: 45 MM HG (ref 41–51)
PH BLDV: 7.36 PH (ref 7.33–7.43)
PLATELET # BLD AUTO: 216 X10*3/UL (ref 150–450)
PO2 BLDV: 29 MM HG (ref 35–45)
POTASSIUM BLDV-SCNC: 4.1 MMOL/L (ref 3.5–5.3)
POTASSIUM SERPL-SCNC: 4.1 MMOL/L (ref 3.5–5.3)
PROT SERPL-MCNC: 6 G/DL (ref 6.4–8.2)
RBC # BLD AUTO: 3.51 X10*6/UL (ref 4.5–5.9)
SAO2 % BLDV: 43 % (ref 45–75)
SARS-COV-2 RNA RESP QL NAA+PROBE: NOT DETECTED
SODIUM BLDV-SCNC: 137 MMOL/L (ref 136–145)
SODIUM SERPL-SCNC: 140 MMOL/L (ref 136–145)
WBC # BLD AUTO: 15.2 X10*3/UL (ref 4.4–11.3)

## 2024-10-24 PROCEDURE — 2500000004 HC RX 250 GENERAL PHARMACY W/ HCPCS (ALT 636 FOR OP/ED): Performed by: EMERGENCY MEDICINE

## 2024-10-24 PROCEDURE — 99223 1ST HOSP IP/OBS HIGH 75: CPT

## 2024-10-24 PROCEDURE — 84075 ASSAY ALKALINE PHOSPHATASE: CPT | Performed by: EMERGENCY MEDICINE

## 2024-10-24 PROCEDURE — 87086 URINE CULTURE/COLONY COUNT: CPT | Mod: PARLAB | Performed by: EMERGENCY MEDICINE

## 2024-10-24 PROCEDURE — 2060000001 HC INTERMEDIATE ICU ROOM DAILY

## 2024-10-24 PROCEDURE — 36415 COLL VENOUS BLD VENIPUNCTURE: CPT | Performed by: EMERGENCY MEDICINE

## 2024-10-24 PROCEDURE — 2500000005 HC RX 250 GENERAL PHARMACY W/O HCPCS: Performed by: EMERGENCY MEDICINE

## 2024-10-24 PROCEDURE — 85025 COMPLETE CBC W/AUTO DIFF WBC: CPT | Performed by: EMERGENCY MEDICINE

## 2024-10-24 PROCEDURE — 71045 X-RAY EXAM CHEST 1 VIEW: CPT

## 2024-10-24 PROCEDURE — 82330 ASSAY OF CALCIUM: CPT | Performed by: EMERGENCY MEDICINE

## 2024-10-24 PROCEDURE — 71045 X-RAY EXAM CHEST 1 VIEW: CPT | Performed by: STUDENT IN AN ORGANIZED HEALTH CARE EDUCATION/TRAINING PROGRAM

## 2024-10-24 PROCEDURE — 93005 ELECTROCARDIOGRAM TRACING: CPT

## 2024-10-24 PROCEDURE — 96365 THER/PROPH/DIAG IV INF INIT: CPT | Mod: 59

## 2024-10-24 PROCEDURE — 96361 HYDRATE IV INFUSION ADD-ON: CPT

## 2024-10-24 PROCEDURE — 81001 URINALYSIS AUTO W/SCOPE: CPT | Performed by: EMERGENCY MEDICINE

## 2024-10-24 PROCEDURE — 84484 ASSAY OF TROPONIN QUANT: CPT | Performed by: EMERGENCY MEDICINE

## 2024-10-24 PROCEDURE — 87040 BLOOD CULTURE FOR BACTERIA: CPT | Mod: PARLAB | Performed by: EMERGENCY MEDICINE

## 2024-10-24 PROCEDURE — 83605 ASSAY OF LACTIC ACID: CPT | Performed by: EMERGENCY MEDICINE

## 2024-10-24 PROCEDURE — 99285 EMERGENCY DEPT VISIT HI MDM: CPT | Mod: 25

## 2024-10-24 PROCEDURE — 87635 SARS-COV-2 COVID-19 AMP PRB: CPT | Performed by: EMERGENCY MEDICINE

## 2024-10-24 PROCEDURE — 87075 CULTR BACTERIA EXCEPT BLOOD: CPT | Mod: PARLAB | Performed by: EMERGENCY MEDICINE

## 2024-10-24 RX ORDER — METOPROLOL SUCCINATE 25 MG/1
25 TABLET, EXTENDED RELEASE ORAL DAILY
Status: DISCONTINUED | OUTPATIENT
Start: 2024-10-25 | End: 2024-10-26 | Stop reason: HOSPADM

## 2024-10-24 RX ORDER — ONDANSETRON HYDROCHLORIDE 2 MG/ML
4 INJECTION, SOLUTION INTRAVENOUS EVERY 8 HOURS PRN
Status: DISCONTINUED | OUTPATIENT
Start: 2024-10-24 | End: 2024-10-26 | Stop reason: HOSPADM

## 2024-10-24 RX ORDER — FUROSEMIDE 40 MG/1
40 TABLET ORAL DAILY
Status: DISCONTINUED | OUTPATIENT
Start: 2024-10-25 | End: 2024-10-26 | Stop reason: HOSPADM

## 2024-10-24 RX ORDER — VANCOMYCIN HYDROCHLORIDE 1 G/20ML
INJECTION, POWDER, LYOPHILIZED, FOR SOLUTION INTRAVENOUS DAILY PRN
Status: DISCONTINUED | OUTPATIENT
Start: 2024-10-24 | End: 2024-10-26 | Stop reason: HOSPADM

## 2024-10-24 RX ORDER — ONDANSETRON 4 MG/1
4 TABLET, FILM COATED ORAL EVERY 8 HOURS PRN
Status: DISCONTINUED | OUTPATIENT
Start: 2024-10-24 | End: 2024-10-26 | Stop reason: HOSPADM

## 2024-10-24 RX ORDER — DEXTROSE 50 % IN WATER (D50W) INTRAVENOUS SYRINGE
25
Status: DISCONTINUED | OUTPATIENT
Start: 2024-10-24 | End: 2024-10-26 | Stop reason: HOSPADM

## 2024-10-24 RX ORDER — VANCOMYCIN HYDROCHLORIDE 1 G/20ML
INJECTION, POWDER, LYOPHILIZED, FOR SOLUTION INTRAVENOUS DAILY PRN
Status: DISCONTINUED | OUTPATIENT
Start: 2024-10-24 | End: 2024-10-24

## 2024-10-24 RX ORDER — DEXTROSE 50 % IN WATER (D50W) INTRAVENOUS SYRINGE
12.5
Status: DISCONTINUED | OUTPATIENT
Start: 2024-10-24 | End: 2024-10-26 | Stop reason: HOSPADM

## 2024-10-24 RX ORDER — NITROGLYCERIN 0.4 MG/1
0.4 TABLET SUBLINGUAL EVERY 5 MIN PRN
Status: DISCONTINUED | OUTPATIENT
Start: 2024-10-24 | End: 2024-10-26 | Stop reason: HOSPADM

## 2024-10-24 ASSESSMENT — COLUMBIA-SUICIDE SEVERITY RATING SCALE - C-SSRS
2. HAVE YOU ACTUALLY HAD ANY THOUGHTS OF KILLING YOURSELF?: NO
1. IN THE PAST MONTH, HAVE YOU WISHED YOU WERE DEAD OR WISHED YOU COULD GO TO SLEEP AND NOT WAKE UP?: NO
6. HAVE YOU EVER DONE ANYTHING, STARTED TO DO ANYTHING, OR PREPARED TO DO ANYTHING TO END YOUR LIFE?: NO
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
## 2024-10-24 NOTE — ED TRIAGE NOTES
Pt arrived from [facility] via EMS for evaluation of hypotension (65/42) tonight while staff bathing Pt and generalized weakness.

## 2024-10-25 LAB
AMORPH CRY #/AREA UR COMP ASSIST: ABNORMAL /HPF
ANION GAP SERPL CALC-SCNC: 12 MMOL/L (ref 10–20)
APPEARANCE UR: ABNORMAL
ATRIAL RATE: 79 BPM
BACTERIA #/AREA URNS AUTO: ABNORMAL /HPF
BILIRUB UR STRIP.AUTO-MCNC: NEGATIVE MG/DL
BNP SERPL-MCNC: 71 PG/ML (ref 0–99)
BUN SERPL-MCNC: 28 MG/DL (ref 6–23)
CALCIUM SERPL-MCNC: 8.1 MG/DL (ref 8.6–10.3)
CHLORIDE SERPL-SCNC: 105 MMOL/L (ref 98–107)
CO2 SERPL-SCNC: 25 MMOL/L (ref 21–32)
COLOR UR: YELLOW
CREAT SERPL-MCNC: 2.37 MG/DL (ref 0.5–1.3)
EGFRCR SERPLBLD CKD-EPI 2021: 27 ML/MIN/1.73M*2
ERYTHROCYTE [DISTWIDTH] IN BLOOD BY AUTOMATED COUNT: 13.4 % (ref 11.5–14.5)
GLUCOSE SERPL-MCNC: 98 MG/DL (ref 74–99)
GLUCOSE UR STRIP.AUTO-MCNC: NORMAL MG/DL
HCT VFR BLD AUTO: 31.5 % (ref 41–52)
HGB BLD-MCNC: 10.4 G/DL (ref 13.5–17.5)
HOLD SPECIMEN: NORMAL
KETONES UR STRIP.AUTO-MCNC: NEGATIVE MG/DL
LEUKOCYTE ESTERASE UR QL STRIP.AUTO: ABNORMAL
MCH RBC QN AUTO: 34.1 PG (ref 26–34)
MCHC RBC AUTO-ENTMCNC: 33 G/DL (ref 32–36)
MCV RBC AUTO: 103 FL (ref 80–100)
MRSA DNA SPEC QL NAA+PROBE: DETECTED
NITRITE UR QL STRIP.AUTO: NEGATIVE
NRBC BLD-RTO: 0 /100 WBCS (ref 0–0)
P AXIS: 19 DEGREES
P OFFSET: 171 MS
P ONSET: 124 MS
PH UR STRIP.AUTO: 5 [PH]
PLATELET # BLD AUTO: 200 X10*3/UL (ref 150–450)
POTASSIUM SERPL-SCNC: 3.9 MMOL/L (ref 3.5–5.3)
PR INTERVAL: 184 MS
PROCALCITONIN SERPL-MCNC: 1.6 NG/ML
PROT UR STRIP.AUTO-MCNC: ABNORMAL MG/DL
Q ONSET: 216 MS
QRS COUNT: 13 BEATS
QRS DURATION: 124 MS
QT INTERVAL: 418 MS
QTC CALCULATION(BAZETT): 479 MS
QTC FREDERICIA: 458 MS
R AXIS: -67 DEGREES
RBC # BLD AUTO: 3.05 X10*6/UL (ref 4.5–5.9)
RBC # UR STRIP.AUTO: ABNORMAL /UL
RBC #/AREA URNS AUTO: >20 /HPF
SODIUM SERPL-SCNC: 138 MMOL/L (ref 136–145)
SP GR UR STRIP.AUTO: 1.02
T AXIS: 45 DEGREES
T OFFSET: 425 MS
TRANS CELLS #/AREA UR COMP ASSIST: ABNORMAL /HPF
UROBILINOGEN UR STRIP.AUTO-MCNC: ABNORMAL MG/DL
VANCOMYCIN TROUGH SERPL-MCNC: 12.5 UG/ML (ref 5–20)
VENTRICULAR RATE: 79 BPM
WBC # BLD AUTO: 12.7 X10*3/UL (ref 4.4–11.3)
WBC #/AREA URNS AUTO: >50 /HPF
WBC CLUMPS #/AREA URNS AUTO: ABNORMAL /HPF

## 2024-10-25 PROCEDURE — 83880 ASSAY OF NATRIURETIC PEPTIDE: CPT

## 2024-10-25 PROCEDURE — 80202 ASSAY OF VANCOMYCIN: CPT

## 2024-10-25 PROCEDURE — 87640 STAPH A DNA AMP PROBE: CPT | Performed by: EMERGENCY MEDICINE

## 2024-10-25 PROCEDURE — 2060000001 HC INTERMEDIATE ICU ROOM DAILY

## 2024-10-25 PROCEDURE — 2500000001 HC RX 250 WO HCPCS SELF ADMINISTERED DRUGS (ALT 637 FOR MEDICARE OP)

## 2024-10-25 PROCEDURE — 84145 PROCALCITONIN (PCT): CPT | Mod: PARLAB

## 2024-10-25 PROCEDURE — 2500000004 HC RX 250 GENERAL PHARMACY W/ HCPCS (ALT 636 FOR OP/ED): Performed by: INTERNAL MEDICINE

## 2024-10-25 PROCEDURE — 2500000005 HC RX 250 GENERAL PHARMACY W/O HCPCS

## 2024-10-25 PROCEDURE — 97165 OT EVAL LOW COMPLEX 30 MIN: CPT | Mod: GO

## 2024-10-25 PROCEDURE — 2500000004 HC RX 250 GENERAL PHARMACY W/ HCPCS (ALT 636 FOR OP/ED)

## 2024-10-25 PROCEDURE — 92610 EVALUATE SWALLOWING FUNCTION: CPT | Mod: GN

## 2024-10-25 PROCEDURE — 97161 PT EVAL LOW COMPLEX 20 MIN: CPT | Mod: GP

## 2024-10-25 PROCEDURE — 85027 COMPLETE CBC AUTOMATED: CPT

## 2024-10-25 PROCEDURE — 36415 COLL VENOUS BLD VENIPUNCTURE: CPT

## 2024-10-25 PROCEDURE — 80048 BASIC METABOLIC PNL TOTAL CA: CPT

## 2024-10-25 RX ORDER — LIDOCAINE 560 MG/1
1 PATCH PERCUTANEOUS; TOPICAL; TRANSDERMAL DAILY
Status: DISCONTINUED | OUTPATIENT
Start: 2024-10-25 | End: 2024-10-26 | Stop reason: HOSPADM

## 2024-10-25 RX ORDER — ACETAMINOPHEN 325 MG/1
650 TABLET ORAL EVERY 6 HOURS PRN
Status: DISCONTINUED | OUTPATIENT
Start: 2024-10-25 | End: 2024-10-26 | Stop reason: HOSPADM

## 2024-10-25 RX ORDER — VANCOMYCIN HYDROCHLORIDE 1.25 G/250ML
1250 INJECTION, SOLUTION INTRAVITREAL ONCE
Status: COMPLETED | OUTPATIENT
Start: 2024-10-25 | End: 2024-10-26

## 2024-10-25 SDOH — ECONOMIC STABILITY: FOOD INSECURITY: WITHIN THE PAST 12 MONTHS, THE FOOD YOU BOUGHT JUST DIDN'T LAST AND YOU DIDN'T HAVE MONEY TO GET MORE.: NEVER TRUE

## 2024-10-25 SDOH — SOCIAL STABILITY: SOCIAL INSECURITY: WITHIN THE LAST YEAR, HAVE YOU BEEN HUMILIATED OR EMOTIONALLY ABUSED IN OTHER WAYS BY YOUR PARTNER OR EX-PARTNER?: NO

## 2024-10-25 SDOH — SOCIAL STABILITY: SOCIAL INSECURITY
WITHIN THE LAST YEAR, HAVE YOU BEEN KICKED, HIT, SLAPPED, OR OTHERWISE PHYSICALLY HURT BY YOUR PARTNER OR EX-PARTNER?: NO

## 2024-10-25 SDOH — SOCIAL STABILITY: SOCIAL INSECURITY: HAVE YOU HAD ANY THOUGHTS OF HARMING ANYONE ELSE?: NO

## 2024-10-25 SDOH — SOCIAL STABILITY: SOCIAL INSECURITY: ARE YOU MARRIED, WIDOWED, DIVORCED, SEPARATED, NEVER MARRIED, OR LIVING WITH A PARTNER?: MARRIED

## 2024-10-25 SDOH — SOCIAL STABILITY: SOCIAL INSECURITY: ARE YOU OR HAVE YOU BEEN THREATENED OR ABUSED PHYSICALLY, EMOTIONALLY, OR SEXUALLY BY ANYONE?: NO

## 2024-10-25 SDOH — SOCIAL STABILITY: SOCIAL INSECURITY: DO YOU FEEL UNSAFE GOING BACK TO THE PLACE WHERE YOU ARE LIVING?: NO

## 2024-10-25 SDOH — ECONOMIC STABILITY: FOOD INSECURITY: WITHIN THE PAST 12 MONTHS, YOU WORRIED THAT YOUR FOOD WOULD RUN OUT BEFORE YOU GOT THE MONEY TO BUY MORE.: NEVER TRUE

## 2024-10-25 SDOH — SOCIAL STABILITY: SOCIAL INSECURITY: ABUSE: ADULT

## 2024-10-25 SDOH — SOCIAL STABILITY: SOCIAL INSECURITY: DO YOU FEEL ANYONE HAS EXPLOITED OR TAKEN ADVANTAGE OF YOU FINANCIALLY OR OF YOUR PERSONAL PROPERTY?: NO

## 2024-10-25 SDOH — SOCIAL STABILITY: SOCIAL INSECURITY: WITHIN THE LAST YEAR, HAVE YOU BEEN AFRAID OF YOUR PARTNER OR EX-PARTNER?: NO

## 2024-10-25 SDOH — SOCIAL STABILITY: SOCIAL INSECURITY: HAVE YOU HAD THOUGHTS OF HARMING ANYONE ELSE?: YES

## 2024-10-25 SDOH — SOCIAL STABILITY: SOCIAL INSECURITY
WITHIN THE LAST YEAR, HAVE YOU BEEN RAPED OR FORCED TO HAVE ANY KIND OF SEXUAL ACTIVITY BY YOUR PARTNER OR EX-PARTNER?: NO

## 2024-10-25 SDOH — ECONOMIC STABILITY: HOUSING INSECURITY: IN THE PAST 12 MONTHS, HOW MANY TIMES HAVE YOU MOVED WHERE YOU WERE LIVING?: 0

## 2024-10-25 SDOH — ECONOMIC STABILITY: HOUSING INSECURITY: AT ANY TIME IN THE PAST 12 MONTHS, WERE YOU HOMELESS OR LIVING IN A SHELTER (INCLUDING NOW)?: NO

## 2024-10-25 SDOH — ECONOMIC STABILITY: HOUSING INSECURITY: IN THE LAST 12 MONTHS, WAS THERE A TIME WHEN YOU WERE NOT ABLE TO PAY THE MORTGAGE OR RENT ON TIME?: NO

## 2024-10-25 SDOH — ECONOMIC STABILITY: TRANSPORTATION INSECURITY: IN THE PAST 12 MONTHS, HAS LACK OF TRANSPORTATION KEPT YOU FROM MEDICAL APPOINTMENTS OR FROM GETTING MEDICATIONS?: NO

## 2024-10-25 SDOH — ECONOMIC STABILITY: INCOME INSECURITY: IN THE PAST 12 MONTHS HAS THE ELECTRIC, GAS, OIL, OR WATER COMPANY THREATENED TO SHUT OFF SERVICES IN YOUR HOME?: NO

## 2024-10-25 SDOH — SOCIAL STABILITY: SOCIAL INSECURITY: HAS ANYONE EVER THREATENED TO HURT YOUR FAMILY OR YOUR PETS?: NO

## 2024-10-25 SDOH — SOCIAL STABILITY: SOCIAL INSECURITY: ARE THERE ANY APPARENT SIGNS OF INJURIES/BEHAVIORS THAT COULD BE RELATED TO ABUSE/NEGLECT?: NO

## 2024-10-25 SDOH — ECONOMIC STABILITY: FOOD INSECURITY: HOW HARD IS IT FOR YOU TO PAY FOR THE VERY BASICS LIKE FOOD, HOUSING, MEDICAL CARE, AND HEATING?: NOT HARD AT ALL

## 2024-10-25 SDOH — SOCIAL STABILITY: SOCIAL INSECURITY: DOES ANYONE TRY TO KEEP YOU FROM HAVING/CONTACTING OTHER FRIENDS OR DOING THINGS OUTSIDE YOUR HOME?: NO

## 2024-10-25 ASSESSMENT — ACTIVITIES OF DAILY LIVING (ADL)
GROOMING: INDEPENDENT
ASSISTIVE_DEVICE: WALKER
HEARING - LEFT EAR: FUNCTIONAL
ADEQUATE_TO_COMPLETE_ADL: YES
FEEDING YOURSELF: INDEPENDENT
ADL_ASSISTANCE: INDEPENDENT
LACK_OF_TRANSPORTATION: NO
WALKS IN HOME: NEEDS ASSISTANCE
JUDGMENT_ADEQUATE_SAFELY_COMPLETE_DAILY_ACTIVITIES: YES
PATIENT'S MEMORY ADEQUATE TO SAFELY COMPLETE DAILY ACTIVITIES?: YES
TOILETING: NEEDS ASSISTANCE
HEARING - RIGHT EAR: FUNCTIONAL
BATHING: NEEDS ASSISTANCE
DRESSING YOURSELF: NEEDS ASSISTANCE

## 2024-10-25 ASSESSMENT — COGNITIVE AND FUNCTIONAL STATUS - GENERAL
DAILY ACTIVITIY SCORE: 19
WALKING IN HOSPITAL ROOM: TOTAL
DRESSING REGULAR LOWER BODY CLOTHING: A LOT
MOBILITY SCORE: 13
MOVING TO AND FROM BED TO CHAIR: A LITTLE
CLIMB 3 TO 5 STEPS WITH RAILING: A LOT
WALKING IN HOSPITAL ROOM: A LOT
TURNING FROM BACK TO SIDE WHILE IN FLAT BAD: A LITTLE
CLIMB 3 TO 5 STEPS WITH RAILING: TOTAL
DAILY ACTIVITIY SCORE: 19
MOVING FROM LYING ON BACK TO SITTING ON SIDE OF FLAT BED WITH BEDRAILS: A LITTLE
MOBILITY SCORE: 17
STANDING UP FROM CHAIR USING ARMS: A LITTLE
TOILETING: A LOT
MOVING FROM LYING ON BACK TO SITTING ON SIDE OF FLAT BED WITH BEDRAILS: A LITTLE
PERSONAL GROOMING: A LITTLE
HELP NEEDED FOR BATHING: A LOT
TURNING FROM BACK TO SIDE WHILE IN FLAT BAD: A LITTLE
TOILETING: A LOT
DRESSING REGULAR LOWER BODY CLOTHING: A LITTLE
TOILETING: A LOT
HELP NEEDED FOR BATHING: A LITTLE
DRESSING REGULAR UPPER BODY CLOTHING: A LITTLE
STANDING UP FROM CHAIR USING ARMS: A LITTLE
MOVING TO AND FROM BED TO CHAIR: A LOT
DRESSING REGULAR LOWER BODY CLOTHING: A LITTLE
DRESSING REGULAR UPPER BODY CLOTHING: A LITTLE
STANDING UP FROM CHAIR USING ARMS: A LOT
WALKING IN HOSPITAL ROOM: A LOT
DAILY ACTIVITIY SCORE: 16
PATIENT BASELINE BEDBOUND: NO
MOBILITY SCORE: 14
HELP NEEDED FOR BATHING: A LITTLE
CLIMB 3 TO 5 STEPS WITH RAILING: TOTAL
MOVING TO AND FROM BED TO CHAIR: A LOT
DRESSING REGULAR UPPER BODY CLOTHING: A LITTLE

## 2024-10-25 ASSESSMENT — LIFESTYLE VARIABLES
HOW MANY STANDARD DRINKS CONTAINING ALCOHOL DO YOU HAVE ON A TYPICAL DAY: 3 OR 4
HOW OFTEN DO YOU HAVE A DRINK CONTAINING ALCOHOL: 4 OR MORE TIMES A WEEK
AUDIT-C TOTAL SCORE: 9
HOW OFTEN DO YOU HAVE 6 OR MORE DRINKS ON ONE OCCASION: DAILY OR ALMOST DAILY
AUDIT-C TOTAL SCORE: 9
SKIP TO QUESTIONS 9-10: 0

## 2024-10-25 ASSESSMENT — PATIENT HEALTH QUESTIONNAIRE - PHQ9
SUM OF ALL RESPONSES TO PHQ9 QUESTIONS 1 & 2: 0
1. LITTLE INTEREST OR PLEASURE IN DOING THINGS: NOT AT ALL
2. FEELING DOWN, DEPRESSED OR HOPELESS: NOT AT ALL

## 2024-10-25 ASSESSMENT — PAIN SCALES - GENERAL
PAINLEVEL_OUTOF10: 3
PAINLEVEL_OUTOF10: 1
PAINLEVEL_OUTOF10: 2
PAINLEVEL_OUTOF10: 0 - NO PAIN
PAINLEVEL_OUTOF10: 4
PAINLEVEL_OUTOF10: 0 - NO PAIN
PAINLEVEL_OUTOF10: 2
PAINLEVEL_OUTOF10: 4
PAINLEVEL_OUTOF10: 0 - NO PAIN
PAINLEVEL_OUTOF10: 6

## 2024-10-25 ASSESSMENT — PAIN DESCRIPTION - DESCRIPTORS: DESCRIPTORS: ACHING;DISCOMFORT;TENDER

## 2024-10-25 ASSESSMENT — PAIN - FUNCTIONAL ASSESSMENT
PAIN_FUNCTIONAL_ASSESSMENT: 0-10

## 2024-10-25 ASSESSMENT — PAIN DESCRIPTION - ORIENTATION
ORIENTATION: RIGHT;LEFT;MID
ORIENTATION: RIGHT;MID

## 2024-10-25 ASSESSMENT — PAIN DESCRIPTION - LOCATION: LOCATION: BACK

## 2024-10-25 NOTE — PROGRESS NOTES
Physical Therapy    Physical Therapy Evaluation    Patient Name: Mikhail Moses  MRN: [id number]  Today's Date: 10/25/2024   Time Calculation  Start Time: 0848  Stop Time: 0912  Time Calculation (min): 24 min  826/826-A    Assessment/Plan   PT Assessment  PT Assessment Results: Decreased strength, Decreased endurance, Impaired balance, Decreased mobility, Decreased cognition, Decreased safety awareness, Orthopedic restrictions, Pain, Decreased skin integrity  Rehab Prognosis: Good  Evaluation/Treatment Tolerance: Patient limited by fatigue  Medical Staff Made Aware: Yes  End of Session Communication: Bedside nurse  Assessment Comment: Pt presents /c above impairments and decline from functional baseline 2nd acute to subacute medical conditions and recent lumbar sx. Pt will benefit from continued PT services at mod intensity to address above and maximize functional mobility.  End of Session Patient Position: Up in chair, Alarm on  IP OR SWING BED PT PLAN  Inpatient or Swing Bed: Inpatient  PT Plan  Treatment/Interventions: Bed mobility, Transfer training, Gait training, Balance training, Neuromuscular re-education, Strengthening, Endurance training, Therapeutic exercise, Therapeutic activity  PT Plan: Ongoing PT  PT Frequency: 4 times per week  PT Discharge Recommendations: Moderate intensity level of continued care  PT - OK to Discharge: Yes    Subjective     Current Problem:  1. Sepsis with acute hypoxic respiratory failure without septic shock, due to unspecified organism (Multi)          Patient Active Problem List   Diagnosis    Acute gouty arthropathy    Osteoarthritis of knees, bilateral    Spinal stenosis of lumbar region with radiculopathy    Essential hypertension    HLD (hyperlipidemia)    Coronary artery disease involving native coronary artery of native heart with angina pectoris    Abscess, neck    Acid reflux    Alcohol abuse    Cellulitis of foot, left    Chronic obstructive pulmonary disease (Multi)     Cellulitis of left hand    Familial hypercholesterolemia    Heart attack    Old myocardial infarction    Incisional hernia    Lumbar herniated disc    Obesity    Panacinar emphysema (Multi)    Post-traumatic anterior urethral stricture    Pulmonary arterial hypertension (Multi)    Stage 2 chronic kidney disease    Status post percutaneous transluminal coronary angioplasty    Lumbar radiculopathy    Unilateral primary osteoarthritis, left knee    Arthritis of left knee    Weakness    Acute deep vein thrombosis (DVT) of left femoral vein (Multi)    Right shoulder pain    Right rotator cuff tendonitis    Unilateral primary osteoarthritis, right knee    Sepsis with acute hypoxic respiratory failure without septic shock, due to unspecified organism (Multi)       General Visit Information:  General  Reason for Referral: PT eval and treat  Referred By: Eliu  Past Medical History Relevant to Rehab: COPD, CAD, GERD, HTN, HLD, OA,  Co-Treatment: OT  Co-Treatment Reason: possible two person assist, maximize functional mobility  Prior to Session Communication: Bedside nurse  Patient Position Received: Bed, 3 rail up, Alarm off, not on at start of session  General Comment: Pt presents from SNF /c hypotension and confusion. Pt being managed for sepsis /c acute hypoxic respiratory failure, atelectasis, suspect pneumonia and JUMA. Pt cleared for therapy by nursing. Pt supine, agreeable.    Home Living:  Home Living  Home Living Comments: Pt and spouse in ranch style home /c 3STE /c rail. Most recently at SNF since back sx.    Prior Level of Function:  Prior Function Per Pt/Caregiver Report  Prior Function Comments: Indep at baseline, no AD use, no falls, not driving. Since being at SNF, pt states only being able to take a few steps /c walker and help. Later in session pt states he was walking hallway distances /c WW (?).    Precautions:  Precautions  Precautions Comment: recent lumbar sx, falls, safety       Objective      Pain:  Pain Assessment  Pain Assessment: 0-10  0-10 (Numeric) Pain Score: 4 (pt c/o back/sx pain, nursing aware and applied pain patch during session)    Cognition:  Cognition  Orientation Level:  (A&Ox3, but appears confused /c further history/details. Pt unable to recall where or when his back sx was. Also providing conflicting information re:PLOF at SNF.)    General Assessments:  General Observation  General Observation: activity orders: OOB /c A lines: ulrich, 3L NC O2, piv  skin integrity: WFL; lumbar incision/stitches intact   Activity Tolerance  Endurance: Tolerates less than 10 min exercise, no significant change in vital signs  Sensation  Sensation Comment: denies n/t  Strength  Strength Comments: BLE at least 3+/5           Dynamic Sitting Balance  Dynamic Sitting-Comments: F+  Dynamic Standing Balance  Dynamic Standing-Comments: F    Functional Assessments:     Bed Mobility  Bed Mobility: Yes  Bed Mobility 1  Bed Mobility Comments 1: Supine<>Sit /c minAx1, mod VCs for logroll technique.  Transfers  Transfer: Yes  Transfer 1  Trials/Comments 1: Sit<>Stand /c modAx1, slightly retropulsive. VCs for hand placement/technique.  Ambulation/Gait Training  Ambulation/Gait Training Performed:  (Pt ambulates 4' WW, minAx1. Slow step through gait, rafael knees slightly flexed. Narrow CELIA, mild SOB.)        Outcome Measures:     [facility] Basic Mobility  Turning from your back to your side while in a flat bed without using bedrails: A little  Moving from lying on your back to sitting on the side of a flat bed without using bedrails: A little  Moving to and from bed to chair (including a wheelchair): A lot  Standing up from a chair using your arms (e.g. wheelchair or bedside chair): A lot  To walk in hospital room: A lot  Climbing 3-5 steps with railing: Total  Basic Mobility - Total Score: 13             Goals:  Encounter Problems       Encounter Problems (Active)       PT Problem       STG - Pt will transition supine <>  sitting with mod I, logroll (Progressing)       Start:  10/25/24    Expected End:  11/08/24            STG - Pt will transfer STS with supervision (Progressing)       Start:  10/25/24    Expected End:  11/08/24            STG - Pt will amb 100' using LRAD with supervision (Progressing)       Start:  10/25/24    Expected End:  11/08/24            STG - Pt will maintain F+ dynamic standing balance to decrease risk of falls  (Progressing)       Start:  10/25/24    Expected End:  11/08/24                 Education Documentation  Mobility Training, taught by Karlee Rosenthal, PT at 10/25/2024 11:04 AM.  Learner: Patient  Readiness: Acceptance  Method: Explanation  Response: Verbalizes Understanding, Needs Reinforcement    Education Comments  No comments found.
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
## 2024-10-25 NOTE — PROGRESS NOTES
10/25/24 1015   Discharge Planning   Living Arrangements Spouse/significant other   Support Systems Spouse/significant other   Assistance Needed None   Type of Residence Private residence   Home or Post Acute Services Post acute facilities (Rehab/SNF/etc)   Expected Discharge Disposition SNF   Does the patient need discharge transport arranged? Yes   RoundTrip coordination needed? Yes   Has discharge transport been arranged? No   Patient Choice   Provider Choice list and CMS website (https://medicare.gov/care-compare#search) for post-acute Quality and Resource Measure Data were provided and reviewed with: Patient  (declined SNF, return [facility])   Patient / Family choosing to utilize agency / facility established prior to hospitalization Yes     Care transitions assessment completed via bedside with patient. TCC introduced self and explained role. Demographics verified. Patient from SNF.  Patient anticipates return to SNF, but unsure of name of facility, requested TCC call spouse, Call placed, busy signal, unable to leave VM. Chart reviewed, patient from [facility]. DSC tasked to send return referral to [facility]. Awaiting if able to accept back/confirm patient was at facility. TCC to continue to follow for discharge planning needs.      PCP: Dr. Josué Kay  Last seen: 10/2024  Pharmacy: Per SNF, [facility] [place]   Insurance: Medicare, MMO Medicare  Dispo: RTN [facility], await PT/OT kathleen.     MARLYS PARKER, KATHY TCC

## 2024-10-25 NOTE — PROGRESS NOTES
Occupational Therapy    Evaluation    Patient Name: Mikhail Moses  MRN: [id number]  Today's Date: 10/25/2024  Time In: 0847  Time Out: 0913  826/826-A    Kinza Baeza OTR/L was present throughout this session to supervise this patient's care    Assessment  IP OT Assessment  OT Assessment: This hospitilization on 10/24/2024 due to #Sepsis with acute hypoxic respiratory failure without septic shock, due to unspecified organism, #Atelectasis on CXR with possible pneumonia or aspiration and #JUMA on CKD. Patient would benefit from continued OT services to promote patient independence.  Prognosis: Good  Evaluation/Treatment Tolerance: Patient tolerated treatment well  Medical Staff Made Aware: Yes  End of Session Communication: Bedside nurse  End of Session Patient Position: Up in chair, Alarm on, call-light within reach and all needs met.    Plan:  Treatment Interventions: ADL retraining, Functional transfer training, Endurance training, Patient/family training, Equipment evaluation/education, Compensatory technique education (Energy conservation techniques)  OT Frequency: 3 times per week  OT Discharge Recommendations: Moderate intensity level of continued care  OT - OK to Discharge: Yes to next level of care when medically cleared by physician/medical team     Subjective   Current Problem:  1. Sepsis with acute hypoxic respiratory failure without septic shock, due to unspecified organism (Multi)          General:  General  Reason for Referral: ADL and Safety Assessment  Referred By: Magdiel Recinos MD  Past Medical History Relevant to Rehab: CAD, CKD, COPD, Degeneration of lumbar intervertebral disc      gets nerve blocks, GERD, Gout, HLD, HTN, OA, Old myocardial infarction, Hx of myocardial infarction, PAH, Presence of coronary angioplasty implant and graft H/O heart artery stentx and  Urethral stricture      dilatation every 6-8weeks  Co-Treatment: PT  Co-Treatment Reason: to maximize safety for functional  mobility  Prior to Session Communication: Bedside nurse who confirmed that patient is medically stable to participate in this OT session   Patient Position Received: Bed, 3 rail up, Alarm off, not on at start of session  General Comment: Patient agreeable to OT eval. Patient coopertive and able to complete all required tasks throughout. Prior to arrival, patient recieving rehab at [facility] post lumbar decompression surgery less than 1 month ago per patient.    Precautions:  Medical Precautions: Fall precautions, Spinal precautions (Aspiration Pracautions)  Precautions Comment: UE IV, 3L O2 via nasal cannula, SCDs    Pain:  Pain Assessment  Pain Assessment: 0-10  0-10 (Numeric) Pain Score: 4  Pain Type: Surgical pain  Pain Location: Back  Pain Orientation: Mid, Right  Pain Descriptors: Aching, Discomfort, Tender  Pain Frequency: Constant/continuous  Pain Onset: Ongoing  Pain Interventions: Repositioned    Objective   Cognition:  Overall Cognitive Status: Within Functional Limits  Orientation Level: Oriented X4  Attention:  (cueing required to maintain focus due to period of inconsistant hostory recall; patient potenital poor historian)     Home Living:  Type of Home: House ([place])  Lives With: Spouse (Wife)  Home Adaptive Equipment: Walker rolling or standard, Cane, Crutches, Sock aid, Reacher  Home Layout: One level, Able to live on main level with bedroom/bathroom, Laundry in basement  Home Access:  (4 KARO with rails)  Bathroom Shower/Tub: Tub/shower unit  Bathroom Toilet:  (Elevated toilet)  Home Living Comments: Sleeps in regular bed. Patient most recently at [facility] for rehab for ~1 month following lumbar decompression surgery, patient reports they required assist of 1-2 to completed ADL, functional transfers and mobility.      Prior Function:  Receives Help From: Family, Neighbor  ADL Assistance: Independent  Homemaking Assistance:  (Wife completes cooking, cleaning, laundry)  Ambulatory Assistance:  Independent  Hand Dominance: Right  Prior Function Comments: Wife drives and patient reports no falls in the last 6 months    ADL:  LE Dressing Assistance: Moderate (and Minimal cueing)  LE Dressing Deficit:  (Patient able to utilize figure 4 technique to don L sock seated edge of bed; assist required to don R sock and adjust L sock; cueing required to maintain spinal precautions)  ADL Comments: anticipated assist for ADL completion/functional transfers    Activity Tolerance:  Activity Tolerance Comments: decreased endurance and activity tolerance during functional task completion    Bed Mobility/Transfers:   Bed Mobility  Bed Mobility: Yes  Bed Mobility 1  Bed Mobility 1: Log roll, Rolling right  Level of Assistance 1: Minimum assistance, Moderate verbal cues  Bed Mobility Comments 1: HOB elevated; Minimal assistance for UE and Moderate cueing during log roll technique  Transfers  Transfer: Yes  Transfer 1  Transfer From 1: Bed to  Technique 1: Sit to stand  Transfer Device 1: Walker  Transfer Level of Assistance 1: Moderate assistance  Transfers 2  Transfer to 2: Chair with arms (recliner)  Technique 2: Stand to sit  Transfer Device 2: Walker  Transfer Level of Assistance 2: Moderate assistance    Ambulation/Gait Training:  Functional Mobility  Functional Mobility Performed: Yes  Functional Mobility 1  Device 1: Rolling walker  Assistance 1: Minimum assistance  Comments 1: ambulated short distance from bed > recliner chair    Sitting Balance:  Static Sitting Balance  Static Sitting-Level of Assistance:  (stand by assist)    Sensation:  Light Touch: No apparent deficits    Hand Function:  Hand Function  Gross Grasp: Functional (BUE hand strength 4+/5)  Coordination: Functional    Extremities: RUE   RUE : Within Functional Limits (Shoulder AROM WFL and strength no tested to avoid aggrivating pain due to patient reports of shoulder pain/discomfort with rotator cuff. Elbow: AROM WFL and strength 4+/5) and LUE   LUE:  Within Functional Limits (Shoulder AROM WFL and strength 4/5. Elbow: AROM WFL and strength 4+/5)    Outcome Measures: [facility] Daily Activity  Putting on and taking off regular lower body clothing: A lot  Bathing (including washing, rinsing, drying): A lot  Putting on and taking off regular upper body clothing: A little  Toileting, which includes using toilet, bedpan or urinal: A lot  Taking care of personal grooming such as brushing teeth: A little  Eating Meals: None  Daily Activity - Total Score: 16    EDUCATION:  Education Documentation  Body Mechanics, taught by Hannah-Grace Knobloch, S-OT at 10/25/2024 11:30 AM.  Learner: Patient  Readiness: Acceptance  Method: Explanation, Demonstration  Response: Verbalizes Understanding, Needs Reinforcement, Demonstrated Understanding    Precautions, taught by Hannah-Grace Knobloch, S-OT at 10/25/2024 11:30 AM.  Learner: Patient  Readiness: Acceptance  Method: Explanation, Demonstration  Response: Verbalizes Understanding, Needs Reinforcement, Demonstrated Understanding    ADL Training, taught by Hannah-Grace Knobloch, S-OT at 10/25/2024 11:30 AM.  Learner: Patient  Readiness: Acceptance  Method: Explanation, Demonstration  Response: Verbalizes Understanding, Needs Reinforcement, Demonstrated Understanding    Mobility Training, taught by Hannah-Grace Knobloch, S-OT at 10/25/2024 11:30 AM.  Learner: Patient  Readiness: Acceptance  Method: Explanation, Demonstration  Response: Verbalizes Understanding, Needs Reinforcement, Demonstrated Understanding    Goals:   Encounter Problems       Encounter Problems (Active)       OT Goals       Patient will complete sit to stand transfers from bed, chair and toilet with SBA utilizing DME as needed.   (Progressing)       Start:  10/25/24    Expected End:  11/08/24            Patient will complete lower body dressing/toileting with supervision and using  adaptive equipment as needed  (Progressing)       Start:  10/25/24    Expected End:   11/08/24            Patient will complete 6 minutes of standing with supervision during functional task/ADL completion.  (Progressing)       Start:  10/25/24    Expected End:  11/08/24            Patient will adhere to spinal precautions during functional task completion.   (Progressing)       Start:  10/25/24    Expected End:  11/08/24
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
## 2024-10-25 NOTE — CARE PLAN
The patient's goals for the shift include work with therapy.    The clinical goals for the shift include maintain HDS.      Problem: Pain  Goal: Takes deep breaths with improved pain control throughout the shift  Outcome: Progressing     Problem: Pain  Goal: Turns in bed with improved pain control throughout the shift  Outcome: Progressing     Problem: Pain - Adult  Goal: Verbalizes/displays adequate comfort level or baseline comfort level  Outcome: Progressing     Problem: Safety - Adult  Goal: Free from fall injury  Outcome: Progressing     Problem: Chronic Conditions and Co-morbidities  Goal: Patient's chronic conditions and co-morbidity symptoms are monitored and maintained or improved  Outcome: Progressing     Problem: Skin  Goal: Participates in plan/prevention/treatment measures  Outcome: Progressing

## 2024-10-25 NOTE — PROGRESS NOTES
Speech-Language Pathology    SL[facility] Adult Inpatient Speech-Language Pathology Clinical Swallow Evaluation    Patient Name: Mikhail Moses  MRN: [id number]  Today's Date: 10/25/2024     Current Problem:   1. Sepsis with acute hypoxic respiratory failure without septic shock, due to unspecified organism (Multi)          Recommendations:  Solid Diet Recommendations : Regular (IDDSI Level 7)  Liquid Diet Recommendations: Thin (IDDSI Level 0)  Medications whole with water    Assessment: Intact oropharyngeal swallow  Pt seen at the bedside to determine the integrity of the oropharyngeal swallow. Pt reported no prior ST history. Pt reported no prior or current problems with swallowing or complaints of bolus sensations of PO sticking the throat region. Pt reports no difficulty chewing and ate a cheeseburger at lunch today without issue.     Pt presents with intact oral phase of the swallow, and suspected functional pharyngeal swallow given clinical bedside indicators. Pt is able to self feed. Pt is managing secretions. Oral motor ROM is WFL; tongue protrudes to midline.  PO trials: ice chips, thin liquids by cup, consecutive sips thins, thin liquids by straw (ginger ale), 3oz water protocol, puree solids (applesauce), soft and bite sized solids (cookie in applesauce), regular solids (halloween candy, cookie)    ORAL PHASE: Labial seal is intact on straw and cup sips, there is no labial loss of the oral bolus. Mastication is effective with adequate dentition (some missing teeth). Oral bolus formation and manipulation WFL across solid PO. A/P transport WFL across all bolus trials. No significant oral residue after the swallow. No oral pocketing.   PHARYNGEAL PHASE: No overt s/s aspiration, no change in vocal quality or respirations. Pt on 3L NC. Pt has a strong volitional cough and can swallow on command. Laryngeal rise noted on palpation. No coughing or throat clearing noted throughout the evaluation session. Vocal quality is  strong and speech is intelligible for the duration of the session.    Will suggest pt maintain an oral diet of regular solids and thin liquids with no ST needs identified.    ST to sign off, please make NPO if any changes in medical status or mentation that may impact safe chewing or swallowing, and consult ST for further testing.         Plan:  Treatment/Interventions: Bolus trials, Assess diet tolerance, Diet recommendations  SLP Plan: No Skilled SLP  Discussed POC: Patient, Nursing  Discussed Risks/Benefits: Yes, Caregiver/Family, Nursing  Patient/Caregiver Agreeable: Yes      Subjective   Pt repositioned at the bedside to participate in the bedside swallow evaluation. Pt is pleasant, conversational, alert and oriented x4. Pt followed all presented instructions. Vocal quality is strong and speech is intelligible.      General Visit Information:  Patient Class: Inpatient  Living Environment: Home  [employer] Diet:  (Regular solids; thin liquids)  Current Diet :  (regular solids; thins)  Dysphagia Diagnosis: Within functional limits    Objective   Per H&P Written 10/25/24  Mikhail Moses is a 79 y.o. male presents to the emergency department secondary to hypotension.  Patient is currently in [place] which he has been at rehab post lumbar decompression surgery.  He has had an indwelling Paul catheter since.  Patient AOx3 but cannot specify why he is in the ED.    Per ED note, he was getting bathed when he was found to be hypotensive with mild confusion.  Patient's SBP was 70 per EMS at which point 1 L of fluid was started.  Patient also received 500 mL while in ED.  He is not on supplemental oxygen at baseline, states he has been in his normal state of health otherwise.  Currently endorses fatigue, denies chest discomfort, shortness of breath, abdominal pain, discomfort with urination, hesitancy/urgency.  He states urine has been draining normally from what he has gathered.    XR Chest  10/24/24:  IMPRESSION:  1. Bibasilar opacities likely represent atelectasis but superimposed  pneumonia or aspiration is not excluded.  2. Perihilar consolidation, more prominent on the right likely  represents vascular congestion.      WBC 12.7  Afebrile with temp 96.8%  95% SPO2 on room air with stable respiratory skills noted with no SOB exhibited across volume based PO and conversational exchange.    Oral/Motor Assessment:  Dentition: Adequate/Natural, Some Missing Teeth  Oral Motor: Within Functional Limits  Labial ROM: Within Functional Limits  Lingual ROM: Within Functional Limits  Breath Support: Adequate for speech (3L Nasal Cannula)    Clinical Observations:  Patient Positioning: Upright in Bed  Management of Oral Secretions: Adequate  Was The 3 oz Swallow Protocol Completed: Yes    Inpatient:  Education Documentation  SLP provides patient and KATHY Patel with the results and recommendation of the session.       [facility]-SL[facility]   SLP provided this student with direct supervision throughout the entire session.

## 2024-10-25 NOTE — PROGRESS NOTES
Vancomycin Dosing by Pharmacy- INITIAL  Vancomycin Dosing by Pharmacy- EMERGENCY DEPARTMENT    Mikhail Moses is a 79 y.o. year old male who Pharmacy has been consulted to give a ONE TIME ONLY vancomycin dose in the Emergency Department for pneumonia.     Visit Vitals  /52   Pulse 79   Temp 37.3 °C (99.1 °F) (Temporal)   Resp (!) 22      Lab Results   Component Value Date    CREATININE 2.27 (H) 10/24/2024    CREATININE 1.52 (H) 01/31/2024    CREATININE 1.06 01/13/2024    CREATININE 1.25 01/12/2024      Wt Readings from Last 1 Encounters:   10/24/24 90.7 kg (200 lb)        Assessment/Plan     Patient will be given a one time loading dose of 1750 mg  Pharmacy will sign off at this time. If vancomycin is to be continued, please re-consult Pharmacy.       Cornelio Porter, PharmD

## 2024-10-25 NOTE — CARE PLAN
The patient's goals for the shift include  Rest    The clinical goals for the shift include Monitor BP's    Over the shift, the patient did not make progress toward the following goals. Barriers to progression include Low Bp's . Recommendations to address these barriers include Fluids and meds  Problem: Pain  Goal: Takes deep breaths with improved pain control throughout the shift  Outcome: Progressing  Goal: Turns in bed with improved pain control throughout the shift  Outcome: Progressing  Goal: Walks with improved pain control throughout the shift  Outcome: Progressing  Goal: Performs ADL's with improved pain control throughout shift  Outcome: Progressing   .

## 2024-10-25 NOTE — CONSULTS
Vancomycin Dosing by Pharmacy- INITIAL    Mikhail Moses is a 79 y.o. year old male who Pharmacy has been consulted for vancomycin dosing for pneumonia. Based on the patient's indication and renal status this patient will be dosed based on a goal trough/random level of 15-20.     Renal function is currently declining.    Visit Vitals  BP (!) 91/48   Pulse 70   Temp 37.3 °C (99.1 °F) (Temporal)   Resp (!) 22        Lab Results   Component Value Date    CREATININE 2.27 (H) 10/24/2024    CREATININE 1.52 (H) 2024    CREATININE 1.06 2024    CREATININE 1.25 2024        Patient weight is as follows:   Vitals:    10/24/24 1940   Weight: 90.7 kg (200 lb)       Cultures:  No results found for the encounter in last 14 days.        No intake/output data recorded.  I/O during current shift:  I/O this shift:  In: 1050 [IV Piggyback:1050]  Out: -     Temp (24hrs), Av.3 °C (99.1 °F), Min:37.3 °C (99.1 °F), Max:37.3 °C (99.1 °F)         Assessment/Plan     Patient has already been given a loading dose of 1750 mg.  Follow-up level will be ordered on 10/25 at 2100 unless clinically indicated sooner.  Will continue to monitor renal function daily while on vancomycin and order serum creatinine at least every 48 hours if not already ordered.  Follow for continued vancomycin needs, clinical response, and signs/symptoms of toxicity.       Shane Singh [facility]

## 2024-10-26 ENCOUNTER — APPOINTMENT (OUTPATIENT)
Dept: RADIOLOGY | Facility: HOSPITAL | Age: 79
End: 2024-10-26
Payer: MEDICARE

## 2024-10-26 VITALS
HEIGHT: 68 IN | HEART RATE: 76 BPM | SYSTOLIC BLOOD PRESSURE: 132 MMHG | BODY MASS INDEX: 30.07 KG/M2 | RESPIRATION RATE: 18 BRPM | TEMPERATURE: 97.5 F | WEIGHT: 198.41 LBS | DIASTOLIC BLOOD PRESSURE: 60 MMHG | OXYGEN SATURATION: 94 %

## 2024-10-26 LAB
ALBUMIN SERPL BCP-MCNC: 2.8 G/DL (ref 3.4–5)
ALP SERPL-CCNC: 82 U/L (ref 33–136)
ALT SERPL W P-5'-P-CCNC: 9 U/L (ref 10–52)
ANION GAP SERPL CALC-SCNC: 11 MMOL/L (ref 10–20)
AST SERPL W P-5'-P-CCNC: 15 U/L (ref 9–39)
BACTERIA UR CULT: NO GROWTH
BASOPHILS # BLD AUTO: 0.06 X10*3/UL (ref 0–0.1)
BASOPHILS NFR BLD AUTO: 0.7 %
BILIRUB SERPL-MCNC: 0.6 MG/DL (ref 0–1.2)
BUN SERPL-MCNC: 30 MG/DL (ref 6–23)
CALCIUM SERPL-MCNC: 7.8 MG/DL (ref 8.6–10.3)
CHLORIDE SERPL-SCNC: 108 MMOL/L (ref 98–107)
CO2 SERPL-SCNC: 26 MMOL/L (ref 21–32)
CREAT SERPL-MCNC: 1.77 MG/DL (ref 0.5–1.3)
EGFRCR SERPLBLD CKD-EPI 2021: 39 ML/MIN/1.73M*2
EOSINOPHIL # BLD AUTO: 0.26 X10*3/UL (ref 0–0.4)
EOSINOPHIL NFR BLD AUTO: 2.9 %
ERYTHROCYTE [DISTWIDTH] IN BLOOD BY AUTOMATED COUNT: 13.3 % (ref 11.5–14.5)
GLUCOSE SERPL-MCNC: 102 MG/DL (ref 74–99)
HCT VFR BLD AUTO: 32 % (ref 41–52)
HGB BLD-MCNC: 10.4 G/DL (ref 13.5–17.5)
IMM GRANULOCYTES # BLD AUTO: 0.05 X10*3/UL (ref 0–0.5)
IMM GRANULOCYTES NFR BLD AUTO: 0.6 % (ref 0–0.9)
LYMPHOCYTES # BLD AUTO: 1.43 X10*3/UL (ref 0.8–3)
LYMPHOCYTES NFR BLD AUTO: 15.7 %
MCH RBC QN AUTO: 33.3 PG (ref 26–34)
MCHC RBC AUTO-ENTMCNC: 32.5 G/DL (ref 32–36)
MCV RBC AUTO: 103 FL (ref 80–100)
MONOCYTES # BLD AUTO: 0.7 X10*3/UL (ref 0.05–0.8)
MONOCYTES NFR BLD AUTO: 7.7 %
NEUTROPHILS # BLD AUTO: 6.58 X10*3/UL (ref 1.6–5.5)
NEUTROPHILS NFR BLD AUTO: 72.4 %
NRBC BLD-RTO: 0 /100 WBCS (ref 0–0)
PLATELET # BLD AUTO: 196 X10*3/UL (ref 150–450)
POTASSIUM SERPL-SCNC: 4.2 MMOL/L (ref 3.5–5.3)
PROT SERPL-MCNC: 4.9 G/DL (ref 6.4–8.2)
RBC # BLD AUTO: 3.12 X10*6/UL (ref 4.5–5.9)
SODIUM SERPL-SCNC: 141 MMOL/L (ref 136–145)
WBC # BLD AUTO: 9.1 X10*3/UL (ref 4.4–11.3)

## 2024-10-26 PROCEDURE — 2500000001 HC RX 250 WO HCPCS SELF ADMINISTERED DRUGS (ALT 637 FOR MEDICARE OP): Performed by: NURSE PRACTITIONER

## 2024-10-26 PROCEDURE — 36415 COLL VENOUS BLD VENIPUNCTURE: CPT | Performed by: INTERNAL MEDICINE

## 2024-10-26 PROCEDURE — 51702 INSERT TEMP BLADDER CATH: CPT

## 2024-10-26 PROCEDURE — 80053 COMPREHEN METABOLIC PANEL: CPT | Performed by: INTERNAL MEDICINE

## 2024-10-26 PROCEDURE — 2500000004 HC RX 250 GENERAL PHARMACY W/ HCPCS (ALT 636 FOR OP/ED)

## 2024-10-26 PROCEDURE — 71046 X-RAY EXAM CHEST 2 VIEWS: CPT | Performed by: RADIOLOGY

## 2024-10-26 PROCEDURE — 2500000001 HC RX 250 WO HCPCS SELF ADMINISTERED DRUGS (ALT 637 FOR MEDICARE OP)

## 2024-10-26 PROCEDURE — 71046 X-RAY EXAM CHEST 2 VIEWS: CPT

## 2024-10-26 PROCEDURE — 85025 COMPLETE CBC W/AUTO DIFF WBC: CPT | Performed by: INTERNAL MEDICINE

## 2024-10-26 RX ORDER — ALPRAZOLAM 0.25 MG/1
0.5 TABLET ORAL ONCE
Status: COMPLETED | OUTPATIENT
Start: 2024-10-26 | End: 2024-10-26

## 2024-10-26 RX ORDER — CALCIUM CARBONATE 200(500)MG
1000 TABLET,CHEWABLE ORAL 4 TIMES DAILY PRN
Status: DISCONTINUED | OUTPATIENT
Start: 2024-10-26 | End: 2024-10-26 | Stop reason: HOSPADM

## 2024-10-26 ASSESSMENT — PAIN - FUNCTIONAL ASSESSMENT: PAIN_FUNCTIONAL_ASSESSMENT: 0-10

## 2024-10-26 ASSESSMENT — COGNITIVE AND FUNCTIONAL STATUS - GENERAL
DRESSING REGULAR UPPER BODY CLOTHING: A LITTLE
DAILY ACTIVITIY SCORE: 18
EATING MEALS: A LITTLE
TURNING FROM BACK TO SIDE WHILE IN FLAT BAD: A LOT
DRESSING REGULAR LOWER BODY CLOTHING: A LITTLE
MOBILITY SCORE: 12
TOILETING: A LITTLE
PERSONAL GROOMING: A LITTLE
CLIMB 3 TO 5 STEPS WITH RAILING: A LOT
HELP NEEDED FOR BATHING: A LITTLE
STANDING UP FROM CHAIR USING ARMS: A LOT
WALKING IN HOSPITAL ROOM: A LOT
MOVING FROM LYING ON BACK TO SITTING ON SIDE OF FLAT BED WITH BEDRAILS: A LOT
MOVING TO AND FROM BED TO CHAIR: A LOT

## 2024-10-26 ASSESSMENT — PAIN DESCRIPTION - DESCRIPTORS: DESCRIPTORS: DISCOMFORT

## 2024-10-26 ASSESSMENT — PAIN SCALES - GENERAL
PAINLEVEL_OUTOF10: 6
PAINLEVEL_OUTOF10: 4

## 2024-10-26 NOTE — CARE PLAN
The patient's goals for the shift include Rest    The clinical goals for the shift include Maintain HDS and sit in chair as tolerated      Problem: Pain  Goal: Takes deep breaths with improved pain control throughout the shift  Outcome: Progressing  Goal: Turns in bed with improved pain control throughout the shift  Outcome: Progressing  Goal: Walks with improved pain control throughout the shift  Outcome: Progressing  Goal: Performs ADL's with improved pain control throughout shift  Outcome: Progressing  Goal: Participates in PT with improved pain control throughout the shift  Outcome: Progressing  Goal: Free from opioid side effects throughout the shift  Outcome: Progressing  Goal: Free from acute confusion related to pain meds throughout the shift  Outcome: Progressing
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
## 2024-10-26 NOTE — CARE PLAN
The patient's goals for the shift include control pain.     The clinical goals for the shift include sit in chair as tolerated.      Problem: Pain  Goal: Takes deep breaths with improved pain control throughout the shift  Outcome: Progressing     Problem: Pain  Goal: Turns in bed with improved pain control throughout the shift  Outcome: Progressing     Problem: Pain  Goal: Walks with improved pain control throughout the shift  Outcome: Progressing     Problem: Pain  Goal: Performs ADL's with improved pain control throughout shift  Outcome: Progressing     Problem: Pain  Goal: Participates in PT with improved pain control throughout the shift  Outcome: Progressing     Problem: Pain  Goal: Free from opioid side effects throughout the shift  Outcome: Progressing     Problem: Pain  Goal: Free from acute confusion related to pain meds throughout the shift  Outcome: Progressing     Problem: Pain - Adult  Goal: Verbalizes/displays adequate comfort level or baseline comfort level  Outcome: Progressing

## 2024-10-26 NOTE — PROGRESS NOTES
Discharge in for today. Plan for return to [facility]. DSC tasked to update facility and see if able to take today. Awaiting response. TCC to continue to follow for discharge planning.     UPDATE 1411: patient able to return to [facility] today. DSC tasked to arrange transport. Awaiting time.     UPDATE 1512: Transport for 6PM to go to [facility]. Report # [phone]. RN updated. Patient updated, states will notify family.     MARLYS PARKER RN TCC

## 2024-10-26 NOTE — PROGRESS NOTES
Vancomycin Dosing by Pharmacy- FOLLOW UP    Mikhail Moses is a 79 y.o. year old male who Pharmacy has been consulted for vancomycin dosing for pneumonia. Based on the patient's indication and renal status this patient is being dosed based on a goal trough/random level of 15-20.     Renal function is currently declining.    Current vancomycin dose:   LD 1750 mg x 1    Most recent trough level: 12.5 mcg/mL    Visit Vitals  /57 (BP Location: Right arm, Patient Position: Lying)   Pulse 67   Temp 35.6 °C (96.1 °F) (Temporal)   Resp 19        Lab Results   Component Value Date    CREATININE 2.37 (H) 10/25/2024    CREATININE 2.27 (H) 10/24/2024    CREATININE 1.52 (H) 2024    CREATININE 1.06 2024        Patient weight is as follows:   Vitals:    10/25/24 0057   Weight: 90 kg (198 lb 6.6 oz)       Cultures:  No results found for the encounter in last 14 days.       I/O last 3 completed shifts:  In: 3563 (39.6 mL/kg) [P.O.:313; IV Piggyback:3250]  Out: 2545 (28.3 mL/kg) [Urine:2545 (0.8 mL/kg/hr)]  Weight: 90 kg   I/O during current shift:  I/O this shift:  In: 50 [IV Piggyback:50]  Out: -     Temp (24hrs), Av.9 °C (96.7 °F), Min:35.6 °C (96.1 °F), Max:36.1 °C (97 °F)      Assessment/Plan    Give Vanco 1250 mg x 1 dose  The next level will be obtained on 10/26 at 2100. May be obtained sooner if clinically indicated.   Will continue to monitor renal function daily while on vancomycin and order serum creatinine at least every 48 hours if not already ordered.  Follow for continued vancomycin needs, clinical response, and signs/symptoms of toxicity.       Shane Singh [facility]
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
## 2024-10-27 LAB
BACTERIA BLD CULT: NORMAL
BACTERIA BLD CULT: NORMAL

## 2024-10-29 LAB
BACTERIA BLD CULT: NORMAL
BACTERIA BLD CULT: NORMAL

## 2024-10-30 NOTE — DOCUMENTATION CLARIFICATION NOTE
"    PATIENT:               DORIAN FELDMAN  ACCT #:                  [id number]  MRN:                       [id number]  :                       1945  ADMIT DATE:       10/24/2024 7:33 PM  DISCH DATE:        10/26/2024 9:45 PM  RESPONDING PROVIDER #:        [id number]          PROVIDER RESPONSE TEXT:    Hypocalcemia is ruled out    CDI QUERY TEXT:    Clarification        Instruction:    Based on your assessment of the patient and the clinical information, please provide the requested documentation by clicking on the appropriate radio button and enter any additional information if prompted.    Question: Based on the information, please further clarify the diagnosis of hypocalcemia as    When answering this query, please exercise your independent professional judgment. The fact that a question is being asked, does not imply that any particular answer is desired or expected.    The patient's clinical indicators include:  Clinical Information: 79 yr old male admitted 10/24 with sepsis with acute respiratory failure, PNA, and JUMA on CKD.    Documented Diagnosis: H/P 10/24 per Dr. Toney states: \"hypocalcemia\"  \"hypocalcemia of 8.2 with ionized calcium within normal range, hypoalbuminemia of 3.2\"    Clinical Indicators:  Calcium 10/24: 8.2  Albumin 10/24: 3.2  Ionized Calcium, Venous 10/24: 1.15    Calcium 10/26: 7.8  Albumin 10/35: 2.8    Treatment: Daily CMP.    Risk Factors: Sepsis, acute respiratory failure, JUMA on CKD.  Options provided:  -- Hypocalcemia ruled in as evidenced by, Please specify additional information below  -- Hypocalcemia is ruled out  -- Other - I will add my own diagnosis  -- Refer to Clinical Documentation Reviewer    Query created by: Lizzie Lopez on 10/30/2024 11:07 AM      Electronically signed by:  TRACY TONEY MD 10/30/2024 4:54 PM          "

## 2024-10-30 NOTE — DOCUMENTATION CLARIFICATION NOTE
"    PATIENT:               DORIAN FELDMAN  ACCT #:                  [id number]  MRN:                       [id number]  :                       1945  ADMIT DATE:       10/24/2024 7:33 PM  DISCH DATE:        10/26/2024 9:45 PM  RESPONDING PROVIDER #:        [id number]          PROVIDER RESPONSE TEXT:    Aspiration PNA    CDI QUERY TEXT:    Clarification        Instruction:    Based on your assessment of the patient and the clinical information, please provide the requested documentation by clicking on the appropriate radio button and enter any additional information if prompted.    Question: Please further specify the type of pneumonia being treated    When answering this query, please exercise your independent professional judgment. The fact that a question is being asked, does not imply that any particular answer is desired or expected.    The patient's clinical indicators include:  Clinical Information: 79 yr old male admitted from rehab facility with sepsis, pneumonia, acute respiratory failure, and JUMA on CKD.    Clinical Indicators:  Progress Note 10/25 per Dr. Toney states: \"superimposed pneumonia or aspiration cannot be excluded. \"    CXR 10/24: Superimposed pneumonia or aspiration is not excluded.  CXR 10/25: Possible small effusions.    Treatment: IV Zosyn and IV Vancomycin 10/24-10/26. \"Discharged on an additional 7 days of Zosyn.\" (per discharge summary  10/26 per Dr. Toney)    Risk Factors: Presented from nursing facility, recent hospitalization for back procedure, sepsis, acute respiratory failure, advanced age.  Options provided:  -- Aspiration PNA  -- Gram Negative PNA  -- MRSA PNA  -- Other - I will add my own diagnosis  -- Refer to Clinical Documentation Reviewer    Query created by: Lizzie Lopez on 10/30/2024 11:26 AM      Electronically signed by:  TRACY TONEY MD 10/30/2024 4:54 PM          "

## 2024-10-31 ENCOUNTER — APPOINTMENT (OUTPATIENT)
Dept: ORTHOPEDIC SURGERY | Facility: CLINIC | Age: 79
End: 2024-10-31
Payer: MEDICARE

## 2024-11-06 LAB
ATRIAL RATE: 79 BPM
P AXIS: 19 DEGREES
P OFFSET: 171 MS
P ONSET: 124 MS
PR INTERVAL: 184 MS
Q ONSET: 216 MS
QRS COUNT: 13 BEATS
QRS DURATION: 124 MS
QT INTERVAL: 418 MS
QTC CALCULATION(BAZETT): 479 MS
QTC FREDERICIA: 458 MS
R AXIS: -67 DEGREES
T AXIS: 45 DEGREES
T OFFSET: 425 MS
VENTRICULAR RATE: 79 BPM

## 2024-11-14 ENCOUNTER — HOME HEALTH ADMISSION (OUTPATIENT)
Dept: HOME HEALTH SERVICES | Facility: HOME HEALTH | Age: 79
End: 2024-11-14
Payer: MEDICARE

## 2024-11-14 ENCOUNTER — DOCUMENTATION (OUTPATIENT)
Dept: HOME HEALTH SERVICES | Facility: HOME HEALTH | Age: 79
End: 2024-11-14
Payer: MEDICARE

## 2024-11-14 NOTE — HH CARE COORDINATION
Home Care received a Referral for Nursing, Physical Therapy, and Occupational Therapy. We have processed the referral for a Start of Care on 24-48 HOURS POST DC .     If you have any questions or concerns, please feel free to contact us at [phone]. Follow the prompts, enter your five digit zip code, and you will be directed to your care team on WEST 3.

## 2024-11-18 ENCOUNTER — PATIENT OUTREACH (OUTPATIENT)
Dept: PRIMARY CARE | Facility: CLINIC | Age: 79
End: 2024-11-18
Payer: MEDICARE

## 2024-11-18 ENCOUNTER — HOME CARE VISIT (OUTPATIENT)
Dept: HOME HEALTH SERVICES | Facility: HOME HEALTH | Age: 79
End: 2024-11-18
Payer: MEDICARE

## 2024-11-18 VITALS — HEART RATE: 100 BPM | SYSTOLIC BLOOD PRESSURE: 110 MMHG | OXYGEN SATURATION: 97 % | DIASTOLIC BLOOD PRESSURE: 58 MMHG

## 2024-11-18 VITALS
HEART RATE: 60 BPM | OXYGEN SATURATION: 95 % | TEMPERATURE: 97.7 F | RESPIRATION RATE: 12 BRPM | DIASTOLIC BLOOD PRESSURE: 60 MMHG | SYSTOLIC BLOOD PRESSURE: 120 MMHG

## 2024-11-18 PROCEDURE — G0151 HHCP-SERV OF PT,EA 15 MIN: HCPCS | Mod: HHH

## 2024-11-18 PROCEDURE — G0299 HHS/HOSPICE OF RN EA 15 MIN: HCPCS | Mod: HHH

## 2024-11-18 PROCEDURE — 169592 NO-PAY CLAIM PROCEDURE

## 2024-11-18 ASSESSMENT — ENCOUNTER SYMPTOMS
PAIN LOCATION - PAIN SEVERITY: 4/10
PAIN LOCATION: BACK
DENIES PAIN: 1
HIGHEST PAIN SEVERITY IN PAST 24 HOURS: 6/10
PAIN: 1
PERSON REPORTING PAIN: PATIENT
LOWEST PAIN SEVERITY IN PAST 24 HOURS: 1/10
OCCASIONAL FEELINGS OF UNSTEADINESS: 0
APPETITE LEVEL: GOOD

## 2024-11-18 ASSESSMENT — ACTIVITIES OF DAILY LIVING (ADL)
AMBULATION ASSISTANCE ON FLAT SURFACES: 1
OASIS_M1830: 02
ENTERING_EXITING_HOME: NEEDS ASSISTANCE

## 2024-11-18 NOTE — PROGRESS NOTES
Discharge facility: [facility]   Discharge diagnosis: Sepsis with acute hypoxic respiratory failure without septic shock, due to unspecified organism   MILD PROTEIN-CALORIE MALNUTRITION    CHRONIC OBSTRUCTIVE PULMONARY DISEASE,      Admission date: 10/17/24  Discharge date:  11/15/24    PCP Appointment Date: 11/26/24  Specialist Appointment Date: 2/13/25 ortho  Hospital Encounter and Summary:   not available at this time    See Discharge assessment below for further details    Medications  Medications reviewed with patient/caregiver?: Yes (11/18/2024  2:32 PM)  Is the patient having any side effects they believe may be caused by any medication additions or changes?: No (11/18/2024  2:32 PM)  Does the patient have all medications ordered at discharge?: Yes (11/18/2024  2:32 PM)  Care Management Interventions: Provided patient education (11/18/2024  2:32 PM)  Prescription Comments: Wife reports no new or changed medications (11/18/2024  2:32 PM)  Is the patient taking all medications as directed (includes completed medication regime)?: Yes (11/18/2024  2:32 PM)  Care Management Interventions: Provided patient education (11/18/2024  2:32 PM)  Medication Comments: Wife reports patient has all medications and has had no new or changed medications since hospitalization and rehab- No discharge medication list available for this nurse to verify (11/18/2024  2:32 PM)  Follow Up Tasks: -- (n/a) (11/18/2024  2:32 PM)    Appointments  Does the patient have a primary care provider?: Yes (11/18/2024  2:32 PM)  Care Management Interventions: Verified appointment date/time/provider (TCM scheduled) (11/18/2024  2:32 PM)  Has the patient kept scheduled appointments due by today?: Yes (11/18/2024  2:32 PM)  Care Management Interventions: Advised patient to keep appointment (11/18/2024  2:32 PM)  Follow Up Tasks: -- (n/a) (11/18/2024  2:32 PM)    Self Management  What is the home health agency?:  [facility] (11/18/2024   2:32 PM)  Has home health visited the patient within 72 hours of discharge?: Yes (11/18/2024  2:32 PM)  Home Health Interventions: -- (n/a) (11/18/2024  2:32 PM)  What Durable Medical Equipment (DME) was ordered?: n/a (11/18/2024  2:32 PM)  Has all Durable Medical Equipment (DME) been delivered?: -- (n/a) (11/18/2024  2:32 PM)  DME Interventions: -- (n/a) (11/18/2024  2:32 PM)  Care Management Interventions: Notified PCP/provider (11/18/2024  2:32 PM)  Follow Up Tasks: -- (n/a) (11/18/2024  2:32 PM)    Patient Teaching  Does the patient have access to their discharge instructions?: Yes (11/18/2024  2:32 PM)  Care Management Interventions: -- (Discharge instructions not avilable for this nurse to review.) (11/18/2024  2:32 PM)  What is the patient's perception of their health status since discharge?: Improving (11/18/2024  2:32 PM)  Is the patient/caregiver able to teach back the hierarchy of who to call/visit for symptoms/problems? PCP, Specialist, Home Health nurse, Urgent Care, ED, 911: Yes (11/18/2024  2:32 PM)  Patient/Caregiver Education Comments: Instructed on hospital discharge instructions. Instructed on new and changed medications. Instructed if increased shortness of breath or chest pains to call 911. Provided my contact information and encouraged to call with any questions (11/18/2024  2:32 PM)    Wrap Up  Is the patient/caregiver familiar with Advance Care Planning?: Yes (11/18/2024  2:32 PM)  Would the patient like more information on Advance Care Planning?: No (11/18/2024  2:32 PM)  Wrap Up Additional Comments: Patient's wife reports patient is doing well. She states home care physical therapist was to their home and does not feel patient needs anymore physical therapy. Nurse is planning to return several times. They have appt with occupational therapist to come to home. She states he is eating and taking all medications as ordered. She denies any concerns at this time. (11/18/2024  2:32 PM)

## 2024-11-19 ENCOUNTER — HOME CARE VISIT (OUTPATIENT)
Dept: HOME HEALTH SERVICES | Facility: HOME HEALTH | Age: 79
End: 2024-11-19
Payer: MEDICARE

## 2024-11-19 VITALS — SYSTOLIC BLOOD PRESSURE: 106 MMHG | DIASTOLIC BLOOD PRESSURE: 50 MMHG | HEART RATE: 76 BPM | OXYGEN SATURATION: 96 %

## 2024-11-19 PROCEDURE — G0152 HHCP-SERV OF OT,EA 15 MIN: HCPCS | Mod: HHH

## 2024-11-19 ASSESSMENT — ENCOUNTER SYMPTOMS
SUBJECTIVE PAIN PROGRESSION: GRADUALLY IMPROVING
PAIN LOCATION - PAIN SEVERITY: 4/10
PAIN LOCATION: BACK
LOWEST PAIN SEVERITY IN PAST 24 HOURS: 0/10
PAIN LOCATION - PAIN FREQUENCY: FREQUENT
HIGHEST PAIN SEVERITY IN PAST 24 HOURS: 4/10
PAIN: 1
PERSON REPORTING PAIN: PATIENT
PAIN LOCATION - RELIEVING FACTORS: REST/MEDS

## 2024-11-19 ASSESSMENT — ACTIVITIES OF DAILY LIVING (ADL)
BATHING ASSESSED: 1
BATHING_CURRENT_FUNCTION: SUPERVISION
TOILETING: INDEPENDENT
TOILETING: 1
DRESSING_UB_CURRENT_FUNCTION: INDEPENDENT
PREPARING MEALS: INDEPENDENT
DRESSING_LB_CURRENT_FUNCTION: INDEPENDENT

## 2024-11-26 ENCOUNTER — LAB (OUTPATIENT)
Dept: LAB | Facility: LAB | Age: 79
End: 2024-11-26
Payer: MEDICARE

## 2024-11-26 ENCOUNTER — APPOINTMENT (OUTPATIENT)
Dept: PRIMARY CARE | Facility: CLINIC | Age: 79
End: 2024-11-26
Payer: MEDICARE

## 2024-11-26 VITALS
SYSTOLIC BLOOD PRESSURE: 103 MMHG | TEMPERATURE: 96.6 F | OXYGEN SATURATION: 94 % | HEIGHT: 68 IN | BODY MASS INDEX: 31.67 KG/M2 | WEIGHT: 209 LBS | HEART RATE: 80 BPM | DIASTOLIC BLOOD PRESSURE: 63 MMHG

## 2024-11-26 DIAGNOSIS — I25.119 CORONARY ARTERY DISEASE INVOLVING NATIVE CORONARY ARTERY OF NATIVE HEART WITH ANGINA PECTORIS: ICD-10-CM

## 2024-11-26 DIAGNOSIS — R41.89 COGNITIVE IMPAIRMENT: ICD-10-CM

## 2024-11-26 DIAGNOSIS — N18.32 STAGE 3B CHRONIC KIDNEY DISEASE (MULTI): ICD-10-CM

## 2024-11-26 DIAGNOSIS — R65.20 SEPSIS WITH ACUTE HYPOXIC RESPIRATORY FAILURE WITHOUT SEPTIC SHOCK, DUE TO UNSPECIFIED ORGANISM (MULTI): ICD-10-CM

## 2024-11-26 DIAGNOSIS — I25.119 CORONARY ARTERY DISEASE INVOLVING NATIVE CORONARY ARTERY OF NATIVE HEART WITH ANGINA PECTORIS: Primary | ICD-10-CM

## 2024-11-26 DIAGNOSIS — M48.02 SPINAL STENOSIS IN CERVICAL REGION: ICD-10-CM

## 2024-11-26 DIAGNOSIS — M10.9 GOUT, UNSPECIFIED CAUSE, UNSPECIFIED CHRONICITY, UNSPECIFIED SITE: ICD-10-CM

## 2024-11-26 DIAGNOSIS — K21.9 GASTROESOPHAGEAL REFLUX DISEASE WITHOUT ESOPHAGITIS: ICD-10-CM

## 2024-11-26 DIAGNOSIS — J96.01 SEPSIS WITH ACUTE HYPOXIC RESPIRATORY FAILURE WITHOUT SEPTIC SHOCK, DUE TO UNSPECIFIED ORGANISM (MULTI): ICD-10-CM

## 2024-11-26 DIAGNOSIS — A41.9 SEPSIS WITH ACUTE HYPOXIC RESPIRATORY FAILURE WITHOUT SEPTIC SHOCK, DUE TO UNSPECIFIED ORGANISM (MULTI): ICD-10-CM

## 2024-11-26 DIAGNOSIS — E83.51 HYPOCALCEMIA: ICD-10-CM

## 2024-11-26 PROCEDURE — 86901 BLOOD TYPING SEROLOGIC RH(D): CPT

## 2024-11-26 PROCEDURE — 85025 COMPLETE CBC W/AUTO DIFF WBC: CPT

## 2024-11-26 PROCEDURE — 3078F DIAST BP <80 MM HG: CPT | Performed by: FAMILY MEDICINE

## 2024-11-26 PROCEDURE — 1160F RVW MEDS BY RX/DR IN RCRD: CPT | Performed by: FAMILY MEDICINE

## 2024-11-26 PROCEDURE — 82306 VITAMIN D 25 HYDROXY: CPT

## 2024-11-26 PROCEDURE — 36415 COLL VENOUS BLD VENIPUNCTURE: CPT

## 2024-11-26 PROCEDURE — 86900 BLOOD TYPING SEROLOGIC ABO: CPT

## 2024-11-26 PROCEDURE — G2211 COMPLEX E/M VISIT ADD ON: HCPCS | Performed by: FAMILY MEDICINE

## 2024-11-26 PROCEDURE — 86850 RBC ANTIBODY SCREEN: CPT

## 2024-11-26 PROCEDURE — 99214 OFFICE O/P EST MOD 30 MIN: CPT | Performed by: FAMILY MEDICINE

## 2024-11-26 PROCEDURE — 1036F TOBACCO NON-USER: CPT | Performed by: FAMILY MEDICINE

## 2024-11-26 PROCEDURE — 80053 COMPREHEN METABOLIC PANEL: CPT

## 2024-11-26 PROCEDURE — 3074F SYST BP LT 130 MM HG: CPT | Performed by: FAMILY MEDICINE

## 2024-11-26 PROCEDURE — 1123F ACP DISCUSS/DSCN MKR DOCD: CPT | Performed by: FAMILY MEDICINE

## 2024-11-26 PROCEDURE — 84100 ASSAY OF PHOSPHORUS: CPT

## 2024-11-26 PROCEDURE — 83735 ASSAY OF MAGNESIUM: CPT

## 2024-11-26 PROCEDURE — 1159F MED LIST DOCD IN RCRD: CPT | Performed by: FAMILY MEDICINE

## 2024-11-26 RX ORDER — ALLOPURINOL 300 MG/1
300 TABLET ORAL DAILY
COMMUNITY
End: 2024-11-26 | Stop reason: SDUPTHER

## 2024-11-26 RX ORDER — DONEPEZIL HYDROCHLORIDE 5 MG/1
5 TABLET, FILM COATED ORAL NIGHTLY
Qty: 30 TABLET | Refills: 0 | Status: SHIPPED | OUTPATIENT
Start: 2024-11-26 | End: 2025-05-25

## 2024-11-26 RX ORDER — ALLOPURINOL 300 MG/1
300 TABLET ORAL DAILY
Qty: 30 TABLET | Refills: 0 | Status: CANCELLED | OUTPATIENT
Start: 2024-11-26

## 2024-11-26 RX ORDER — ALLOPURINOL 300 MG/1
300 TABLET ORAL DAILY
Qty: 90 TABLET | Refills: 3 | Status: SHIPPED | OUTPATIENT
Start: 2024-11-26

## 2024-11-26 RX ORDER — ALLOPURINOL 300 MG/1
300 TABLET ORAL DAILY
Qty: 30 TABLET | Refills: 0 | Status: SHIPPED | OUTPATIENT
Start: 2024-11-26

## 2024-11-26 RX ORDER — ALLOPURINOL 300 MG/1
300 TABLET ORAL DAILY
Qty: 90 TABLET | Refills: 1 | Status: CANCELLED | OUTPATIENT
Start: 2024-11-26

## 2024-11-26 NOTE — PROGRESS NOTES
"79-year-old male with history of spinal stenosis bilateral osteoarthritis of the knees gout chronic kidney disease and history of provoked deep vein thrombosis of left femoral vein in January 2024 currently on Eliquis since then.  There is some question as to whether or not he has had previous deep vein thrombosis.  There is no record of this available to me.  His wife here today is concerned about short  memory loss.  He is complaining of some left hip pain as well.  Denies abdominal pain nausea vomiting dysuria hematuria pyuria flank pain      /63   Pulse 80   Temp 35.9 °C (96.6 °F)   Ht 1.727 m (5' 8\")   Wt 94.8 kg (209 lb)   SpO2 94%   BMI 31.78 kg/m²       Appears comfortable  No retractions  Skin without pallor petechia icterus cyanosis  Neck without JVD thyromegaly bruits  Chest wall nontender  Chest clear to auscultation without rales rhonchi wheeze  Heart regular rate and rhythm without murmur  Abdomen soft nondistended nontender without organomegaly or mass  Extremities without erythema edema Homans or cord  Peripheral pulses palpable  Neuro intact    Mini cog test 3 out of 5  "

## 2024-11-27 ENCOUNTER — TELEPHONE (OUTPATIENT)
Dept: PRIMARY CARE | Facility: CLINIC | Age: 79
End: 2024-11-27

## 2024-11-27 DIAGNOSIS — E55.9 VITAMIN D DEFICIENCY: ICD-10-CM

## 2024-11-27 DIAGNOSIS — E83.51 HYPOCALCEMIA: Primary | ICD-10-CM

## 2024-11-27 DIAGNOSIS — E83.42 HYPOMAGNESEMIA: ICD-10-CM

## 2024-11-27 LAB
25(OH)D3 SERPL-MCNC: 10 NG/ML (ref 30–100)
ABO GROUP (TYPE) IN BLOOD: NORMAL
ALBUMIN SERPL BCP-MCNC: 3.7 G/DL (ref 3.4–5)
ALP SERPL-CCNC: 97 U/L (ref 33–136)
ALT SERPL W P-5'-P-CCNC: 12 U/L (ref 10–52)
ANION GAP SERPL CALC-SCNC: 17 MMOL/L (ref 10–20)
ANTIBODY SCREEN: NORMAL
AST SERPL W P-5'-P-CCNC: 19 U/L (ref 9–39)
BASOPHILS # BLD AUTO: 0.1 X10*3/UL (ref 0–0.1)
BASOPHILS NFR BLD AUTO: 0.9 %
BILIRUB SERPL-MCNC: 0.6 MG/DL (ref 0–1.2)
BUN SERPL-MCNC: 19 MG/DL (ref 6–23)
CALCIUM SERPL-MCNC: 7.2 MG/DL (ref 8.6–10.6)
CHLORIDE SERPL-SCNC: 111 MMOL/L (ref 98–107)
CO2 SERPL-SCNC: 23 MMOL/L (ref 21–32)
CREAT SERPL-MCNC: 1.29 MG/DL (ref 0.5–1.3)
EGFRCR SERPLBLD CKD-EPI 2021: 56 ML/MIN/1.73M*2
EOSINOPHIL # BLD AUTO: 0.17 X10*3/UL (ref 0–0.4)
EOSINOPHIL NFR BLD AUTO: 1.5 %
ERYTHROCYTE [DISTWIDTH] IN BLOOD BY AUTOMATED COUNT: 14.1 % (ref 11.5–14.5)
GLUCOSE SERPL-MCNC: 116 MG/DL (ref 74–99)
HCT VFR BLD AUTO: 38.7 % (ref 41–52)
HGB BLD-MCNC: 12.5 G/DL (ref 13.5–17.5)
IMM GRANULOCYTES # BLD AUTO: 0.06 X10*3/UL (ref 0–0.5)
IMM GRANULOCYTES NFR BLD AUTO: 0.5 % (ref 0–0.9)
LYMPHOCYTES # BLD AUTO: 2.3 X10*3/UL (ref 0.8–3)
LYMPHOCYTES NFR BLD AUTO: 20.9 %
MAGNESIUM SERPL-MCNC: 0.82 MG/DL (ref 1.6–2.4)
MCH RBC QN AUTO: 31.4 PG (ref 26–34)
MCHC RBC AUTO-ENTMCNC: 32.3 G/DL (ref 32–36)
MCV RBC AUTO: 97 FL (ref 80–100)
MONOCYTES # BLD AUTO: 0.8 X10*3/UL (ref 0.05–0.8)
MONOCYTES NFR BLD AUTO: 7.3 %
NEUTROPHILS # BLD AUTO: 7.55 X10*3/UL (ref 1.6–5.5)
NEUTROPHILS NFR BLD AUTO: 68.9 %
NRBC BLD-RTO: 0 /100 WBCS (ref 0–0)
PHOSPHATE SERPL-MCNC: 2.8 MG/DL (ref 2.5–4.9)
PLATELET # BLD AUTO: 204 X10*3/UL (ref 150–450)
POTASSIUM SERPL-SCNC: 3.7 MMOL/L (ref 3.5–5.3)
PROT SERPL-MCNC: 6.2 G/DL (ref 6.4–8.2)
RBC # BLD AUTO: 3.98 X10*6/UL (ref 4.5–5.9)
RH FACTOR (ANTIGEN D): NORMAL
SODIUM SERPL-SCNC: 147 MMOL/L (ref 136–145)
WBC # BLD AUTO: 11 X10*3/UL (ref 4.4–11.3)

## 2024-11-27 RX ORDER — ERGOCALCIFEROL 1.25 MG/1
50000 CAPSULE ORAL WEEKLY
Qty: 8 CAPSULE | Refills: 0 | Status: SHIPPED | OUTPATIENT
Start: 2024-11-27 | End: 2025-01-22

## 2024-11-27 NOTE — TELEPHONE ENCOUNTER
Different person from the lab called back saying they did not have a big enough sample to do the Protime-INR so it was canceled. Please advise.

## 2024-11-27 NOTE — RESULT ENCOUNTER NOTE
Calcium is low due to vitamin D deficiency.  He needs to take ergocalciferol 50,000 units weekly.  This was ordered.  After 8 weeks of therapy he needs to then take 1000 international units of vitamin D daily.  Anemia has improved and is stable.  Kidney function is also improved.  Follow-up as previously instructed

## 2024-11-27 NOTE — RESULT ENCOUNTER NOTE
Magnesium is also low.  Recommend over-the-counter magnesium supplement 250 to 400 mg daily.  We will repeat labs in 8 weeks

## 2024-11-27 NOTE — TELEPHONE ENCOUNTER
Spoke to wife and she understood the results    ----- Message from Josué Kay sent at 11/27/2024  9:37 AM EST -----  Calcium is low due to vitamin D deficiency.  He needs to take ergocalciferol 50,000 units weekly.  This was ordered.  After 8 weeks of therapy he needs to then take 1000 international units of vitamin D daily.  Anemia has improved and is stable.  Kid  mg function is also improved.  Follow-up as previously instructed

## 2024-11-29 ENCOUNTER — HOME CARE VISIT (OUTPATIENT)
Dept: HOME HEALTH SERVICES | Facility: HOME HEALTH | Age: 79
End: 2024-11-29
Payer: MEDICARE

## 2024-12-02 ENCOUNTER — HOME CARE VISIT (OUTPATIENT)
Dept: HOME HEALTH SERVICES | Facility: HOME HEALTH | Age: 79
End: 2024-12-02
Payer: MEDICARE

## 2024-12-02 ASSESSMENT — ACTIVITIES OF DAILY LIVING (ADL)
HOME_HEALTH_OASIS: 00
OASIS_M1830: 01

## 2024-12-05 ENCOUNTER — PATIENT OUTREACH (OUTPATIENT)
Dept: PRIMARY CARE | Facility: CLINIC | Age: 79
End: 2024-12-05
Payer: MEDICARE

## 2024-12-05 NOTE — PROGRESS NOTES
Call regarding appt. with PCP on  11/26/24 after hospitalization. At time of outreach call, spoke with patient's wife, she patient feels as if  patient's condition has improved.   She denies him experiencing any shortness of breath. Reports he is eating well.  Reviewed the PCP appointment with the pt and addressed any questions or concerns.

## 2024-12-13 ENCOUNTER — OFFICE VISIT (OUTPATIENT)
Dept: ORTHOPEDIC SURGERY | Facility: CLINIC | Age: 79
End: 2024-12-13
Payer: MEDICARE

## 2024-12-13 DIAGNOSIS — M17.11 PRIMARY OSTEOARTHRITIS OF RIGHT KNEE: Primary | ICD-10-CM

## 2024-12-13 PROCEDURE — 99214 OFFICE O/P EST MOD 30 MIN: CPT | Performed by: ORTHOPAEDIC SURGERY

## 2024-12-13 PROCEDURE — 1123F ACP DISCUSS/DSCN MKR DOCD: CPT | Performed by: ORTHOPAEDIC SURGERY

## 2024-12-13 NOTE — PROGRESS NOTES
Patient is a 79-year-old gentleman who presents today for discussion having decided to proceed with right total knee arthroplasty.  He rates his pain as 9 out of 10.  He has difficulty walking sort of distance or going up and down stairs.  He does take Tylenol.  He is on anticoagulation for history of DVT.  He is working with his cardiologist regarding this.  He previous underwent left total knee replacement did well from that standpoint.  He is never had any surgery on the right knee.    Right knee:  AAOx3, NAD, walks with a moderate antalgic gait  Varus allignment  Range of motion lacks 10 degrees of full extension and flexes to 110 degrees  Stable to varus/valgus/anterior/posterior stress through out the range of motion  Slight laxity with varus stress  Diffuse medial  joint line tenderness to palpation  Moderate effusion  SILT in a pawan/saph/per/tib distribution  5/5 knee extension/df/pf/ehl  Brisk capillary refill  no popliteal lymphadenopathy  no other overlying lesions  mood: euthymic  Respirations non labored    Plain films were reviewed by myself in clinic today.  They have advanced osteoarthritis of their knee with significant joint space narrowing, bone on bone changes, underlying sclerosis and tricompartmental osteophytic change.    Patient is now failed a greater than 3-month trial of reasonable conservative treatment and wishes proceed with surgery which is indicated at this time.  Discussed the risk benefits alternatives to surgery.  All of their questions were answered.    Procedure: right total knee  Location: [facility]  ICD10: M17.11  CPT: 08434  Stay: overnight  Equipment: [employer] [facility]    I talked with the patient at length about risks, limitations, benefits and alternatives to total knee replacement today. I reviewed concerns about implant wear, loosening, breakage, infection and infection prophylaxis, DVT, PE, death and other medical and anesthetic complications of surgery. We talked about  the potential for persistent pain following surgery since there are many possible causes for knee and leg pain. The patient was advised that knee replacement will only relieve pain that is coming from the knee. We talked about limited range of motion following knee replacement and the importance of physical therapy and their motivation. We talked about improvements in pain management post-operatively and our accelerated rehab program. We talked about wound healing issues and neurovascular complications of surgery. I reviewed functional and activity restrictions in detail. We discussed the fact that many of our patients are able to go home in 1 day or less depending on their health, mobility, pre-op preparation, individual home situation and personal preference.  The patient has identified their personal goals of their joint replacement surgery and recovery and we have discussed them. These are documented in our digitalbox patient engagement platform. In addition, we have discussed the advantages and disadvantages of various implant and fixation options, as well as various surgical approaches.  The basic concepts of the joint replacement procedure has been reviewed with the patient and the patient has been provided the opportunity to see an actual implant either in the office or in our pre-op education class.  All of the patients questions were answered. The patient can call my office to schedule surgery and the pre-op teaching class. I told the patient that they should contact their primary care physician to discuss fitness for surgery.    This note was created using voice recognition software and was not corrected for typographical or grammatical errors.

## 2024-12-17 ENCOUNTER — CLINICAL SUPPORT (OUTPATIENT)
Dept: PREADMISSION TESTING | Facility: HOSPITAL | Age: 79
End: 2024-12-17
Payer: MEDICARE

## 2024-12-17 DIAGNOSIS — M17.11 UNILATERAL PRIMARY OSTEOARTHRITIS, RIGHT KNEE: ICD-10-CM
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
## 2024-12-24 ENCOUNTER — DOCUMENTATION (OUTPATIENT)
Dept: PREADMISSION TESTING | Facility: HOSPITAL | Age: 79
End: 2024-12-24

## 2024-12-24 ENCOUNTER — HOSPITAL ENCOUNTER (OUTPATIENT)
Dept: RADIOLOGY | Facility: CLINIC | Age: 79
Discharge: HOME | End: 2024-12-24
Payer: MEDICARE

## 2024-12-24 ENCOUNTER — PRE-ADMISSION TESTING (OUTPATIENT)
Dept: PREADMISSION TESTING | Facility: HOSPITAL | Age: 79
End: 2024-12-24
Payer: MEDICARE

## 2024-12-24 ENCOUNTER — LAB (OUTPATIENT)
Dept: LAB | Facility: LAB | Age: 79
End: 2024-12-24
Payer: MEDICARE

## 2024-12-24 VITALS
RESPIRATION RATE: 18 BRPM | DIASTOLIC BLOOD PRESSURE: 84 MMHG | TEMPERATURE: 98.4 F | HEIGHT: 67 IN | WEIGHT: 222.66 LBS | BODY MASS INDEX: 34.95 KG/M2 | HEART RATE: 70 BPM | SYSTOLIC BLOOD PRESSURE: 147 MMHG | OXYGEN SATURATION: 95 %

## 2024-12-24 DIAGNOSIS — Z01.818 PREOP TESTING: Primary | ICD-10-CM

## 2024-12-24 DIAGNOSIS — Z01.818 PREOP TESTING: ICD-10-CM

## 2024-12-24 DIAGNOSIS — R06.02 SOB (SHORTNESS OF BREATH): ICD-10-CM

## 2024-12-24 DIAGNOSIS — M17.11 UNILATERAL PRIMARY OSTEOARTHRITIS, RIGHT KNEE: ICD-10-CM

## 2024-12-24 DIAGNOSIS — R73.9 ELEVATED BLOOD SUGAR: ICD-10-CM

## 2024-12-24 LAB
ANION GAP SERPL CALC-SCNC: 16 MMOL/L (ref 10–20)
APTT PPP: 38 SECONDS (ref 27–38)
BASOPHILS # BLD AUTO: 0.06 X10*3/UL (ref 0–0.1)
BASOPHILS NFR BLD AUTO: 0.8 %
BUN SERPL-MCNC: 12 MG/DL (ref 6–23)
CALCIUM SERPL-MCNC: 6.8 MG/DL (ref 8.6–10.3)
CHLORIDE SERPL-SCNC: 105 MMOL/L (ref 98–107)
CO2 SERPL-SCNC: 28 MMOL/L (ref 21–32)
CREAT SERPL-MCNC: 1.06 MG/DL (ref 0.5–1.3)
EGFRCR SERPLBLD CKD-EPI 2021: 71 ML/MIN/1.73M*2
EOSINOPHIL # BLD AUTO: 0.18 X10*3/UL (ref 0–0.4)
EOSINOPHIL NFR BLD AUTO: 2.3 %
ERYTHROCYTE [DISTWIDTH] IN BLOOD BY AUTOMATED COUNT: 14.5 % (ref 11.5–14.5)
EST. AVERAGE GLUCOSE BLD GHB EST-MCNC: 94 MG/DL
GLUCOSE SERPL-MCNC: 104 MG/DL (ref 74–99)
HBA1C MFR BLD: 4.9 %
HCT VFR BLD AUTO: 37.4 % (ref 41–52)
HGB BLD-MCNC: 11.9 G/DL (ref 13.5–17.5)
IMM GRANULOCYTES # BLD AUTO: 0.05 X10*3/UL (ref 0–0.5)
IMM GRANULOCYTES NFR BLD AUTO: 0.6 % (ref 0–0.9)
INR PPP: 1.5 (ref 0.9–1.1)
LYMPHOCYTES # BLD AUTO: 1.88 X10*3/UL (ref 0.8–3)
LYMPHOCYTES NFR BLD AUTO: 24.3 %
MCH RBC QN AUTO: 30.1 PG (ref 26–34)
MCHC RBC AUTO-ENTMCNC: 31.8 G/DL (ref 32–36)
MCV RBC AUTO: 95 FL (ref 80–100)
MONOCYTES # BLD AUTO: 0.77 X10*3/UL (ref 0.05–0.8)
MONOCYTES NFR BLD AUTO: 10 %
NEUTROPHILS # BLD AUTO: 4.79 X10*3/UL (ref 1.6–5.5)
NEUTROPHILS NFR BLD AUTO: 62 %
NRBC BLD-RTO: 0 /100 WBCS (ref 0–0)
PLATELET # BLD AUTO: 194 X10*3/UL (ref 150–450)
POTASSIUM SERPL-SCNC: 3.7 MMOL/L (ref 3.5–5.3)
PROTHROMBIN TIME: 17.2 SECONDS (ref 9.8–12.8)
RBC # BLD AUTO: 3.95 X10*6/UL (ref 4.5–5.9)
SODIUM SERPL-SCNC: 145 MMOL/L (ref 136–145)
WBC # BLD AUTO: 7.7 X10*3/UL (ref 4.4–11.3)

## 2024-12-24 PROCEDURE — 87081 CULTURE SCREEN ONLY: CPT | Mod: AHULAB | Performed by: PHYSICIAN ASSISTANT

## 2024-12-24 PROCEDURE — 83036 HEMOGLOBIN GLYCOSYLATED A1C: CPT

## 2024-12-24 PROCEDURE — 77073 BONE LENGTH STUDIES: CPT

## 2024-12-24 PROCEDURE — 99214 OFFICE O/P EST MOD 30 MIN: CPT | Performed by: PHYSICIAN ASSISTANT

## 2024-12-24 PROCEDURE — 73562 X-RAY EXAM OF KNEE 3: CPT | Mod: RT

## 2024-12-24 RX ORDER — CHLORHEXIDINE GLUCONATE ORAL RINSE 1.2 MG/ML
SOLUTION DENTAL
Qty: 473 ML | Refills: 0 | Status: SHIPPED | OUTPATIENT
Start: 2024-12-24

## 2024-12-24 ASSESSMENT — ENCOUNTER SYMPTOMS
CONSTITUTIONAL NEGATIVE: 1
RESPIRATORY NEGATIVE: 1
ALLERGIC/IMMUNOLOGIC NEGATIVE: 1
ENDOCRINE NEGATIVE: 1
PSYCHIATRIC NEGATIVE: 1
EYES NEGATIVE: 1
GASTROINTESTINAL NEGATIVE: 1
MUSCULOSKELETAL NEGATIVE: 1
HEMATOLOGIC/LYMPHATIC NEGATIVE: 1
NEUROLOGICAL NEGATIVE: 1
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
## 2024-12-24 NOTE — PREPROCEDURE INSTRUCTIONS
Medication List            Accurate as of December 24, 2024  7:24 AM. Always use your most recent med list.                allopurinol 300 mg tablet  Commonly known as: Zyloprim  Take 1 tablet (300 mg) by mouth once daily.  Medication Adjustments for Surgery: Take/Use as prescribed     apixaban 5 mg tablet  Commonly known as: Eliquis  Take 1 tablet (5 mg) by mouth 2 times a day. Do not start before January 20, 2024.  Notes to patient: HOLD 3 Days prior to surgery - LD 1/3     atorvastatin 10 mg tablet  Commonly known as: Lipitor  Medication Adjustments for Surgery: Take/Use as prescribed     donepezil 5 mg tablet  Commonly known as: Aricept  Take 1 tablet (5 mg) by mouth once daily at bedtime.  Medication Adjustments for Surgery: Take/Use as prescribed     ergocalciferol 1.25 MG (81092 UT) capsule  Commonly known as: Vitamin D-2  Take 1 capsule (50,000 Units) by mouth 1 (one) time per week.  Medication Adjustments for Surgery: Do Not take on the morning of surgery     finasteride 5 mg tablet  Commonly known as: Proscar  Medication Adjustments for Surgery: Take/Use as prescribed     furosemide 40 mg tablet  Commonly known as: Lasix  Medication Adjustments for Surgery: Do Not take on the morning of surgery     metoprolol succinate XL 25 mg 24 hr tablet  Commonly known as: Toprol-XL  Medication Adjustments for Surgery: Take on the morning of surgery     nitroglycerin 0.4 mg SL tablet  Commonly known as: Nitrostat  Medication Adjustments for Surgery: Take/Use as prescribed     pantoprazole 40 mg EC tablet  Commonly known as: ProtoNix  Medication Adjustments for Surgery: Take on the morning of surgery     ramipril 10 mg capsule  Commonly known as: Altace  Medication Adjustments for Surgery: Take last dose 1 day (24 hours) before surgery                 Concerning above medication instructions - If medication is normally taken at night continue normal schedule - do not take night prior and morning of surgery.        Preoperative Deep Breathing Exercises  Why it is important to do deep breathing exercises before my surgery?  Deep breathing exercises strengthen your breathing muscles.  This helps you to recover after your surgery and decreases the chance of breathing complications.  How are the deep breathing exercises done?  Sit straight with your back supported.  Breathe in deeply and slowly through your nose. Your lower rib cage should expand and your abdomen may move forward.  Hold that breath for 3 to 5 seconds.  Breathe out through pursed lips, slowly and completely.  Rest and repeat 10 times every hour while awake.  Rest longer if you become dizzy or lightheaded.      CONTACT SURGEON'S OFFICE IF YOU DEVELOP:  * Fever = 100.4 F   * New respiratory symptoms (e.g. cough, shortness of breath, respiratory distress, sore throat)  * Recent loss of taste or smell  *Flu like symptoms such as headache, fatigue or gastrointestinal symptoms  * You develop any open sores, shingles, burning or painful urination   AND/OR:  * You no longer wish to have the surgery.  * Any other personal circumstances change that may lead to the need to cancel or defer this surgery.  *You were admitted to any hospital within one week of your planned procedure.    SMOKING:  *Quitting smoking can make a huge difference to your health and recovery from surgery.    *If you need help with quitting, call 2-598-QUIT-NOW.    THE DAY OF SURGERY:  *Do not eat any food after midnight the night before surgery/procedure.   *YOU MUST DRINK 14 oz drink clear liquids (i.e. water, black coffee/tea, (no milk or cream) apple juice, and electrolyte drinks (Gatorade)  2 hours before your instructed arrival time to the hospital.  *You may chew gum until  2 hours before your surgery/procedure.    SURGICAL TIME  *You will be contacted between 2 p.m. and 6 p.m. the business day before your surgery with your arrival time.  *If you haven't received a call by 6pm, call  [phone].  *Scheduled surgery times may change and you will be notified if this occurs-check your personal voicemail for any updates.    ON THE MORNING OF SURGERY:  *Wear comfortable, loose fitting clothing.   *Do not use moisturizers, creams, lotions or perfume.  *All jewelry and valuables should be left at home.  *Prosthetic devices such as contact lenses, hearing aids, dentures, eyelash extensions, hairpins and body piercing must be removed before surgery.    BRING WITH YOU:  *Photo ID and insurance card  *Current list of medicines and allergies  *Pacemaker/Defibrillator/Heart stent cards  *CPAP machine and mask  *Slings/splints/crutches  *Copy of your complete Advanced Directive/DHPOA-if applicable  *Neurostimulator implant remote    PARKING AND ARRIVAL:  *Check in at the Main Entrance desk and let them know you are here for surgery.  *You will be directed to the 2nd floor surgical waiting area.    AFTER OUTPATIENT SURGERY:  *A responsible adult MUST accompany you at the time of discharge and stay with you for 24 hours after your surgery.  *You may NOT drive yourself home after surgery.  *You may use a taxi or ride sharing service ("[employer] Uber) to return home ONLY if you are accompanied by a friend or family member.  *Instructions for resuming your medications will be provided by your surgeon.      Home Preoperative Antibacterial Shower     What is a home preoperative antibacterial shower?  This shower is a way of cleaning the skin with a germ killing soap before surgery.  The soap contains chlorhexidine, commonly known as CHG.  CHG is a soap for your skin with germ killing ability.  Let your doctor know if you are allergic to chlorhexidine.    Why do I need to take a preoperative antibacterial shower?  Skin is not sterile.  It is best to try to make your skin as free of germs as possible before surgery.  Proper cleansing with a germ killing soap before surgery can lower the number of germs on your skin.   This helps to reduce the risk of infection at the surgical site.  Following the instructions listed below will help you prepare your skin for surgery.      How do I use the CHG skin cleanser?  Steps:  Begin using your CHG soap five days before your scheduled surgery on ________________________.    Days 1-4 Shower before bed:  Wash your face and genitals with your normal soap and rinse.    Wash and rinse your hair using the CHG soap. Rinse completely, do not condition your   Hair.          3.    Apply the CHG soap to a clean wet washcloth.  Turn the water off or move away                From the water spray to avoid premature rinsing of the CHG soap as you are applying.     4.   Lather your entire body from the neck down.  Do not use on your face or genitals.   Pay special attention to the area(s) where your incision(s) will be located unless they are on your face.  Avoid scrubbing your skin too hard.  The important point is to have the CHG soap sit on your skin for 3 minutes.    When the 3 minutes are up, turn on the water and rinse the CHG soap off your body completely.   Pat yourself dry with a clean, freshly-laundered towel.  Dress in clean, freshly laundered night clothes.    Be sure to sleep with clean, freshly laundered sheets.  Day 5:  Last shower is the morning before surgery: Follow above Instructions.    NOTE:    *Hair extensions should be removed    *Keep CHG soap out of eyes and ear canals   *DO NOT wash with regular soap on your body after you have used the CHG        soap solution  *DO NOT apply powders, lotions, or perfume.  *Deodorant may be used days 1-4, BUT NOT the day of surgery    Who should I contact if I have any questions regarding the use of CHG soap?  Call the [facility], Preadmission Testing at [phone] if you have any questions.              Patient Information: Pre-Operative Infection Prevention Measures     Why did I have my nose, under my arms and  groin swabbed?  The purpose of the swab is to identify Staphylococcus aureus inside your nose or on your skin.  The swab was sent to the laboratory for culture.  A positive swab/culture for Staphylococcus aureus is called colonization or carriage.      What is Staphylococcus aureus?  Staphylococcus aureus, also known as “staph”, is a germ found on the skin or in the nose of healthy people.  Sometimes Staphylococcus aureus can get into the body and cause an infection.  This can be minor (such as pimples, boils or other skin problems).  It might also be serious (such as blood infection, pneumonia or a surgical site infection).    What is Staphylococcus aureus colonization or carriage?  Colonization or carriage means that a person has the germ but is not sick from it.  These bacteria can be spread on the hands or when breathing or sneezing.    How is Staphylococcus aureus spread?  It is most often spread by close contact with a person or item that carries it.    What happens if my culture is positive for Staphylococcus aureus?  Your doctor/medical team will use this information to guide any antibiotic treatment which may be necessary.  Regardless of the culture results, we will clean the inside of your nose with a betadine swab just before you have your surgery.      Will I get an infection if I have Staphylococcus aureus in my nose or on my skin?  Anyone can get an infection with Staphylococcus aureus.  However, the best way to reduce your risk of infection is to follow the instructions provided to you for the use of your CHG soap and dental rinse.        Who should I contact if I have any questions?  Call the [facility], Preadmission Testing at [phone] if you have any questions.           Patient Information: Oral/Dental Rinse  **This is a prescription; pick it up at your preferred local pharmacy **  What is oral/dental rinse?   It is a mouthwash. It is a way of cleaning the mouth  with a germ killing solution before your surgery.  The solution contains chlorhexidine, commonly known as CHG.   It is used inside the mouth to kill a bacteria known as Staphylococcus aureus.  Let your doctor know if you are allergic to Chlorhexidine.    Why do I need to use CHG oral/dental rinse?  The CHG oral/dental rinse helps to kill a bacteria in your mouth known a Staphylococcus aureus.     This reduces the risk of infection at the surgical site.      Using your CHG oral/dental rinse  STEPS:  Use your CHG oral/dental rinse after you brush your teeth the night before (at bedtime) and the morning of your surgery.  Follow all directions on your prescription label.    Use the cap on the container to measure 15ml (fill cap to fill line)  Swish (gargle if you can) the mouthwash in your mouth for at least 30 seconds, (do not to swallow) spit out  After you use your CHG rinse, do not rinse your mouth with water, drink or eat.  Please refer to prescription label for the appropriate time to resume oral intake  Dental rinse comes in one size bottle: 473ml ~16oz.  You will have leftover    rinse, discard after this use.    What side effects might I have using the CHG oral/dental rinse?  CHG rinse will stick to plaque on the teeth.  Brush and floss just before use.  Teeth brushing will help avoid staining of plaque during use.    Who should I contact if I have questions about the CHG oral/dental rinse?  Please call [facility], Preadmission Testing at [phone] if you have any questions
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
## 2024-12-26 LAB — STAPHYLOCOCCUS SPEC CULT: NORMAL

## 2024-12-30 ENCOUNTER — TELEPHONE (OUTPATIENT)
Dept: ORTHOPEDIC SURGERY | Facility: HOSPITAL | Age: 79
End: 2024-12-30
Payer: MEDICARE

## 2025-01-06 ENCOUNTER — HOME HEALTH ADMISSION (OUTPATIENT)
Dept: HOME HEALTH SERVICES | Facility: HOME HEALTH | Age: 80
End: 2025-01-06
Payer: MEDICARE

## 2025-01-06 PROBLEM — M17.11 LOCALIZED OSTEOARTHRITIS OF RIGHT KNEE: Status: ACTIVE | Noted: 2025-01-06

## 2025-01-06 RX ORDER — DOCUSATE SODIUM 100 MG/1
100 CAPSULE, LIQUID FILLED ORAL 2 TIMES DAILY
Qty: 60 CAPSULE | Refills: 0 | Status: SHIPPED | OUTPATIENT
Start: 2025-01-06 | End: 2025-02-06

## 2025-01-06 RX ORDER — PANTOPRAZOLE SODIUM 40 MG/1
40 TABLET, DELAYED RELEASE ORAL DAILY
Qty: 30 TABLET | Refills: 0 | Status: SHIPPED | OUTPATIENT
Start: 2025-01-06 | End: 2025-02-05

## 2025-01-06 RX ORDER — POLYETHYLENE GLYCOL 3350 17 G/17G
17 POWDER, FOR SOLUTION ORAL DAILY
Qty: 510 G | Refills: 0 | Status: SHIPPED | OUTPATIENT
Start: 2025-01-06

## 2025-01-06 RX ORDER — TRAMADOL HYDROCHLORIDE 50 MG/1
50 TABLET ORAL EVERY 6 HOURS PRN
Qty: 28 TABLET | Refills: 0 | Status: SHIPPED | OUTPATIENT
Start: 2025-01-06 | End: 2025-01-14

## 2025-01-06 RX ORDER — OXYCODONE HYDROCHLORIDE 5 MG/1
5 TABLET ORAL EVERY 6 HOURS PRN
Qty: 28 TABLET | Refills: 0 | Status: SHIPPED | OUTPATIENT
Start: 2025-01-06 | End: 2025-01-14

## 2025-01-06 RX ORDER — ACETAMINOPHEN 500 MG
1000 TABLET ORAL EVERY 6 HOURS PRN
Qty: 240 TABLET | Refills: 0 | Status: SHIPPED | OUTPATIENT
Start: 2025-01-06 | End: 2025-02-05

## 2025-01-06 NOTE — DISCHARGE SUMMARY
MD Hamida Prince, MPAS, PALevarC, ATC  Adult Reconstruction and Joint Replacement Surgery  Phone: [phone]     Fax:[phone]             Discharge Summary    Discharge Diagnosis  Right Total Knee Arthroplasty    Issues Requiring Follow-Up  Home care services to start within 48 hours. Outpatient PT to start 2 weeks  S/P total Joint for Knees only. Hips optional.    Test Results Pending At Discharge  Pending Labs       No current pending labs.          Hospital Course  Patient underwent Right Total Knee Arthroplasty on 1/7/25 without complications. The patient was then taken to the PACU in stable condition. Patient was then transferred to the or.  Pain was appropriately controlled. Diet was advanced as tolerated. Patient progressed adequately through their recovery during hospital stay including PT/ OT and were recommended for discharge. Patient was then discharged on  to home in stable condition. Patient had uneventful hospital course. Patient was instructed on the use of pain medications as needed for pain. The signs and symptoms of infection were discussed and the patient was given our number to call should they have any questions or concerns following discharge.    Based on my clinical judgment, the patient was provided with a 7-day prescription for opioid medication at 30 MED, indicated for treatment of acute pain in the setting of recent Total Joint Arthroplasty. OARRS report was run and has demonstrated an appropriate time course.  The patient has been provided with counseling pertaining to safe use of opioid medication.    Patient may use operative extremity WBAT with use of walker for assistance with ambulation .  Mepilex dressing to be removed POD # 7 by home care and incision left open to air  OAC for DVT prophylaxis started on POD #1 and to be taken for 30 days    Patient is to follow-up in 6 weeks at scheduled post-op visit.     Take 2.5 mg eliquis twice daily for 5  days, then resume home dose of 5 mg twice daily.    Face-to Face after surgery progress note  Pertinent Physical Exam At Time of Discharge  Review of Systems   Constitutional: Negative.  Negative for activity change, chills, fatigue and fever.   HENT: Negative.     Eyes: Negative.    Respiratory: Negative.  Negative for cough, chest tightness, shortness of breath and wheezing.    Cardiovascular: Negative.  Negative for palpitations.   Gastrointestinal:  Negative for abdominal pain, blood in stool, nausea and vomiting.   Endocrine: Negative.  Negative for cold intolerance and polyuria.   Genitourinary: Negative.  Negative for difficulty urinating, dysuria, frequency, hematuria and urgency.   Musculoskeletal:  Positive for gait problem and joint swelling. Negative for arthralgias and back pain.   Skin: Negative.  Negative for color change, pallor, rash and wound.   Allergic/Immunologic: Negative.  Negative for environmental allergies.   Neurological:  Negative for dizziness, weakness and light-headedness.   Hematological: Negative.    Psychiatric/Behavioral:  Negative for agitation, confusion and suicidal ideas. The patient is not nervous/anxious.    All other systems reviewed and are negative    Physical Exam  side: right knee  Vitals and nursing note reviewed. VSS, Afebrile  Constitutional:       Appearance: Normal appearance, awake and alert.  HENT:      Head: Normocephalic and atraumatic.       Pupils: Pupils are equal, round, and reactive to light.   Cardiovascular:      Rate and Rhythm: Normal rate and regular rhythm.   Pulmonary:      Effort: Pulmonary effort is normal.     Abdominal:         Palpations: Abdomen is soft.   Musculoskeletal:   Sensation intact bilaterally, sural/saph/sp/tibal n.  Motor intact flexion/extension/DF/PF/EHL/FHL bilaterally. Palpable symmetric DP/PT pulse bilaterally. Spinal wearing off.    Skin:      Bulky Dressing intact to the surgical extremity. No signs of gross bloody or  purulent drainage.     General: Skin is warm and dry.      Capillary Refill: Capillary refill takes less than 2 seconds.   Neurological:      General: No focal deficit present.      Mental Status: She is alert and oriented to person, place, and time. Mental status is at baseline.   Psychiatric:         Mood and Affect: Mood normal.        Home Medications  Scheduled medications    Current Facility-Administered Medications:     ceFAZolin (Ancef) 2 g in dextrose (iso)  mL, 2 g, intravenous, Once, Hamida Padilla PA-C    scopolamine (Transderm-Scop) patch 1 patch, 1 patch, transdermal, q72h, Hamida Padilla PA-C, 1 patch at 01/07/25 1015    sodium chloride 0.9 % irrigation solution, , , PRN, Emmanuel Bradford MD, 1,000 mL at 01/07/25 1210    sodium chloride 0.9 % irrigation solution, , , PRN, Emmanuel Bradford MD, 1,000 mL at 01/07/25 1210    Facility-Administered Medications Ordered in Other Encounters:     BUPivacaine-EPINEPHrine (Marcaine w/EPI) 0.5 %-1:200,000 injection, , perineural injection, PRN, Mikal Wiggins MD, 20 mL at 01/07/25 1110    ceFAZolin (Ancef) injection, , intravenous, PRN, NELLI Chairez, 2 g at 01/07/25 1150    fentaNYL PF (Sublimaze) injection, , intravenous, PRN, Mikal Wiggins MD, 50 mcg at 01/07/25 1107    glycopyrrolate PF (Robinul) 0.2 mg/mL injection, , intravenous, PRN, NELLI Chairez, 0.2 mg at 01/07/25 1213    lactated Ringer's infusion, , intravenous, Continuous PRN, NELLI Chairez, New Bag at 01/07/25 1133    lidocaine PF (Xylocaine) 20 mg/mL (2 %) injection, , intravenous, PRN, NELLI Chairez, 50 mg at 01/07/25 1146    mepivacaine (Carbocaine) 15 mg/mL (1.5 %) injection, , intrathecal, PRN, NELLI Chairez, 3.5 mL at 01/07/25 1145    midazolam (Versed) injection, , intravenous, PRN, Mikal Wiggins MD, 2 mg at 01/07/25 1142    phenylephrine (Anival-Synephrine) 10 mg in sodium chloride 0.9% 250 mL (0.04 mg/mL) infusion (premix), , intravenous,  Continuous PRN, NELLI Chairez, Last Rate: 59.28 mL/hr at 01/07/25 1213, 0.4 mcg/kg/min at 01/07/25 1213    phenylephrine in NS (Anival-Synephrine) 100 mcg/mL syringe, , intravenous, PRN, NELLI Chairez, 200 mcg at 01/07/25 1208    propofol (Diprivan) injection, , intravenous, Continuous PRN, NELLI Chairez, Last Rate: 59.28 mL/hr at 01/07/25 1200, 100 mcg/kg/min at 01/07/25 1200    tranexamic acid (Cyklokapron) injection, , intravenous, PRN, NELLI Chairez, 1,000 mg at 01/07/25 1150     PRN medications  PRN medications: sodium chloride, sodium chloride    Discharge medications     Your medication list        START taking these medications        Instructions Last Dose Given Next Dose Due   acetaminophen 500 mg tablet  Commonly known as: Tylenol Extra Strength      Take 2 tablets (1,000 mg) by mouth every 6 hours if needed for mild pain (1 - 3).       docusate sodium 100 mg capsule  Commonly known as: Colace      Take 1 capsule (100 mg) by mouth 2 times a day.       oxyCODONE 5 mg immediate release tablet  Commonly known as: Roxicodone      Take 1 tablet (5 mg) by mouth every 6 hours if needed for severe pain (7 - 10) for up to 7 days.       polyethylene glycol 17 gram/dose powder  Commonly known as: Glycolax, Miralax      Mix 1 cap (17g)of powder into 8 ounces of fluid and drink once daily.       traMADol 50 mg tablet  Commonly known as: Ultram      Take 1 tablet (50 mg) by mouth every 6 hours if needed for severe pain (7 - 10) for up to 7 days.              CHANGE how you take these medications        Instructions Last Dose Given Next Dose Due   apixaban 5 mg tablet  Commonly known as: Eliquis  Start taking on: January 7, 2025  What changed: See the new instructions.      Take 0.5 tablets (2.5 mg) by mouth 2 times a day for 5 days, THEN 1 tablet (5 mg) 2 times a day for 25 days.       pantoprazole 40 mg EC tablet  Commonly known as: ProtoNix  What changed:   when to take this  additional instructions       Take 1 tablet (40 mg) by mouth once daily. Do not crush, chew, or split.              CONTINUE taking these medications        Instructions Last Dose Given Next Dose Due   allopurinol 300 mg tablet  Commonly known as: Zyloprim      Take 1 tablet (300 mg) by mouth once daily.       atorvastatin 10 mg tablet  Commonly known as: Lipitor           donepezil 5 mg tablet  Commonly known as: Aricept      Take 1 tablet (5 mg) by mouth once daily at bedtime.       ergocalciferol 1.25 MG (30094 UT) capsule  Commonly known as: Vitamin D-2      Take 1 capsule (50,000 Units) by mouth 1 (one) time per week.       furosemide 40 mg tablet  Commonly known as: Lasix           metoprolol succinate XL 25 mg 24 hr tablet  Commonly known as: Toprol-XL           pregabalin 300 mg capsule  Commonly known as: Lyrica           ramipril 10 mg capsule  Commonly known as: Altace           tamsulosin 0.4 mg 24 hr capsule  Commonly known as: Flomax                  STOP taking these medications      chlorhexidine 0.12 % solution  Commonly known as: Peridex        finasteride 5 mg tablet  Commonly known as: Proscar        nitroglycerin 0.4 mg SL tablet  Commonly known as: Nitrostat                  Where to Get Your Medications        These medications were sent to [facility]  [address], [address], [place] [address]      Hours: 8 AM to 6 PM Mon-Fri, 9 AM to 1 PM Saturday Phone: [phone]   acetaminophen 500 mg tablet  apixaban 5 mg tablet  docusate sodium 100 mg capsule  oxyCODONE 5 mg immediate release tablet  pantoprazole 40 mg EC tablet  polyethylene glycol 17 gram/dose powder  traMADol 50 mg tablet         You have not been prescribed any medications.     Outpatient Follow-Up  Patient to follow-up with /Hamida Padilla PA-C.  Thank you for trusting us with your care. You should be scheduled for a follow-up post-surgical visit in 6 weeks.    Special Instructions     Take 2.5 mg eliquis twice daily for 5 days,  then resume home dose of 5 mg twice daily.    Please read discharge instructions provided by your surgeon before calling with questions as this will delay care.    Medication refills-Oxycodone and Tramadol will be refilled every 7 days per state law. Request refills through [facility] at the institution in which you had surgery or MyChart. All medication requests may take up to 72 hours to refill and refills after Friday 1pm will be refilled on the next business day.      No future appointments.    ROSEMARY Escobar, PA-C ATC  [facility]  Adult Reconstruction and Total Joint Replacement  General Orthopedics  Department of Orthopaedic Surgery  [place] [address]  [employer] preferred

## 2025-01-06 NOTE — DISCHARGE INSTRUCTIONS
MD Hamida Prince MPAS, FREDRICK, ATC  Adult Reconstruction and Joint Replacement Surgery  Phone: [phone]     Fax: [phone]        DISCHARGE INSTRUCTIONS      PLEASE READ CAREFULLY BEFORE CONTACTING YOUR PROVIDER.    WE WORK COLLABORATIVELY AS A TEAM. CALLING MULTIPLE STAFF MEMBERS REGARDING THE SAME ISSUE WILL DELAY YOUR CARE.    [employer] IS THE PREFERRED COMMUNICATION FOR ALL TEAM MEMBERS.    POSTOPERATIVE INSTRUCTIONS: TOTAL HIP & TOTAL KNEE ARTHROPLASTY    JOINT CARE TEAM  Please use the information below to contact your care team following surgery.  If you are leaving a message or using the [email or web address] Chart portal, please include your full name, date of birth and date of surgery so that we can correctly identify you.  Your call will be returned within 1-2 business days, please do not leave multiple messages with other staff regarding a single issue while you are awaiting a return call.     Who to call Contact Information Matters needing handled   Hamida Padilla PA-C  Physician Assistant [facility] Mobile Portal   Medical questions/concerns       Marshall Medel-    [email or web address]           [phone]  opt. 2    [phone] fax  [phone]         Scheduling office Visits  Medical questions/concerns  Leave of Absence or other paperwork  Any concerns more than 6 weeks from surgery - an appointment will need to be made   Lashonda Han MBA, BSN, RN-BC  Ortho Nurse Navigator [place]        __________________________________    Boby Olivares RN, BSN  Ortho Coordinator [place] DURANN-BC  Ortho Nurse Navigator [facility]       [phone] Please call staff at the institution in which you had surgery for prescription refills        Prescription Refills  Nursing, medical question related questions or concerns within 6 weeks of surgery   Orders for Outpatient Physical Therapy             MEDICATION  REFILLS - MyChart or Nurse Navigator (Above) at the institution in which you had surgery. [facility] or [facility].    -You will NOT receive a call indicating that your prescription has been filled.  Please contact your pharmacy with any questions.    Medication refills will be filled Monday-Friday 7am to 1pm ONLY. Please call the nurse navigator office or send a [employer] message for a refill request.  Any requests received outside of this timeframe will be handled on the next business day.  Please do not call multiple times or call other members of the care team for medication needs, this will cause the refill to take longer.    Per State and Institutional policy, pain medications can only be refilled every 7 days for up to six weeks following surgery.    My Chart Portal: If you are using the My Chart portal and are requesting a medication refill, please list what type of surgery you had and left or right side, medication that needs refilled, and pharmacy you would like your medication sent.     WEIGHT BEARING- weight bearing as tolerated to operative extremity     ACTIVITY-As Tolerated    DRIVING & TRAVEL AFTER SURGERY   Patients should anticipate waiting at least 4-6 weeks before traveling long distances after surgery.  You will need to stop to walk around ever 1 hour during your travel to help with blood clot prevention.    Patients may not drive until cleared by the joint nurse or the office and you are off of all narcotics.    DENTAL PROCEDURES & CLEANINGS  You must wait a minimum of 3 months for elective dental appointments after a total joint replacement, including routine cleanings or dental work including bridges, crowns, extractions, etc.. Unless, it is an emergency. You will need a prophylactic antibiotic lifelong prior to any dental visit, cleaning or procedure. Your surgeon's office or your dentist may provide a prescription antibiotic. Antibiotics are a lifelong need before dental appointments.      You  do not need antibiotics for endoscopic procedures such as colonoscopy or EGD, dermatologic biopsies or eye surgeries.    WOUND CARE  If you experience continued drainage or bleeding, you may cover with abdominal/Maxi pads (purchase at local drug store).  Knee replacements should wrap with an ace wrap.  You may shower with waterproof dressing on. Your surgical bandage will be removed by your home therapist 1 week after surgery. If you have staples intact, home care will remove in 2 weeks. If you have sutures intact, you will need to return to the office in 2 weeks for suture removal. Once the dressing is removed by home care, you may continue to shower. Let soap and water wash over the wound. DO NOT SCRUB.  Steri-strips under the bandage will remain in place until they fall off on their own.  If they are loose, you may gently remove.  If they have not fallen off in two weeks, gently peel them off. Do not remove if pulling causes resistance against the incision.  You will see suture tails sticking out of the ends of the incision.  DO NOT CUT THEM.  They will fall off when the sutures dissolve.  If they are bothersome, cover with a band aid.  Do not soak in a bath tub, hot tub, pool or lake until you are 8 weeks out from surgery.  Do not apply lotions, creams or ointments until you are 6 weeks out from surgery,    PAIN, SWELLING, BRUISING & CLICKING  Pain and swelling are a natural part of your recovery which is considered normal for up to a year after surgery.  Symptoms may be treated with movement, ice, compression stockings, elevating your leg, and by following the pain medication regimen as prescribed.  Bruising is normal for several weeks after surgery. You may also have leg swelling and pain in your shin.  You may ice areas that are tender to help with discomfort.  You are required to wear the provided compression stockings, every day, for 4 weeks following surgery.  Remove the stockings at night and place them  back on in the morning.  Pain and swelling may temporarily increase with an increase in activity or exercise.  Use ice after activity.  Audible clicking with movement or exercises is considered normal following joint replacement.  If this persists at 6 months or 1 year, please notify your surgeon.  You may also feel decreased sensation or numbness near the incision site.  This is normal and sensation may or may not return.    PERSONAL HYGIENE  You may shower upon discharging from the hospital.  Soap and water is permitted to run over the surgical dressing, steri-strips and incision.  Do not scrub directly over these items.  DO NOT soak your incision in a bath, hot tub, pool or pond/lake for a minimum of 8 weeks following your surgery.  DO NOT use lotions, creams, ointments on your wound for a minimum of 6 weeks following your surgery. At that time you may use vitamin E to assist with softening of your incision.      RESTARTING HOME ROUTINE - DIET & MEDICATIONS  Post-operative constipation can result due to a combination of inactivity, anesthesia and pain medication. To help prevent this, you should increase your water and fiber intake. Physical activity such as walking will also help stimulate the bowels.   You may resume your normal diet when you discharge home.    To avoid constipation, choose foods that help promote good bowel habits, such as foods high in fiber.  You may restart your home medications the following day after your surgery UNLESS you have been given alternate instructions.  Follow the instructions given to you on your hospital discharge instructions for more information regarding your home medications.  IF YOU EXPERIENCE NAUSEA OR DIARRHEA, FOLLOW THE B.R.A.T. DIET UNTIL SYMPTOMS RESOLVE.  If you are experiencing vomiting that lasts more than 24 hours, please contact your joint nurse.      IN-HOME PHYSICAL THERAPY & OUTPATIENT PHYSICAL THERAPY  In-home physical therapy will start 1-2 days after you  get home from the hospital.    The home care agency will call within the first 24-48 hours to set up their first visit.  Please do not call your care team to inquire during this timeframe.  Continue the exercises you were given in the hospital until you have been seen by in-home therapy.  Make sure to provide a phone number with the ability for the home care staff to leave a message if you do not answer your phone.    Outpatient physical therapy following knee replacement surgery should begin 2-3 weeks after surgery.  You will be given physical therapy order prior to discharge from the hospital. You should call to schedule this appointment ASAP if not already scheduled before surgery.  Waiting until you are ready for outpatient physical therapy will cause a delay in your care.  You may choose any outpatient physical therapy location.      EMERGENCIES - WHEN TO CONTACT THE SURGEON'S OFFICE IMMEDIATELY  Fever >101 with chills that has been present for at least 48 hours.   Excessive bleeding from incision that will not slow down. A small amount of drainage is normal and expected.  Once pressure is applied and the area is covered, do not continue to check the area regularly.  This will remove pressure and bleeding will continue.  Leave in place for 4-6 hours.  Signs of infection of incision-excessive drainage that is soaking through your dressing (especially if it is pus-like), redness that is spreading out from the edges of your incision, or increased warmth around the area.  Excruciating pain for which the pain medication, taken as instructed, is not helping.  Severe calf pain.  Go directly to the emergency room or call 911, if you are experiencing chest pain or difficulty breathing.    After Hour and Weekend Emergency Answering Service [phone]    ICE & COLD THERAPY INSTRUCTIONS    To assist with pain control and post-op swelling, you should be using ice regularly throughout recovery, especially for the first 6  weeks, regardless of the cold therapy method you use.      Always make sure there is a layer of protection between the cold pad and your skin.    If you are using ICE PACKS or GEL PACKS, you will need to alternate 20 minutes on, 20 minutes off twice per hour.    If you are using an ICE MACHINE, please follow the provided ice machine instructions.  These devices differ from ice or ice packs whereas the mechanism circulates water through tubing and a pad to provide longer periods of cold therapy to the desired site.  You can use your cold devices around the clock for optimal comfort.  We recommend using cold therapy after working with therapy or completing exercises on your own.  There is no set schedule in which you must follow while using cold therapy.  Below are a few points to remember when using a cold therapy device:    You do not need to need to use the 20 on, 20 off method.  Detach the pad from the cooler and ambulate at least once every hour.  You can check your skin under the pad at this time.  You may wear the cold therapy device during periods of sleep including overnight.  If you wake up during the night, you can check the skin at this time.  You do not need to wake up specifically to perform skin checks.  Empty the cooler and pad when device is not in use.  Follow 's instructions for cleaning your cold therapy device.    DISCHARGE MEDICATIONS - Please reference the sample schedule on the reverse side for instructions on how to best schedule medications.    PAIN MEDICATION    ___X_ Tramadol / Oxycodone  Tramadol and Oxycodone have been prescribed for post-operative pain control.    These medications will only be refilled ONCE every 7 days for a period of up to 6 weeks following surgery.  After 6 weeks, you will transition to acetaminophen and over -the- counter anti-inflammatories such as Ibuprofen, Advil or Aleve in conjunction with ICE/COLD THERAPY.   Side effects may be constipation and  nausea, vomiting, sleepiness, dizziness, lightheadedness, headache, blurred vision, dry mouth sweating, itching (if you have itching, over-the -counter Benadryl can be used as needed).  You may NOT operate a motor vehicle while taking these medications or have been cleared by your care team.     ___X_ Acetaminophen (Tylenol)  Acetaminophen has been prescribed as an adjunct for pain control. Take two 500 mg tablets every 6 hours for 4 weeks. You will not receive a refill on this medication.  Do not exceed 4000mg of acetaminophen within a 24 hour period.  Side effects may include nausea, heartburn, drowsiness, and headache.    ____ Meloxicam (Mobic)-Meloxicam has been prescribed as an adjunct anti-inflammatory to assist in pain control.    Take one 15mg tablet once daily for 4 weeks.  You will not receive refills on this medication.   Side effects may include nausea.  May not be prescribed if you are on a more potent blood thinner than aspirin or have chronic kidney disease.    BLOOD THINNER    ___X_ Blood Thinner   A blood thinner has been prescribed to prevent blood clots in your leg or lungs. Take as prescribed on the bottle for 4 weeks. You will not receive a refill on this medication.  Take 2.5 mg eliquis twice daily for 5 days, then resume home dose of 5 mg twice daily.  ANTI NAUSEA    ___X_ Proton Pump Inhibitor (PPI)-Stomach Acid Reduction Medication  If you are already on a PPI, you will continue your regular medication. If you are not, you will be prescribed Pantoprazole to help with nausea and protect your stomach while taking pain medication.  You will not receive a refill on this medication.    STOOL SOFTENERS    ___X_ Colace (Docusate Sodium) & Miralax (polyethylene glycol)  Take both medications to help with constipation while using the Oxycodone and Tramadol for pain control.  You will not receive a refill on this medication.    Continued Constipation  If you continue to be constipated despite daily  use of Miralax and Colace, you try an over-the-counter Dulcolax Suppository and use per instructions on the package.       SPECIAL INSTRUCTIONS   Take 2.5 mg eliquis twice daily for 5 days, then resume home dose of 5 mg twice daily.  You will not receive refills on the following medications.   Acetaminophen (Tylenol  Miralax  Colace  Proton Pump Inhibitor (PPI)  Blood Thinner    Pain Medication Refills - Ortho Nurse Navigator or MyChart- Monday through Friday 7am-1pm    FOLLOW-UP- You should have an appointment with either Dr. Bradford or MURPHY Davalos in 6 weeks.         SAMPLE              The times below are an example of how to organize medications to optimize pain control  Your actual medication schedule may vary based on your last dose taken IN THE HOSPITAL      Time 3:00 am 6:00 am 9:00 am 12:00 pm 3:00 pm 6:00 pm 9:00 pm 12:00 am   Medications Tramadol Tylenol  Oxycodone  Miralax   Blood Thinner  Colace  Pantoprazole or other PPI  Tramadol   Tylenol  Oxycodone Tramadol Tylenol  Oxycodone  Miralax Blood Thinner  Colace  Tramadol   Tylenol  Oxycodone            You may begin to wean off the pain medication as your pain remains controlled with increased activity.  The schedules provided are meant to serve as an example.  You may wean off based on your pain control.  Please note that pain medications are not filled beyond 6 weeks after surgery.              The times below are an example of how to WEAN OFF medications WHILE CONTINUING TO OPTIMIZE PAIN CONTROL.  Your actual medication schedule may vary based on your last dose taken.    Time 12:00am 4:00am 8:00am 12:00pm 4:00pm 8:00pm   Med Tramadol Oxycodone   Tramadol Oxycodone Tramadol Oxycodone     Time 12:00am 6:00am 12:00pm 6:00pm   Med Tramadol Oxycodone   Tramadol Oxycodone     Time 12:00am 8:00am 4:00pm   Med Tramadol Oxycodone   Tramadol     Time 12:00am 12:00pm   Med Tramadol Tramadol         TOTAL KNEE REPLACEMENT PATIENTS SHOULD TRANSITION TO  OUTPATIENT PHYSICAL THERAPY NO MORE THAN 3 WEEKS FOLLOWING SURGERY.  PLEASE SEE THE LIST OF  FACILITIES BELOW.  CALL TO SCHEDULE YOUR FIRST APPOINTMENT BEFORE YOU HAVE YOUR SURGERY.                   CARDIAC CATHETERIZATION DISCHARGE INSTRUCTIONS     FOR SUDDEN AND SEVERE CHEST PAIN, SHORTNESS OF BREATH, EXCESSIVE BLEEDING, SIGNS OF STROKE, OR CHANGES IN MENTAL STATUS YOU SHOULD CALL 911 IMMEDIATELY.     If your provider has prescribed aspirin and/or clopidogrel (Plavix), or prasugrel (Effient), or ticagrelor (Brilinta), DO NOT STOP THESE MEDICATIONS for any reason without talking to your cardiologist first. If any of these were prescribed, you must take them every day without missing a single dose. If you are getting low on these medications, contact your provider immediately for a refill.     FOR NEXT 24 HOURS  - Upon discharge, you should return home and rest for the remainder of the day and evening. You do not have to stay on bed rest but should not be very active.  It is recommended a responsible adult be with you for the first 24 hours after the procedure.    - No driving for 24 hours after procedure. Please arrange for someone to drive you home from the hospital today.     - Do not drive, operate machinery, or use power tools for 24 hours after your procedure.     - Do not make any legal decisions for 24 hours after your procedure.     - Do not drink alcoholic beverages for 24 hours after your procedure.    WOUND CARE   *FOR FEMORAL (LEG) ACCESS*  ·      Avoid heavy lifting (over 10 pounds) for 7 days, squatting or excessive bending for 2 days, and strenuous exercise for 7 days.  ·      No submerged bathing, swimming, or hot tubs for the next 7 days, or until fully healed.  ·      Avoid sexual activity for 3-4 days until any groin discomfort has ceased.     *FOR RADIAL (WRIST) ACCESS*  ·      No lifting more than 5 pounds or excessive use of the wrist for 24 hours - for example, treat your wrist as if it is  sprained.  ·      Do not engage in vigorous activities (tennis, golf, bowling, weights) for at least 48 hours after the procedure.  ·      Do not submerge the wrist for 7 days after the procedure.  ·      You should expect mild tingling in your hand and tenderness at the puncture site for up to 3 days.    - The transparent dressing should be removed from the site 24 hours after the procedure.  Wash the site gently with soap and water. Rinse well and pat dry. Keep the area clean and dry. You may apply a Band-Aid to the site. Avoid lotions, ointments, or powders until fully healed.     - You may shower the day after your procedure.      - It is normal to notice a small bruise around the puncture site and/or a small grape sized or smaller lump. Any large bruising or large lump warrants a call to the office.     - If bleeding should occur, lay down and apply pressure to the affected area for 10 minutes.  If the bleeding stops notify your physician.  If there is a large amount of bleeding or spurting of blood CALL 911 immediately.  DO NOT drive yourself to the hospital.    - You may experience some tenderness, bruising or minimal inflammation.  If you have any concerns, you may contact the Cath Lab or if any of these symptoms become excessive, contact your cardiologist or go to the emergency room.     OTHER INSTRUCTIONS  - You may take acetaminophen (Tylenol) as directed for discomfort.  If pain is not relieved with acetaminophen (Tylenol), contact your doctor.    - If you notice or experience any of the following, you should notify your doctor or seek medical attention  Chest pain or discomfort  Change in mental status or weakness in extremities.  Dizziness, light headedness, or feeling faint.  Change in the site where the procedure was performed, such as bleeding or an increased area of bruising or swelling.  Tingling, numbness, pain, or coolness in the leg/arm beyond the site where the procedure was performed.  Signs  of infection (i.e. shaking chills, temperature > 100 degrees Fahrenheit, warmth, redness) in the leg/arm area where the procedure was performed.  Changes in urination   Bloody or black stools  Vomiting blood  Severe nose bleeds  Any excessive bleeding    - If you DO NOT have an appointment with your cardiologist within 2-4 weeks following your procedure, please contact their office.

## 2025-01-07 ENCOUNTER — HOSPITAL ENCOUNTER (INPATIENT)
Facility: HOSPITAL | Age: 80
Discharge: NURSING, REHAB OR LONG-TERM CARE | End: 2025-01-07
Attending: ORTHOPAEDIC SURGERY | Admitting: SURGERY
Payer: MEDICARE

## 2025-01-07 ENCOUNTER — ANESTHESIA EVENT (OUTPATIENT)
Dept: OPERATING ROOM | Facility: HOSPITAL | Age: 80
End: 2025-01-07
Payer: MEDICARE

## 2025-01-07 ENCOUNTER — ANESTHESIA (OUTPATIENT)
Dept: OPERATING ROOM | Facility: HOSPITAL | Age: 80
End: 2025-01-07
Payer: MEDICARE

## 2025-01-07 ENCOUNTER — DOCUMENTATION (OUTPATIENT)
Dept: HOME HEALTH SERVICES | Facility: HOME HEALTH | Age: 80
End: 2025-01-07

## 2025-01-07 DIAGNOSIS — R06.02 SHORTNESS OF BREATH: ICD-10-CM

## 2025-01-07 DIAGNOSIS — I25.119 CORONARY ARTERY DISEASE INVOLVING NATIVE CORONARY ARTERY OF NATIVE HEART WITH ANGINA PECTORIS: ICD-10-CM

## 2025-01-07 DIAGNOSIS — M17.11 LOCALIZED OSTEOARTHRITIS OF RIGHT KNEE: Primary | ICD-10-CM

## 2025-01-07 DIAGNOSIS — R57.8 OTHER SHOCK: ICD-10-CM

## 2025-01-07 DIAGNOSIS — R65.20 SEPSIS WITH ACUTE HYPOXIC RESPIRATORY FAILURE WITHOUT SEPTIC SHOCK, DUE TO UNSPECIFIED ORGANISM (MULTI): ICD-10-CM

## 2025-01-07 DIAGNOSIS — I51.7 CARDIOMEGALY: ICD-10-CM

## 2025-01-07 DIAGNOSIS — A41.9 SEPSIS WITH ACUTE HYPOXIC RESPIRATORY FAILURE WITHOUT SEPTIC SHOCK, DUE TO UNSPECIFIED ORGANISM (MULTI): ICD-10-CM

## 2025-01-07 DIAGNOSIS — R09.02 HYPOXIA: ICD-10-CM

## 2025-01-07 DIAGNOSIS — I82.412 ACUTE DEEP VEIN THROMBOSIS (DVT) OF LEFT FEMORAL VEIN (MULTI): ICD-10-CM

## 2025-01-07 DIAGNOSIS — Z97.8 CHRONIC INDWELLING FOLEY CATHETER: ICD-10-CM

## 2025-01-07 DIAGNOSIS — R57.9 SHOCK (MULTI): ICD-10-CM

## 2025-01-07 DIAGNOSIS — R11.0 NAUSEA: ICD-10-CM

## 2025-01-07 DIAGNOSIS — R60.0 LOCALIZED EDEMA: ICD-10-CM

## 2025-01-07 DIAGNOSIS — J96.01 SEPSIS WITH ACUTE HYPOXIC RESPIRATORY FAILURE WITHOUT SEPTIC SHOCK, DUE TO UNSPECIFIED ORGANISM (MULTI): ICD-10-CM

## 2025-01-07 DIAGNOSIS — T81.10XA: ICD-10-CM

## 2025-01-07 DIAGNOSIS — M17.11 UNILATERAL PRIMARY OSTEOARTHRITIS, RIGHT KNEE: ICD-10-CM

## 2025-01-07 PROCEDURE — C1713 ANCHOR/SCREW BN/BN,TIS/BN: HCPCS | Performed by: ORTHOPAEDIC SURGERY

## 2025-01-07 PROCEDURE — 2780000003 HC OR 278 NO HCPCS: Performed by: ORTHOPAEDIC SURGERY

## 2025-01-07 PROCEDURE — 97530 THERAPEUTIC ACTIVITIES: CPT | Mod: GP | Performed by: PHYSICAL THERAPIST

## 2025-01-07 PROCEDURE — 27447 TOTAL KNEE ARTHROPLASTY: CPT | Performed by: ORTHOPAEDIC SURGERY

## 2025-01-07 PROCEDURE — 64447 NJX AA&/STRD FEMORAL NRV IMG: CPT | Performed by: ANESTHESIOLOGY

## 2025-01-07 PROCEDURE — 2500000005 HC RX 250 GENERAL PHARMACY W/O HCPCS: Performed by: PHYSICIAN ASSISTANT

## 2025-01-07 PROCEDURE — 2720000007 HC OR 272 NO HCPCS: Performed by: ORTHOPAEDIC SURGERY

## 2025-01-07 PROCEDURE — 3700000001 HC GENERAL ANESTHESIA TIME - INITIAL BASE CHARGE: Performed by: ORTHOPAEDIC SURGERY

## 2025-01-07 PROCEDURE — 2500000004 HC RX 250 GENERAL PHARMACY W/ HCPCS (ALT 636 FOR OP/ED)

## 2025-01-07 PROCEDURE — 2500000004 HC RX 250 GENERAL PHARMACY W/ HCPCS (ALT 636 FOR OP/ED): Performed by: ORTHOPAEDIC SURGERY

## 2025-01-07 PROCEDURE — 2500000004 HC RX 250 GENERAL PHARMACY W/ HCPCS (ALT 636 FOR OP/ED): Performed by: ANESTHESIOLOGY

## 2025-01-07 PROCEDURE — 2500000001 HC RX 250 WO HCPCS SELF ADMINISTERED DRUGS (ALT 637 FOR MEDICARE OP): Performed by: PHARMACIST

## 2025-01-07 PROCEDURE — 97161 PT EVAL LOW COMPLEX 20 MIN: CPT | Mod: GP | Performed by: PHYSICAL THERAPIST

## 2025-01-07 PROCEDURE — 99100 ANES PT EXTEME AGE<1 YR&>70: CPT | Performed by: ANESTHESIOLOGY

## 2025-01-07 PROCEDURE — 2500000004 HC RX 250 GENERAL PHARMACY W/ HCPCS (ALT 636 FOR OP/ED): Performed by: PHYSICIAN ASSISTANT

## 2025-01-07 PROCEDURE — 3600000010 HC OR TIME - EACH INCREMENTAL 1 MINUTE - PROCEDURE LEVEL FIVE: Performed by: ORTHOPAEDIC SURGERY

## 2025-01-07 PROCEDURE — 2500000001 HC RX 250 WO HCPCS SELF ADMINISTERED DRUGS (ALT 637 FOR MEDICARE OP): Performed by: PHYSICIAN ASSISTANT

## 2025-01-07 PROCEDURE — 2500000005 HC RX 250 GENERAL PHARMACY W/O HCPCS: Performed by: ANESTHESIOLOGY

## 2025-01-07 PROCEDURE — 7100000011 HC EXTENDED STAY RECOVERY HOURLY - NURSING UNIT

## 2025-01-07 PROCEDURE — 3E043XZ INTRODUCTION OF VASOPRESSOR INTO CENTRAL VEIN, PERCUTANEOUS APPROACH: ICD-10-PCS

## 2025-01-07 PROCEDURE — 4A023N6 MEASUREMENT OF CARDIAC SAMPLING AND PRESSURE, RIGHT HEART, PERCUTANEOUS APPROACH: ICD-10-PCS | Performed by: HOSPITALIST

## 2025-01-07 PROCEDURE — 97110 THERAPEUTIC EXERCISES: CPT | Mod: GP | Performed by: PHYSICAL THERAPIST

## 2025-01-07 PROCEDURE — 2500000005 HC RX 250 GENERAL PHARMACY W/O HCPCS: Performed by: ORTHOPAEDIC SURGERY

## 2025-01-07 PROCEDURE — 7100000001 HC RECOVERY ROOM TIME - INITIAL BASE CHARGE: Performed by: ORTHOPAEDIC SURGERY

## 2025-01-07 PROCEDURE — A27447 PR TOTAL KNEE ARTHROPLASTY

## 2025-01-07 PROCEDURE — C1776 JOINT DEVICE (IMPLANTABLE): HCPCS | Performed by: ORTHOPAEDIC SURGERY

## 2025-01-07 PROCEDURE — 7100000002 HC RECOVERY ROOM TIME - EACH INCREMENTAL 1 MINUTE: Performed by: ORTHOPAEDIC SURGERY

## 2025-01-07 PROCEDURE — 2500000002 HC RX 250 W HCPCS SELF ADMINISTERED DRUGS (ALT 637 FOR MEDICARE OP, ALT 636 FOR OP/ED): Performed by: PHYSICIAN ASSISTANT

## 2025-01-07 PROCEDURE — 2500000001 HC RX 250 WO HCPCS SELF ADMINISTERED DRUGS (ALT 637 FOR MEDICARE OP): Performed by: STUDENT IN AN ORGANIZED HEALTH CARE EDUCATION/TRAINING PROGRAM

## 2025-01-07 PROCEDURE — A27447 PR TOTAL KNEE ARTHROPLASTY: Performed by: ANESTHESIOLOGY

## 2025-01-07 PROCEDURE — 3600000005 HC OR TIME - INITIAL BASE CHARGE - PROCEDURE LEVEL FIVE: Performed by: ORTHOPAEDIC SURGERY

## 2025-01-07 PROCEDURE — 3700000002 HC GENERAL ANESTHESIA TIME - EACH INCREMENTAL 1 MINUTE: Performed by: ORTHOPAEDIC SURGERY

## 2025-01-07 PROCEDURE — 2500000005 HC RX 250 GENERAL PHARMACY W/O HCPCS

## 2025-01-07 PROCEDURE — 02HQ32Z INSERTION OF MONITORING DEVICE INTO RIGHT PULMONARY ARTERY, PERCUTANEOUS APPROACH: ICD-10-PCS | Performed by: HOSPITALIST

## 2025-01-07 DEVICE — ATTUNE KNEE SYSTEM FEMORAL POSTERIOR STABILIZED SIZE 8 RIGHT CEMENTED
Type: IMPLANTABLE DEVICE | Site: KNEE | Status: FUNCTIONAL
Brand: ATTUNE

## 2025-01-07 DEVICE — ATTUNE KNEE SYSTEM TIBIAL INSERT FIXED BEARING POSTERIOR STABILIZED 8 6MM AOX
Type: IMPLANTABLE DEVICE | Site: KNEE | Status: FUNCTIONAL
Brand: ATTUNE

## 2025-01-07 DEVICE — ATTUNE KNEE SYSTEM TIBIAL BASE FIXED BEARING SIZE 7 CEMENTED
Type: IMPLANTABLE DEVICE | Site: KNEE | Status: FUNCTIONAL
Brand: ATTUNE

## 2025-01-07 DEVICE — ATTUNE PATELLA MEDIALIZED DOME 38MM CEMENTED AOX
Type: IMPLANTABLE DEVICE | Site: KNEE | Status: FUNCTIONAL
Brand: ATTUNE

## 2025-01-07 DEVICE — SMARTSET HV HIGH VISCOSITY BONE CEMENT 40G
Type: IMPLANTABLE DEVICE | Site: KNEE | Status: FUNCTIONAL
Brand: SMARTSET

## 2025-01-07 RX ORDER — DONEPEZIL HYDROCHLORIDE 5 MG/1
5 TABLET, FILM COATED ORAL NIGHTLY
Status: DISPENSED | OUTPATIENT
Start: 2025-01-07

## 2025-01-07 RX ORDER — TRANEXAMIC ACID 650 MG/1
1950 TABLET ORAL ONCE
Status: COMPLETED | OUTPATIENT
Start: 2025-01-07 | End: 2025-01-07

## 2025-01-07 RX ORDER — RAMIPRIL 10 MG/1
10 CAPSULE ORAL DAILY
Status: DISCONTINUED | OUTPATIENT
Start: 2025-01-07 | End: 2025-01-07 | Stop reason: ALTCHOICE

## 2025-01-07 RX ORDER — ALLOPURINOL 300 MG/1
300 TABLET ORAL DAILY
Status: DISPENSED | OUTPATIENT
Start: 2025-01-07

## 2025-01-07 RX ORDER — LANOLIN ALCOHOL/MO/W.PET/CERES
100 CREAM (GRAM) TOPICAL DAILY
Status: DISPENSED | OUTPATIENT
Start: 2025-01-07

## 2025-01-07 RX ORDER — FENTANYL CITRATE 50 UG/ML
INJECTION, SOLUTION INTRAMUSCULAR; INTRAVENOUS AS NEEDED
Status: DISCONTINUED | OUTPATIENT
Start: 2025-01-07 | End: 2025-01-07

## 2025-01-07 RX ORDER — TAMSULOSIN HYDROCHLORIDE 0.4 MG/1
0.4 CAPSULE ORAL NIGHTLY
Status: DISPENSED | OUTPATIENT
Start: 2025-01-07

## 2025-01-07 RX ORDER — LORAZEPAM 1 MG/1
2 TABLET ORAL EVERY 2 HOUR PRN
Status: DISCONTINUED | OUTPATIENT
Start: 2025-01-07 | End: 2025-01-08

## 2025-01-07 RX ORDER — KETOROLAC TROMETHAMINE 30 MG/ML
15 INJECTION, SOLUTION INTRAMUSCULAR; INTRAVENOUS EVERY 6 HOURS
Status: DISCONTINUED | OUTPATIENT
Start: 2025-01-07 | End: 2025-01-08

## 2025-01-07 RX ORDER — LABETALOL HYDROCHLORIDE 5 MG/ML
5 INJECTION, SOLUTION INTRAVENOUS ONCE AS NEEDED
Status: DISCONTINUED | OUTPATIENT
Start: 2025-01-07 | End: 2025-01-07 | Stop reason: HOSPADM

## 2025-01-07 RX ORDER — BUPIVACAINE HCL/EPINEPHRINE 0.5-1:200K
VIAL (ML) INJECTION AS NEEDED
Status: DISCONTINUED | OUTPATIENT
Start: 2025-01-07 | End: 2025-01-07

## 2025-01-07 RX ORDER — SODIUM CHLORIDE 0.9 G/100ML
IRRIGANT IRRIGATION AS NEEDED
Status: DISCONTINUED | OUTPATIENT
Start: 2025-01-07 | End: 2025-01-07 | Stop reason: HOSPADM

## 2025-01-07 RX ORDER — POLYETHYLENE GLYCOL 3350 17 G/17G
17 POWDER, FOR SOLUTION ORAL DAILY
Status: DISPENSED | OUTPATIENT
Start: 2025-01-07

## 2025-01-07 RX ORDER — MIDAZOLAM HYDROCHLORIDE 1 MG/ML
INJECTION INTRAMUSCULAR; INTRAVENOUS AS NEEDED
Status: DISCONTINUED | OUTPATIENT
Start: 2025-01-07 | End: 2025-01-07

## 2025-01-07 RX ORDER — ATORVASTATIN CALCIUM 10 MG/1
10 TABLET, FILM COATED ORAL DAILY
Status: DISPENSED | OUTPATIENT
Start: 2025-01-07

## 2025-01-07 RX ORDER — FENTANYL CITRATE 50 UG/ML
INJECTION, SOLUTION INTRAMUSCULAR; INTRAVENOUS
Status: COMPLETED
Start: 2025-01-07 | End: 2025-01-07

## 2025-01-07 RX ORDER — WATER
75 LIQUID (ML) MISCELLANEOUS
Status: DISCONTINUED | OUTPATIENT
Start: 2025-01-07 | End: 2025-01-09

## 2025-01-07 RX ORDER — ALBUTEROL SULFATE 0.83 MG/ML
2.5 SOLUTION RESPIRATORY (INHALATION) ONCE AS NEEDED
Status: DISCONTINUED | OUTPATIENT
Start: 2025-01-07 | End: 2025-01-07 | Stop reason: HOSPADM

## 2025-01-07 RX ORDER — SODIUM CHLORIDE, SODIUM LACTATE, POTASSIUM CHLORIDE, CALCIUM CHLORIDE 600; 310; 30; 20 MG/100ML; MG/100ML; MG/100ML; MG/100ML
100 INJECTION, SOLUTION INTRAVENOUS CONTINUOUS
Status: ACTIVE | OUTPATIENT
Start: 2025-01-07 | End: 2025-01-08

## 2025-01-07 RX ORDER — CEFAZOLIN 1 G/1
INJECTION, POWDER, FOR SOLUTION INTRAVENOUS AS NEEDED
Status: DISCONTINUED | OUTPATIENT
Start: 2025-01-07 | End: 2025-01-07

## 2025-01-07 RX ORDER — MIDAZOLAM HYDROCHLORIDE 1 MG/ML
INJECTION INTRAMUSCULAR; INTRAVENOUS
Status: COMPLETED
Start: 2025-01-07 | End: 2025-01-07

## 2025-01-07 RX ORDER — ACETAMINOPHEN 325 MG/1
975 TABLET ORAL ONCE
Status: COMPLETED | OUTPATIENT
Start: 2025-01-07 | End: 2025-01-07

## 2025-01-07 RX ORDER — FOLIC ACID 1 MG/1
1 TABLET ORAL DAILY
Status: DISPENSED | OUTPATIENT
Start: 2025-01-07

## 2025-01-07 RX ORDER — CEFAZOLIN SODIUM 2 G/100ML
2 INJECTION, SOLUTION INTRAVENOUS ONCE
Status: DISCONTINUED | OUTPATIENT
Start: 2025-01-07 | End: 2025-01-07 | Stop reason: HOSPADM

## 2025-01-07 RX ORDER — ONDANSETRON HYDROCHLORIDE 2 MG/ML
4 INJECTION, SOLUTION INTRAVENOUS EVERY 8 HOURS PRN
Status: DISPENSED | OUTPATIENT
Start: 2025-01-07

## 2025-01-07 RX ORDER — SCOPOLAMINE 1 MG/3D
1 PATCH, EXTENDED RELEASE TRANSDERMAL
Status: DISCONTINUED | OUTPATIENT
Start: 2025-01-07 | End: 2025-01-07

## 2025-01-07 RX ORDER — OXYCODONE HYDROCHLORIDE 5 MG/1
5 TABLET ORAL EVERY 4 HOURS PRN
Status: DISCONTINUED | OUTPATIENT
Start: 2025-01-07 | End: 2025-01-07 | Stop reason: HOSPADM

## 2025-01-07 RX ORDER — LISINOPRIL 20 MG/1
20 TABLET ORAL DAILY
Status: DISPENSED | OUTPATIENT
Start: 2025-01-07

## 2025-01-07 RX ORDER — PROPOFOL 10 MG/ML
INJECTION, EMULSION INTRAVENOUS CONTINUOUS PRN
Status: DISCONTINUED | OUTPATIENT
Start: 2025-01-07 | End: 2025-01-07

## 2025-01-07 RX ORDER — MULTIVIT-MIN/IRON FUM/FOLIC AC 7.5 MG-4
1 TABLET ORAL DAILY
Status: DISPENSED | OUTPATIENT
Start: 2025-01-07

## 2025-01-07 RX ORDER — METOCLOPRAMIDE 10 MG/1
10 TABLET ORAL EVERY 6 HOURS PRN
Status: DISPENSED | OUTPATIENT
Start: 2025-01-07

## 2025-01-07 RX ORDER — PREGABALIN 100 MG/1
300 CAPSULE ORAL 2 TIMES DAILY
Status: DISCONTINUED | OUTPATIENT
Start: 2025-01-07 | End: 2025-01-10

## 2025-01-07 RX ORDER — HYDRALAZINE HYDROCHLORIDE 20 MG/ML
5 INJECTION INTRAMUSCULAR; INTRAVENOUS EVERY 30 MIN PRN
Status: DISCONTINUED | OUTPATIENT
Start: 2025-01-07 | End: 2025-01-07 | Stop reason: HOSPADM

## 2025-01-07 RX ORDER — LIDOCAINE HYDROCHLORIDE 10 MG/ML
0.1 INJECTION, SOLUTION EPIDURAL; INFILTRATION; INTRACAUDAL; PERINEURAL ONCE
Status: DISCONTINUED | OUTPATIENT
Start: 2025-01-07 | End: 2025-01-07 | Stop reason: HOSPADM

## 2025-01-07 RX ORDER — MELOXICAM 7.5 MG/1
7.5 TABLET ORAL ONCE
Status: COMPLETED | OUTPATIENT
Start: 2025-01-07 | End: 2025-01-07

## 2025-01-07 RX ORDER — OXYCODONE HCL 10 MG/1
10 TABLET, FILM COATED, EXTENDED RELEASE ORAL ONCE
Status: COMPLETED | OUTPATIENT
Start: 2025-01-07 | End: 2025-01-07

## 2025-01-07 RX ORDER — BISACODYL 10 MG/1
10 SUPPOSITORY RECTAL DAILY PRN
Status: ACTIVE | OUTPATIENT
Start: 2025-01-07

## 2025-01-07 RX ORDER — LORAZEPAM 1 MG/1
1 TABLET ORAL EVERY 2 HOUR PRN
Status: DISCONTINUED | OUTPATIENT
Start: 2025-01-07 | End: 2025-01-08

## 2025-01-07 RX ORDER — PREGABALIN 75 MG/1
75 CAPSULE ORAL ONCE
Status: COMPLETED | OUTPATIENT
Start: 2025-01-07 | End: 2025-01-07

## 2025-01-07 RX ORDER — LIDOCAINE HYDROCHLORIDE 20 MG/ML
INJECTION, SOLUTION EPIDURAL; INFILTRATION; INTRACAUDAL; PERINEURAL AS NEEDED
Status: DISCONTINUED | OUTPATIENT
Start: 2025-01-07 | End: 2025-01-07

## 2025-01-07 RX ORDER — CEFAZOLIN SODIUM 2 G/100ML
2 INJECTION, SOLUTION INTRAVENOUS EVERY 8 HOURS
Status: COMPLETED | OUTPATIENT
Start: 2025-01-07 | End: 2025-01-08

## 2025-01-07 RX ORDER — SODIUM CHLORIDE, SODIUM LACTATE, POTASSIUM CHLORIDE, CALCIUM CHLORIDE 600; 310; 30; 20 MG/100ML; MG/100ML; MG/100ML; MG/100ML
INJECTION, SOLUTION INTRAVENOUS CONTINUOUS PRN
Status: DISCONTINUED | OUTPATIENT
Start: 2025-01-07 | End: 2025-01-07

## 2025-01-07 RX ORDER — ONDANSETRON HYDROCHLORIDE 2 MG/ML
4 INJECTION, SOLUTION INTRAVENOUS ONCE AS NEEDED
Status: DISCONTINUED | OUTPATIENT
Start: 2025-01-07 | End: 2025-01-07 | Stop reason: HOSPADM

## 2025-01-07 RX ORDER — PANTOPRAZOLE SODIUM 40 MG/1
40 TABLET, DELAYED RELEASE ORAL
Status: DISPENSED | OUTPATIENT
Start: 2025-01-08 | End: 2025-01-29

## 2025-01-07 RX ORDER — ONDANSETRON 4 MG/1
4 TABLET, FILM COATED ORAL EVERY 8 HOURS PRN
Status: DISPENSED | OUTPATIENT
Start: 2025-01-07

## 2025-01-07 RX ORDER — OXYCODONE HYDROCHLORIDE 5 MG/1
10 TABLET ORAL EVERY 4 HOURS PRN
Status: DISCONTINUED | OUTPATIENT
Start: 2025-01-07 | End: 2025-01-08

## 2025-01-07 RX ORDER — FUROSEMIDE 40 MG/1
40 TABLET ORAL DAILY
Status: DISPENSED | OUTPATIENT
Start: 2025-01-07

## 2025-01-07 RX ORDER — ROPIVACAINE/EPI/CLONIDINE/KET 2.46-0.005
SYRINGE (ML) INJECTION AS NEEDED
Status: DISCONTINUED | OUTPATIENT
Start: 2025-01-07 | End: 2025-01-07 | Stop reason: HOSPADM

## 2025-01-07 RX ORDER — PHENYLEPHRINE 10 MG/250 ML(40 MCG/ML)IN 0.9 % SOD.CHLORIDE INTRAVENOUS
CONTINUOUS PRN
Status: DISCONTINUED | OUTPATIENT
Start: 2025-01-07 | End: 2025-01-07

## 2025-01-07 RX ORDER — ACETAMINOPHEN 325 MG/1
650 TABLET ORAL EVERY 6 HOURS SCHEDULED
Status: DISPENSED | OUTPATIENT
Start: 2025-01-07

## 2025-01-07 RX ORDER — METOCLOPRAMIDE HYDROCHLORIDE 5 MG/ML
10 INJECTION INTRAMUSCULAR; INTRAVENOUS EVERY 6 HOURS PRN
Status: DISPENSED | OUTPATIENT
Start: 2025-01-07

## 2025-01-07 RX ORDER — DOCUSATE SODIUM 100 MG/1
100 CAPSULE, LIQUID FILLED ORAL 2 TIMES DAILY
Status: DISPENSED | OUTPATIENT
Start: 2025-01-07

## 2025-01-07 RX ORDER — TRANEXAMIC ACID 100 MG/ML
INJECTION, SOLUTION INTRAVENOUS AS NEEDED
Status: DISCONTINUED | OUTPATIENT
Start: 2025-01-07 | End: 2025-01-07

## 2025-01-07 RX ORDER — METOPROLOL SUCCINATE 25 MG/1
25 TABLET, EXTENDED RELEASE ORAL DAILY
Status: ACTIVE | OUTPATIENT
Start: 2025-01-08

## 2025-01-07 RX ORDER — NALOXONE HYDROCHLORIDE 0.4 MG/ML
0.2 INJECTION, SOLUTION INTRAMUSCULAR; INTRAVENOUS; SUBCUTANEOUS EVERY 5 MIN PRN
Status: DISPENSED | OUTPATIENT
Start: 2025-01-07

## 2025-01-07 RX ORDER — CYCLOBENZAPRINE HCL 5 MG
5 TABLET ORAL 3 TIMES DAILY PRN
Status: DISPENSED | OUTPATIENT
Start: 2025-01-07

## 2025-01-07 RX ORDER — OXYCODONE HYDROCHLORIDE 5 MG/1
5 TABLET ORAL EVERY 6 HOURS PRN
Status: DISPENSED | OUTPATIENT
Start: 2025-01-07

## 2025-01-07 RX ORDER — LORAZEPAM 0.5 MG/1
0.5 TABLET ORAL EVERY 2 HOUR PRN
Status: DISCONTINUED | OUTPATIENT
Start: 2025-01-07 | End: 2025-01-08

## 2025-01-07 RX ORDER — BISACODYL 5 MG
10 TABLET, DELAYED RELEASE (ENTERIC COATED) ORAL DAILY PRN
Status: ACTIVE | OUTPATIENT
Start: 2025-01-07

## 2025-01-07 RX ORDER — PHENYLEPHRINE HCL IN 0.9% NACL 1 MG/10 ML
SYRINGE (ML) INTRAVENOUS AS NEEDED
Status: DISCONTINUED | OUTPATIENT
Start: 2025-01-07 | End: 2025-01-07

## 2025-01-07 RX ORDER — BUPIVACAINE HCL/EPINEPHRINE 0.5-1:200K
VIAL (ML) INJECTION
Status: COMPLETED
Start: 2025-01-07 | End: 2025-01-07

## 2025-01-07 RX ADMIN — MIDAZOLAM HYDROCHLORIDE 1 MG: 1 INJECTION, SOLUTION INTRAMUSCULAR; INTRAVENOUS at 11:07

## 2025-01-07 RX ADMIN — CEFAZOLIN SODIUM 2 G: 2 INJECTION, SOLUTION INTRAVENOUS at 17:09

## 2025-01-07 RX ADMIN — OXYCODONE HYDROCHLORIDE 10 MG: 10 TABLET, FILM COATED, EXTENDED RELEASE ORAL at 10:15

## 2025-01-07 RX ADMIN — DONEPEZIL HYDROCHLORIDE 5 MG: 5 TABLET ORAL at 20:59

## 2025-01-07 RX ADMIN — HYDROMORPHONE HYDROCHLORIDE 0.5 MG: 1 INJECTION, SOLUTION INTRAMUSCULAR; INTRAVENOUS; SUBCUTANEOUS at 14:24

## 2025-01-07 RX ADMIN — Medication 200 MCG: at 12:08

## 2025-01-07 RX ADMIN — TRANEXAMIC ACID 1950 MG: 650 TABLET ORAL at 17:09

## 2025-01-07 RX ADMIN — Medication 2 L/MIN: at 21:07

## 2025-01-07 RX ADMIN — TRANEXAMIC ACID 1000 MG: 1 INJECTION, SOLUTION INTRAVENOUS at 11:50

## 2025-01-07 RX ADMIN — ATORVASTATIN CALCIUM 10 MG: 10 TABLET, FILM COATED ORAL at 20:59

## 2025-01-07 RX ADMIN — Medication 75 ML: at 22:47

## 2025-01-07 RX ADMIN — Medication 75 ML: at 17:14

## 2025-01-07 RX ADMIN — FENTANYL CITRATE 50 MCG: 50 INJECTION, SOLUTION INTRAMUSCULAR; INTRAVENOUS at 11:05

## 2025-01-07 RX ADMIN — PROPOFOL 30 MG: 10 INJECTION, EMULSION INTRAVENOUS at 11:59

## 2025-01-07 RX ADMIN — Medication 20 ML: at 11:10

## 2025-01-07 RX ADMIN — POLYETHYLENE GLYCOL 3350 17 G: 17 POWDER, FOR SOLUTION ORAL at 20:59

## 2025-01-07 RX ADMIN — LISINOPRIL 20 MG: 20 TABLET ORAL at 20:59

## 2025-01-07 RX ADMIN — TAMSULOSIN HYDROCHLORIDE 0.4 MG: 0.4 CAPSULE ORAL at 20:59

## 2025-01-07 RX ADMIN — FUROSEMIDE 40 MG: 40 TABLET ORAL at 17:09

## 2025-01-07 RX ADMIN — PREGABALIN 75 MG: 75 CAPSULE ORAL at 10:15

## 2025-01-07 RX ADMIN — HYDROMORPHONE HYDROCHLORIDE 0.5 MG: 1 INJECTION, SOLUTION INTRAMUSCULAR; INTRAVENOUS; SUBCUTANEOUS at 14:07

## 2025-01-07 RX ADMIN — MEPIVACAINE HYDROCHLORIDE 3.5 ML: 15 INJECTION, SOLUTION EPIDURAL; INFILTRATION at 11:45

## 2025-01-07 RX ADMIN — FOLIC ACID 1 MG: 1 TABLET ORAL at 20:58

## 2025-01-07 RX ADMIN — MIDAZOLAM HYDROCHLORIDE 1 MG: 1 INJECTION, SOLUTION INTRAMUSCULAR; INTRAVENOUS at 11:05

## 2025-01-07 RX ADMIN — FENTANYL CITRATE 50 MCG: 50 INJECTION, SOLUTION INTRAMUSCULAR; INTRAVENOUS at 11:07

## 2025-01-07 RX ADMIN — PROPOFOL 30 MG: 10 INJECTION, EMULSION INTRAVENOUS at 13:04

## 2025-01-07 RX ADMIN — Medication 6 L/MIN: at 13:40

## 2025-01-07 RX ADMIN — KETOROLAC TROMETHAMINE 15 MG: 30 INJECTION, SOLUTION INTRAMUSCULAR at 22:46

## 2025-01-07 RX ADMIN — ONDANSETRON 4 MG: 2 INJECTION, SOLUTION INTRAMUSCULAR; INTRAVENOUS at 17:19

## 2025-01-07 RX ADMIN — DOCUSATE SODIUM 100 MG: 100 CAPSULE, LIQUID FILLED ORAL at 20:58

## 2025-01-07 RX ADMIN — PHENYLEPHRINE-NACL IV SOLUTION 10 MG/250ML-0.9% 0.4 MCG/KG/MIN: 10-0.9/25 SOLUTION at 12:13

## 2025-01-07 RX ADMIN — MULTIPLE VITAMINS W/ MINERALS TAB 1 TABLET: TAB ORAL at 20:58

## 2025-01-07 RX ADMIN — ALLOPURINOL 300 MG: 300 TABLET ORAL at 20:58

## 2025-01-07 RX ADMIN — Medication 100 MG: at 20:58

## 2025-01-07 RX ADMIN — Medication 200 MCG: at 12:39

## 2025-01-07 RX ADMIN — PREGABALIN 300 MG: 100 CAPSULE ORAL at 20:59

## 2025-01-07 RX ADMIN — CEFAZOLIN 2 G: 330 INJECTION, POWDER, FOR SOLUTION INTRAMUSCULAR; INTRAVENOUS at 11:50

## 2025-01-07 RX ADMIN — LIDOCAINE HYDROCHLORIDE 50 MG: 20 INJECTION, SOLUTION EPIDURAL; INFILTRATION; INTRACAUDAL; PERINEURAL at 11:46

## 2025-01-07 RX ADMIN — OXYCODONE HYDROCHLORIDE 10 MG: 5 TABLET ORAL at 20:56

## 2025-01-07 RX ADMIN — SODIUM CHLORIDE, POTASSIUM CHLORIDE, SODIUM LACTATE AND CALCIUM CHLORIDE: 600; 310; 30; 20 INJECTION, SOLUTION INTRAVENOUS at 11:33

## 2025-01-07 RX ADMIN — ACETAMINOPHEN 650 MG: 325 TABLET, FILM COATED ORAL at 17:09

## 2025-01-07 RX ADMIN — KETOROLAC TROMETHAMINE 15 MG: 30 INJECTION, SOLUTION INTRAMUSCULAR at 17:09

## 2025-01-07 RX ADMIN — GLYCOPYRROLATE 0.2 MG: 0.2 INJECTION, SOLUTION INTRAMUSCULAR; INTRAVENOUS at 12:13

## 2025-01-07 RX ADMIN — SODIUM CHLORIDE, POTASSIUM CHLORIDE, SODIUM LACTATE AND CALCIUM CHLORIDE 100 ML/HR: 600; 310; 30; 20 INJECTION, SOLUTION INTRAVENOUS at 17:08

## 2025-01-07 RX ADMIN — MELOXICAM 7.5 MG: 7.5 TABLET ORAL at 10:15

## 2025-01-07 RX ADMIN — CYCLOBENZAPRINE HYDROCHLORIDE 5 MG: 5 TABLET, FILM COATED ORAL at 20:56

## 2025-01-07 RX ADMIN — MIDAZOLAM HYDROCHLORIDE 2 MG: 1 INJECTION, SOLUTION INTRAMUSCULAR; INTRAVENOUS at 11:42

## 2025-01-07 RX ADMIN — HYDROMORPHONE HYDROCHLORIDE 0.5 MG: 1 INJECTION, SOLUTION INTRAMUSCULAR; INTRAVENOUS; SUBCUTANEOUS at 14:16

## 2025-01-07 RX ADMIN — HYDROMORPHONE HYDROCHLORIDE 0.5 MG: 1 INJECTION, SOLUTION INTRAMUSCULAR; INTRAVENOUS; SUBCUTANEOUS at 14:37

## 2025-01-07 RX ADMIN — SCOPOLAMINE 1 PATCH: 1.5 PATCH, EXTENDED RELEASE TRANSDERMAL at 10:15

## 2025-01-07 RX ADMIN — Medication 75 ML: at 20:00

## 2025-01-07 RX ADMIN — Medication 75 ML: at 21:05

## 2025-01-07 RX ADMIN — POVIDONE-IODINE 1 APPLICATION: 5 SOLUTION TOPICAL at 10:15

## 2025-01-07 RX ADMIN — PROPOFOL 100 MCG/KG/MIN: 10 INJECTION, EMULSION INTRAVENOUS at 11:47

## 2025-01-07 RX ADMIN — PROPOFOL 30 MG: 10 INJECTION, EMULSION INTRAVENOUS at 12:46

## 2025-01-07 RX ADMIN — Medication 75 ML: at 18:37

## 2025-01-07 RX ADMIN — ACETAMINOPHEN 975 MG: 325 TABLET, FILM COATED ORAL at 10:15

## 2025-01-07 RX ADMIN — Medication 100 MCG: at 12:04

## 2025-01-07 RX ADMIN — SODIUM CHLORIDE, POTASSIUM CHLORIDE, SODIUM LACTATE AND CALCIUM CHLORIDE 100 ML/HR: 600; 310; 30; 20 INJECTION, SOLUTION INTRAVENOUS at 13:40

## 2025-01-07 ASSESSMENT — LIFESTYLE VARIABLES
HEADACHE, FULLNESS IN HEAD: NOT PRESENT
AUDITORY DISTURBANCES: NOT PRESENT
VISUAL DISTURBANCES: NOT PRESENT
AGITATION: NORMAL ACTIVITY
NAUSEA AND VOMITING: NO NAUSEA AND NO VOMITING
AUDITORY DISTURBANCES: NOT PRESENT
TREMOR: NO TREMOR
PAROXYSMAL SWEATS: NO SWEAT VISIBLE
ORIENTATION AND CLOUDING OF SENSORIUM: ORIENTED AND CAN DO SERIAL ADDITIONS
ANXIETY: NO ANXIETY, AT EASE
PAROXYSMAL SWEATS: NO SWEAT VISIBLE
PAROXYSMAL SWEATS: NO SWEAT VISIBLE
AGITATION: NORMAL ACTIVITY
HEADACHE, FULLNESS IN HEAD: NOT PRESENT
AUDITORY DISTURBANCES: NOT PRESENT
ANXIETY: NO ANXIETY, AT EASE
VISUAL DISTURBANCES: NOT PRESENT
TOTAL SCORE: 0
ORIENTATION AND CLOUDING OF SENSORIUM: ORIENTED AND CAN DO SERIAL ADDITIONS
TOTAL SCORE: 0
TREMOR: NO TREMOR
ANXIETY: NO ANXIETY, AT EASE
TOTAL SCORE: 0
NAUSEA AND VOMITING: NO NAUSEA AND NO VOMITING
VISUAL DISTURBANCES: NOT PRESENT
AGITATION: NORMAL ACTIVITY
ORIENTATION AND CLOUDING OF SENSORIUM: ORIENTED AND CAN DO SERIAL ADDITIONS
HEADACHE, FULLNESS IN HEAD: NOT PRESENT
TREMOR: NO TREMOR
NAUSEA AND VOMITING: NO NAUSEA AND NO VOMITING

## 2025-01-07 ASSESSMENT — PAIN - FUNCTIONAL ASSESSMENT
PAIN_FUNCTIONAL_ASSESSMENT: 0-10
PAIN_FUNCTIONAL_ASSESSMENT: UNABLE TO SELF-REPORT
PAIN_FUNCTIONAL_ASSESSMENT: 0-10
PAIN_FUNCTIONAL_ASSESSMENT: UNABLE TO SELF-REPORT
PAIN_FUNCTIONAL_ASSESSMENT: UNABLE TO SELF-REPORT
PAIN_FUNCTIONAL_ASSESSMENT: 0-10

## 2025-01-07 ASSESSMENT — PAIN SCALES - GENERAL
PAINLEVEL_OUTOF10: 8
PAINLEVEL_OUTOF10: 7
PAINLEVEL_OUTOF10: 5 - MODERATE PAIN
PAINLEVEL_OUTOF10: 9
PAINLEVEL_OUTOF10: 8
PAINLEVEL_OUTOF10: 10 - WORST POSSIBLE PAIN
PAINLEVEL_OUTOF10: 8
PAINLEVEL_OUTOF10: 10 - WORST POSSIBLE PAIN
PAINLEVEL_OUTOF10: 0 - NO PAIN
PAINLEVEL_OUTOF10: 10 - WORST POSSIBLE PAIN
PAINLEVEL_OUTOF10: 9
PAINLEVEL_OUTOF10: 10 - WORST POSSIBLE PAIN
PAINLEVEL_OUTOF10: 8
PAINLEVEL_OUTOF10: 0 - NO PAIN

## 2025-01-07 ASSESSMENT — COGNITIVE AND FUNCTIONAL STATUS - GENERAL
MOVING TO AND FROM BED TO CHAIR: A LITTLE
MOBILITY SCORE: 16
STANDING UP FROM CHAIR USING ARMS: A LITTLE
MOVING FROM LYING ON BACK TO SITTING ON SIDE OF FLAT BED WITH BEDRAILS: A LITTLE
WALKING IN HOSPITAL ROOM: A LITTLE
CLIMB 3 TO 5 STEPS WITH RAILING: TOTAL
TURNING FROM BACK TO SIDE WHILE IN FLAT BAD: A LITTLE

## 2025-01-07 ASSESSMENT — PAIN DESCRIPTION - LOCATION
LOCATION: KNEE

## 2025-01-07 ASSESSMENT — ACTIVITIES OF DAILY LIVING (ADL): ADL_ASSISTANCE: INDEPENDENT

## 2025-01-07 ASSESSMENT — PAIN DESCRIPTION - ORIENTATION
ORIENTATION: RIGHT

## 2025-01-07 ASSESSMENT — PAIN DESCRIPTION - DESCRIPTORS: DESCRIPTORS: ACHING

## 2025-01-07 NOTE — INTERVAL H&P NOTE
H&P reviewed. The patient was examined and there are no changes to the H&P.  
Spontaneous, unlabored and symmetrical

## 2025-01-07 NOTE — BRIEF OP NOTE
Date: 2025  OR Location: [facility] OR    Name: Mikhail Moses, : 1945, Age: 79 y.o., MRN: [id number], Sex: male    Diagnosis  Pre-op Diagnosis      * Unilateral primary osteoarthritis, right knee [M17.11] Post-op Diagnosis     * Unilateral primary osteoarthritis, right knee [M17.11]     Procedures  Right Knee Total  Arthroplasty  52183 - TX ARTHRP KNE CONDYLE&PLATU MEDIAL&LAT COMPARTMENTS      Surgeons      * Emmanuel Bradford - Primary    Resident/Fellow/Other Assistant:  Surgeons and Role:     * Angle Rodney PA-C - HEATH[facility] Assist  Steffanie Davis MD    Staff:   Circulator: Rocío Yeung Person: Kinza  Surgical Assistant: Blaine Armijo Circulator: Emmy Armijo Scrub: Bhavya    Anesthesia Staff: Anesthesiologist: Mikal Wiggins MD  C-AA: NELLI Chairez    Procedure Summary  Anesthesia: Monitor Anesthesia Care, Spinal  ASA: III  Estimated Blood Loss: 25 mL  Intra-op Medications:   Administrations occurring from 1200 to 1430 on 25:   Medication Name Total Dose   sodium chloride 0.9 % irrigation solution 1,000 mL   sodium chloride 0.9 % irrigation solution 1,000 mL   glycopyrrolate PF (Robinul) injection 0.2 mg   phenylephrine (Anival-Synephrine) 10 mg/250 mL NS (40 mcg/mL) infusion 5.85 mg   phenylephrine 100 mcg/mL syringe 10 mL (prefilled) 500 mcg   propofol (Diprivan) injection 10 mg/mL 1,961.9 mg              Anesthesia Record               Intraprocedure I/O Totals          Intake    Tranexamic Acid 0.00 mL    The total shown is the total volume documented since Anesthesia Start was filed.    Phenylephrine Drip 0.00 mL    The total shown is the total volume documented since Anesthesia Start was filed.    Total Intake 0 mL       Output    Est. Blood Loss 25 mL    Total Output 25 mL       Net    Net Volume -25 mL          Specimen: No specimens collected               Findings: right knee osteoarthritis    Complications:  None; patient tolerated the procedure well.     Disposition:  PACU - hemodynamically stable.  Condition: stable  Specimens Collected: No specimens collected  Attending Attestation: I was present and scrubbed for the entire procedure.    Emmanuel Bradford  Phone Number: [phone]

## 2025-01-07 NOTE — OP NOTE
Right Knee Total  Arthroplasty (R) Operative Note     Date: 2025  OR Location: [facility] OR    Name: Mikhail Moses, : 1945, Age: 79 y.o., MRN: [id number], Sex: male    Diagnosis  Pre-op Diagnosis      * Unilateral primary osteoarthritis, right knee [M17.11] Post-op Diagnosis     * Unilateral primary osteoarthritis, right knee [M17.11]     Procedures  Right Knee Total  Arthroplasty  09753 - ME ARTHRP KNE CONDYLE&PLATU MEDIAL&LAT COMPARTMENTS      Surgeons      * Emmanuel Bradford - Primary    Resident/Fellow/Other Assistant:  Surgeons and Role:     * Angle Rodney PA-C - HEATHER First Assist    Staff:   Circulator: Rocío Yeung Person: Kinza  Surgical Assistant: Blaine Armijo Circulator: Emmy Armijo Scrub: Bhavya    Anesthesia Staff: Anesthesiologist: Mikal Wiggins MD  C-AA: NELLI Chairez    Procedure Summary  Anesthesia: Monitor Anesthesia Care, Spinal  ASA: III  Estimated Blood Loss: 25mL  Intra-op Medications:   Administrations occurring from 1200 to 1430 on 25:   Medication Name Total Dose   sodium chloride 0.9 % irrigation solution 1,000 mL   sodium chloride 0.9 % irrigation solution 1,000 mL   glycopyrrolate PF (Robinul) injection 0.2 mg   phenylephrine (Anival-Synephrine) 10 mg/250 mL NS (40 mcg/mL) infusion 7.53 mg   phenylephrine 100 mcg/mL syringe 10 mL (prefilled) 500 mcg   propofol (Diprivan) injection 10 mg/mL 1,512 mg              Anesthesia Record               Intraprocedure I/O Totals          Intake    Tranexamic Acid 0.00 mL    The total shown is the total volume documented since Anesthesia Start was filed.    Phenylephrine Drip 0.00 mL    The total shown is the total volume documented since Anesthesia Start was filed.    Total Intake 0 mL       Output    Est. Blood Loss 25 mL    Total Output 25 mL       Net    Net Volume -25 mL          Specimen: No specimens collected              Drains and/or Catheters:   Closed/Suction Drain 1 Right Knee Bulb 15 Fr. (Active)        Tourniquet Times:   * Missing tourniquet times found for documented tourniquets in lo *     Implants:  Implants       Type Name Action Serial No.      Joint Knee BONE CEMENT, SMART SET, HIGH VISCOSITY, 40GM - [id number] Implanted      Joint Knee INSERT, ATTUNE PS FB, SZ 8, 6MM - SN/A - [id number] Implanted N/A     Joint Knee TIBAL BASE ATTUNE FB, SZ 7 RAJINDER - SN/A - [id number] Implanted N/A     Joint Knee FEMORAL, ATTUNE PS, RAJINDER, SZ 8, RT - SN/A - [id number] Implanted N/A     Joint Knee DOME, PATELLA, MEDIALIZED, 38MM - SN/A - [id number] Implanted N/A              Findings: advanced OA    Indications: Mikhail Moses is an 79 y.o. male who is having surgery for Unilateral primary osteoarthritis, right knee [M17.11].     The patient was seen in the preoperative area. The risks, benefits, complications, treatment options, non-operative alternatives, expected recovery and outcomes were discussed with the patient. The possibilities of reaction to medication, pulmonary aspiration, injury to surrounding structures, bleeding, recurrent infection, the need for additional procedures, failure to diagnose a condition, and creating a complication requiring transfusion or operation were discussed with the patient. The patient concurred with the proposed plan, giving informed consent.  The site of surgery was properly noted/marked if necessary per policy. The patient has been actively warmed in preoperative area. Preoperative antibiotics have been ordered and given within 1 hours of incision. Venous thrombosis prophylaxis have been ordered including bilateral sequential compression devices    Procedure Details: R TKA  Complications:  None; patient tolerated the procedure well.    Disposition: PACU - hemodynamically stable.  Condition: stable     PREOPERATIVE DIAGNOSIS:  right knee osteoarthritis     POSTOPERATIVE DIAGNOSIS:  right knee osteoarthritis     OPERATION/PROCEDURE:  right total knee arthroplasty      SURGEON:  Emmanuel Bradford MD     ASSISTANT(S):  Steffanie Pride MD PGY5     ANESTHESIA:  spinal     LOCATION:  [facility]     ESTIMATED BLOOD LOSS AND INTRAVENOUS FLUIDS:  Please see Anesthesia record     COMPONENTS USED:  DePuy Attune knee system  1. 8 femur  2. 7 tibia  3. 38 patella  4. 6mm insert     BRIEF CLINICAL NOTE:  The patient is a 80 yo male with advanced osteoarthritis of  their right knee.  They failed conservative treatment and wished to  proceed with total knee arthroplasty which is indicated at this time.  We discussed the risks, benefits, and alternatives of surgery  including but not limited to infection, damage to vessel or nerve,  bleeding, soft tissue pain, DVT, PE, problems with anesthesia, lack  of range of motion, continued soft tissue pain, need for further  surgery, etc.  Consent was obtained.  They were taken to the operating  room in order to undergo the procedure.    PRE-OP ROM:      OPERATIVE REPORT:  The patient was transferred to the operating room table.  Time-out  was performed confirming patient name, medical record number,  surgical site, and adequate and appropriate imaging.  The patient  received appropriate IV antibiotics as well as tranexamic acid.  Once we were prepped and draped,midline skin incision was performed.  Hemostasis was obtained using electrocautery.  Underlying extensor mechanism was easily identified and entered using a standard medial parapatellar arthrotomy performedsharply.  The infrapatellar and suprapatellar fat pads were debrided.Initial medial release was performed.  The patella was subluxed laterally.  Distal femur was entered using a step drill.  Distal  femoral cut was performed, and the femur was sized and prepared.  The tibia was subluxed forward using a double-prong PCL retractor.  The proximal tibia was cut in neutral mechanical axis using an  extramedullary tibial guide.  We then used the lamina  to clear out the posterior  osteophytes and clear the meniscal remnants. We then sized the tibia and prepared it. We cut the patella freehand using a caliper to restore the native patellar height. We then trialed with multiple polyethylene inserts.  Once I was happy with the position of components and stability of the knee, all trial components were removed.  The  cut bony surfaces were thoroughly irrigated and dried.  The posterior capsule and surrounding soft tissues were injected with DIYA solution we then cemented into place the real components.  The knee was held in extension until all cement was hardened.  All extraneous cement was  removed.  We then closed the extensor mechanism over a drain using interrupted #2 Ethibond as well as interrupted 0 Vicryl.  The subcu was closed with interrupted 2-0 Vicryl.  The skin was closed with  running 3-0 Biosyn followed by Dermabond and Steri-Strips.  Dry sterile dressing was placed.  The patient was transferred back to the hospital bed without incident or complication.  She will be  weightbearing as tolerated.  They will be on Eliquis and SCDs for DVT prophylaxis.     Task Performed by RNFA or Surgical Assistant:  preparing the leg, draping the leg, retracting and manipulating the leg during surgery, facilitating safe performance of the procedure, and closure of the wound.    Additional Details: WBAT, Eliquis    Attending Attestation: I was present and scrubbed for the key portions of the procedure.

## 2025-01-07 NOTE — HH CARE COORDINATION
Home Care received a Referral for Physical Therapy. We have processed the referral for a Start of Care on 1/8/25.     If you have any questions or concerns, please feel free to contact us at [phone]. Follow the prompts, enter your five digit zip code, and you will be directed to your care team on WE[facility] 3.

## 2025-01-07 NOTE — ANESTHESIA PROCEDURE NOTES
Spinal Block    Patient location during procedure: OR  Start time: 1/7/2025 11:43 AM  End time: 1/7/2025 11:46 AM  Reason for block: primary anesthetic  Staffing  Performed: NELLI   Authorized by: Mikal Wiggins MD    Performed by: NELLI Chairez    Preanesthetic Checklist  Completed: patient identified, IV checked, risks and benefits discussed, surgical consent, pre-op evaluation, timeout performed and sterile techniques followed  Block Timeout  RN/Licensed healthcare professional reads aloud to the Anesthesia provider and entire team: Patient identity, procedure with side and site, patient position, and as applicable the availability of implants/special equipment/special requirements.  Patient on coagulant treatment: no  Timeout performed at:   Spinal Block  Patient position: sitting  Prep: Betadine  Sterility prep: cap, drape, gloves, hand hygiene and mask  Sedation level: moderate sedation  Patient monitoring: blood pressure, continuous pulse oximetry and heart rate  Approach: midline  Vertebral space: L3-4  Injection technique: single-shot  Needle  Needle gauge: 25 G  Needle length: 3.5 in  Free flowing CSF: yes    Assessment  Sensory level: T6  Block outcome: block to be assessed in the OR  Procedure assessment: patient sedated but conversant throughout procedure and patient tolerated procedure well with no immediate complications

## 2025-01-07 NOTE — PERIOPERATIVE NURSING NOTE
1410 handoff received from osbaldo chapa    1416 patient medicated for pain per orders    1441 wife updated via text message     1455 attempted to call patients wife, no answer VM left stating patients inpatient room number     1513 Patient discharged in stable condition back to inpatient unit via bed.     1514 text message sent to patients wife to update on patient being moved to inpatient room  Patient name & assigned room #733 [facility]  Procedure: Right Knee Total Arthroplasty   Anesthesia type (i.e. general, regional, MAC): general  Nerve block (if applicable): Yes and spinal   Estimated blood loss (EBL) in OR: 25  Relevant PMH: alcohol abuse, anemia, BPH, CAD, chronic edema, CKD, COPD, GERD, gout, NSTEMI, HLD, HTN, hx DVT, OA, old MI, PAH, sepsis, ureteral stricture  Orientation/mental status: x4  Incision site(s)/location, surgical dressing(s), and drain type/location: right knee incision dressed with dermabond, steri stripes, meplix. 15 fr drain   Fluids given in OR/PACU, IV site(s), and drips/fluids currently infusin g L AC, LR @100  PO status (last oral intake): tolerating water  Last set of vital signs & O2 requirements: HR 61, /71, RR 16, O2 94% 2L NC (does not wear at home)  Current pain level & last pain/nausea medication dose/time given: 7/10, 2 mg dilaudid given, last dose @1437  Next antibiotic due @1950  Last tranexamic acid dose given @1150  Last void/Paul in place: prior to surgery   Equipment sent with patient (i.e. Polar Cube, TENs unit, walker, crutches): polar cube/ cane  SCDs on (yes/no): yes  Mode of transport (cart or bed): cart   Belongings sent with patient: clothing   Emergency contact (name, relationship, phone number): Marion valero (wife) [phone]

## 2025-01-07 NOTE — ANESTHESIA PROCEDURE NOTES
Peripheral Block    Patient location during procedure: pre-op  Start time: 1/7/2025 11:05 AM  End time: 1/7/2025 11:10 AM  Reason for block: at surgeon's request and post-op pain management  Staffing  Performed: attending   Authorized by: Mikal Wiggins MD    Performed by: Mikal Wiggins MD  Preanesthetic Checklist  Completed: patient identified, IV checked, site marked, risks and benefits discussed, surgical consent, monitors and equipment checked, pre-op evaluation and timeout performed   Timeout performed at: 1/7/2025 11:05 AM  Peripheral Block  Patient position: laying flat  Prep: ChloraPrep  Patient monitoring: heart rate and continuous pulse ox  Block type: adductor canal  Laterality: right  Injection technique: single-shot  Guidance: ultrasound guided  Local infiltration: lidocaine  Infiltration strength: 1 %  Dose: 1 mL  Needle  Needle gauge: 22 G  Needle length: 8 cm  Needle localization: ultrasound guidance     image stored in chart  Assessment  Injection assessment: negative aspiration for heme, no paresthesia on injection, incremental injection and local visualized surrounding nerve on ultrasound
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
## 2025-01-07 NOTE — ANESTHESIA POSTPROCEDURE EVALUATION
Patient: Mikhail Moses    Procedure Summary       Date: 01/07/25 Room / Location: [facility] A OR 11 / Virtual [facility] A OR    Anesthesia Start: 1133 Anesthesia Stop: 1344    Procedure: Right Knee Total  Arthroplasty (Right: Knee) Diagnosis:       Unilateral primary osteoarthritis, right knee      (Unilateral primary osteoarthritis, right knee [M17.11])    Surgeons: Emmanuel Bradford MD Responsible Provider: Mikal Wiggins MD    Anesthesia Type: MAC, spinal ASA Status: 3            Anesthesia Type: MAC, spinal    Vitals Value Taken Time   /71 01/07/25 1446   Temp 36.5 °C (97.7 °F) 01/07/25 1340   Pulse 66 01/07/25 1457   Resp 15 01/07/25 1445   SpO2 90 % 01/07/25 1457   Vitals shown include unfiled device data.    Anesthesia Post Evaluation    Patient participation: complete - patient participated  Level of consciousness: awake  Pain management: satisfactory to patient  Airway patency: patent  Cardiovascular status: acceptable and hemodynamically stable  Respiratory status: acceptable and nonlabored ventilation  Hydration status: balanced  Postoperative Nausea and Vomiting: none        No notable events documented.

## 2025-01-07 NOTE — Clinical Note
Sheath was inserted in the right internal jugular vein. Sheath sutured in place then the swan placed.

## 2025-01-07 NOTE — ANESTHESIA PREPROCEDURE EVALUATION
Patient: Mikhail Moses    Procedure Information       Date/Time: 01/07/25 1200    Procedure: Right Knee Total  Arthroplasty (Right: Knee)    Location: [facility] A OR 11 / Virtual [facility] A OR    Surgeons: Emmanuel Bradford MD          80 yo M hx OA, s/p L TKA 1/2024 (no issues with spinal/block), hx ETOH abuse (pt states 2-3 beers per day but may be more), CAD s/p stents x7 (pt is not on baby ASA; cards clearance states moderate risk), controlled GERD, HTN, hx DVT after fall during last TKA.    Relevant Problems   Cardiac   (+) Coronary artery disease involving native coronary artery of native heart with angina pectoris   (+) Essential hypertension   (+) Familial hypercholesterolemia   (+) HLD (hyperlipidemia)   (+) Heart attack   (+) Old myocardial infarction      Pulmonary   (+) Chronic obstructive pulmonary disease (Multi)   (+) Panacinar emphysema (Multi)   (+) Pulmonary arterial hypertension (Multi)      Neuro   (+) Lumbar radiculopathy      GI   (+) Acid reflux      Endocrine   (+) Obesity      Hematology   (+) Acute deep vein thrombosis (DVT) of left femoral vein (Multi)      Musculoskeletal   (+) Localized osteoarthritis of right knee   (+) Lumbar herniated disc   (+) Osteoarthritis of knees, bilateral   (+) Osteoarthritis of right knee, unspecified osteoarthritis type   (+) Spinal stenosis of lumbar region with radiculopathy   (+) Unilateral primary osteoarthritis, left knee   (+) Unilateral primary osteoarthritis, right knee       Clinical information reviewed:   Tobacco  Allergies  Meds   Med Hx  Surg Hx   Fam Hx  Soc Hx        NPO Detail:  NPO/Void Status  Carbohydrate Drink Given Prior to Surgery? : N  Date of Last Liquid: 01/07/25  Time of Last Liquid: 0600  Date of Last Solid: 01/06/25  Time of Last Solid: 1800  Last Intake Type: Clear fluids  Time of Last Void: 0900         Physical Exam    Airway  Mallampati: III  TM distance: >3 FB  Neck ROM: full     Cardiovascular   Rate: normal     Dental    Comments: Poor dentition.  Denies any loose teeth   Pulmonary - normal exam     Abdominal            Anesthesia Plan    History of general anesthesia?: yes  History of complications of general anesthesia?: no    ASA 3     MAC and spinal     intravenous induction   Postoperative administration of opioids is intended.  Anesthetic plan and risks discussed with patient.

## 2025-01-08 ENCOUNTER — APPOINTMENT (OUTPATIENT)
Dept: CARDIOLOGY | Facility: HOSPITAL | Age: 80
End: 2025-01-08
Payer: MEDICARE

## 2025-01-08 ENCOUNTER — APPOINTMENT (OUTPATIENT)
Dept: RADIOLOGY | Facility: HOSPITAL | Age: 80
End: 2025-01-08
Payer: MEDICARE

## 2025-01-08 ENCOUNTER — APPOINTMENT (OUTPATIENT)
Dept: VASCULAR MEDICINE | Facility: HOSPITAL | Age: 80
End: 2025-01-08
Payer: MEDICARE

## 2025-01-08 PROBLEM — T81.10XA: Status: ACTIVE | Noted: 2024-10-14

## 2025-01-08 PROBLEM — R57.9 SHOCK (MULTI): Status: ACTIVE | Noted: 2024-10-14

## 2025-01-08 LAB
ALBUMIN SERPL BCP-MCNC: 2.9 G/DL (ref 3.4–5)
ANION GAP BLDA CALCULATED.4IONS-SCNC: 12 MMO/L (ref 10–25)
ANION GAP BLDA CALCULATED.4IONS-SCNC: 12 MMO/L (ref 10–25)
ANION GAP BLDA CALCULATED.4IONS-SCNC: 13 MMO/L (ref 10–25)
ANION GAP BLDA CALCULATED.4IONS-SCNC: 15 MMO/L (ref 10–25)
ANION GAP BLDV CALCULATED.4IONS-SCNC: 16 MMOL/L (ref 10–25)
ANION GAP SERPL CALC-SCNC: 11 MMOL/L (ref 10–20)
ANION GAP SERPL CALC-SCNC: 13 MMOL/L (ref 10–20)
ANION GAP SERPL CALC-SCNC: 14 MMOL/L (ref 10–20)
APPARATUS: ABNORMAL
APPEARANCE UR: ABNORMAL
APTT PPP: 31 SECONDS (ref 27–38)
ATRIAL RATE: 70 BPM
ATRIAL RATE: 71 BPM
BASE EXCESS BLDA CALC-SCNC: -1 MMOL/L (ref -2–3)
BASE EXCESS BLDA CALC-SCNC: -3.1 MMOL/L (ref -2–3)
BASE EXCESS BLDA CALC-SCNC: -5 MMOL/L (ref -2–3)
BASE EXCESS BLDA CALC-SCNC: -6.2 MMOL/L (ref -2–3)
BASE EXCESS BLDV CALC-SCNC: -6 MMOL/L (ref -2–3)
BILIRUB UR STRIP.AUTO-MCNC: NEGATIVE MG/DL
BNP SERPL-MCNC: 273 PG/ML (ref 0–99)
BODY TEMPERATURE: 37 DEGREES CELSIUS
BUN SERPL-MCNC: 15 MG/DL (ref 6–23)
BUN SERPL-MCNC: 16 MG/DL (ref 6–23)
BUN SERPL-MCNC: 18 MG/DL (ref 6–23)
CA-I BLDA-SCNC: 0.98 MMOL/L (ref 1.1–1.33)
CA-I BLDA-SCNC: 1.01 MMOL/L (ref 1.1–1.33)
CA-I BLDA-SCNC: 1.03 MMOL/L (ref 1.1–1.33)
CA-I BLDA-SCNC: 1.03 MMOL/L (ref 1.1–1.33)
CA-I BLDV-SCNC: 0.99 MMOL/L (ref 1.1–1.33)
CALCIUM SERPL-MCNC: 6.7 MG/DL (ref 8.6–10.3)
CALCIUM SERPL-MCNC: 7.3 MG/DL (ref 8.6–10.3)
CALCIUM SERPL-MCNC: 7.5 MG/DL (ref 8.6–10.3)
CARDIAC TROPONIN I PNL SERPL HS: 155 NG/L (ref 0–20)
CARDIAC TROPONIN I PNL SERPL HS: 30 NG/L (ref 0–20)
CARDIAC TROPONIN I PNL SERPL HS: 45 NG/L (ref 0–20)
CARDIAC TROPONIN I PNL SERPL HS: 61 NG/L (ref 0–20)
CHLORIDE BLDA-SCNC: 105 MMOL/L (ref 98–107)
CHLORIDE BLDA-SCNC: 105 MMOL/L (ref 98–107)
CHLORIDE BLDA-SCNC: 106 MMOL/L (ref 98–107)
CHLORIDE BLDA-SCNC: 107 MMOL/L (ref 98–107)
CHLORIDE BLDV-SCNC: 103 MMOL/L (ref 98–107)
CHLORIDE SERPL-SCNC: 106 MMOL/L (ref 98–107)
CHLORIDE SERPL-SCNC: 108 MMOL/L (ref 98–107)
CHLORIDE SERPL-SCNC: 108 MMOL/L (ref 98–107)
CO2 SERPL-SCNC: 23 MMOL/L (ref 21–32)
CO2 SERPL-SCNC: 24 MMOL/L (ref 21–32)
CO2 SERPL-SCNC: 29 MMOL/L (ref 21–32)
COLOR UR: YELLOW
CREAT SERPL-MCNC: 1.78 MG/DL (ref 0.5–1.3)
CREAT SERPL-MCNC: 2.16 MG/DL (ref 0.5–1.3)
CREAT SERPL-MCNC: 2.59 MG/DL (ref 0.5–1.3)
EGFRCR SERPLBLD CKD-EPI 2021: 24 ML/MIN/1.73M*2
EGFRCR SERPLBLD CKD-EPI 2021: 30 ML/MIN/1.73M*2
EGFRCR SERPLBLD CKD-EPI 2021: 38 ML/MIN/1.73M*2
ERYTHROCYTE [DISTWIDTH] IN BLOOD BY AUTOMATED COUNT: 14.5 % (ref 11.5–14.5)
FLOW: 50 LPM
GLUCOSE BLD MANUAL STRIP-MCNC: 166 MG/DL (ref 74–99)
GLUCOSE BLD MANUAL STRIP-MCNC: 194 MG/DL (ref 74–99)
GLUCOSE BLD MANUAL STRIP-MCNC: 80 MG/DL (ref 74–99)
GLUCOSE BLDA-MCNC: 104 MG/DL (ref 74–99)
GLUCOSE BLDA-MCNC: 112 MG/DL (ref 74–99)
GLUCOSE BLDA-MCNC: 129 MG/DL (ref 74–99)
GLUCOSE BLDA-MCNC: 92 MG/DL (ref 74–99)
GLUCOSE BLDV-MCNC: 128 MG/DL (ref 74–99)
GLUCOSE SERPL-MCNC: 103 MG/DL (ref 74–99)
GLUCOSE SERPL-MCNC: 110 MG/DL (ref 74–99)
GLUCOSE SERPL-MCNC: 83 MG/DL (ref 74–99)
GLUCOSE UR STRIP.AUTO-MCNC: NORMAL MG/DL
HCO3 BLDA-SCNC: 20.2 MMOL/L (ref 22–26)
HCO3 BLDA-SCNC: 21.6 MMOL/L (ref 22–26)
HCO3 BLDA-SCNC: 22.7 MMOL/L (ref 22–26)
HCO3 BLDA-SCNC: 24.8 MMOL/L (ref 22–26)
HCO3 BLDV-SCNC: 21.4 MMOL/L (ref 22–26)
HCT VFR BLD AUTO: 30.8 % (ref 41–52)
HCT VFR BLD AUTO: 34 % (ref 41–52)
HCT VFR BLD AUTO: 38.3 % (ref 41–52)
HCT VFR BLD EST: 30 % (ref 41–52)
HCT VFR BLD EST: 31 % (ref 41–52)
HCT VFR BLD EST: 31 % (ref 41–52)
HCT VFR BLD EST: 32 % (ref 41–52)
HCT VFR BLD EST: 35 % (ref 41–52)
HGB BLD-MCNC: 10 G/DL (ref 13.5–17.5)
HGB BLD-MCNC: 10.6 G/DL (ref 13.5–17.5)
HGB BLD-MCNC: 12 G/DL (ref 13.5–17.5)
HGB BLDA-MCNC: 10.1 G/DL (ref 13.5–17.5)
HGB BLDA-MCNC: 10.2 G/DL (ref 13.5–17.5)
HGB BLDA-MCNC: 10.3 G/DL (ref 13.5–17.5)
HGB BLDA-MCNC: 11.8 G/DL (ref 13.5–17.5)
HGB BLDV-MCNC: 10.7 G/DL (ref 13.5–17.5)
INHALED O2 CONCENTRATION: 60 %
INHALED O2 CONCENTRATION: 70 %
INHALED O2 CONCENTRATION: 90 %
INR PPP: 1.2 (ref 0.9–1.1)
KETONES UR STRIP.AUTO-MCNC: ABNORMAL MG/DL
LACTATE BLDA-SCNC: 2 MMOL/L (ref 0.4–2)
LACTATE BLDA-SCNC: 2.4 MMOL/L (ref 0.4–2)
LACTATE BLDA-SCNC: 2.5 MMOL/L (ref 0.4–2)
LACTATE BLDA-SCNC: 2.8 MMOL/L (ref 0.4–2)
LACTATE BLDV-SCNC: 2.5 MMOL/L (ref 0.4–2)
LACTATE BLDV-SCNC: 2.5 MMOL/L (ref 0.4–2)
LEUKOCYTE ESTERASE UR QL STRIP.AUTO: ABNORMAL
MAGNESIUM SERPL-MCNC: 0.85 MG/DL (ref 1.6–2.4)
MCH RBC QN AUTO: 29.4 PG (ref 26–34)
MCH RBC QN AUTO: 29.5 PG (ref 26–34)
MCH RBC QN AUTO: 29.8 PG (ref 26–34)
MCHC RBC AUTO-ENTMCNC: 31.2 G/DL (ref 32–36)
MCHC RBC AUTO-ENTMCNC: 31.3 G/DL (ref 32–36)
MCHC RBC AUTO-ENTMCNC: 32.5 G/DL (ref 32–36)
MCV RBC AUTO: 92 FL (ref 80–100)
MCV RBC AUTO: 94 FL (ref 80–100)
MCV RBC AUTO: 94 FL (ref 80–100)
NITRITE UR QL STRIP.AUTO: NEGATIVE
NRBC BLD-RTO: 0 /100 WBCS (ref 0–0)
OXYHGB MFR BLDA: 91.9 % (ref 94–98)
OXYHGB MFR BLDA: 92.7 % (ref 94–98)
OXYHGB MFR BLDA: 92.8 % (ref 94–98)
OXYHGB MFR BLDA: 93.5 % (ref 94–98)
OXYHGB MFR BLDV: 73.2 % (ref 45–75)
P AXIS: -13 DEGREES
P AXIS: 19 DEGREES
P OFFSET: 140 MS
P OFFSET: 153 MS
P ONSET: 110 MS
P ONSET: 117 MS
PCO2 BLDA: 43 MM HG (ref 38–42)
PCO2 BLDA: 43 MM HG (ref 38–42)
PCO2 BLDA: 45 MM HG (ref 38–42)
PCO2 BLDA: 46 MM HG (ref 38–42)
PCO2 BLDV: 50 MM HG (ref 41–51)
PH BLDA: 7.28 PH (ref 7.38–7.42)
PH BLDA: 7.28 PH (ref 7.38–7.42)
PH BLDA: 7.33 PH (ref 7.38–7.42)
PH BLDA: 7.35 PH (ref 7.38–7.42)
PH BLDV: 7.24 PH (ref 7.33–7.43)
PH UR STRIP.AUTO: 5.5 [PH]
PHOSPHATE SERPL-MCNC: 4.7 MG/DL (ref 2.5–4.9)
PLATELET # BLD AUTO: 200 X10*3/UL (ref 150–450)
PLATELET # BLD AUTO: 221 X10*3/UL (ref 150–450)
PLATELET # BLD AUTO: 224 X10*3/UL (ref 150–450)
PO2 BLDA: 63 MM HG (ref 85–95)
PO2 BLDA: 66 MM HG (ref 85–95)
PO2 BLDA: 68 MM HG (ref 85–95)
PO2 BLDA: 71 MM HG (ref 85–95)
PO2 BLDV: 45 MM HG (ref 35–45)
POTASSIUM BLDA-SCNC: 3.4 MMOL/L (ref 3.5–5.3)
POTASSIUM BLDA-SCNC: 3.4 MMOL/L (ref 3.5–5.3)
POTASSIUM BLDA-SCNC: 3.7 MMOL/L (ref 3.5–5.3)
POTASSIUM BLDA-SCNC: 3.7 MMOL/L (ref 3.5–5.3)
POTASSIUM BLDV-SCNC: 3.9 MMOL/L (ref 3.5–5.3)
POTASSIUM SERPL-SCNC: 3.2 MMOL/L (ref 3.5–5.3)
POTASSIUM SERPL-SCNC: 3.7 MMOL/L (ref 3.5–5.3)
POTASSIUM SERPL-SCNC: 4.3 MMOL/L (ref 3.5–5.3)
PR INTERVAL: 164 MS
PR INTERVAL: 194 MS
PROT UR STRIP.AUTO-MCNC: ABNORMAL MG/DL
PROTHROMBIN TIME: 13.4 SECONDS (ref 9.8–12.8)
Q ONSET: 192 MS
Q ONSET: 214 MS
QRS COUNT: 11 BEATS
QRS COUNT: 12 BEATS
QRS DURATION: 122 MS
QRS DURATION: 134 MS
QT INTERVAL: 424 MS
QT INTERVAL: 474 MS
QTC CALCULATION(BAZETT): 457 MS
QTC CALCULATION(BAZETT): 515 MS
QTC FREDERICIA: 446 MS
QTC FREDERICIA: 501 MS
R AXIS: -47 DEGREES
R AXIS: -54 DEGREES
RBC # BLD AUTO: 3.36 X10*6/UL (ref 4.5–5.9)
RBC # BLD AUTO: 3.61 X10*6/UL (ref 4.5–5.9)
RBC # BLD AUTO: 4.07 X10*6/UL (ref 4.5–5.9)
RBC # UR STRIP.AUTO: NEGATIVE /UL
RBC #/AREA URNS AUTO: ABNORMAL /HPF
SAO2 % BLDA: 94 % (ref 94–100)
SAO2 % BLDA: 95 % (ref 94–100)
SAO2 % BLDA: 95 % (ref 94–100)
SAO2 % BLDA: 96 % (ref 94–100)
SAO2 % BLDV: 75 % (ref 45–75)
SODIUM BLDA-SCNC: 136 MMOL/L (ref 136–145)
SODIUM BLDA-SCNC: 136 MMOL/L (ref 136–145)
SODIUM BLDA-SCNC: 138 MMOL/L (ref 136–145)
SODIUM BLDA-SCNC: 139 MMOL/L (ref 136–145)
SODIUM BLDV-SCNC: 136 MMOL/L (ref 136–145)
SODIUM SERPL-SCNC: 141 MMOL/L (ref 136–145)
SODIUM SERPL-SCNC: 142 MMOL/L (ref 136–145)
SODIUM SERPL-SCNC: 142 MMOL/L (ref 136–145)
SP GR UR STRIP.AUTO: 1.03
T AXIS: 29 DEGREES
T AXIS: 43 DEGREES
T OFFSET: 426 MS
T OFFSET: 429 MS
UFH PPP CHRO-ACNC: 0.8 IU/ML
UROBILINOGEN UR STRIP.AUTO-MCNC: NORMAL MG/DL
VENTRICULAR RATE: 70 BPM
VENTRICULAR RATE: 71 BPM
WBC # BLD AUTO: 10.6 X10*3/UL (ref 4.4–11.3)
WBC # BLD AUTO: 15.9 X10*3/UL (ref 4.4–11.3)
WBC # BLD AUTO: 7.4 X10*3/UL (ref 4.4–11.3)
WBC #/AREA URNS AUTO: >50 /HPF

## 2025-01-08 PROCEDURE — 5A0945A ASSISTANCE WITH RESPIRATORY VENTILATION, 24-96 CONSECUTIVE HOURS, HIGH NASAL FLOW/VELOCITY: ICD-10-PCS | Performed by: INTERNAL MEDICINE

## 2025-01-08 PROCEDURE — 87899 AGENT NOS ASSAY W/OPTIC: CPT | Mod: AHULAB | Performed by: NURSE PRACTITIONER

## 2025-01-08 PROCEDURE — 85520 HEPARIN ASSAY: CPT | Performed by: NURSE PRACTITIONER

## 2025-01-08 PROCEDURE — 82947 ASSAY GLUCOSE BLOOD QUANT: CPT | Performed by: SURGERY

## 2025-01-08 PROCEDURE — 93010 ELECTROCARDIOGRAM REPORT: CPT | Performed by: STUDENT IN AN ORGANIZED HEALTH CARE EDUCATION/TRAINING PROGRAM

## 2025-01-08 PROCEDURE — 85610 PROTHROMBIN TIME: CPT | Performed by: NURSE PRACTITIONER

## 2025-01-08 PROCEDURE — 82947 ASSAY GLUCOSE BLOOD QUANT: CPT | Performed by: HOSPITALIST

## 2025-01-08 PROCEDURE — 51702 INSERT TEMP BLADDER CATH: CPT

## 2025-01-08 PROCEDURE — 78830 RP LOCLZJ TUM SPECT W/CT 1: CPT | Performed by: RADIOLOGY

## 2025-01-08 PROCEDURE — 84484 ASSAY OF TROPONIN QUANT: CPT | Performed by: HOSPITALIST

## 2025-01-08 PROCEDURE — 2500000001 HC RX 250 WO HCPCS SELF ADMINISTERED DRUGS (ALT 637 FOR MEDICARE OP): Performed by: NURSE PRACTITIONER

## 2025-01-08 PROCEDURE — 2500000004 HC RX 250 GENERAL PHARMACY W/ HCPCS (ALT 636 FOR OP/ED): Performed by: INTERNAL MEDICINE

## 2025-01-08 PROCEDURE — 83735 ASSAY OF MAGNESIUM: CPT | Performed by: NURSE PRACTITIONER

## 2025-01-08 PROCEDURE — 85027 COMPLETE CBC AUTOMATED: CPT

## 2025-01-08 PROCEDURE — 71045 X-RAY EXAM CHEST 1 VIEW: CPT | Performed by: RADIOLOGY

## 2025-01-08 PROCEDURE — C1751 CATH, INF, PER/CENT/MIDLINE: HCPCS | Performed by: HOSPITALIST

## 2025-01-08 PROCEDURE — 87040 BLOOD CULTURE FOR BACTERIA: CPT | Mod: AHULAB | Performed by: HOSPITALIST

## 2025-01-08 PROCEDURE — 99222 1ST HOSP IP/OBS MODERATE 55: CPT | Performed by: INTERNAL MEDICINE

## 2025-01-08 PROCEDURE — 99291 CRITICAL CARE FIRST HOUR: CPT | Performed by: NURSE PRACTITIONER

## 2025-01-08 PROCEDURE — 93010 ELECTROCARDIOGRAM REPORT: CPT | Performed by: INTERNAL MEDICINE

## 2025-01-08 PROCEDURE — 84484 ASSAY OF TROPONIN QUANT: CPT | Performed by: SURGERY

## 2025-01-08 PROCEDURE — 84132 ASSAY OF SERUM POTASSIUM: CPT | Performed by: SURGERY

## 2025-01-08 PROCEDURE — 2500000004 HC RX 250 GENERAL PHARMACY W/ HCPCS (ALT 636 FOR OP/ED): Performed by: ANESTHESIOLOGY

## 2025-01-08 PROCEDURE — 85027 COMPLETE CBC AUTOMATED: CPT | Performed by: NURSE PRACTITIONER

## 2025-01-08 PROCEDURE — 0SRC0J9 REPLACEMENT OF RIGHT KNEE JOINT WITH SYNTHETIC SUBSTITUTE, CEMENTED, OPEN APPROACH: ICD-10-PCS | Performed by: ORTHOPAEDIC SURGERY

## 2025-01-08 PROCEDURE — 82810 BLOOD GASES O2 SAT ONLY: CPT | Performed by: SURGERY

## 2025-01-08 PROCEDURE — 93503 INSERT/PLACE HEART CATHETER: CPT | Performed by: HOSPITALIST

## 2025-01-08 PROCEDURE — 85027 COMPLETE CBC AUTOMATED: CPT | Performed by: PHYSICIAN ASSISTANT

## 2025-01-08 PROCEDURE — 84295 ASSAY OF SERUM SODIUM: CPT | Performed by: PHYSICIAN ASSISTANT

## 2025-01-08 PROCEDURE — 99223 1ST HOSP IP/OBS HIGH 75: CPT | Performed by: INTERNAL MEDICINE

## 2025-01-08 PROCEDURE — 93306 TTE W/DOPPLER COMPLETE: CPT | Performed by: INTERNAL MEDICINE

## 2025-01-08 PROCEDURE — 84295 ASSAY OF SERUM SODIUM: CPT | Performed by: NURSE PRACTITIONER

## 2025-01-08 PROCEDURE — 82947 ASSAY GLUCOSE BLOOD QUANT: CPT

## 2025-01-08 PROCEDURE — 84132 ASSAY OF SERUM POTASSIUM: CPT | Performed by: PHYSICIAN ASSISTANT

## 2025-01-08 PROCEDURE — 36600 WITHDRAWAL OF ARTERIAL BLOOD: CPT

## 2025-01-08 PROCEDURE — 84132 ASSAY OF SERUM POTASSIUM: CPT | Performed by: NURSE PRACTITIONER

## 2025-01-08 PROCEDURE — 2500000005 HC RX 250 GENERAL PHARMACY W/O HCPCS: Performed by: SURGERY

## 2025-01-08 PROCEDURE — 84484 ASSAY OF TROPONIN QUANT: CPT | Performed by: NURSE PRACTITIONER

## 2025-01-08 PROCEDURE — 84132 ASSAY OF SERUM POTASSIUM: CPT

## 2025-01-08 PROCEDURE — 2500000005 HC RX 250 GENERAL PHARMACY W/O HCPCS: Performed by: NURSE PRACTITIONER

## 2025-01-08 PROCEDURE — 93970 EXTREMITY STUDY: CPT

## 2025-01-08 PROCEDURE — 2720000007 HC OR 272 NO HCPCS: Performed by: HOSPITALIST

## 2025-01-08 PROCEDURE — 2500000005 HC RX 250 GENERAL PHARMACY W/O HCPCS: Performed by: HOSPITALIST

## 2025-01-08 PROCEDURE — 1200000002 HC GENERAL ROOM WITH TELEMETRY DAILY

## 2025-01-08 PROCEDURE — 87449 NOS EACH ORGANISM AG IA: CPT | Mod: AHULAB | Performed by: NURSE PRACTITIONER

## 2025-01-08 PROCEDURE — 78830 RP LOCLZJ TUM SPECT W/CT 1: CPT

## 2025-01-08 PROCEDURE — 2500000004 HC RX 250 GENERAL PHARMACY W/ HCPCS (ALT 636 FOR OP/ED): Performed by: HOSPITALIST

## 2025-01-08 PROCEDURE — 2500000002 HC RX 250 W HCPCS SELF ADMINISTERED DRUGS (ALT 637 FOR MEDICARE OP, ALT 636 FOR OP/ED): Performed by: NURSE PRACTITIONER

## 2025-01-08 PROCEDURE — 36415 COLL VENOUS BLD VENIPUNCTURE: CPT

## 2025-01-08 PROCEDURE — 71250 CT THORAX DX C-: CPT

## 2025-01-08 PROCEDURE — 99223 1ST HOSP IP/OBS HIGH 75: CPT | Performed by: NURSE PRACTITIONER

## 2025-01-08 PROCEDURE — 93970 EXTREMITY STUDY: CPT | Performed by: INTERNAL MEDICINE

## 2025-01-08 PROCEDURE — 80048 BASIC METABOLIC PNL TOTAL CA: CPT | Mod: CCI

## 2025-01-08 PROCEDURE — 37799 UNLISTED PX VASCULAR SURGERY: CPT | Performed by: HOSPITALIST

## 2025-01-08 PROCEDURE — 83605 ASSAY OF LACTIC ACID: CPT | Performed by: HOSPITALIST

## 2025-01-08 PROCEDURE — A9540 TC99M MAA: HCPCS | Performed by: INTERNAL MEDICINE

## 2025-01-08 PROCEDURE — 93451 RIGHT HEART CATH: CPT | Performed by: HOSPITALIST

## 2025-01-08 PROCEDURE — 87536 HIV-1 QUANT&REVRSE TRNSCRPJ: CPT | Mod: AHULAB | Performed by: NURSE PRACTITIONER

## 2025-01-08 PROCEDURE — 2500000001 HC RX 250 WO HCPCS SELF ADMINISTERED DRUGS (ALT 637 FOR MEDICARE OP): Performed by: PHYSICIAN ASSISTANT

## 2025-01-08 PROCEDURE — 36415 COLL VENOUS BLD VENIPUNCTURE: CPT | Performed by: PHYSICIAN ASSISTANT

## 2025-01-08 PROCEDURE — 3430000001 HC RX 343 DIAGNOSTIC RADIOPHARMACEUTICALS: Performed by: INTERNAL MEDICINE

## 2025-01-08 PROCEDURE — 2020000001 HC ICU ROOM DAILY

## 2025-01-08 PROCEDURE — 2500000005 HC RX 250 GENERAL PHARMACY W/O HCPCS: Performed by: PHYSICIAN ASSISTANT

## 2025-01-08 PROCEDURE — 87389 HIV-1 AG W/HIV-1&-2 AB AG IA: CPT | Mod: AHULAB | Performed by: NURSE PRACTITIONER

## 2025-01-08 PROCEDURE — 2500000001 HC RX 250 WO HCPCS SELF ADMINISTERED DRUGS (ALT 637 FOR MEDICARE OP): Performed by: STUDENT IN AN ORGANIZED HEALTH CARE EDUCATION/TRAINING PROGRAM

## 2025-01-08 PROCEDURE — 99233 SBSQ HOSP IP/OBS HIGH 50: CPT

## 2025-01-08 PROCEDURE — 2500000004 HC RX 250 GENERAL PHARMACY W/ HCPCS (ALT 636 FOR OP/ED): Performed by: PHYSICIAN ASSISTANT

## 2025-01-08 PROCEDURE — 84132 ASSAY OF SERUM POTASSIUM: CPT | Performed by: HOSPITALIST

## 2025-01-08 PROCEDURE — 2500000005 HC RX 250 GENERAL PHARMACY W/O HCPCS: Performed by: PHARMACIST

## 2025-01-08 PROCEDURE — 80074 ACUTE HEPATITIS PANEL: CPT | Mod: AHULAB | Performed by: NURSE PRACTITIONER

## 2025-01-08 PROCEDURE — 36620 INSERTION CATHETER ARTERY: CPT | Performed by: NURSE PRACTITIONER

## 2025-01-08 PROCEDURE — 71045 X-RAY EXAM CHEST 1 VIEW: CPT

## 2025-01-08 PROCEDURE — C8929 TTE W OR WO FOL WCON,DOPPLER: HCPCS

## 2025-01-08 PROCEDURE — 71250 CT THORAX DX C-: CPT | Performed by: STUDENT IN AN ORGANIZED HEALTH CARE EDUCATION/TRAINING PROGRAM

## 2025-01-08 PROCEDURE — 2500000004 HC RX 250 GENERAL PHARMACY W/ HCPCS (ALT 636 FOR OP/ED): Performed by: NURSE PRACTITIONER

## 2025-01-08 PROCEDURE — 2500000004 HC RX 250 GENERAL PHARMACY W/ HCPCS (ALT 636 FOR OP/ED): Performed by: PHARMACIST

## 2025-01-08 PROCEDURE — 94660 CPAP INITIATION&MGMT: CPT

## 2025-01-08 PROCEDURE — 93005 ELECTROCARDIOGRAM TRACING: CPT

## 2025-01-08 PROCEDURE — 2500000004 HC RX 250 GENERAL PHARMACY W/ HCPCS (ALT 636 FOR OP/ED)

## 2025-01-08 PROCEDURE — 81003 URINALYSIS AUTO W/O SCOPE: CPT | Performed by: NURSE PRACTITIONER

## 2025-01-08 PROCEDURE — 99292 CRITICAL CARE ADDL 30 MIN: CPT | Performed by: INTERNAL MEDICINE

## 2025-01-08 PROCEDURE — 85018 HEMOGLOBIN: CPT

## 2025-01-08 PROCEDURE — 9420000001 HC RT PATIENT EDUCATION 5 MIN

## 2025-01-08 PROCEDURE — 37799 UNLISTED PX VASCULAR SURGERY: CPT | Performed by: NURSE PRACTITIONER

## 2025-01-08 PROCEDURE — 83880 ASSAY OF NATRIURETIC PEPTIDE: CPT | Performed by: INTERNAL MEDICINE

## 2025-01-08 PROCEDURE — C1894 INTRO/SHEATH, NON-LASER: HCPCS | Performed by: HOSPITALIST

## 2025-01-08 RX ORDER — FUROSEMIDE 10 MG/ML
40 INJECTION INTRAMUSCULAR; INTRAVENOUS ONCE
Status: COMPLETED | OUTPATIENT
Start: 2025-01-08 | End: 2025-01-08

## 2025-01-08 RX ORDER — HYDROMORPHONE HYDROCHLORIDE 0.2 MG/ML
0.2 INJECTION INTRAMUSCULAR; INTRAVENOUS; SUBCUTANEOUS EVERY 2 HOUR PRN
Status: DISPENSED | OUTPATIENT
Start: 2025-01-08

## 2025-01-08 RX ORDER — IPRATROPIUM BROMIDE AND ALBUTEROL SULFATE 2.5; .5 MG/3ML; MG/3ML
3 SOLUTION RESPIRATORY (INHALATION) EVERY 4 HOURS PRN
Status: DISCONTINUED | OUTPATIENT
Start: 2025-01-08 | End: 2025-01-10

## 2025-01-08 RX ORDER — NOREPINEPHRINE BITARTRATE/D5W 8 MG/250ML
0-.5 PLASTIC BAG, INJECTION (ML) INTRAVENOUS CONTINUOUS
Status: DISCONTINUED | OUTPATIENT
Start: 2025-01-08 | End: 2025-01-08

## 2025-01-08 RX ORDER — MAGNESIUM SULFATE HEPTAHYDRATE 40 MG/ML
4 INJECTION, SOLUTION INTRAVENOUS ONCE
Status: COMPLETED | OUTPATIENT
Start: 2025-01-08 | End: 2025-01-08

## 2025-01-08 RX ORDER — HEPARIN SODIUM 10000 [USP'U]/100ML
0-4500 INJECTION, SOLUTION INTRAVENOUS CONTINUOUS
Status: DISCONTINUED | OUTPATIENT
Start: 2025-01-08 | End: 2025-01-09

## 2025-01-08 RX ORDER — VANCOMYCIN HYDROCHLORIDE 1 G/20ML
INJECTION, POWDER, LYOPHILIZED, FOR SOLUTION INTRAVENOUS DAILY PRN
Status: DISCONTINUED | OUTPATIENT
Start: 2025-01-08 | End: 2025-01-08

## 2025-01-08 RX ORDER — POTASSIUM CHLORIDE 14.9 MG/ML
20 INJECTION INTRAVENOUS
Status: COMPLETED | OUTPATIENT
Start: 2025-01-08 | End: 2025-01-08

## 2025-01-08 RX ORDER — LIDOCAINE HYDROCHLORIDE 20 MG/ML
INJECTION, SOLUTION INFILTRATION; PERINEURAL AS NEEDED
Status: DISCONTINUED | OUTPATIENT
Start: 2025-01-08 | End: 2025-01-08 | Stop reason: HOSPADM

## 2025-01-08 RX ORDER — NOREPINEPHRINE BITARTRATE/D5W 8 MG/250ML
0-.5 PLASTIC BAG, INJECTION (ML) INTRAVENOUS CONTINUOUS
Status: DISCONTINUED | OUTPATIENT
Start: 2025-01-08 | End: 2025-01-09 | Stop reason: ALTCHOICE

## 2025-01-08 RX ADMIN — SODIUM CHLORIDE, SODIUM LACTATE, POTASSIUM CHLORIDE, AND CALCIUM CHLORIDE 1000 ML: 600; 310; 30; 20 INJECTION, SOLUTION INTRAVENOUS at 06:34

## 2025-01-08 RX ADMIN — PIPERACILLIN SODIUM AND TAZOBACTAM SODIUM 2.25 G: 2; .25 INJECTION, SOLUTION INTRAVENOUS at 21:15

## 2025-01-08 RX ADMIN — PANTOPRAZOLE SODIUM 40 MG: 40 TABLET, DELAYED RELEASE ORAL at 06:35

## 2025-01-08 RX ADMIN — KIT FOR THE PREPARATION OF TECHNETIUM TC 99M ALBUMIN AGGREGATED 4.2 MILLICURIE: 2.5 INJECTION, POWDER, FOR SOLUTION INTRAVENOUS at 08:15

## 2025-01-08 RX ADMIN — ACETAMINOPHEN 650 MG: 325 TABLET, FILM COATED ORAL at 23:30

## 2025-01-08 RX ADMIN — ACETAMINOPHEN 650 MG: 325 TABLET, FILM COATED ORAL at 06:34

## 2025-01-08 RX ADMIN — Medication 75 ML: at 04:18

## 2025-01-08 RX ADMIN — Medication 50 L/MIN: at 07:19

## 2025-01-08 RX ADMIN — VASOPRESSIN 0.03 UNITS/MIN: 0.2 INJECTION INTRAVENOUS at 21:05

## 2025-01-08 RX ADMIN — ACETAMINOPHEN 650 MG: 325 TABLET, FILM COATED ORAL at 00:27

## 2025-01-08 RX ADMIN — NOREPINEPHRINE BITARTRATE 0.05 MCG/KG/MIN: 8 INJECTION, SOLUTION INTRAVENOUS at 06:20

## 2025-01-08 RX ADMIN — SODIUM CHLORIDE, POTASSIUM CHLORIDE, SODIUM LACTATE AND CALCIUM CHLORIDE 100 ML/HR: 600; 310; 30; 20 INJECTION, SOLUTION INTRAVENOUS at 07:32

## 2025-01-08 RX ADMIN — POTASSIUM CHLORIDE 20 MEQ: 14.9 INJECTION, SOLUTION INTRAVENOUS at 04:58

## 2025-01-08 RX ADMIN — POLYETHYLENE GLYCOL 3350 17 G: 17 POWDER, FOR SOLUTION ORAL at 10:36

## 2025-01-08 RX ADMIN — HYDROMORPHONE HYDROCHLORIDE 0.2 MG: 0.2 INJECTION, SOLUTION INTRAMUSCULAR; INTRAVENOUS; SUBCUTANEOUS at 23:30

## 2025-01-08 RX ADMIN — FUROSEMIDE 40 MG: 10 INJECTION, SOLUTION INTRAMUSCULAR; INTRAVENOUS at 01:47

## 2025-01-08 RX ADMIN — Medication 100 MG: at 10:37

## 2025-01-08 RX ADMIN — ALLOPURINOL 300 MG: 300 TABLET ORAL at 10:37

## 2025-01-08 RX ADMIN — VASOPRESSIN 0.03 UNITS/MIN: 0.2 INJECTION INTRAVENOUS at 12:31

## 2025-01-08 RX ADMIN — ATORVASTATIN CALCIUM 10 MG: 10 TABLET, FILM COATED ORAL at 10:37

## 2025-01-08 RX ADMIN — Medication 60 PERCENT: at 09:14

## 2025-01-08 RX ADMIN — SODIUM CHLORIDE, POTASSIUM CHLORIDE, SODIUM LACTATE AND CALCIUM CHLORIDE 100 ML/HR: 600; 310; 30; 20 INJECTION, SOLUTION INTRAVENOUS at 12:32

## 2025-01-08 RX ADMIN — METHYLPREDNISOLONE SODIUM SUCCINATE 125 MG: 125 INJECTION, POWDER, FOR SOLUTION INTRAMUSCULAR; INTRAVENOUS at 12:51

## 2025-01-08 RX ADMIN — DOCUSATE SODIUM 100 MG: 100 CAPSULE, LIQUID FILLED ORAL at 21:15

## 2025-01-08 RX ADMIN — Medication 50 L/MIN: at 10:37

## 2025-01-08 RX ADMIN — MAGNESIUM SULFATE HEPTAHYDRATE 4 G: 40 INJECTION, SOLUTION INTRAVENOUS at 13:04

## 2025-01-08 RX ADMIN — VANCOMYCIN HYDROCHLORIDE 2000 MG: 5 INJECTION, POWDER, LYOPHILIZED, FOR SOLUTION INTRAVENOUS at 09:06

## 2025-01-08 RX ADMIN — NALOXONE HYDROCHLORIDE 0.2 MG: 0.4 INJECTION, SOLUTION INTRAMUSCULAR; INTRAVENOUS; SUBCUTANEOUS at 09:20

## 2025-01-08 RX ADMIN — OXYCODONE HYDROCHLORIDE 10 MG: 5 TABLET ORAL at 07:52

## 2025-01-08 RX ADMIN — NOREPINEPHRINE BITARTRATE 0.13 MCG/KG/MIN: 8 INJECTION, SOLUTION INTRAVENOUS at 10:00

## 2025-01-08 RX ADMIN — PIPERACILLIN SODIUM AND TAZOBACTAM SODIUM 3.38 G: 3; .375 INJECTION, SOLUTION INTRAVENOUS at 06:34

## 2025-01-08 RX ADMIN — METHYLPREDNISOLONE SODIUM SUCCINATE 40 MG: 40 INJECTION, POWDER, FOR SOLUTION INTRAMUSCULAR; INTRAVENOUS at 21:15

## 2025-01-08 RX ADMIN — Medication 50 L/MIN: at 15:53

## 2025-01-08 RX ADMIN — DOCUSATE SODIUM 100 MG: 100 CAPSULE, LIQUID FILLED ORAL at 10:38

## 2025-01-08 RX ADMIN — MULTIPLE VITAMINS W/ MINERALS TAB 1 TABLET: TAB ORAL at 10:36

## 2025-01-08 RX ADMIN — PERFLUTREN 10 ML OF DILUTION: 6.52 INJECTION, SUSPENSION INTRAVENOUS at 10:51

## 2025-01-08 RX ADMIN — ACETAMINOPHEN 650 MG: 325 TABLET, FILM COATED ORAL at 12:51

## 2025-01-08 RX ADMIN — KETOROLAC TROMETHAMINE 15 MG: 30 INJECTION, SOLUTION INTRAMUSCULAR at 04:58

## 2025-01-08 RX ADMIN — Medication 75 ML: at 06:37

## 2025-01-08 RX ADMIN — FOLIC ACID 1 MG: 1 TABLET ORAL at 10:37

## 2025-01-08 RX ADMIN — TAMSULOSIN HYDROCHLORIDE 0.4 MG: 0.4 CAPSULE ORAL at 21:16

## 2025-01-08 RX ADMIN — POTASSIUM CHLORIDE 20 MEQ: 14.9 INJECTION, SOLUTION INTRAVENOUS at 01:48

## 2025-01-08 RX ADMIN — SODIUM CHLORIDE, SODIUM LACTATE, POTASSIUM CHLORIDE, AND CALCIUM CHLORIDE 1000 ML: 600; 310; 30; 20 INJECTION, SOLUTION INTRAVENOUS at 10:36

## 2025-01-08 RX ADMIN — Medication 50 L/MIN: at 20:15

## 2025-01-08 RX ADMIN — SODIUM CHLORIDE 500 ML: 9 INJECTION, SOLUTION INTRAVENOUS at 06:06

## 2025-01-08 RX ADMIN — CEFAZOLIN SODIUM 2 G: 2 INJECTION, SOLUTION INTRAVENOUS at 00:27

## 2025-01-08 RX ADMIN — DONEPEZIL HYDROCHLORIDE 5 MG: 5 TABLET ORAL at 21:15

## 2025-01-08 RX ADMIN — HEPARIN SODIUM 1800 UNITS/HR: 10000 INJECTION, SOLUTION INTRAVENOUS at 13:12

## 2025-01-08 SDOH — ECONOMIC STABILITY: FOOD INSECURITY: WITHIN THE PAST 12 MONTHS, THE FOOD YOU BOUGHT JUST DIDN'T LAST AND YOU DIDN'T HAVE MONEY TO GET MORE.: NEVER TRUE

## 2025-01-08 SDOH — ECONOMIC STABILITY: HOUSING INSECURITY: AT ANY TIME IN THE PAST 12 MONTHS, WERE YOU HOMELESS OR LIVING IN A SHELTER (INCLUDING NOW)?: NO

## 2025-01-08 SDOH — SOCIAL STABILITY: SOCIAL INSECURITY: WERE YOU ABLE TO COMPLETE ALL THE BEHAVIORAL HEALTH SCREENINGS?: YES

## 2025-01-08 SDOH — SOCIAL STABILITY: SOCIAL INSECURITY: DO YOU FEEL ANYONE HAS EXPLOITED OR TAKEN ADVANTAGE OF YOU FINANCIALLY OR OF YOUR PERSONAL PROPERTY?: NO

## 2025-01-08 SDOH — SOCIAL STABILITY: SOCIAL INSECURITY: ARE YOU OR HAVE YOU BEEN THREATENED OR ABUSED PHYSICALLY, EMOTIONALLY, OR SEXUALLY BY ANYONE?: NO

## 2025-01-08 SDOH — ECONOMIC STABILITY: FOOD INSECURITY: HOW HARD IS IT FOR YOU TO PAY FOR THE VERY BASICS LIKE FOOD, HOUSING, MEDICAL CARE, AND HEATING?: NOT VERY HARD

## 2025-01-08 SDOH — ECONOMIC STABILITY: HOUSING INSECURITY: IN THE LAST 12 MONTHS, WAS THERE A TIME WHEN YOU WERE NOT ABLE TO PAY THE MORTGAGE OR RENT ON TIME?: NO

## 2025-01-08 SDOH — ECONOMIC STABILITY: FOOD INSECURITY: HOW HARD IS IT FOR YOU TO PAY FOR THE VERY BASICS LIKE FOOD, HOUSING, MEDICAL CARE, AND HEATING?: NOT HARD AT ALL

## 2025-01-08 SDOH — HEALTH STABILITY: PHYSICAL HEALTH: ON AVERAGE, HOW MANY MINUTES DO YOU ENGAGE IN EXERCISE AT THIS LEVEL?: 30 MIN

## 2025-01-08 SDOH — SOCIAL STABILITY: SOCIAL INSECURITY: WITHIN THE LAST YEAR, HAVE YOU BEEN HUMILIATED OR EMOTIONALLY ABUSED IN OTHER WAYS BY YOUR PARTNER OR EX-PARTNER?: NO

## 2025-01-08 SDOH — ECONOMIC STABILITY: HOUSING INSECURITY: IN THE PAST 12 MONTHS, HOW MANY TIMES HAVE YOU MOVED WHERE YOU WERE LIVING?: 1

## 2025-01-08 SDOH — HEALTH STABILITY: PHYSICAL HEALTH
HOW OFTEN DO YOU NEED TO HAVE SOMEONE HELP YOU WHEN YOU READ INSTRUCTIONS, PAMPHLETS, OR OTHER WRITTEN MATERIAL FROM YOUR DOCTOR OR PHARMACY?: NEVER

## 2025-01-08 SDOH — SOCIAL STABILITY: SOCIAL INSECURITY: ARE THERE ANY APPARENT SIGNS OF INJURIES/BEHAVIORS THAT COULD BE RELATED TO ABUSE/NEGLECT?: NO

## 2025-01-08 SDOH — SOCIAL STABILITY: SOCIAL INSECURITY: WITHIN THE LAST YEAR, HAVE YOU BEEN AFRAID OF YOUR PARTNER OR EX-PARTNER?: NO

## 2025-01-08 SDOH — SOCIAL STABILITY: SOCIAL INSECURITY: HAVE YOU HAD THOUGHTS OF HARMING ANYONE ELSE?: NO

## 2025-01-08 SDOH — ECONOMIC STABILITY: INCOME INSECURITY: IN THE PAST 12 MONTHS HAS THE ELECTRIC, GAS, OIL, OR WATER COMPANY THREATENED TO SHUT OFF SERVICES IN YOUR HOME?: NO

## 2025-01-08 SDOH — ECONOMIC STABILITY: FOOD INSECURITY: WITHIN THE PAST 12 MONTHS, YOU WORRIED THAT YOUR FOOD WOULD RUN OUT BEFORE YOU GOT THE MONEY TO BUY MORE.: NEVER TRUE

## 2025-01-08 SDOH — ECONOMIC STABILITY: HOUSING INSECURITY: IN THE PAST 12 MONTHS, HOW MANY TIMES HAVE YOU MOVED WHERE YOU WERE LIVING?: 0

## 2025-01-08 SDOH — SOCIAL STABILITY: SOCIAL INSECURITY: DO YOU FEEL UNSAFE GOING BACK TO THE PLACE WHERE YOU ARE LIVING?: NO

## 2025-01-08 SDOH — HEALTH STABILITY: PHYSICAL HEALTH: ON AVERAGE, HOW MANY DAYS PER WEEK DO YOU ENGAGE IN MODERATE TO STRENUOUS EXERCISE (LIKE A BRISK WALK)?: 2 DAYS

## 2025-01-08 SDOH — ECONOMIC STABILITY: HOUSING INSECURITY: DO YOU FEEL UNSAFE GOING BACK TO THE PLACE WHERE YOU LIVE?: NO

## 2025-01-08 SDOH — SOCIAL STABILITY: SOCIAL INSECURITY: HAVE YOU HAD ANY THOUGHTS OF HARMING ANYONE ELSE?: NO

## 2025-01-08 SDOH — SOCIAL STABILITY: SOCIAL INSECURITY: ABUSE: ADULT

## 2025-01-08 SDOH — SOCIAL STABILITY: SOCIAL INSECURITY: HAS ANYONE EVER THREATENED TO HURT YOUR FAMILY OR YOUR PETS?: NO

## 2025-01-08 SDOH — ECONOMIC STABILITY: TRANSPORTATION INSECURITY: IN THE PAST 12 MONTHS, HAS LACK OF TRANSPORTATION KEPT YOU FROM MEDICAL APPOINTMENTS OR FROM GETTING MEDICATIONS?: NO

## 2025-01-08 SDOH — SOCIAL STABILITY: SOCIAL INSECURITY
ASK PARENT OR GUARDIAN: ARE THERE TIMES WHEN YOU, YOUR CHILD(REN), OR ANY MEMBER OF YOUR HOUSEHOLD FEEL UNSAFE, HARMED, OR THREATENED AROUND PERSONS WITH WHOM YOU KNOW OR LIVE?: NO

## 2025-01-08 SDOH — SOCIAL STABILITY: SOCIAL INSECURITY: DOES ANYONE TRY TO KEEP YOU FROM HAVING/CONTACTING OTHER FRIENDS OR DOING THINGS OUTSIDE YOUR HOME?: NO

## 2025-01-08 ASSESSMENT — COGNITIVE AND FUNCTIONAL STATUS - GENERAL
STANDING UP FROM CHAIR USING ARMS: A LOT
MOVING FROM LYING ON BACK TO SITTING ON SIDE OF FLAT BED WITH BEDRAILS: A LOT
MOBILITY SCORE: 12
DRESSING REGULAR LOWER BODY CLOTHING: A LOT
MOVING TO AND FROM BED TO CHAIR: A LOT
TOILETING: A LOT
PATIENT BASELINE BEDBOUND: NO
DRESSING REGULAR UPPER BODY CLOTHING: A LOT
HELP NEEDED FOR BATHING: A LOT
CLIMB 3 TO 5 STEPS WITH RAILING: A LOT
DAILY ACTIVITIY SCORE: 16
TURNING FROM BACK TO SIDE WHILE IN FLAT BAD: A LOT
WALKING IN HOSPITAL ROOM: A LOT

## 2025-01-08 ASSESSMENT — LIFESTYLE VARIABLES
PAROXYSMAL SWEATS: NO SWEAT VISIBLE
HEADACHE, FULLNESS IN HEAD: NOT PRESENT
SKIP TO QUESTIONS 9-10: 0
VISUAL DISTURBANCES: NOT PRESENT
AGITATION: NORMAL ACTIVITY
AUDITORY DISTURBANCES: NOT PRESENT
ANXIETY: NO ANXIETY, AT EASE
ANXIETY: NO ANXIETY, AT EASE
HEADACHE, FULLNESS IN HEAD: NOT PRESENT
TOTAL SCORE: 0
HOW OFTEN DO YOU HAVE A DRINK CONTAINING ALCOHOL: 2-3 TIMES A WEEK
TREMOR: NO TREMOR
VISUAL DISTURBANCES: NOT PRESENT
AUDITORY DISTURBANCES: NOT PRESENT
HOW MANY STANDARD DRINKS CONTAINING ALCOHOL DO YOU HAVE ON A TYPICAL DAY: 3 OR 4
TOTAL SCORE: 0
AUDIT-C TOTAL SCORE: 4
ORIENTATION AND CLOUDING OF SENSORIUM: ORIENTED AND CAN DO SERIAL ADDITIONS
AGITATION: NORMAL ACTIVITY
HOW OFTEN DO YOU HAVE 6 OR MORE DRINKS ON ONE OCCASION: NEVER
AUDIT-C TOTAL SCORE: 4
ORIENTATION AND CLOUDING OF SENSORIUM: ORIENTED AND CAN DO SERIAL ADDITIONS
PAROXYSMAL SWEATS: NO SWEAT VISIBLE
NAUSEA AND VOMITING: NO NAUSEA AND NO VOMITING
NAUSEA AND VOMITING: NO NAUSEA AND NO VOMITING
TREMOR: NO TREMOR

## 2025-01-08 ASSESSMENT — ACTIVITIES OF DAILY LIVING (ADL)
GROOMING: NEEDS ASSISTANCE
LACK_OF_TRANSPORTATION: NO
ASSISTIVE_DEVICE: WALKER
ADEQUATE_TO_COMPLETE_ADL: YES
TOILETING: NEEDS ASSISTANCE
HEARING - RIGHT EAR: FUNCTIONAL
LACK_OF_TRANSPORTATION: NO
LACK_OF_TRANSPORTATION: NO
HEARING - LEFT EAR: FUNCTIONAL
DRESSING YOURSELF: NEEDS ASSISTANCE
FEEDING YOURSELF: INDEPENDENT
PATIENT'S MEMORY ADEQUATE TO SAFELY COMPLETE DAILY ACTIVITIES?: YES
JUDGMENT_ADEQUATE_SAFELY_COMPLETE_DAILY_ACTIVITIES: YES
LACK_OF_TRANSPORTATION: NO
BATHING: NEEDS ASSISTANCE
WALKS IN HOME: NEEDS ASSISTANCE

## 2025-01-08 ASSESSMENT — PAIN DESCRIPTION - ORIENTATION
ORIENTATION: RIGHT
ORIENTATION: RIGHT

## 2025-01-08 ASSESSMENT — PAIN DESCRIPTION - LOCATION
LOCATION: KNEE
LOCATION: KNEE

## 2025-01-08 ASSESSMENT — PAIN SCALES - GENERAL
PAINLEVEL_OUTOF10: 7
PAINLEVEL_OUTOF10: 8

## 2025-01-08 ASSESSMENT — PAIN - FUNCTIONAL ASSESSMENT: PAIN_FUNCTIONAL_ASSESSMENT: 0-10

## 2025-01-08 NOTE — PROCEDURES
Insert arterial line    Date/Time: 1/8/2025 12:13 PM    Performed by: SOTERO Rainey DNP  Authorized by: SOTERO Rainey DNP    Consent:     Consent obtained:  Verbal and emergent situation    Consent given by:  Patient    Risks, benefits, and alternatives were discussed: yes      Risks discussed:  Bleeding, ischemia, repeat procedure, infection and pain  Universal protocol:     Procedure explained and questions answered to patient or proxy's satisfaction: yes      Relevant documents present and verified: yes      Test results available: yes      Imaging studies available: yes      Required blood products, implants, devices, and special equipment available: yes      Site/side marked: yes      Immediately prior to procedure, a time out was called: yes      Patient identity confirmed:  Verbally with patient, hospital-assigned identification number and arm band  Indications:     Indications: hemodynamic monitoring and multiple ABGs    Pre-procedure details:     Skin preparation:  Chlorhexidine  Sedation:     Sedation type:  None  Anesthesia:     Anesthesia method:  Local infiltration  Procedure details:     Location:  L radial    Alvin's test performed: yes      Alvin's test abnormal: no      Placement technique:  Seldinger and ultrasound guided    Number of attempts:  1    Transducer: waveform confirmed    Post-procedure details:     Post-procedure:  Sterile dressing applied and sutured    CMS:  Normal    Procedure completion:  Tolerated well, no immediate complications

## 2025-01-08 NOTE — PROGRESS NOTES
01/08/25 1653   Discharge Planning   Living Arrangements Spouse/significant other   Support Systems Spouse/significant other   Assistance Needed Independent, drives   Type of Residence Private residence   Number of Stairs to Enter Residence 3   Number of Stairs Within Residence 0   Do you have animals or pets at home? Yes   Type of Animals or Pets one dog   Who is requesting discharge planning? Provider   Home or Post Acute Services In home services   Type of Home Care Services Home nursing visits;Home OT;Home PT   Expected Discharge Disposition Home H   Does the patient need discharge transport arranged? Yes   RoundTrip coordination needed? Yes   Has discharge transport been arranged? No   Financial Resource Strain   How hard is it for you to pay for the very basics like food, housing, medical care, and heating? Not hard   Housing Stability   In the last 12 months, was there a time when you were not able to pay the mortgage or rent on time? N   In the past 12 months, how many times have you moved where you were living? 1   At any time in the past 12 months, were you homeless or living in a shelter (including now)? N   Transportation Needs   In the past 12 months, has lack of transportation kept you from medical appointments or from getting medications? no   In the past 12 months, has lack of transportation kept you from meetings, work, or from getting things needed for daily living? No   Patient Choice   Provider Choice list and CMS website (https://medicare.gov/care-compare#search) for post-acute Quality and Resource Measure Data were provided and reviewed with: Family   Patient / Family choosing to utilize agency / facility established prior to hospitalization No   Stroke Family Assessment   Stroke Family Assessment Needed No   Intensity of Service   Intensity of Service 0-30 min          Met with patient and his wife Marion Samuel at bedside and explained my role as care coordinator. They live in the house. He was  independent with his care at home. He was using a cane for outside sometimes. No oxygen in use at home, no HD. His PCP is Dr. Josué Kay and he seen him couple months ago. Pharmacy he uses is [facility] in [place] or  [facility]. He is able to afford medications and to get to his doctors appointments. Patient had right total knee arthoplasty done yesterday and post op had hypoxia and is on HF oxygen. He is on pressors. Patient went to cardiac cath. Most likely he will need re-eval. Wife stated if he will need SNF she would like him to go to [facility] where he was before after his back surgery. Will continue to follow for discharge needs.

## 2025-01-08 NOTE — PROGRESS NOTES
Vancomycin Dosing by Pharmacy- INITIAL    Mikhail Moses is a 79 y.o. year old male who Pharmacy has been consulted for vancomycin dosing for other prosthetic device infection . Based on the patient's indication and renal status this patient will be dosed based on a goal AUC of 400-600.     Renal function is currently declining.    Visit Vitals  BP 90/50   Pulse 68   Temp 36.1 °C (96.9 °F)   Resp 13        Lab Results   Component Value Date    CREATININE 1.78 (H) 2025    CREATININE 1.06 2024    CREATININE 1.29 2024    CREATININE 1.77 (H) 10/26/2024        Patient weight is as follows:   Vitals:    25 0959   Weight: 98.8 kg (217 lb 13 oz)       Cultures:  No results found for the encounter in last 14 days.        I/O last 3 completed shifts:  In: 1317.9 (13.3 mL/kg) [P.O.:150; I.V.:1167.9 (11.8 mL/kg)]  Out: 45 (0.5 mL/kg) [Drains:20; Blood:25]  Weight: 98.8 kg   I/O during current shift:  I/O this shift:  In: 1495 [P.O.:465; I.V.:1030]  Out: 50 [Drains:50]    Temp (24hrs), Av.2 °C (97.1 °F), Min:35.3 °C (95.5 °F), Max:36.6 °C (97.9 °F)         Assessment/Plan     Patient will be given 2000mg x1.  Will initiate vancomycin maintenance, dosing by level for renal function.  Follow-up level will be ordered on  at AM labs unless clinically indicated sooner.  Will continue to monitor renal function daily while on vancomycin and order serum creatinine at least every 48 hours if not already ordered.  Follow for continued vancomycin needs, clinical response, and signs/symptoms of toxicity.       Jeremi Marrero, PharmD
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
## 2025-01-08 NOTE — NURSING NOTE
Pt 84% on 4 L nc- Increased O2 to 6 L nc -92%/pt sounds junkie- notified Jas Wells/Doctor Carlos. Respiratory placed on 12 L high flow- 94%; labs drawn; CXRAY; EKG- NSR w/RBBB; Bladder scanned-26; IV lasix given. Will continue to monitor. Possible VQ Scan in a.m.
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
## 2025-01-08 NOTE — POST-PROCEDURE NOTE
Physician Transition of Care Summary  Invasive Cardiovascular Lab    Procedure Date: 1/8/2025  Attending:    Luis Antonio Luna - Primary  Resident/Fellow/Other Assistant: Surgeons and Role:  * No surgeons found with a matching role *    Indications:   Pre-op Diagnosis      * Shock (Multi) [R57.9]     * Postoperative shock, unspecified shock type, initial encounter [T81.10XA]    Post-procedure diagnosis:   Post-op Diagnosis     * Shock (Multi) [R57.9]     * Postoperative shock, unspecified shock type, initial encounter [T81.10XA]    Procedure(s):   Right Heart Cath  30706 - [place] RIGHT HEART CATH O2 SATURATION & CARDIAC OUTPUT    Procedure Findings:   -RHC: Elevated right- [RA 10, RVEDP 15 mmHg] and left-[PCWP 17 mmHg] sided filling pressures, PA pressures of 47/20 [mean PA 29 mmHg], and normal assumed Nely cardiac output at 6.5 L/min [cardiac index at 3.1 L/min/m² [MVO2 67%].  SVR is low at 812 dyn·s/cm5 on vasopressin 0.03 and Levophed 0.18 mcg/kg/min.  Please note that end expiratory numbers were reported.  -[place] catheter was sutured in place at an external marker of 51 cm.    Access of the Procedure:   We exchanged the triple-lumen catheter in the right internal jugular to a 9 [place] citizen sheath that was sutured in.    Complications:   None.    Stents/Implants:   None.    Anticoagulation/Antiplatelet Plan:   Patient is on heparin drip.    Estimated Blood Loss:   5 mL    Anesthesia: Moderate Sedation Anesthesia Staff: No anesthesia staff entered.    Any Specimen(s) Removed:   Order Name Source Comment Collection Info Order Time   HIV RNA, QUANTITATIVE, PCR Blood, Venous   1/8/2025  4:54 PM     Release result to [facility]   Immediate        HEPATITIS PANEL, ACUTE Blood, Venous   1/8/2025  4:54 PM     Release result to [facility]   Immediate        HIV 1/2 ANTIGEN/ANTIBODY SCREEN [facility] REFLEX TO CONFIRMATION Blood, Venous   1/8/2025  4:54 PM     Release result to [facility]   Immediate            Disposition:   Further management  as per MICU and cardiology inpatient consult team.      Electronically signed by: Lalita Luna MD, 1/8/2025 5:04 PM

## 2025-01-08 NOTE — CONSULTS
Reason For Consult  Ulrich eplacement    History Of Present Illness  Mikhail Moses is a 79 y.o. male with PMH for urethroplasty and urethral dilation who presented for elective R TKA with Dr. Bradford 1/7/25. Patient became hypoxic and hypotension overnight and was transferred to ICU for further management. He sees  urologist Dr. Agapito Almaraz outpatient who performs urethral dilations every 6-8 weeks for urethral stricture. Urology was consulted for ulrich placement for inability to urinate.     Past Medical History  He has a past medical history of Alcohol abuse, Anemia, BPH (benign prostatic hyperplasia), CAD (coronary artery disease), Chronic edema, CKD (chronic kidney disease), COPD (chronic obstructive pulmonary disease) (Multi), GERD (gastroesophageal reflux disease), Gout, H/O non-ST elevation myocardial infarction (NSTEMI) (07/2021), History of blood transfusion, HLD (hyperlipidemia), HTN (hypertension), deep venous thrombosis (2023), OA (osteoarthritis), Old myocardial infarction, PAH (pulmonary artery hypertension) (Multi), Sepsis with acute hypoxic respiratory failure without septic shock, due to unspecified organism (Multi) (10/2024), and Urethral stricture.    Surgical History  He has a past surgical history that includes Splenectomy, total; Coronary angioplasty with stent; Urethroplasty; Fracture surgery; Hernia repair; Carpal tunnel release (Right); Knee Arthroplasty (Left, 2023); Lumbar spine surgery (10/2024); and Cataract extraction (Bilateral).     Social History  He reports that he quit smoking about 20 years ago. His smoking use included cigarettes. He started smoking about 61 years ago. He has a 61.5 pack-year smoking history. He has never used smokeless tobacco. He reports that he does not currently use alcohol after a past usage of about 28.0 standard drinks of alcohol per week. He reports that he does not use drugs.    Family History  Family History   Problem Relation Name Age of Onset     "No Known Problems Mother      Heart attack Father      No Known Problems Half-Sister      No Known Problems Half-Sister          Allergies  Patient has no known allergies.    Review of Systems  12 point ROS negative unless stated above       Physical Exam  PE:  Constitutional: lethargic, minimally responds to commands  ENMT: dry mucous membranes  Respiratory/Thorax: On 50 L airvo  Gastrointestinal: Abdomen non distended, soft, nontender  Genitourinary: external catheter in place  Musculoskeletal: ROM intact, no joint swelling, normal strength  Extremities: No peripheral edema  Neurological: no focal deficits. Not responding to questions/commands  Skin: Warm and dry       Last Recorded Vitals  Blood pressure 88/56, pulse 69, temperature 36.4 °C (97.6 °F), temperature source Tympanic, resp. rate 16, height 1.727 m (5' 8\"), weight 98.8 kg (217 lb 13 oz), SpO2 94%.    Relevant Results  Results for orders placed or performed during the hospital encounter of 01/07/25 (from the past 24 hours)   Blood Gas Arterial Full Panel   Result Value Ref Range    POCT pH, Arterial 7.35 (L) 7.38 - 7.42 pH    POCT pCO2, Arterial 45 (H) 38 - 42 mm Hg    POCT pO2, Arterial 68 (L) 85 - 95 mm Hg    POCT SO2, Arterial 95 94 - 100 %    POCT Oxy Hemoglobin, Arterial 92.8 (L) 94.0 - 98.0 %    POCT Hematocrit Calculated, Arterial 35.0 (L) 41.0 - 52.0 %    POCT Sodium, Arterial 139 136 - 145 mmol/L    POCT Potassium, Arterial 3.4 (L) 3.5 - 5.3 mmol/L    POCT Chloride, Arterial 106 98 - 107 mmol/L    POCT Ionized Calcium, Arterial 1.03 (L) 1.10 - 1.33 mmol/L    POCT Glucose, Arterial 112 (H) 74 - 99 mg/dL    POCT Lactate, Arterial 2.0 0.4 - 2.0 mmol/L    POCT Base Excess, Arterial -1.0 -2.0 - 3.0 mmol/L    POCT HCO3 Calculated, Arterial 24.8 22.0 - 26.0 mmol/L    POCT Hemoglobin, Arterial 11.8 (L) 13.5 - 17.5 g/dL    POCT Anion Gap, Arterial 12 10 - 25 mmo/L    Patient Temperature 37.0 degrees Celsius    FiO2 70 %   CBC   Result Value Ref Range    " WBC 10.6 4.4 - 11.3 x10*3/uL    nRBC 0.0 0.0 - 0.0 /100 WBCs    RBC 4.07 (L) 4.50 - 5.90 x10*6/uL    Hemoglobin 12.0 (L) 13.5 - 17.5 g/dL    Hematocrit 38.3 (L) 41.0 - 52.0 %    MCV 94 80 - 100 fL    MCH 29.5 26.0 - 34.0 pg    MCHC 31.3 (L) 32.0 - 36.0 g/dL    RDW 14.5 11.5 - 14.5 %    Platelets 221 150 - 450 x10*3/uL   Basic Metabolic Panel   Result Value Ref Range    Glucose 110 (H) 74 - 99 mg/dL    Sodium 142 136 - 145 mmol/L    Potassium 4.3 3.5 - 5.3 mmol/L    Chloride 106 98 - 107 mmol/L    Bicarbonate 29 21 - 32 mmol/L    Anion Gap 11 10 - 20 mmol/L    Urea Nitrogen 15 6 - 23 mg/dL    Creatinine 1.78 (H) 0.50 - 1.30 mg/dL    eGFR 38 (L) >60 mL/min/1.73m*2    Calcium 7.5 (L) 8.6 - 10.3 mg/dL   ECG 12 lead   Result Value Ref Range    Ventricular Rate 71 BPM    Atrial Rate 71 BPM    TX Interval 164 ms    QRS Duration 134 ms    QT Interval 474 ms    QTC Calculation(Bazett) 515 ms    P Axis -13 degrees    R Axis -54 degrees    T Axis 43 degrees    QRS Count 11 beats    Q Onset 192 ms    P Onset 110 ms    P Offset 153 ms    T Offset 429 ms    QTC Fredericia 501 ms   CBC   Result Value Ref Range    WBC 7.4 4.4 - 11.3 x10*3/uL    nRBC 0.0 0.0 - 0.0 /100 WBCs    RBC 3.61 (L) 4.50 - 5.90 x10*6/uL    Hemoglobin 10.6 (L) 13.5 - 17.5 g/dL    Hematocrit 34.0 (L) 41.0 - 52.0 %    MCV 94 80 - 100 fL    MCH 29.4 26.0 - 34.0 pg    MCHC 31.2 (L) 32.0 - 36.0 g/dL    RDW 14.5 11.5 - 14.5 %    Platelets 200 150 - 450 x10*3/uL   Basic metabolic panel   Result Value Ref Range    Glucose 83 74 - 99 mg/dL    Sodium 142 136 - 145 mmol/L    Potassium 3.2 (L) 3.5 - 5.3 mmol/L    Chloride 108 (H) 98 - 107 mmol/L    Bicarbonate 24 21 - 32 mmol/L    Anion Gap 13 10 - 20 mmol/L    Urea Nitrogen 16 6 - 23 mg/dL    Creatinine 2.16 (H) 0.50 - 1.30 mg/dL    eGFR 30 (L) >60 mL/min/1.73m*2    Calcium 7.3 (L) 8.6 - 10.3 mg/dL   Troponin I, High Sensitivity   Result Value Ref Range    Troponin I, High Sensitivity 30 (H) 0 - 20 ng/L   Magnesium    Result Value Ref Range    Magnesium 0.85 (L) 1.60 - 2.40 mg/dL   B-type natriuretic peptide   Result Value Ref Range     (H) 0 - 99 pg/mL   POCT GLUCOSE   Result Value Ref Range    POCT Glucose 80 74 - 99 mg/dL   Blood Gas Arterial Full Panel   Result Value Ref Range    POCT pH, Arterial 7.33 (L) 7.38 - 7.42 pH    POCT pCO2, Arterial 43 (H) 38 - 42 mm Hg    POCT pO2, Arterial 66 (L) 85 - 95 mm Hg    POCT SO2, Arterial 95 94 - 100 %    POCT Oxy Hemoglobin, Arterial 92.7 (L) 94.0 - 98.0 %    POCT Hematocrit Calculated, Arterial 30.0 (L) 41.0 - 52.0 %    POCT Sodium, Arterial 138 136 - 145 mmol/L    POCT Potassium, Arterial 3.4 (L) 3.5 - 5.3 mmol/L    POCT Chloride, Arterial 107 98 - 107 mmol/L    POCT Ionized Calcium, Arterial 1.03 (L) 1.10 - 1.33 mmol/L    POCT Glucose, Arterial 92 74 - 99 mg/dL    POCT Lactate, Arterial 2.5 (H) 0.4 - 2.0 mmol/L    POCT Base Excess, Arterial -3.1 (L) -2.0 - 3.0 mmol/L    POCT HCO3 Calculated, Arterial 22.7 22.0 - 26.0 mmol/L    POCT Hemoglobin, Arterial 10.1 (L) 13.5 - 17.5 g/dL    POCT Anion Gap, Arterial 12 10 - 25 mmo/L    Patient Temperature 37.0 degrees Celsius    FiO2 90 %    Apparatus HIGH FLOW CANNULA     Flow 50.0 LPM   Transthoracic Echo (TTE) Complete   Result Value Ref Range    BSA 2.18 m2   Blood Gas Arterial Full Panel   Result Value Ref Range    POCT pH, Arterial 7.28 (L) 7.38 - 7.42 pH    POCT pCO2, Arterial 46 (H) 38 - 42 mm Hg    POCT pO2, Arterial 63 (L) 85 - 95 mm Hg    POCT SO2, Arterial 94 94 - 100 %    POCT Oxy Hemoglobin, Arterial 91.9 (L) 94.0 - 98.0 %    POCT Hematocrit Calculated, Arterial 31.0 (L) 41.0 - 52.0 %    POCT Sodium, Arterial 136 136 - 145 mmol/L    POCT Potassium, Arterial 3.7 3.5 - 5.3 mmol/L    POCT Chloride, Arterial 105 98 - 107 mmol/L    POCT Ionized Calcium, Arterial 1.01 (L) 1.10 - 1.33 mmol/L    POCT Glucose, Arterial 104 (H) 74 - 99 mg/dL    POCT Lactate, Arterial 2.8 (H) 0.4 - 2.0 mmol/L    POCT Base Excess, Arterial -5.0 (L)  -2.0 - 3.0 mmol/L    POCT HCO3 Calculated, Arterial 21.6 (L) 22.0 - 26.0 mmol/L    POCT Hemoglobin, Arterial 10.3 (L) 13.5 - 17.5 g/dL    POCT Anion Gap, Arterial 13 10 - 25 mmo/L    Patient Temperature 37.0 degrees Celsius    FiO2 60 %    Apparatus HIGH FLOW CANNULA     Flow 50.0 LPM   Renal function panel   Result Value Ref Range    Glucose 103 (H) 74 - 99 mg/dL    Sodium 141 136 - 145 mmol/L    Potassium 3.7 3.5 - 5.3 mmol/L    Chloride 108 (H) 98 - 107 mmol/L    Bicarbonate 23 21 - 32 mmol/L    Anion Gap 14 10 - 20 mmol/L    Urea Nitrogen 18 6 - 23 mg/dL    Creatinine 2.59 (H) 0.50 - 1.30 mg/dL    eGFR 24 (L) >60 mL/min/1.73m*2    Calcium 6.7 (L) 8.6 - 10.3 mg/dL    Phosphorus 4.7 2.5 - 4.9 mg/dL    Albumin 2.9 (L) 3.4 - 5.0 g/dL   Coagulation Screen   Result Value Ref Range    Protime 13.4 (H) 9.8 - 12.8 seconds    INR 1.2 (H) 0.9 - 1.1    aPTT 31 27 - 38 seconds   CBC   Result Value Ref Range    WBC 15.9 (H) 4.4 - 11.3 x10*3/uL    nRBC 0.0 0.0 - 0.0 /100 WBCs    RBC 3.36 (L) 4.50 - 5.90 x10*6/uL    Hemoglobin 10.0 (L) 13.5 - 17.5 g/dL    Hematocrit 30.8 (L) 41.0 - 52.0 %    MCV 92 80 - 100 fL    MCH 29.8 26.0 - 34.0 pg    MCHC 32.5 32.0 - 36.0 g/dL    RDW 14.5 11.5 - 14.5 %    Platelets 224 150 - 450 x10*3/uL   Troponin I, High Sensitivity   Result Value Ref Range    Troponin I, High Sensitivity 45 (H) 0 - 20 ng/L   Electrocardiogram, 12-lead PRN ACS symptoms   Result Value Ref Range    Ventricular Rate 70 BPM    Atrial Rate 70 BPM    NH Interval 194 ms    QRS Duration 122 ms    QT Interval 424 ms    QTC Calculation(Bazett) 457 ms    P Axis 19 degrees    R Axis -47 degrees    T Axis 29 degrees    QRS Count 12 beats    Q Onset 214 ms    P Onset 117 ms    P Offset 140 ms    T Offset 426 ms    QTC Fredericia 446 ms   Vascular US lower extremity venous duplex bilateral   Result Value Ref Range    BSA 2.18 m2   Blood Gas Arterial Full Panel   Result Value Ref Range    POCT pH, Arterial 7.28 (L) 7.38 - 7.42 pH     POCT pCO2, Arterial 43 (H) 38 - 42 mm Hg    POCT pO2, Arterial 71 (L) 85 - 95 mm Hg    POCT SO2, Arterial 96 94 - 100 %    POCT Oxy Hemoglobin, Arterial 93.5 (L) 94.0 - 98.0 %    POCT Hematocrit Calculated, Arterial 31.0 (L) 41.0 - 52.0 %    POCT Sodium, Arterial 136 136 - 145 mmol/L    POCT Potassium, Arterial 3.7 3.5 - 5.3 mmol/L    POCT Chloride, Arterial 105 98 - 107 mmol/L    POCT Ionized Calcium, Arterial 0.98 (L) 1.10 - 1.33 mmol/L    POCT Glucose, Arterial 129 (H) 74 - 99 mg/dL    POCT Lactate, Arterial 2.4 (H) 0.4 - 2.0 mmol/L    POCT Base Excess, Arterial -6.2 (L) -2.0 - 3.0 mmol/L    POCT HCO3 Calculated, Arterial 20.2 (L) 22.0 - 26.0 mmol/L    POCT Hemoglobin, Arterial 10.2 (L) 13.5 - 17.5 g/dL    POCT Anion Gap, Arterial 15 10 - 25 mmo/L    Patient Temperature 37.0 degrees Celsius    FiO2 60 %    Apparatus HIGH FLOW CANNULA     Flow 50.0 LPM   Blood Gas Lactic Acid, Venous   Result Value Ref Range    POCT Lactate, Venous 2.5 (H) 0.4 - 2.0 mmol/L   Blood Gas Venous Full Panel   Result Value Ref Range    POCT pH, Venous 7.24 (LL) 7.33 - 7.43 pH    POCT pCO2, Venous 50 41 - 51 mm Hg    POCT pO2, Venous 45 35 - 45 mm Hg    POCT SO2, Venous 75 45 - 75 %    POCT Oxy Hemoglobin, Venous 73.2 45.0 - 75.0 %    POCT Hematocrit Calculated, Venous 32.0 (L) 41.0 - 52.0 %    POCT Sodium, Venous 136 136 - 145 mmol/L    POCT Potassium, Venous 3.9 3.5 - 5.3 mmol/L    POCT Chloride, Venous 103 98 - 107 mmol/L    POCT Ionized Calicum, Venous 0.99 (L) 1.10 - 1.33 mmol/L    POCT Glucose, Venous 128 (H) 74 - 99 mg/dL    POCT Lactate, Venous 2.5 (H) 0.4 - 2.0 mmol/L    POCT Base Excess, Venous -6.0 (L) -2.0 - 3.0 mmol/L    POCT HCO3 Calculated, Venous 21.4 (L) 22.0 - 26.0 mmol/L    POCT Hemoglobin, Venous 10.7 (L) 13.5 - 17.5 g/dL    POCT Anion Gap, Venous 16.0 10.0 - 25.0 mmol/L    Patient Temperature 37.0 degrees Celsius    FiO2 60 %   Urinalysis with Reflex Microscopic   Result Value Ref Range    Color, Urine Yellow  Light-Yellow, Yellow, Dark-Yellow    Appearance, Urine Turbid (N) Clear    Specific Gravity, Urine 1.027 1.005 - 1.035    pH, Urine 5.5 5.0, 5.5, 6.0, 6.5, 7.0, 7.5, 8.0    Protein, Urine 20 (TRACE) NEGATIVE, 10 (TRACE), 20 (TRACE) mg/dL    Glucose, Urine Normal Normal mg/dL    Blood, Urine NEGATIVE NEGATIVE    Ketones, Urine 10 (1+) (A) NEGATIVE mg/dL    Bilirubin, Urine NEGATIVE NEGATIVE    Urobilinogen, Urine Normal Normal mg/dL    Nitrite, Urine NEGATIVE NEGATIVE    Leukocyte Esterase, Urine 500 Mario/uL (A) NEGATIVE   Microscopic Only, Urine   Result Value Ref Range    WBC, Urine >50 (A) 1-5, NONE /HPF    RBC, Urine 6-10 (A) NONE, 1-2, 3-5 /HPF     XR chest 1 view   Final Result   No evidence of pneumothorax. Bilateral perihilar infiltrates/edema.        MACRO:   None.        Signed by: Emily Dillon 1/8/2025 3:29 PM   Dictation workstation:   [id number]      Vascular US lower extremity venous duplex bilateral         XR chest 1 view   Final Result   Status post placement of right IJ catheter with a possible tiny right   apical pneumothorax. Repeat films including inspiratory and   expiratory views are suggested with removal of the gown.        Cardiomegaly and diffuse interstitial prominence, which may be   related pulmonary edema.        Patchy density in the right lung base, which may be related to   atelectasis or infiltrate.                  MACRO:   Critical Finding:  See findings. Notification was initiated on   1/8/2025 at 1:15 pm by  Theresa Sousa.  (**-OCF-**) Instructions:        Signed by: Theresa Sousa 1/8/2025 1:15 PM   Dictation workstation:   [id number]      Transthoracic Echo (TTE) Complete         NM Lung perfusion with spect/ct   Final Result   1. Heterogenous perfusion in both lungs with suspicion of small   subsegmental peripheral perfusion defects in right apical and   posterior segment of left upper lobe on SPECT CT without evidence on   planar imaging. Findings suggest a low to  intermediate probability   for acute pulmonary embolism. In case of high clinical suspicion,   further evaluation is warranted.             The interpretation above is based on modified PIOPED II and PISAPED   criteria.        I personally reviewed the images/study and I agree with the findings   as stated by Garth Cat MD.  This study was interpreted at   [facility], [place], [place].        MACRO:   None        Signed by: Levi Person 1/8/2025 9:56 AM   Dictation workstation:   [id number]      XR chest 1 view   Final Result   Cardiomegaly with mild interstitial prominence which could be chronic   or relate to component developing interstitial edema. Correlate   clinically.        MACRO:   None        Signed by: Leonel Pickard 1/8/2025 1:14 AM   Dictation workstation:   [id number]      Cardiac Catheterization Procedure    (Results Pending)          Assessment/Plan     Mikhail Moses is a 79 y.o. male with PMH for urethroplasty and urethral dilation who presented for elective R TKA with Dr. Bradford 1/7/25. Patient became hypoxic and hypotension overnight and was transferred to ICU for further management. He sees  urologist Dr. Agapito Almaraz outpatient who performs urethral dilations every 6-8 weeks for urethral stricture. Urology was consulted for ulrich placement for inability to urinate.    Difficult ulrich placement- 14 F coude catheter placed bedside by Dr. Vaughan. Draining cloudy yellow urine. Ulrich to stay in place at time of discharge. Follow up with Dr. Almaraz outpatient for ulrich removal.     I spent 45 minutes in the professional and overall care of this patient.      Shanda Duenas, APRN-CNP

## 2025-01-08 NOTE — POST-PROCEDURE NOTE
Central Venous Catheter Insertion Procedure Note    Procedure(s) (LRB):  Right Knee Total  Arthroplasty (Right)    Indications:  vascular access, shock requiring vasopressor wupport    Procedure Details   Procedure was emergent    Maximum sterile technique was used including antiseptics, cap, gloves, gown, hand hygiene, mask, and sheet.    Under sterile conditions the skin above the on the right internal jugular vein was prepped with betadine and covered with a sterile drape. Target vessel identified using ultrasound guidance.  Local anesthesia was applied to the skin and subcutaneous tissues.  Target vessel accessed via steel needle, placement verified by manometry. A guide wire was then passed easily through the needle.  Needle removed and second verification using ultrasound confirmed proper placement of guidewire in RIJ. There were mild dysrhythmias requiring repositioning of guide wire.  Following verification, a  7.0 [place] triple-lumen was then inserted into the vessel over the guide wire and guidewire removed.  The catheter was sutured into place.    Findings:  There were no changes to vital signs. Catheter was flushed with 20 cc NS. Patient did tolerate procedure well.    Recommendations:  CXR ordered to verify placement.    Luis Felipe Banks, LEV

## 2025-01-08 NOTE — NURSING NOTE
Pt desatting to 84% on 14 L high flow/asymptomatic- RT placed pt on 50 of airvo; pt bladder scanned- 100 mL- still no urine output- Dr. Carlos ordered ulrich- pt refused due to issues with his bladder and strictures/pt to be transferred to step down

## 2025-01-08 NOTE — PROGRESS NOTES
"Orthopaedic Surgery Progress Note    Subjective:  Desats overnight to 86-90 requiring HFNC, Medicine consulted and transferred to SDU. Lung perfusion scan and sepsis workup ongoing.    This AM, patient c/o postop pain but otherwise feeling well. Denies fevers/chills or systemic symptoms. Denies chest pain or SOB.    O:  BP (!) 76/45   Pulse 72   Temp 36.1 °C (96.9 °F)   Resp 11   Ht 1.727 m (5' 8\")   Wt 98.8 kg (217 lb 13 oz)   SpO2 98%   BMI 33.12 kg/m²     Gen: arousable, NAD, appropriately conversational  Cardiac: RRR to peripheral palpation  Resp: nonlabored on RA  GI: soft, nondistended    MSK:  RLE  - Prevena c/d/i  - Fires DF/PF/EHL/FHL  - Residual numbness from block over medial calf  - Foot warm, well perfused  - DP/PT pulse, brisk cap refill  - Compartments soft and compressible    Drain intact w serosang output    A/P: 79 y.o. male s/p L TKA on 1/7 with Dr. Bradford.  Postop course c/b hypoxic respiratory failure, transferred to SDU POD1 and remains on HFNC. Sepsis workup and lung scan ongoing.    Plan:  - Weight bearing: WBAT LLE  - DVT ppx: SCDs, Eliquis  - Diet: Regular  - Pain: Tylenol, oxycodone 5/10  - Antibiotics: empiric Vanc/Zosyn per Medicine  - FEN: HLIV with good PO intake  - Bowel Regimen: Colace, senna, dulcolax  - PT/OT  - Continue home medications  - Appreciate Medicine recs, pending sepsis workup and lung scan  - Maintain HV and prevena, record output q8hr    Dispo: pending clinical course and HV    Rhonda Henriquez, PGY-2  Orthopedic Surgery Resident  Available via SlideShare    While admitted, this patient will be followed by the Ortho Joints Team. Please contact below residents with any questions (available via Epic Chat).     First call: Zheng Craven, PGY-1  Second call: Rhonda Henriquez PGY-2  Third call: Susan Gusman, PGY-4    On weekends and after 6PM:  At [facility]: Please reach out to the orthopaedic on-call resident ([phone])  At [facility]: Please reach out to the orthopaedic " on-call HEATHER or resident (please refer to Malena)

## 2025-01-08 NOTE — PROGRESS NOTES
Physical Therapy                 Therapy Communication Note    Patient Name: Mikhail Moses  MRN: [id number]  Department: [facility]  Room: [facility]  Today's Date: 1/8/2025     Discipline: Physical Therapy    PT Missed Visit: Yes     Missed Visit Reason: Missed Visit Reason:  (Attempted PT f/u tx, Initially US tech in room, after RN spencer'd bedlevel ther ex,however pt soundly asleep, unable to respond or open eyes to name or sternal rub.RN notified.pt unable to participate in tx at this time.)    Missed Time: Attempt    Comment:

## 2025-01-08 NOTE — CONSULTS
Consults    Reason For Consult  Postoperative hypoxia    History Of Present Illness  Mikhail Moses is a 79 y.o. male who underwent a right total knee arthroplasty 1/7/2025 and postoperatively HiBi he bEcame hypoxic requiring high flow nasal cannula at 12 L to get this saturation up to 98%.  He denies any chest pain or shortness of breath at the current time.         Past Medical History  He has a past medical history of Alcohol abuse, Anemia, BPH (benign prostatic hyperplasia), CAD (coronary artery disease), Chronic edema, CKD (chronic kidney disease), COPD (chronic obstructive pulmonary disease) (Multi), GERD (gastroesophageal reflux disease), Gout, H/O non-ST elevation myocardial infarction (NSTEMI) (07/2021), History of blood transfusion, HLD (hyperlipidemia), HTN (hypertension), deep venous thrombosis (2023), OA (osteoarthritis), Old myocardial infarction, PAH (pulmonary artery hypertension) (Multi), Sepsis with acute hypoxic respiratory failure without septic shock, due to unspecified organism (Multi) (10/2024), and Urethral stricture.    Surgical History  He has a past surgical history that includes Splenectomy, total; Coronary angioplasty with stent; Urethroplasty; Fracture surgery; Hernia repair; Carpal tunnel release (Right); Knee Arthroplasty (Left, 2023); Lumbar spine surgery (10/2024); and Cataract extraction (Bilateral).     Social History  He reports that he quit smoking about 20 years ago. His smoking use included cigarettes. He started smoking about 61 years ago. He has a 61.5 pack-year smoking history. He has never used smokeless tobacco. He reports that he does not currently use alcohol after a past usage of about 28.0 standard drinks of alcohol per week. He reports that he does not use drugs.    Family History  Family History   Problem Relation Name Age of Onset    No Known Problems Mother      Heart attack Father      No Known Problems Half-Sister      No Known Problems Half-Sister           Allergies  Patient has no known allergies.    Review of Systems       Physical Exam  Physical exam:  Constitutional: awake, alert, oriented  x3  Neuro: no tremor, no focal deficits  CV: Heart is regular rate and rhythm  Pulm: Lungs are clear to auscultation B/L  GI: Abdomen is soft nontender  Skin: Intact  Musculoskeletal Moves all 4 extremities  No calf tenderness  Extremities: 1+ edema.  Bilaterally right total knee dressing intact       Last Recorded Vitals  /65 (Patient Position: Lying)   Pulse 65   Temp 36.4 °C (97.6 °F) (Temporal)   Resp 18   Wt 98.8 kg (217 lb 13 oz)   SpO2 94%     Relevant Results  Glucose 104  Sodium 145 potassium 3.7 bicarbonate 28 BUN 12 creatinine 1.06  ABG showed pH 7.35 pCO2 45 pO2 68 potassium 3.4 hemoglobin A1c 4.9.  Lactate is 2.0     Assessment/Plan   Hypoxemia postoperatively he is POD 1 from a right total hip arthroplasty  Plan  ABGs have been done  Chest x-ray  40 of Lasix IV  Replace potassium  CTA of the chest -I decided to get a CTA of the chest because he is already on apixaban and his renal function is borderline  Discussed with surgical PA and ICU  Transfer to stepdown unit    CAD s/p PCI with multiple drug-eluting stents  Resume lisinopril 20 mg orally daily as a substitute for his ramipril 10 mg orally daily  Metoprolol XL 25 mg orally daily  Atorvastatin 10 mg orally daily  Apixaban 2.5 mg orally daily    Dementia  Aricept 5 mg orally daily    Hyperlipidemia  Atorvastatin 10 mg orally daily    Hypertension  Metoprolol XL 25 mg orally daily  Lisinopril 20 mg orally daily    BPH  Resume Flomax 0.4 mg orally nightly    COPD  DuoNeb aerosols as needed    DVT prophylaxis/history of DVT  Covered with apixaban    Duke Carlos DO

## 2025-01-08 NOTE — PROGRESS NOTES
Physical Therapy                 Therapy Communication Note    Patient Name: Mikhail Moses  MRN: [id number]  Department: [address]  Room: [address] [place], [place] [address]  Today's Date: 1/8/2025     Discipline: Physical Therapy    PT Missed Visit: Yes     Missed Visit Reason: Missed Visit Reason: Patient in a medical procedure (Attempted PT f/utx,pt with hypotensive and hypoxic episode yesterday per chart review and RN report, currently still with low BP, now on airvo and having ECHO done,rec to hold tx until this afternoon.)    Missed Time: Attempt    Comment:

## 2025-01-08 NOTE — TREATMENT PLAN
Summoned to bedside for low oxygen saturations. Patient is on high-flow NC saturating around 86-90. He denied any chest pains, shortness of breath, weakness, dizziness. His lung sounds are clear when I examined him. His pulse is a regular rate and rhythm.     Will obtain STAT CBC, BMP, ABG, CXR, EKG. I placed medicine consult and have spoken with Dr Carlos on the phone regarding the patient. He will obtain a CTA chest and examine the patient.

## 2025-01-08 NOTE — CONSULTS
Vancomycin Dosing by Pharmacy- Cessation of Therapy    Consult to pharmacy for vancomycin dosing has been discontinued by the prescriber, pharmacy will sign off at this time.    Please call pharmacy if there are further questions or re-enter a consult if vancomycin is resumed.     Lsahonda Chatterjee, LilliD

## 2025-01-09 ENCOUNTER — HOME CARE VISIT (OUTPATIENT)
Dept: HOME HEALTH SERVICES | Facility: HOME HEALTH | Age: 80
End: 2025-01-09

## 2025-01-09 PROBLEM — J96.01 ACUTE HYPOXIC RESPIRATORY FAILURE (MULTI): Status: ACTIVE | Noted: 2025-01-09

## 2025-01-09 LAB
ALBUMIN SERPL BCP-MCNC: 3 G/DL (ref 3.4–5)
ANION GAP SERPL CALC-SCNC: 15 MMOL/L (ref 10–20)
ANION GAP SERPL CALC-SCNC: 15 MMOL/L (ref 10–20)
BUN SERPL-MCNC: 30 MG/DL (ref 6–23)
BUN SERPL-MCNC: 30 MG/DL (ref 6–23)
CALCIUM SERPL-MCNC: 6.9 MG/DL (ref 8.6–10.3)
CALCIUM SERPL-MCNC: 6.9 MG/DL (ref 8.6–10.3)
CHLORIDE SERPL-SCNC: 104 MMOL/L (ref 98–107)
CHLORIDE SERPL-SCNC: 104 MMOL/L (ref 98–107)
CO2 SERPL-SCNC: 20 MMOL/L (ref 21–32)
CO2 SERPL-SCNC: 20 MMOL/L (ref 21–32)
CREAT SERPL-MCNC: 2.6 MG/DL (ref 0.5–1.3)
CREAT SERPL-MCNC: 2.6 MG/DL (ref 0.5–1.3)
EGFRCR SERPLBLD CKD-EPI 2021: 24 ML/MIN/1.73M*2
EGFRCR SERPLBLD CKD-EPI 2021: 24 ML/MIN/1.73M*2
ERYTHROCYTE [DISTWIDTH] IN BLOOD BY AUTOMATED COUNT: 14.2 % (ref 11.5–14.5)
GLUCOSE BLD MANUAL STRIP-MCNC: 119 MG/DL (ref 74–99)
GLUCOSE BLD MANUAL STRIP-MCNC: 126 MG/DL (ref 74–99)
GLUCOSE BLD MANUAL STRIP-MCNC: 154 MG/DL (ref 74–99)
GLUCOSE BLD MANUAL STRIP-MCNC: 170 MG/DL (ref 74–99)
GLUCOSE BLD MANUAL STRIP-MCNC: 190 MG/DL (ref 74–99)
GLUCOSE SERPL-MCNC: 194 MG/DL (ref 74–99)
GLUCOSE SERPL-MCNC: 194 MG/DL (ref 74–99)
HAV IGM SER QL: NONREACTIVE
HBV CORE IGM SER QL: NONREACTIVE
HBV SURFACE AG SERPL QL IA: NONREACTIVE
HCT VFR BLD AUTO: 29.8 % (ref 41–52)
HCV AB SER QL: NONREACTIVE
HGB BLD-MCNC: 9.7 G/DL (ref 13.5–17.5)
HIV 1+2 AB+HIV1 P24 AG SERPL QL IA: NONREACTIVE
HIV1 RNA # PLAS NAA DL=20: NOT DETECTED {COPIES}/ML
HIV1 RNA SPEC NAA+PROBE-LOG#: NORMAL {LOG_COPIES}/ML
LEGIONELLA AG UR QL: NEGATIVE
MAGNESIUM SERPL-MCNC: 1.85 MG/DL (ref 1.6–2.4)
MCH RBC QN AUTO: 29.8 PG (ref 26–34)
MCHC RBC AUTO-ENTMCNC: 32.6 G/DL (ref 32–36)
MCV RBC AUTO: 91 FL (ref 80–100)
NRBC BLD-RTO: 0 /100 WBCS (ref 0–0)
PHOSPHATE SERPL-MCNC: 4.4 MG/DL (ref 2.5–4.9)
PLATELET # BLD AUTO: 176 X10*3/UL (ref 150–450)
POTASSIUM SERPL-SCNC: 4 MMOL/L (ref 3.5–5.3)
POTASSIUM SERPL-SCNC: 4 MMOL/L (ref 3.5–5.3)
RBC # BLD AUTO: 3.26 X10*6/UL (ref 4.5–5.9)
S PNEUM AG UR QL: NEGATIVE
SODIUM SERPL-SCNC: 135 MMOL/L (ref 136–145)
SODIUM SERPL-SCNC: 135 MMOL/L (ref 136–145)
UFH PPP CHRO-ACNC: 0.8 IU/ML
UFH PPP CHRO-ACNC: 1.1 IU/ML
WBC # BLD AUTO: 17.2 X10*3/UL (ref 4.4–11.3)

## 2025-01-09 PROCEDURE — 97116 GAIT TRAINING THERAPY: CPT | Mod: GP,CQ | Performed by: PHYSICAL THERAPY ASSISTANT

## 2025-01-09 PROCEDURE — 2500000004 HC RX 250 GENERAL PHARMACY W/ HCPCS (ALT 636 FOR OP/ED): Performed by: NURSE PRACTITIONER

## 2025-01-09 PROCEDURE — 2500000004 HC RX 250 GENERAL PHARMACY W/ HCPCS (ALT 636 FOR OP/ED): Mod: JZ

## 2025-01-09 PROCEDURE — 80048 BASIC METABOLIC PNL TOTAL CA: CPT | Performed by: NURSE PRACTITIONER

## 2025-01-09 PROCEDURE — 2500000005 HC RX 250 GENERAL PHARMACY W/O HCPCS: Performed by: NURSE PRACTITIONER

## 2025-01-09 PROCEDURE — 2500000001 HC RX 250 WO HCPCS SELF ADMINISTERED DRUGS (ALT 637 FOR MEDICARE OP): Performed by: NURSE PRACTITIONER

## 2025-01-09 PROCEDURE — 9420000001 HC RT PATIENT EDUCATION 5 MIN

## 2025-01-09 PROCEDURE — 97110 THERAPEUTIC EXERCISES: CPT | Mod: GP,CQ | Performed by: PHYSICAL THERAPY ASSISTANT

## 2025-01-09 PROCEDURE — 2500000004 HC RX 250 GENERAL PHARMACY W/ HCPCS (ALT 636 FOR OP/ED): Mod: JZ | Performed by: NURSE PRACTITIONER

## 2025-01-09 PROCEDURE — 97530 THERAPEUTIC ACTIVITIES: CPT | Mod: GP,CQ | Performed by: PHYSICAL THERAPY ASSISTANT

## 2025-01-09 PROCEDURE — 82947 ASSAY GLUCOSE BLOOD QUANT: CPT

## 2025-01-09 PROCEDURE — 2500000002 HC RX 250 W HCPCS SELF ADMINISTERED DRUGS (ALT 637 FOR MEDICARE OP, ALT 636 FOR OP/ED)

## 2025-01-09 PROCEDURE — 37799 UNLISTED PX VASCULAR SURGERY: CPT | Performed by: NURSE PRACTITIONER

## 2025-01-09 PROCEDURE — 99233 SBSQ HOSP IP/OBS HIGH 50: CPT | Performed by: INTERNAL MEDICINE

## 2025-01-09 PROCEDURE — 85520 HEPARIN ASSAY: CPT | Performed by: NURSE PRACTITIONER

## 2025-01-09 PROCEDURE — 82374 ASSAY BLOOD CARBON DIOXIDE: CPT | Performed by: NURSE PRACTITIONER

## 2025-01-09 PROCEDURE — 80048 BASIC METABOLIC PNL TOTAL CA: CPT | Mod: CCI

## 2025-01-09 PROCEDURE — 85027 COMPLETE CBC AUTOMATED: CPT

## 2025-01-09 PROCEDURE — 99222 1ST HOSP IP/OBS MODERATE 55: CPT | Performed by: UROLOGY

## 2025-01-09 PROCEDURE — 2020000001 HC ICU ROOM DAILY

## 2025-01-09 PROCEDURE — 2500000001 HC RX 250 WO HCPCS SELF ADMINISTERED DRUGS (ALT 637 FOR MEDICARE OP)

## 2025-01-09 PROCEDURE — 2500000001 HC RX 250 WO HCPCS SELF ADMINISTERED DRUGS (ALT 637 FOR MEDICARE OP): Performed by: STUDENT IN AN ORGANIZED HEALTH CARE EDUCATION/TRAINING PROGRAM

## 2025-01-09 PROCEDURE — 2500000002 HC RX 250 W HCPCS SELF ADMINISTERED DRUGS (ALT 637 FOR MEDICARE OP, ALT 636 FOR OP/ED): Performed by: NURSE PRACTITIONER

## 2025-01-09 PROCEDURE — 99291 CRITICAL CARE FIRST HOUR: CPT

## 2025-01-09 PROCEDURE — 2500000004 HC RX 250 GENERAL PHARMACY W/ HCPCS (ALT 636 FOR OP/ED)

## 2025-01-09 PROCEDURE — 83735 ASSAY OF MAGNESIUM: CPT

## 2025-01-09 RX ORDER — DEXTROSE 50 % IN WATER (D50W) INTRAVENOUS SYRINGE
12.5
Status: ACTIVE | OUTPATIENT
Start: 2025-01-09

## 2025-01-09 RX ORDER — INSULIN LISPRO 100 [IU]/ML
0-10 INJECTION, SOLUTION INTRAVENOUS; SUBCUTANEOUS
Status: DISPENSED | OUTPATIENT
Start: 2025-01-09

## 2025-01-09 RX ORDER — DEXTROSE 50 % IN WATER (D50W) INTRAVENOUS SYRINGE
25
Status: ACTIVE | OUTPATIENT
Start: 2025-01-09

## 2025-01-09 RX ORDER — PREDNISONE 20 MG/1
40 TABLET ORAL DAILY
Status: COMPLETED | OUTPATIENT
Start: 2025-01-10 | End: 2025-01-12

## 2025-01-09 RX ORDER — CALCIUM GLUCONATE 20 MG/ML
1 INJECTION, SOLUTION INTRAVENOUS ONCE
Status: COMPLETED | OUTPATIENT
Start: 2025-01-09 | End: 2025-01-09

## 2025-01-09 RX ORDER — CALCIUM CARBONATE 200(500)MG
500 TABLET,CHEWABLE ORAL 4 TIMES DAILY PRN
Status: DISPENSED | OUTPATIENT
Start: 2025-01-09

## 2025-01-09 RX ORDER — ASPIRIN 81 MG/1
81 TABLET ORAL DAILY
Status: DISPENSED | OUTPATIENT
Start: 2025-01-09

## 2025-01-09 RX ADMIN — ASPIRIN 81 MG: 81 TABLET, COATED ORAL at 14:06

## 2025-01-09 RX ADMIN — DONEPEZIL HYDROCHLORIDE 5 MG: 5 TABLET ORAL at 21:11

## 2025-01-09 RX ADMIN — DOCUSATE SODIUM 100 MG: 100 CAPSULE, LIQUID FILLED ORAL at 08:52

## 2025-01-09 RX ADMIN — CALCIUM GLUCONATE 1 G: 20 INJECTION, SOLUTION INTRAVENOUS at 11:19

## 2025-01-09 RX ADMIN — INSULIN LISPRO 2 UNITS: 100 INJECTION, SOLUTION INTRAVENOUS; SUBCUTANEOUS at 08:52

## 2025-01-09 RX ADMIN — MULTIPLE VITAMINS W/ MINERALS TAB 1 TABLET: TAB ORAL at 08:52

## 2025-01-09 RX ADMIN — TAMSULOSIN HYDROCHLORIDE 0.4 MG: 0.4 CAPSULE ORAL at 21:11

## 2025-01-09 RX ADMIN — HEPARIN SODIUM 1400 UNITS/HR: 10000 INJECTION, SOLUTION INTRAVENOUS at 03:05

## 2025-01-09 RX ADMIN — PIPERACILLIN SODIUM AND TAZOBACTAM SODIUM 2.25 G: 2; .25 INJECTION, SOLUTION INTRAVENOUS at 06:00

## 2025-01-09 RX ADMIN — ATORVASTATIN CALCIUM 10 MG: 10 TABLET, FILM COATED ORAL at 08:52

## 2025-01-09 RX ADMIN — CALCIUM CARBONATE 500 MG: 500 TABLET, CHEWABLE ORAL at 17:12

## 2025-01-09 RX ADMIN — OXYCODONE HYDROCHLORIDE 5 MG: 5 TABLET ORAL at 08:51

## 2025-01-09 RX ADMIN — APIXABAN 2.5 MG: 2.5 TABLET, FILM COATED ORAL at 21:11

## 2025-01-09 RX ADMIN — METHYLPREDNISOLONE SODIUM SUCCINATE 40 MG: 40 INJECTION, POWDER, FOR SOLUTION INTRAMUSCULAR; INTRAVENOUS at 06:00

## 2025-01-09 RX ADMIN — OXYCODONE HYDROCHLORIDE 5 MG: 5 TABLET ORAL at 21:11

## 2025-01-09 RX ADMIN — OXYCODONE HYDROCHLORIDE 5 MG: 5 TABLET ORAL at 15:14

## 2025-01-09 RX ADMIN — CALCIUM CARBONATE 500 MG: 500 TABLET, CHEWABLE ORAL at 09:37

## 2025-01-09 RX ADMIN — NOREPINEPHRINE BITARTRATE 0.07 MCG/KG/MIN: 8 INJECTION, SOLUTION INTRAVENOUS at 01:09

## 2025-01-09 RX ADMIN — POLYETHYLENE GLYCOL 3350 17 G: 17 POWDER, FOR SOLUTION ORAL at 08:52

## 2025-01-09 RX ADMIN — Medication 100 MG: at 08:52

## 2025-01-09 RX ADMIN — ALLOPURINOL 300 MG: 300 TABLET ORAL at 08:52

## 2025-01-09 RX ADMIN — DOCUSATE SODIUM 100 MG: 100 CAPSULE, LIQUID FILLED ORAL at 21:11

## 2025-01-09 RX ADMIN — APIXABAN 2.5 MG: 2.5 TABLET, FILM COATED ORAL at 14:06

## 2025-01-09 RX ADMIN — PIPERACILLIN SODIUM AND TAZOBACTAM SODIUM 2.25 G: 2; .25 INJECTION, SOLUTION INTRAVENOUS at 01:15

## 2025-01-09 RX ADMIN — ACETAMINOPHEN 650 MG: 325 TABLET, FILM COATED ORAL at 14:06

## 2025-01-09 RX ADMIN — PIPERACILLIN SODIUM AND TAZOBACTAM SODIUM 2.25 G: 2; .25 INJECTION, SOLUTION INTRAVENOUS at 14:08

## 2025-01-09 RX ADMIN — ACETAMINOPHEN 650 MG: 325 TABLET, FILM COATED ORAL at 17:13

## 2025-01-09 RX ADMIN — PANTOPRAZOLE SODIUM 40 MG: 40 TABLET, DELAYED RELEASE ORAL at 06:00

## 2025-01-09 RX ADMIN — VASOPRESSIN 0.03 UNITS/MIN: 0.2 INJECTION INTRAVENOUS at 06:26

## 2025-01-09 RX ADMIN — FOLIC ACID 1 MG: 1 TABLET ORAL at 08:52

## 2025-01-09 RX ADMIN — PIPERACILLIN SODIUM AND TAZOBACTAM SODIUM 2.25 G: 2; .25 INJECTION, SOLUTION INTRAVENOUS at 21:11

## 2025-01-09 RX ADMIN — ACETAMINOPHEN 650 MG: 325 TABLET, FILM COATED ORAL at 06:00

## 2025-01-09 ASSESSMENT — PAIN DESCRIPTION - LOCATION
LOCATION: KNEE

## 2025-01-09 ASSESSMENT — COGNITIVE AND FUNCTIONAL STATUS - GENERAL
MOBILITY SCORE: 13
STANDING UP FROM CHAIR USING ARMS: A LOT
TURNING FROM BACK TO SIDE WHILE IN FLAT BAD: A LOT
WALKING IN HOSPITAL ROOM: A LOT
WALKING IN HOSPITAL ROOM: A LOT
MOVING TO AND FROM BED TO CHAIR: A LOT
CLIMB 3 TO 5 STEPS WITH RAILING: TOTAL
STANDING UP FROM CHAIR USING ARMS: A LOT
TURNING FROM BACK TO SIDE WHILE IN FLAT BAD: A LITTLE
MOVING FROM LYING ON BACK TO SITTING ON SIDE OF FLAT BED WITH BEDRAILS: A LITTLE
CLIMB 3 TO 5 STEPS WITH RAILING: TOTAL
MOVING TO AND FROM BED TO CHAIR: A LOT
MOVING FROM LYING ON BACK TO SITTING ON SIDE OF FLAT BED WITH BEDRAILS: A LOT
MOBILITY SCORE: 11

## 2025-01-09 ASSESSMENT — PAIN - FUNCTIONAL ASSESSMENT
PAIN_FUNCTIONAL_ASSESSMENT: 0-10

## 2025-01-09 ASSESSMENT — PAIN DESCRIPTION - ORIENTATION
ORIENTATION: RIGHT

## 2025-01-09 ASSESSMENT — PAIN SCALES - GENERAL
PAINLEVEL_OUTOF10: 4
PAINLEVEL_OUTOF10: 3
PAINLEVEL_OUTOF10: 4
PAINLEVEL_OUTOF10: 4
PAINLEVEL_OUTOF10: 5 - MODERATE PAIN
PAINLEVEL_OUTOF10: 6

## 2025-01-09 NOTE — RESEARCH NOTES
Artificial Intelligence Monitoring in Nursing (AIMS Nursing) Study    Principle Investigator - Dr. Jose J Lopez  Research Coordinator - Bernardo Morgan RN     Patient Name - Mikhail Moses  Date - 1/9/2025 9:21 AM  Location - 4th Floor ICU, [facility]    Mikhail Moses was approached by Bernardo Morgan RN to talk about participating in the AIMS Nursing Study. The patient was not able to be approached, a research coordinator will come back at a later time. Study protocol was followed and patient was given study contact information.     Bernardo Morgan RN

## 2025-01-09 NOTE — PROGRESS NOTES
"Mikhail Moses is a 79 y.o. male on day 1 of admission presenting with Unilateral primary osteoarthritis, right knee.    Subjective   Pt says he is doing quite well this morrning. Denies any SOB. Hypotension improving tolerated stopping of vasopressin. On 45% Airvo. Will trial on regular NC. States he has not had a BM yet but is passing gas.    Objective     Physical Exam  Vitals reviewed.   HENT:      Head: Normocephalic.      Nose: Nose normal.      Mouth/Throat:      Mouth: Mucous membranes are moist.      Pharynx: Oropharynx is clear.   Eyes:      Extraocular Movements: Extraocular movements intact.      Pupils: Pupils are equal, round, and reactive to light.   Cardiovascular:      Rate and Rhythm: Normal rate and regular rhythm.      Heart sounds: Normal heart sounds.   Pulmonary:      Effort: Pulmonary effort is normal. No respiratory distress.      Breath sounds: No stridor. No wheezing.      Comments: Diminished breath sounds bilaterally  Abdominal:      General: Bowel sounds are normal. There is no distension.      Palpations: Abdomen is soft.      Tenderness: There is no abdominal tenderness.   Genitourinary:     Comments: Paul draining bruna clear urine  Musculoskeletal:         General: Normal range of motion.      Cervical back: Normal range of motion.   Skin:     General: Skin is warm and dry.      Capillary Refill: Capillary refill takes less than 2 seconds.   Neurological:      Mental Status: He is alert and oriented to person, place, and time.   Psychiatric:         Mood and Affect: Mood normal.         Thought Content: Thought content normal.         Last Recorded Vitals  Blood pressure 88/56, pulse 62, temperature 36.6 °C (97.9 °F), temperature source Core, resp. rate 22, height 1.727 m (5' 8\"), weight 105 kg (232 lb 3.2 oz), SpO2 99%.  Intake/Output last 3 Shifts:  I/O last 3 completed shifts:  In: 2795 (26.5 mL/kg) [P.O.:765; I.V.:1030 (9.8 mL/kg); IV Piggyback:1000]  Out: 520 (4.9 mL/kg) " [Urine:465 (0.1 mL/kg/hr); Drains:50; Blood:5]  Weight: 105.3 kg     Relevant Results    Current Facility-Administered Medications:     acetaminophen (Tylenol) tablet 650 mg, 650 mg, oral, q6h ANGEL, Hebert Peguero APRN-CNP, DNP, 650 mg at 01/09/25 0600    allopurinol (Zyloprim) tablet 300 mg, 300 mg, oral, Daily, Hebert HowellriRENEA barton-CNP, DNP, 300 mg at 01/09/25 0852    atorvastatin (Lipitor) tablet 10 mg, 10 mg, oral, Daily, RENEA Martinez-CNP, DNP, 10 mg at 01/09/25 0852    benzocaine-menthol (Cepastat Sore Throat) lozenge 1 lozenge, 1 lozenge, Mouth/Throat, q4h PRN, SOTERO Martinez, DNP    bisacodyl (Dulcolax) EC tablet 10 mg, 10 mg, oral, Daily PRN, RNEEA Martinez-CNP, DNP    bisacodyl (Dulcolax) suppository 10 mg, 10 mg, rectal, Daily PRN, SOTERO Martinez, DNP    cyclobenzaprine (Flexeril) tablet 5 mg, 5 mg, oral, TID PRN, RENEA Martinez-CNP, DNP, 5 mg at 01/07/25 2056    dextrose 50 % injection 12.5 g, 12.5 g, intravenous, q15 min PRN, Chanel M Mix, APRN-CNP    dextrose 50 % injection 25 g, 25 g, intravenous, q15 min PRN, Chanel HAYNES Mix, APRN-CNP    docusate sodium (Colace) capsule 100 mg, 100 mg, oral, BID, RENEA Martinez-CNP, DNP, 100 mg at 01/09/25 0852    donepezil (Aricept) tablet 5 mg, 5 mg, oral, Nightly, RENEA Martinez-CNP, DNP, 5 mg at 01/08/25 2115    folic acid (Folvite) tablet 1 mg, 1 mg, oral, Daily, SOTERO Martinez, DNP, 1 mg at 01/09/25 0852    [Held by provider] furosemide (Lasix) tablet 40 mg, 40 mg, oral, Daily, SOTERO Martinez, DNP, 40 mg at 01/07/25 1709    glucagon (Glucagen) injection 1 mg, 1 mg, intramuscular, q15 min PRN, RENEA Glass-CNP    glucagon (Glucagen) injection 1 mg, 1 mg, intramuscular, q15 min PRN, Chanel HAYNES Mix, RENEA-CNP    heparin 25,000 Units in dextrose 5% 250 mL (100 Units/mL) infusion (premix), 0-4,500 Units/hr, intravenous, Continuous, Hebert Peguero,  APRN-CNP, DNP, Last Rate: 12 mL/hr at 01/09/25 0749, 1,200 Units/hr at 01/09/25 0749    heparin bolus from bag 3,000-6,000 Units, 3,000-6,000 Units, intravenous, q4h PRN, SOTERO Martinez, LANDON    HYDROmorphone PF (Dilaudid) injection 0.2 mg, 0.2 mg, intravenous, q2h PRN, SOTERO Martinez DNP, 0.2 mg at 01/08/25 2330    insulin lispro injection 0-10 Units, 0-10 Units, subcutaneous, TID AC, Chanel White, APRN-CNP, 2 Units at 01/09/25 0852    ipratropium-albuteroL (Duo-Neb) 0.5-2.5 mg/3 mL nebulizer solution 3 mL, 3 mL, nebulization, q4h PRN, SOTERO Martinez DNP    [Held by provider] lisinopril tablet 20 mg, 20 mg, oral, Daily, SOTERO Martinez DNP, 20 mg at 01/07/25 2059    methylPREDNISolone sod succinate (SOLU-Medrol) 40 mg/mL injection 40 mg, 40 mg, intravenous, q8h, Lashonda H Isaiah, APRN-CNP, DNP, 40 mg at 01/09/25 0600    metoclopramide (Reglan) tablet 10 mg, 10 mg, oral, q6h PRN **OR** metoclopramide (Reglan) injection 10 mg, 10 mg, intravenous, q6h PRN, SOTERO Martinez DNP    [Held by provider] metoprolol succinate XL (Toprol-XL) 24 hr tablet 25 mg, 25 mg, oral, Daily, SOTERO Martinez DNP    multivitamin with minerals 1 tablet, 1 tablet, oral, Daily, Steffanie Davis MD, 1 tablet at 01/09/25 0852    naloxone (Narcan) injection 0.2 mg, 0.2 mg, intravenous, q5 min PRN, EHSAN MartinezCNP, DNP, 0.2 mg at 01/08/25 0920    norepinephrine (Levophed) 8 mg in dextrose 5% 250 mL (0.032 mg/mL) infusion (premix), 0-0.5 mcg/kg/min, intravenous, Continuous, SOTERO Martinez, DNP, Last Rate: 3.71 mL/hr at 01/09/25 0644, 0.02 mcg/kg/min at 01/09/25 0644    ondansetron (Zofran) tablet 4 mg, 4 mg, oral, q8h PRN **OR** ondansetron (Zofran) injection 4 mg, 4 mg, intravenous, q8h PRN, SOTERO Martinez, DNP, 4 mg at 01/07/25 1719    oxyCODONE (Roxicodone) immediate release tablet 5 mg, 5 mg, oral, q6h PRN, Hebert CASTRO  Nadiaan, APRN-CNP, DNP, 5 mg at 01/09/25 0851    oxygen (O2) therapy, , inhalation, Continuous PRN - O2/gases, Hebert A Elroytarian, APRN-CNP, DNP, 40 L/min at 01/09/25 0725    oxygen (O2) therapy, , inhalation, Continuous PRN - O2/gases, Ariane Kapadia, APRN-CNP    pantoprazole (ProtoNix) EC tablet 40 mg, 40 mg, oral, Daily before breakfast, Hebert GLORIA Abbasitarian, APRN-CNP, DNP, 40 mg at 01/09/25 0600    piperacillin-tazobactam (Zosyn) 2.25 g in dextrose (iso) IV 50 mL, 2.25 g, intravenous, q6h, Lashonda MCCANN Isaiah, APRN-CNP, DNP, Last Rate: 0 mL/hr at 01/09/25 0146, 2.25 g at 01/09/25 0600    polyethylene glycol (Glycolax, Miralax) packet 17 g, 17 g, oral, Daily, Hebert A Elroytarian, APRN-CNP, DNP, 17 g at 01/09/25 0852    [Held by provider] pregabalin (Lyrica) capsule 300 mg, 300 mg, oral, BID, Hebert A Miktarian, APRN-CNP, DNP, 300 mg at 01/07/25 2059    tamsulosin (Flomax) 24 hr capsule 0.4 mg, 0.4 mg, oral, Nightly, Hebert A Elroytarian, APRN-CNP, DNP, 0.4 mg at 01/08/25 2116    thiamine (Vitamin B-1) tablet 100 mg, 100 mg, oral, Daily, Hebert A Miktarian, APRN-CNP, DNP, 100 mg at 01/09/25 0852    vasopressin (Vasostrict) 0.2 unit/mL in 5% dextrose 100 mL IV, 0.03 Units/min, intravenous, Continuous, Hebert A Miktarian, APRN-CNP, DNP, Stopped at 01/09/25 0803     Results for orders placed or performed during the hospital encounter of 01/07/25 (from the past 24 hours)   Transthoracic Echo (TTE) Complete   Result Value Ref Range    BSA 2.18 m2   Blood Gas Arterial Full Panel   Result Value Ref Range    POCT pH, Arterial 7.28 (L) 7.38 - 7.42 pH    POCT pCO2, Arterial 46 (H) 38 - 42 mm Hg    POCT pO2, Arterial 63 (L) 85 - 95 mm Hg    POCT SO2, Arterial 94 94 - 100 %    POCT Oxy Hemoglobin, Arterial 91.9 (L) 94.0 - 98.0 %    POCT Hematocrit Calculated, Arterial 31.0 (L) 41.0 - 52.0 %    POCT Sodium, Arterial 136 136 - 145 mmol/L    POCT Potassium, Arterial 3.7 3.5 - 5.3 mmol/L    POCT Chloride, Arterial 105 98 - 107 mmol/L     POCT Ionized Calcium, Arterial 1.01 (L) 1.10 - 1.33 mmol/L    POCT Glucose, Arterial 104 (H) 74 - 99 mg/dL    POCT Lactate, Arterial 2.8 (H) 0.4 - 2.0 mmol/L    POCT Base Excess, Arterial -5.0 (L) -2.0 - 3.0 mmol/L    POCT HCO3 Calculated, Arterial 21.6 (L) 22.0 - 26.0 mmol/L    POCT Hemoglobin, Arterial 10.3 (L) 13.5 - 17.5 g/dL    POCT Anion Gap, Arterial 13 10 - 25 mmo/L    Patient Temperature 37.0 degrees Celsius    FiO2 60 %    Apparatus HIGH FLOW CANNULA     Flow 50.0 LPM   Renal function panel   Result Value Ref Range    Glucose 103 (H) 74 - 99 mg/dL    Sodium 141 136 - 145 mmol/L    Potassium 3.7 3.5 - 5.3 mmol/L    Chloride 108 (H) 98 - 107 mmol/L    Bicarbonate 23 21 - 32 mmol/L    Anion Gap 14 10 - 20 mmol/L    Urea Nitrogen 18 6 - 23 mg/dL    Creatinine 2.59 (H) 0.50 - 1.30 mg/dL    eGFR 24 (L) >60 mL/min/1.73m*2    Calcium 6.7 (L) 8.6 - 10.3 mg/dL    Phosphorus 4.7 2.5 - 4.9 mg/dL    Albumin 2.9 (L) 3.4 - 5.0 g/dL   Coagulation Screen   Result Value Ref Range    Protime 13.4 (H) 9.8 - 12.8 seconds    INR 1.2 (H) 0.9 - 1.1    aPTT 31 27 - 38 seconds   CBC   Result Value Ref Range    WBC 15.9 (H) 4.4 - 11.3 x10*3/uL    nRBC 0.0 0.0 - 0.0 /100 WBCs    RBC 3.36 (L) 4.50 - 5.90 x10*6/uL    Hemoglobin 10.0 (L) 13.5 - 17.5 g/dL    Hematocrit 30.8 (L) 41.0 - 52.0 %    MCV 92 80 - 100 fL    MCH 29.8 26.0 - 34.0 pg    MCHC 32.5 32.0 - 36.0 g/dL    RDW 14.5 11.5 - 14.5 %    Platelets 224 150 - 450 x10*3/uL   Troponin I, High Sensitivity   Result Value Ref Range    Troponin I, High Sensitivity 45 (H) 0 - 20 ng/L   Electrocardiogram, 12-lead PRN ACS symptoms   Result Value Ref Range    Ventricular Rate 70 BPM    Atrial Rate 70 BPM    NC Interval 194 ms    QRS Duration 122 ms    QT Interval 424 ms    QTC Calculation(Bazett) 457 ms    P Axis 19 degrees    R Axis -47 degrees    T Axis 29 degrees    QRS Count 12 beats    Q Onset 214 ms    P Onset 117 ms    P Offset 140 ms    T Offset 426 ms    QTC Fredericia 446 ms    Blood Gas Arterial Full Panel   Result Value Ref Range    POCT pH, Arterial 7.28 (L) 7.38 - 7.42 pH    POCT pCO2, Arterial 43 (H) 38 - 42 mm Hg    POCT pO2, Arterial 71 (L) 85 - 95 mm Hg    POCT SO2, Arterial 96 94 - 100 %    POCT Oxy Hemoglobin, Arterial 93.5 (L) 94.0 - 98.0 %    POCT Hematocrit Calculated, Arterial 31.0 (L) 41.0 - 52.0 %    POCT Sodium, Arterial 136 136 - 145 mmol/L    POCT Potassium, Arterial 3.7 3.5 - 5.3 mmol/L    POCT Chloride, Arterial 105 98 - 107 mmol/L    POCT Ionized Calcium, Arterial 0.98 (L) 1.10 - 1.33 mmol/L    POCT Glucose, Arterial 129 (H) 74 - 99 mg/dL    POCT Lactate, Arterial 2.4 (H) 0.4 - 2.0 mmol/L    POCT Base Excess, Arterial -6.2 (L) -2.0 - 3.0 mmol/L    POCT HCO3 Calculated, Arterial 20.2 (L) 22.0 - 26.0 mmol/L    POCT Hemoglobin, Arterial 10.2 (L) 13.5 - 17.5 g/dL    POCT Anion Gap, Arterial 15 10 - 25 mmo/L    Patient Temperature 37.0 degrees Celsius    FiO2 60 %    Apparatus HIGH FLOW CANNULA     Flow 50.0 LPM   Blood Gas Lactic Acid, Venous   Result Value Ref Range    POCT Lactate, Venous 2.5 (H) 0.4 - 2.0 mmol/L   Blood Gas Venous Full Panel   Result Value Ref Range    POCT pH, Venous 7.24 (LL) 7.33 - 7.43 pH    POCT pCO2, Venous 50 41 - 51 mm Hg    POCT pO2, Venous 45 35 - 45 mm Hg    POCT SO2, Venous 75 45 - 75 %    POCT Oxy Hemoglobin, Venous 73.2 45.0 - 75.0 %    POCT Hematocrit Calculated, Venous 32.0 (L) 41.0 - 52.0 %    POCT Sodium, Venous 136 136 - 145 mmol/L    POCT Potassium, Venous 3.9 3.5 - 5.3 mmol/L    POCT Chloride, Venous 103 98 - 107 mmol/L    POCT Ionized Calicum, Venous 0.99 (L) 1.10 - 1.33 mmol/L    POCT Glucose, Venous 128 (H) 74 - 99 mg/dL    POCT Lactate, Venous 2.5 (H) 0.4 - 2.0 mmol/L    POCT Base Excess, Venous -6.0 (L) -2.0 - 3.0 mmol/L    POCT HCO3 Calculated, Venous 21.4 (L) 22.0 - 26.0 mmol/L    POCT Hemoglobin, Venous 10.7 (L) 13.5 - 17.5 g/dL    POCT Anion Gap, Venous 16.0 10.0 - 25.0 mmol/L    Patient Temperature 37.0 degrees Celsius     FiO2 60 %   Urinalysis with Reflex Microscopic   Result Value Ref Range    Color, Urine Yellow Light-Yellow, Yellow, Dark-Yellow    Appearance, Urine Turbid (N) Clear    Specific Gravity, Urine 1.027 1.005 - 1.035    pH, Urine 5.5 5.0, 5.5, 6.0, 6.5, 7.0, 7.5, 8.0    Protein, Urine 20 (TRACE) NEGATIVE, 10 (TRACE), 20 (TRACE) mg/dL    Glucose, Urine Normal Normal mg/dL    Blood, Urine NEGATIVE NEGATIVE    Ketones, Urine 10 (1+) (A) NEGATIVE mg/dL    Bilirubin, Urine NEGATIVE NEGATIVE    Urobilinogen, Urine Normal Normal mg/dL    Nitrite, Urine NEGATIVE NEGATIVE    Leukocyte Esterase, Urine 500 Mario/uL (A) NEGATIVE   Microscopic Only, Urine   Result Value Ref Range    WBC, Urine >50 (A) 1-5, NONE /HPF    RBC, Urine 6-10 (A) NONE, 1-2, 3-5 /HPF   Troponin I, High Sensitivity, Initial   Result Value Ref Range    Troponin I, High Sensitivity 155 (HH) 0 - 20 ng/L   Hepatitis panel, acute   Result Value Ref Range    Hepatitis B Surface AG Nonreactive Nonreactive    Hepatitis A  AB- IgM Nonreactive Nonreactive    Hepatitis B Core AB; IgM Nonreactive Nonreactive    Hepatitis C AB Nonreactive Nonreactive   HIV 1/2 Antigen/Antibody Screen with Reflex to Confirmation   Result Value Ref Range    HIV 1/2 Antigen/Antibody Screen with Reflex to Confirmation Nonreactive Nonreactive   Heparin Assay, UFH   Result Value Ref Range    Heparin Unfractionated 0.8 See Comment Below for Therapeutic Ranges IU/mL   POCT GLUCOSE   Result Value Ref Range    POCT Glucose 166 (H) 74 - 99 mg/dL   Troponin, High Sensitivity, 1 Hour   Result Value Ref Range    Troponin I, High Sensitivity 61 (HH) 0 - 20 ng/L   POCT GLUCOSE   Result Value Ref Range    POCT Glucose 194 (H) 74 - 99 mg/dL   Heparin Assay, UFH   Result Value Ref Range    Heparin Unfractionated 1.1 (HH) See Comment Below for Therapeutic Ranges IU/mL   POCT GLUCOSE   Result Value Ref Range    POCT Glucose 190 (H) 74 - 99 mg/dL   Basic Metabolic Panel   Result Value Ref Range    Glucose  194 (H) 74 - 99 mg/dL    Sodium 135 (L) 136 - 145 mmol/L    Potassium 4.0 3.5 - 5.3 mmol/L    Chloride 104 98 - 107 mmol/L    Bicarbonate 20 (L) 21 - 32 mmol/L    Anion Gap 15 10 - 20 mmol/L    Urea Nitrogen 30 (H) 6 - 23 mg/dL    Creatinine 2.60 (H) 0.50 - 1.30 mg/dL    eGFR 24 (L) >60 mL/min/1.73m*2    Calcium 6.9 (L) 8.6 - 10.3 mg/dL   Heparin Assay, UFH   Result Value Ref Range    Heparin Unfractionated 0.8 See Comment Below for Therapeutic Ranges IU/mL   POCT GLUCOSE   Result Value Ref Range    POCT Glucose 170 (H) 74 - 99 mg/dL   CBC   Result Value Ref Range    WBC 17.2 (H) 4.4 - 11.3 x10*3/uL    nRBC 0.0 0.0 - 0.0 /100 WBCs    RBC 3.26 (L) 4.50 - 5.90 x10*6/uL    Hemoglobin 9.7 (L) 13.5 - 17.5 g/dL    Hematocrit 29.8 (L) 41.0 - 52.0 %    MCV 91 80 - 100 fL    MCH 29.8 26.0 - 34.0 pg    MCHC 32.6 32.0 - 36.0 g/dL    RDW 14.2 11.5 - 14.5 %    Platelets 176 150 - 450 x10*3/uL      Assessment/Plan   Mikhail Moses is a 79 year old male with a previous medical history of ETOH use, anemia, BPH, CAD s/p PCI (multiple), CKD, COPD, GERD, Gout, NSTEMI, HLD, hypertension, PAH, urethral stricture, and OA who was admitted s/p left total knee replacement with Dr. Bradford. Pt had worsening hypoxia requiring airvo and hypotension, he was transferred to the ICU for ongoing management. Initially there was concern for cardiogenic shock d/t possible PE vs septic shock vs vasoplegia post surgery. V/Q scan indicated a low probability of PE. He also had a RHC done yesterday that showed elevated left and right sided filling pressures and a normal cardiac output of 6.5. Given his improvement with antibiotics and time it is believed his shock was due to his multifocal PNA or vasoplegia. Currently his blood pressures are much improved and only requiring low dose levophed.     Plan:     Neuro:  Hx of ETOH and dementia  Expected post op pain  - Serial neuro and pain assessments   - Scheduled tylenol and PRN oxycodone/dilaudid for  pain  - AF bundle  - Continue donepezil   - Hold lyrica for now  - Continue thiamine and folic acid  - Monitor for signs of withdrawal   - CIWA protocol     CV:  Hx of CAD s/p PCI, NSTEMI, DVT, HLD, HTN, and PAH  Septic shock vs vasoplegia post total knee replacement~ improving  - Continuous telemetry and ABP  - Fluid resuscitation as indicated  - Norepinephrine to keep MAP greater than 65  - Hold all home antihypertensives  - TTE pending  - ASA/Statin  - Cardiology following  - Remove PA catheter when no longer requiring vasopressor support  - Continue Heparin drip for DVT ppx  - Transition to Eliquis    Pulm:  Hx of COPD  Acute hypoxemic respiratory failure~ improving  Multifocal PNA  Currently oxygenating well on 40L 45% Airvo  - Continuous pulse oximetry  - Wean supplemental O2 as able while keeping SpO2 greater than 92%  - Antibiotics as below  - Continue steroids for now; decrease to 40mg daily dose  - PRN DuoNebs  - BPH/ICS    GI:  Hx of GERD  - Regular Diet  - PPI for GI ppx  - Bowel regimen    Renal/:  Hx of Urethral stricture s/p urethroplasty and urethral dilation, CKD baseline CR of 1.1   JUMA on CKD likely prerenal d/t hypotension  - Trend renal function  - Replete electrolytes as indicated  - Continue Flomax  - Maintain ulrich per urology  - Urology following    Endocrine:  Hx of gout  Hyperglycemia  - SSI AC/HS  - Continue allopurinol    Heme:  Hx of Anemia d/t chronic disease  - Daily CBC  - Monitor for s/s of bleeding    MSK:  Hx of OA  S/P left total knee replacement with Dr. Bradford  - PT/OT  - OOB to chair ambulation as tolerated  - Ortho following    ID:  Leukocytosis  Multifocal PNA  - Monitor temps  - Trend WBCs  - Continue Zosyn    ICU checklist:  Vent/O2: 40L 45% Airvo  Lines/ Devices: PIVs, PA catheter, ART line, Ulrich  AntiBx: Zosyn  Diet: Regular  GI Prophylaxis: PPI  DVT Prophylaxis: Heparin infusion  Code Status: FULL CODE  Dispo: Continued ICU care      This critically ill patient  continues to be at-risk for clinically significant deterioration / failure due to the above mentioned dysfunctional, unstable organ systems.  I have personally identified and managed all complex critical care issues to prevent aforementioned clinical deterioration.  Critical care time is spent at bedside and/or the immediate area and has included, but is not limited to, the review of diagnostic tests, labs, radiographs, serial assessments of hemodynamics, respiratory status, ventilatory management, and family updates.   CRITICAL CARE TIME: 60 minutes       Chanel White, APRN-CNP

## 2025-01-09 NOTE — PROGRESS NOTES
"Mikhail Moses is a 79 y.o. male on day 1 of admission presenting with Unilateral primary osteoarthritis, right knee.    Subjective   Denies any breathing issues today. He is down to 5L/min nasal cannula. CT chest performed overnight.       Objective   GEN: obese man without dyspnea  ENT: wearing O2 nasal cannula  CV: RRR, no m/g/r  LUNGS: moderate effort, clear bilaterally, no w/r/r  EXT: upper and lower extremity edema    Physical Exam    Last Recorded Vitals  Blood pressure 88/56, pulse 74, temperature 36.6 °C (97.9 °F), temperature source Core, resp. rate 16, height 1.727 m (5' 8\"), weight 105 kg (232 lb 3.2 oz), SpO2 95%.  Intake/Output last 3 Shifts:  I/O last 3 completed shifts:  In: 2795 (26.5 mL/kg) [P.O.:765; I.V.:1030 (9.8 mL/kg); IV Piggyback:1000]  Out: 520 (4.9 mL/kg) [Urine:465 (0.1 mL/kg/hr); Drains:50; Blood:5]  Weight: 105.3 kg     Relevant Results  Scheduled medications  acetaminophen, 650 mg, oral, q6h ANGEL  allopurinol, 300 mg, oral, Daily  apixaban, 2.5 mg, oral, BID   Followed by  [START ON 1/12/2025] apixaban, 5 mg, oral, BID  aspirin, 81 mg, oral, Daily  atorvastatin, 10 mg, oral, Daily  docusate sodium, 100 mg, oral, BID  donepezil, 5 mg, oral, Nightly  folic acid, 1 mg, oral, Daily  [Held by provider] furosemide, 40 mg, oral, Daily  insulin lispro, 0-10 Units, subcutaneous, TID AC  [Held by provider] lisinopril, 20 mg, oral, Daily  [Held by provider] metoprolol succinate XL, 25 mg, oral, Daily  multivitamin with minerals, 1 tablet, oral, Daily  pantoprazole, 40 mg, oral, Daily before breakfast  piperacillin-tazobactam, 2.25 g, intravenous, q6h  polyethylene glycol, 17 g, oral, Daily  [START ON 1/10/2025] predniSONE, 40 mg, oral, Daily  [Held by provider] pregabalin, 300 mg, oral, BID  tamsulosin, 0.4 mg, oral, Nightly  thiamine, 100 mg, oral, Daily      Continuous medications  heparin, 0-4,500 Units/hr, Last Rate: 1,200 Units/hr (01/09/25 0749)      PRN medications  PRN medications: " benzocaine-menthol, bisacodyl, bisacodyl, calcium carbonate, cyclobenzaprine, dextrose, dextrose, glucagon, glucagon, heparin, HYDROmorphone, ipratropium-albuteroL, metoclopramide **OR** metoclopramide, naloxone, ondansetron **OR** ondansetron, oxyCODONE, oxygen, oxygen    Results for orders placed or performed during the hospital encounter of 01/07/25 (from the past 24 hours)   Urinalysis with Reflex Microscopic   Result Value Ref Range    Color, Urine Yellow Light-Yellow, Yellow, Dark-Yellow    Appearance, Urine Turbid (N) Clear    Specific Gravity, Urine 1.027 1.005 - 1.035    pH, Urine 5.5 5.0, 5.5, 6.0, 6.5, 7.0, 7.5, 8.0    Protein, Urine 20 (TRACE) NEGATIVE, 10 (TRACE), 20 (TRACE) mg/dL    Glucose, Urine Normal Normal mg/dL    Blood, Urine NEGATIVE NEGATIVE    Ketones, Urine 10 (1+) (A) NEGATIVE mg/dL    Bilirubin, Urine NEGATIVE NEGATIVE    Urobilinogen, Urine Normal Normal mg/dL    Nitrite, Urine NEGATIVE NEGATIVE    Leukocyte Esterase, Urine 500 Mario/uL (A) NEGATIVE   Microscopic Only, Urine   Result Value Ref Range    WBC, Urine >50 (A) 1-5, NONE /HPF    RBC, Urine 6-10 (A) NONE, 1-2, 3-5 /HPF   Troponin I, High Sensitivity, Initial   Result Value Ref Range    Troponin I, High Sensitivity 155 (HH) 0 - 20 ng/L   HIV RNA, quantitative, PCR   Result Value Ref Range    HIV-1 RNA PCR Viral Load Log      HIV RNA Result Not Detected Not Detected   Hepatitis panel, acute   Result Value Ref Range    Hepatitis B Surface AG Nonreactive Nonreactive    Hepatitis A  AB- IgM Nonreactive Nonreactive    Hepatitis B Core AB; IgM Nonreactive Nonreactive    Hepatitis C AB Nonreactive Nonreactive   HIV 1/2 Antigen/Antibody Screen with Reflex to Confirmation   Result Value Ref Range    HIV 1/2 Antigen/Antibody Screen with Reflex to Confirmation Nonreactive Nonreactive   Heparin Assay, UFH   Result Value Ref Range    Heparin Unfractionated 0.8 See Comment Below for Therapeutic Ranges IU/mL   POCT GLUCOSE   Result Value Ref Range     POCT Glucose 166 (H) 74 - 99 mg/dL   Legionella Antigen, Urine    Specimen: Indwelling (Paul) Catheter; Urine   Result Value Ref Range    L. pneumophila Urine Ag Negative Negative   Streptococcus pneumoniae Antigen, Urine    Specimen: Indwelling (Paul) Catheter; Urine   Result Value Ref Range    Streptococcus pneumoniae Ag, Urine Negative Negative   Troponin, High Sensitivity, 1 Hour   Result Value Ref Range    Troponin I, High Sensitivity 61 (HH) 0 - 20 ng/L   POCT GLUCOSE   Result Value Ref Range    POCT Glucose 194 (H) 74 - 99 mg/dL   Heparin Assay, UFH   Result Value Ref Range    Heparin Unfractionated 1.1 (HH) See Comment Below for Therapeutic Ranges IU/mL   POCT GLUCOSE   Result Value Ref Range    POCT Glucose 190 (H) 74 - 99 mg/dL   Basic Metabolic Panel   Result Value Ref Range    Glucose 194 (H) 74 - 99 mg/dL    Sodium 135 (L) 136 - 145 mmol/L    Potassium 4.0 3.5 - 5.3 mmol/L    Chloride 104 98 - 107 mmol/L    Bicarbonate 20 (L) 21 - 32 mmol/L    Anion Gap 15 10 - 20 mmol/L    Urea Nitrogen 30 (H) 6 - 23 mg/dL    Creatinine 2.60 (H) 0.50 - 1.30 mg/dL    eGFR 24 (L) >60 mL/min/1.73m*2    Calcium 6.9 (L) 8.6 - 10.3 mg/dL   Heparin Assay, UFH   Result Value Ref Range    Heparin Unfractionated 0.8 See Comment Below for Therapeutic Ranges IU/mL   Renal Function Panel   Result Value Ref Range    Glucose 194 (H) 74 - 99 mg/dL    Sodium 135 (L) 136 - 145 mmol/L    Potassium 4.0 3.5 - 5.3 mmol/L    Chloride 104 98 - 107 mmol/L    Bicarbonate 20 (L) 21 - 32 mmol/L    Anion Gap 15 10 - 20 mmol/L    Urea Nitrogen 30 (H) 6 - 23 mg/dL    Creatinine 2.60 (H) 0.50 - 1.30 mg/dL    eGFR 24 (L) >60 mL/min/1.73m*2    Calcium 6.9 (L) 8.6 - 10.3 mg/dL    Phosphorus 4.4 2.5 - 4.9 mg/dL    Albumin 3.0 (L) 3.4 - 5.0 g/dL   Magnesium   Result Value Ref Range    Magnesium 1.85 1.60 - 2.40 mg/dL   POCT GLUCOSE   Result Value Ref Range    POCT Glucose 170 (H) 74 - 99 mg/dL   CBC   Result Value Ref Range    WBC 17.2 (H) 4.4 - 11.3  x10*3/uL    nRBC 0.0 0.0 - 0.0 /100 WBCs    RBC 3.26 (L) 4.50 - 5.90 x10*6/uL    Hemoglobin 9.7 (L) 13.5 - 17.5 g/dL    Hematocrit 29.8 (L) 41.0 - 52.0 %    MCV 91 80 - 100 fL    MCH 29.8 26.0 - 34.0 pg    MCHC 32.6 32.0 - 36.0 g/dL    RDW 14.2 11.5 - 14.5 %    Platelets 176 150 - 450 x10*3/uL   POCT GLUCOSE   Result Value Ref Range    POCT Glucose 119 (H) 74 - 99 mg/dL                      IMAGING:  CT chest 1/8/2025:  1.  Bilateral multifocal pneumonia.  2. Fluid throughout the esophagus, high risk for aspiration.  3. Mild thickening of the gastric cardia and body may be related to  peristalsis, or gastritis. Recommend EGD for further evaluation.    Assessment/Plan   Assessment & Plan  Acute hypoxic respiratory failure (Multi)    Summary:  79 y.o. male admitted on 1/7/2025  9:36 AM for elective right total knee arthroplasty. 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 CXR initially appeared to be pulmonary edema. His V/Q SPECT scan is low/moderate probability for P.E. CT chest notable for bilateral lower lobe consolidation and atelectasis concerning for aspiration pneumonia. Cardiac cath yesterday with low SVR and high cardiac index suggestive of distributive shock.      Problem list:  -acute hypoxic respiratory failure  -bilateral lower lobe pneumonia     Recommendations:  -agree with antibiotics for aspiration pneumonia  -titrate FiO2 to maintain SpO2 90-92%. Currently on 5L/min%.  -PRN BIPAP at night for suspected ERIC      Mare De La Garza DO  Pulmonary & Critical Care  1/9/2025 4:33 PM

## 2025-01-09 NOTE — PROGRESS NOTES
Physical Therapy    Physical Therapy Treatment    Patient Name: Mikhail Moses  MRN: [id number]  Department: [facility] [address] [facility]  Room: [address] [place] [address]  Today's Date: 1/9/2025  Time Calculation  Start Time: 1401  Stop Time: 1427  Time Calculation (min): 26 min         Assessment/Plan   PT Assessment  End of Session Communication: Bedside nurse  Assessment Comment:  (pt demo fair miky to increased activity,slow mobility,increased pain)  End of Session Patient Position: Bed, 3 rail up, Alarm on  PT Plan  Inpatient/Swing Bed or Outpatient: Inpatient  PT Plan  Treatment/Interventions: Bed mobility, Gait training, Transfer training, Therapeutic exercise, Therapeutic activity  PT Plan: Ongoing PT  PT Frequency: BID  PT Discharge Recommendations: Moderate intensity level of continued care  Equipment Recommended upon Discharge: Wheeled walker, Straight cane (pt owns recommended equipment)  PT Recommended Transfer Status: Assistive device, Assist x1 (with walker)  PT - OK to Discharge: Yes (per PT POC)      General Visit Information:   PT  Visit  PT Received On: 01/09/25  General  Reason for Referral: s/p R TKA with Dr Bradford  Referred By: Hamida Padilla PA-C  Family/Caregiver Present: Yes  Caregiver Feedback: spouse in room  Prior to Session Communication: Bedside nurse  Patient Position Received: Bed, 3 rail up, Alarm on  Preferred Learning Style: auditory, kinesthetic, verbal  General Comment:  (pt agreeable to tx. reporting increased fatigue and increased pain, RN aware)    Subjective   Precautions:  Precautions  LE Weight Bearing Status: Weight Bearing as Tolerated  Medical Precautions: Fall precautions  Post-Surgical Precautions: Right total knee precautions    Vital Signs (Past 2hrs)        Date/Time Vitals Session Patient Position Pulse Resp SpO2 BP MAP (mmHg)    01/09/25 1401 --  --  74  --  95 %  --  --                   Vital Signs Comment:  (PIVs, PA catheter, ART line, Paul)     Objective   Pain:  Pain Assessment  Pain  Assessment: 0-10  0-10 (Numeric) Pain Score: 5 - Moderate pain (RN medicated pt prior to tx.)  Pain Type: Surgical pain  Pain Location: Knee  Pain Orientation: Right  Cognition:  Cognition  Orientation Level: Oriented X4       Treatments:  Therapeutic Exercise  Therapeutic Exercise Performed: Yes  Therapeutic Exercise Activity 1:  (pt performed supine ther ex:AP,QS,GS,heel slides,SAQ x 10-15 reps with cues and assistance to complete.)         Bed Mobility  Bed Mobility: Yes  Bed Mobility 1  Bed Mobility 1: Supine to sitting, Sitting to supine  Level of Assistance 1: Minimum assistance, Minimal verbal cues, Minimal tactile cues  Bed Mobility Comments 1:  (pt req increased time to complete task)    Ambulation/Gait Training  Ambulation/Gait Training Performed: Yes  Ambulation/Gait Training 1  Surface 1: Level tile  Device 1: Rolling walker  Gait Support Devices: Gait belt  Assistance 1: Moderate assistance, Moderate verbal cues, Moderate tactile cues  Quality of Gait 1: Narrow base of support, Inconsistent stride length, Decreased step length, Antalgic  Comments/Distance (ft) 1: 3-4 short side steps to HOB (pt req increased assitance and cues to complete task,unsteady,low endurance)  Transfers  Transfer: Yes  Transfer 1  Transfer From 1: Sit to, Stand to  Technique 1: Sit to stand, Stand to sit  Transfer Device 1: Walker, Gait belt  Transfer Level of Assistance 1: Moderate assistance, Moderate verbal cues, Moderate tactile cues  Trials/Comments 1:  (pt req increased cues for proper hand placement and sequencing.)         Outcome Measures:  [facility] Basic Mobility  Turning from your back to your side while in a flat bed without using bedrails: A little  Moving from lying on your back to sitting on the side of a flat bed without using bedrails: A little  Moving to and from bed to chair (including a wheelchair): A lot  Standing up from a chair using your arms (e.g. wheelchair or bedside chair): A lot  To walk in hospital  room: A lot  Climbing 3-5 steps with railing: Total  Basic Mobility - Total Score: 13    FSS-ICU  Ambulation: Walks <50 feet with any assistance x1 or walks any distance with assistance x2 people  Rolling: Moderate assistance (performs 50 - 74% of task)  Sitting: Moderate assistance (performs 50 - 74% of task)  Transfer Sit-to-Stand: Moderate assistance (performs 50 - 74% of task)  Transfer Supine-to-Sit: Moderate assistance (performs 50 - 74% of task)  Total Score: 13    Education Documentation  Handouts, taught by Jarad Horne PTA at 1/9/2025  3:24 PM.  Learner: Patient  Readiness: Acceptance  Method: Explanation  Response: Needs Reinforcement    Precautions, taught by Jarad Horne PTA at 1/9/2025  3:24 PM.  Learner: Patient  Readiness: Acceptance  Method: Explanation  Response: Needs Reinforcement    Body Mechanics, taught by Jarad Horne PTA at 1/9/2025  3:24 PM.  Learner: Patient  Readiness: Acceptance  Method: Explanation  Response: Needs Reinforcement    Home Exercise Program, taught by Jarad Horne PTA at 1/9/2025  3:24 PM.  Learner: Patient  Readiness: Acceptance  Method: Explanation  Response: Needs Reinforcement    Mobility Training, taught by Jarad Horne PTA at 1/9/2025  3:24 PM.  Learner: Patient  Readiness: Acceptance  Method: Explanation  Response: Needs Reinforcement    Handouts, taught by Jarad Horne PTA at 1/9/2025  3:08 PM.  Learner: Patient  Readiness: Acceptance  Method: Explanation  Response: Needs Reinforcement    Precautions, taught by Jarad Horne PTA at 1/9/2025  3:08 PM.  Learner: Patient  Readiness: Acceptance  Method: Explanation  Response: Needs Reinforcement    Body Mechanics, taught by Jarad Horne PTA at 1/9/2025  3:08 PM.  Learner: Patient  Readiness: Acceptance  Method: Explanation  Response: Needs Reinforcement    Home Exercise Program, taught by Jarad Horne PTA at 1/9/2025  3:08 PM.  Learner:  Patient  Readiness: Acceptance  Method: Explanation  Response: Needs Reinforcement    Mobility Training, taught by Jarad Horne PTA at 1/9/2025  3:08 PM.  Learner: Patient  Readiness: Acceptance  Method: Explanation  Response: Needs Reinforcement    Education Comments  No comments found.        OP EDUCATION:       Encounter Problems       Encounter Problems (Active)       Mobility       STG - Patient will independently ambulate >100' with RW on level surfaces.       Start:  01/07/25    Expected End:  01/14/25            STG - Patient will ascend and descend four stairs using 1 rail and cane with SBA.       Start:  01/07/25    Expected End:  01/14/25               PT Transfers       STG - Patient to transfer to and from sit to supine independently from flat bed.  (Progressing)       Start:  01/07/25    Expected End:  01/14/25            STG - Patient will transfer sit to and from stand independently with RW. (Progressing)       Start:  01/07/25    Expected End:  01/14/25            Goal 1- HEP (Progressing)       Start:  01/07/25    Expected End:  01/14/25       Pt will recall 100% of TKA HEP with written handout independently.          PT Goal 2- ROM (Progressing)       Start:  01/07/25    Expected End:  01/14/25       Pt will increase R knee supine extension ROM to >/= -5 degrees and seated flexion ROM to >/=100 degrees.         STG - Patient will perform car transfer with RW and SBA.  (Progressing)       Start:  01/07/25                   Pain - Adult

## 2025-01-09 NOTE — PROGRESS NOTES
Physical Therapy    Physical Therapy Treatment    Patient Name: Mikhail Moses  MRN: [id number]  Department: [facility] [address] [facility]  Room: [address] [place] [address]  Today's Date: 1/9/2025  Time Calculation  Start Time: 0949  Stop Time: 1029  Time Calculation (min): 40 min         Assessment/Plan   PT Assessment  End of Session Communication: Bedside nurse  Assessment Comment:  (pt demo fair miky to increased activity, unsteady with standing and transfer to chair,fair endurance)  End of Session Patient Position: Up in chair, Alarm on  PT Plan  Inpatient/Swing Bed or Outpatient: Inpatient  PT Plan  Treatment/Interventions: Bed mobility, Transfer training, Gait training, Therapeutic exercise, Therapeutic activity  PT Plan: Ongoing PT  PT Frequency: BID  PT Discharge Recommendations: Other (comment) (discussed with supervising PT safety concerns with current d/c rec and pt's current medical status.)  Equipment Recommended upon Discharge: Wheeled walker, Straight cane (pt owns recommended equipment)  PT Recommended Transfer Status: Assistive device, Assist x1 (with walker)  PT - OK to Discharge: Yes (per PT POC)      General Visit Information:   PT  Visit  PT Received On: 01/09/25  General  Reason for Referral: s/p R TKA with Dr Bradford  Referred By: Hamida Padilla PA-C  Family/Caregiver Present: Yes  Caregiver Feedback: spouse in room  Prior to Session Communication: Bedside nurse  Patient Position Received: Bed, 3 rail up, Alarm on  Preferred Learning Style: auditory, kinesthetic, verbal  General Comment:  (pt agreeable to tx.)    Subjective   Precautions:  Precautions  LE Weight Bearing Status: Weight Bearing as Tolerated  Medical Precautions: Fall precautions  Post-Surgical Precautions: Right total knee precautions      Vital Signs Comment:  (PIVs, PA catheter, ART line, Paul)     Objective   Pain:  Pain Assessment  Pain Assessment: 0-10  0-10 (Numeric) Pain Score: 3  Pain Type: Surgical pain  Pain Location: Knee  Pain Orientation:  Right  Cognition:  Cognition  Orientation Level: Oriented X4       Treatments:  Therapeutic Exercise  Therapeutic Exercise Performed: Yes  Therapeutic Exercise Activity 1:  (pt performed supine ther ex:AP,QS,GS,heel slides,SAQ x 10 reps with cues and assitance needed to complete.)         Bed Mobility  Bed Mobility: Yes  Bed Mobility 1  Bed Mobility 1: Supine to sitting  Level of Assistance 1: Moderate assistance, Minimal verbal cues, Minimal tactile cues  Bed Mobility Comments 1:  (pt req increased cues and assitance to complete task)    Ambulation/Gait Training  Ambulation/Gait Training Performed: Yes  Ambulation/Gait Training 1  Surface 1: Level tile  Device 1: Rolling walker  Gait Support Devices: Gait belt  Assistance 1: Moderate assistance, Moderate verbal cues, Moderate tactile cues  Quality of Gait 1: Narrow base of support, Inconsistent stride length, Decreased step length, Antalgic  Comments/Distance (ft) 1: 3ft (bed to chair) (pt took few steps with increased assitance needed for balance and FWW placement.unsteady)  Transfers  Transfer: Yes  Transfer 1  Transfer From 1: Sit to, Stand to  Technique 1: Sit to stand, Stand to sit  Transfer Device 1: Walker, Gait belt  Transfer Level of Assistance 1: Moderate assistance, Moderate verbal cues, Moderate tactile cues  Trials/Comments 1: 2 (pt req increased cues and assistance to complete task)         Outcome Measures:  [facility] Basic Mobility  Turning from your back to your side while in a flat bed without using bedrails: A lot  Moving from lying on your back to sitting on the side of a flat bed without using bedrails: A lot  Moving to and from bed to chair (including a wheelchair): A lot  Standing up from a chair using your arms (e.g. wheelchair or bedside chair): A lot  To walk in hospital room: A lot  Climbing 3-5 steps with railing: Total  Basic Mobility - Total Score: 11    Education Documentation  Handouts, taught by Jarad Horne PTA at 1/9/2025   3:08 PM.  Learner: Patient  Readiness: Acceptance  Method: Explanation  Response: Needs Reinforcement    Precautions, taught by Jarad Horne PTA at 1/9/2025  3:08 PM.  Learner: Patient  Readiness: Acceptance  Method: Explanation  Response: Needs Reinforcement    Body Mechanics, taught by Jarad Horne PTA at 1/9/2025  3:08 PM.  Learner: Patient  Readiness: Acceptance  Method: Explanation  Response: Needs Reinforcement    Home Exercise Program, taught by Jarad Horne PTA at 1/9/2025  3:08 PM.  Learner: Patient  Readiness: Acceptance  Method: Explanation  Response: Needs Reinforcement    Mobility Training, taught by Jarad Horne PTA at 1/9/2025  3:08 PM.  Learner: Patient  Readiness: Acceptance  Method: Explanation  Response: Needs Reinforcement    Education Comments  No comments found.        OP EDUCATION:       Encounter Problems       Encounter Problems (Active)       Mobility       STG - Patient will independently ambulate >100' with RW on level surfaces.       Start:  01/07/25    Expected End:  01/14/25            STG - Patient will ascend and descend four stairs using 1 rail and cane with SBA.       Start:  01/07/25    Expected End:  01/14/25               PT Transfers       STG - Patient to transfer to and from sit to supine independently from flat bed.  (Progressing)       Start:  01/07/25    Expected End:  01/14/25            STG - Patient will transfer sit to and from stand independently with RW. (Progressing)       Start:  01/07/25    Expected End:  01/14/25            Goal 1- HEP (Progressing)       Start:  01/07/25    Expected End:  01/14/25       Pt will recall 100% of TKA HEP with written handout independently.          PT Goal 2- ROM (Progressing)       Start:  01/07/25    Expected End:  01/14/25       Pt will increase R knee supine extension ROM to >/= -5 degrees and seated flexion ROM to >/=100 degrees.         STG - Patient will perform car transfer with RW and  GIGI.  (Progressing)       Start:  01/07/25                   Pain - Adult

## 2025-01-09 NOTE — PROGRESS NOTES
Subjective Data:  Right heart catheterization reviewed yesterday consistent with distributive shock.  Low SVR, normal cardiac index 3.1 and mildly elevated wedge pressure 17 mmHg.  CT suggestive of aspiration.  This morning he is much more alert and conversive.    CT chest:  IMPRESSION:  1.  Bilateral multifocal pneumonia.  2. Fluid throughout the esophagus, high risk for aspiration.  3. Mild thickening of the gastric cardia and body may be related to  peristalsis, or gastritis. Recommend EGD for further evaluation.             Objective Data:  Last Recorded Vitals:  Vitals:    01/09/25 0700 01/09/25 0725 01/09/25 0800 01/09/25 0815   BP:       Patient Position:       Pulse: 60  70 62   Resp: 16  18 22   Temp:       TempSrc:       SpO2: 99% 100% 95% 99%   Weight:       Height:           Last Labs:  CBC - 1/9/2025:  7:59 AM  17.2 9.7 176    29.8      CMP - 1/9/2025:  6:45 AM;  6:45 AM  6.9; 6.9 6.2 19 --- 0.6   4.4 3.0 12 97      PTT - 1/8/2025: 12:48 PM  1.2   13.4 31     TROPHS   Date/Time Value Ref Range Status   01/08/2025 09:21 PM 61 0 - 20 ng/L Final     Comment:     Previous result verified on 1/8/2025 1834 on specimen/case [id number] called with component [facility] for procedure Troponin I, High Sensitivity, Initial with value 155 ng/L.   01/08/2025 06:02  0 - 20 ng/L Final   01/08/2025 12:48 PM 45 0 - 20 ng/L Final     BNP   Date/Time Value Ref Range Status   01/08/2025 05:35  0 - 99 pg/mL Final   10/25/2024 05:36 AM 71 0 - 99 pg/mL Final     HGBA1C   Date/Time Value Ref Range Status   12/24/2024 08:14 AM 4.9 See comment % Final      Last I/O:  I/O last 3 completed shifts:  In: 2795 (26.5 mL/kg) [P.O.:765; I.V.:1030 (9.8 mL/kg); IV Piggyback:1000]  Out: 520 (4.9 mL/kg) [Urine:465 (0.1 mL/kg/hr); Drains:50; Blood:5]  Weight: 105.3 kg     Past Cardiology Tests (Last 3 Years):  See original consult note      Inpatient Medications:  Scheduled medications   Medication Dose Route Frequency     acetaminophen  650 mg oral q6h ANGEL    allopurinol  300 mg oral Daily    atorvastatin  10 mg oral Daily    docusate sodium  100 mg oral BID    donepezil  5 mg oral Nightly    folic acid  1 mg oral Daily    [Held by provider] furosemide  40 mg oral Daily    insulin lispro  0-10 Units subcutaneous TID AC    [Held by provider] lisinopril  20 mg oral Daily    [Held by provider] metoprolol succinate XL  25 mg oral Daily    multivitamin with minerals  1 tablet oral Daily    pantoprazole  40 mg oral Daily before breakfast    piperacillin-tazobactam  2.25 g intravenous q6h    polyethylene glycol  17 g oral Daily    [START ON 1/10/2025] predniSONE  40 mg oral Daily    [Held by provider] pregabalin  300 mg oral BID    tamsulosin  0.4 mg oral Nightly    thiamine  100 mg oral Daily     PRN medications   Medication    benzocaine-menthol    bisacodyl    bisacodyl    calcium carbonate    cyclobenzaprine    dextrose    dextrose    glucagon    glucagon    heparin    HYDROmorphone    ipratropium-albuteroL    metoclopramide    Or    metoclopramide    naloxone    ondansetron    Or    ondansetron    oxyCODONE    oxygen    oxygen     Continuous Medications   Medication Dose Last Rate    heparin  0-4,500 Units/hr 1,200 Units/hr (01/09/25 0749)    norepinephrine  0-0.5 mcg/kg/min 0.02 mcg/kg/min (01/09/25 0644)    vasopressin  0.03 Units/min Stopped (01/09/25 0803)       Physical Exam:  GENERAL: alert, cooperative, pleasant, in no acute distress  SKIN: warm, dry, no rash.  NECK: no JVD, no SARAH  CARDIAC: Regular rate and rhythm with no rubs, murmurs, or gallops  CHEST: Normal respiratory efforts, lungs with crackles b/l  EXTREMITIES: no edema  NEURO: Alert and oriented x 3.  Grossly normal.  Moves all 4 extremities.       Assessment/Plan     #Acute on chronic hypoxic respiratory failure  #PNA - likely aspiration  #Acute renal insufficiency seen on CKD  #Shock  #Hypotension  #s/p knee surgery        79-year-old male with history of extensive  coronary artery disease with multiple stents most recent of which was 2021 with ABBY to RCA and known LAD , CKD, HTN, COPD who has developed acute hypoxic respiratory failure post knee surgery. Finding now most c/w aspiration PNA and maybe pneumonitis. RHC with distributive shock (, PCWP 17, CI 3.1).  His EF is mildly reduced with some apical wall motion abnormality which is probably secondary to his LAD .  He did have apical scar on stress test in 2023. Not likely any decompensated HF after CT findings.    Plan to remove swan-shreya catheter today  Resume apixaban when ok from ortho standpoint  Continue statin  He should also be on aspirin 81 mg due to extensive nature of stents  Discussed with ICU team  Will sign off. Please contact us back if can be of further assistance.       Code Status:  Full Code        John Yanes DO

## 2025-01-09 NOTE — PROGRESS NOTES
"Orthopaedic Surgery Progress Note    Subjective:  RHC placed yesterday by Cardiology and ulrich placed by Urology. Currently on two vasopressors and HFNC.     This AM, patient c/o postop pain but otherwise feeling well. Denies fevers/chills or systemic symptoms. Denies chest pain or SOB.    O:  BP 88/56   Pulse 62   Temp 36.6 °C (97.9 °F) (Core)   Resp 22   Ht 1.727 m (5' 8\")   Wt 105 kg (232 lb 3.2 oz)   SpO2 99%   BMI 35.31 kg/m²     Gen: arousable, NAD, appropriately conversational  Cardiac: RRR to peripheral palpation  Resp: nonlabored on RA  GI: soft, nondistended    MSK:  RLE  - Prevena c/d/i  - Fires DF/PF/EHL/FHL  - Residual numbness from block over medial calf  - Foot warm, well perfused  - DP/PT pulse, brisk cap refill  - Compartments soft and compressible    Drain intact w serosang output    A/P: 79 y.o. male s/p L TKA on 1/7 with Dr. rBadford.  Postop course c/b hypoxic respiratory failure, transferred to ICU POD1 and remains on HFNC. Shock workup/management ongoing.    Plan:  - Weight bearing: WBAT LLE  - DVT ppx: SCDs, now on Heparin drip per ICU  - Diet: Regular  - Pain: Tylenol, oxycodone 5/10  - Antibiotics: empiric Vanc/Zosyn per Medicine  - FEN: HLIV with good PO intake  - Bowel Regimen: Colace, senna, dulcolax  - PT/OT when able  - Continue home medications  - Appreciate ICU care  - HV drain removed this AM    Dispo: pending clinical course     Susan Gusman, PGY-4  Orthopedic Surgery Resident  Available via [employer]    While admitted, this patient will be followed by the Ortho Joints Team. Please contact below residents with any questions (available via Epic Chat).     First call: Zheng Craven, PGY-1  Second call: Rhonda Henriquez, PGY-2  Third call: Susan Gusman, PGY-4    On weekends and after 6PM:  [place] [facility] [place] [facility] Please reach out to the orthopaedic on-call resident ([phone])  At [facility]: Please reach out to the orthopaedic on-call HEATHER or resident (please refer to Qgenda)    "

## 2025-01-09 NOTE — CARE PLAN
The patient's goals for the shift include      The clinical goals for the shift include to decrease right knee pain    Over the shift, patient made progress towards goal, pain managed with use of oral medication while increasing patient mobility and out of bed time. Patient titrated of pressors and [place] and Art-line removed. Patient resting in bed. Plan of care continues

## 2025-01-09 NOTE — PROGRESS NOTES
01/09/25 1652   Discharge Planning   Expected Discharge Disposition SNF       Patient remains in ICU. Seen patient up in the chair. He is off pressors. Patient went to cath lab yesterday and Venedocia was placed. Will have Venedocia discontinued today. PT rec mod. Per conversation with wife yesterday her first choice would be [facility] if he needs to go to SNF. Requested from [facility] tosend referral. Will continue to follow for discharge needs. Patient is on HF oxygen.

## 2025-01-10 LAB
ALBUMIN SERPL BCP-MCNC: 3 G/DL (ref 3.4–5)
ANION GAP SERPL CALC-SCNC: 14 MMOL/L (ref 10–20)
AORTIC VALVE MEAN GRADIENT: 2 MMHG
AORTIC VALVE PEAK VELOCITY: 1.02 M/S
AV PEAK GRADIENT: 4 MMHG
AVA (PEAK VEL): 2.98 CM2
AVA (VTI): 3.2 CM2
BUN SERPL-MCNC: 44 MG/DL (ref 6–23)
CALCIUM SERPL-MCNC: 7.6 MG/DL (ref 8.6–10.3)
CHLORIDE SERPL-SCNC: 103 MMOL/L (ref 98–107)
CO2 SERPL-SCNC: 25 MMOL/L (ref 21–32)
CREAT SERPL-MCNC: 2.57 MG/DL (ref 0.5–1.3)
EGFRCR SERPLBLD CKD-EPI 2021: 25 ML/MIN/1.73M*2
EJECTION FRACTION APICAL 4 CHAMBER: 58.8
EJECTION FRACTION: 48 %
ERYTHROCYTE [DISTWIDTH] IN BLOOD BY AUTOMATED COUNT: 14.2 % (ref 11.5–14.5)
GLUCOSE BLD MANUAL STRIP-MCNC: 134 MG/DL (ref 74–99)
GLUCOSE BLD MANUAL STRIP-MCNC: 145 MG/DL (ref 74–99)
GLUCOSE SERPL-MCNC: 130 MG/DL (ref 74–99)
HCT VFR BLD AUTO: 29.5 % (ref 41–52)
HGB BLD-MCNC: 9.3 G/DL (ref 13.5–17.5)
LEFT ATRIUM VOLUME AREA LENGTH INDEX BSA: 23.5 ML/M2
LEFT VENTRICLE INTERNAL DIMENSION DIASTOLE: 4.6 CM (ref 3.5–6)
LEFT VENTRICULAR OUTFLOW TRACT DIAMETER: 2.15 CM
MAGNESIUM SERPL-MCNC: 2.03 MG/DL (ref 1.6–2.4)
MCH RBC QN AUTO: 28.5 PG (ref 26–34)
MCHC RBC AUTO-ENTMCNC: 31.5 G/DL (ref 32–36)
MCV RBC AUTO: 91 FL (ref 80–100)
MITRAL VALVE E/A RATIO: 0.79
NRBC BLD-RTO: 0 /100 WBCS (ref 0–0)
PHOSPHATE SERPL-MCNC: 3.6 MG/DL (ref 2.5–4.9)
PLATELET # BLD AUTO: 200 X10*3/UL (ref 150–450)
POTASSIUM SERPL-SCNC: 4 MMOL/L (ref 3.5–5.3)
RBC # BLD AUTO: 3.26 X10*6/UL (ref 4.5–5.9)
RIGHT VENTRICLE PEAK SYSTOLIC PRESSURE: 8.7 MMHG
SODIUM SERPL-SCNC: 138 MMOL/L (ref 136–145)
TRICUSPID ANNULAR PLANE SYSTOLIC EXCURSION: 1.8 CM
WBC # BLD AUTO: 14.5 X10*3/UL (ref 4.4–11.3)

## 2025-01-10 PROCEDURE — 82565 ASSAY OF CREATININE: CPT

## 2025-01-10 PROCEDURE — 2500000001 HC RX 250 WO HCPCS SELF ADMINISTERED DRUGS (ALT 637 FOR MEDICARE OP): Performed by: STUDENT IN AN ORGANIZED HEALTH CARE EDUCATION/TRAINING PROGRAM

## 2025-01-10 PROCEDURE — 2500000002 HC RX 250 W HCPCS SELF ADMINISTERED DRUGS (ALT 637 FOR MEDICARE OP, ALT 636 FOR OP/ED)

## 2025-01-10 PROCEDURE — 2500000004 HC RX 250 GENERAL PHARMACY W/ HCPCS (ALT 636 FOR OP/ED)

## 2025-01-10 PROCEDURE — 80069 RENAL FUNCTION PANEL: CPT

## 2025-01-10 PROCEDURE — 2500000001 HC RX 250 WO HCPCS SELF ADMINISTERED DRUGS (ALT 637 FOR MEDICARE OP): Performed by: NURSE PRACTITIONER

## 2025-01-10 PROCEDURE — 2500000001 HC RX 250 WO HCPCS SELF ADMINISTERED DRUGS (ALT 637 FOR MEDICARE OP)

## 2025-01-10 PROCEDURE — 2500000004 HC RX 250 GENERAL PHARMACY W/ HCPCS (ALT 636 FOR OP/ED): Performed by: NURSE PRACTITIONER

## 2025-01-10 PROCEDURE — 99232 SBSQ HOSP IP/OBS MODERATE 35: CPT | Performed by: INTERNAL MEDICINE

## 2025-01-10 PROCEDURE — 36415 COLL VENOUS BLD VENIPUNCTURE: CPT

## 2025-01-10 PROCEDURE — 97530 THERAPEUTIC ACTIVITIES: CPT | Mod: GP,CQ | Performed by: PHYSICAL THERAPY ASSISTANT

## 2025-01-10 PROCEDURE — 97116 GAIT TRAINING THERAPY: CPT | Mod: GP,CQ | Performed by: PHYSICAL THERAPY ASSISTANT

## 2025-01-10 PROCEDURE — 85027 COMPLETE CBC AUTOMATED: CPT

## 2025-01-10 PROCEDURE — 1200000002 HC GENERAL ROOM WITH TELEMETRY DAILY

## 2025-01-10 PROCEDURE — 2500000004 HC RX 250 GENERAL PHARMACY W/ HCPCS (ALT 636 FOR OP/ED): Performed by: SURGERY

## 2025-01-10 PROCEDURE — 82947 ASSAY GLUCOSE BLOOD QUANT: CPT

## 2025-01-10 PROCEDURE — 83735 ASSAY OF MAGNESIUM: CPT

## 2025-01-10 PROCEDURE — 2500000005 HC RX 250 GENERAL PHARMACY W/O HCPCS

## 2025-01-10 PROCEDURE — 99291 CRITICAL CARE FIRST HOUR: CPT

## 2025-01-10 PROCEDURE — 9420000001 HC RT PATIENT EDUCATION 5 MIN

## 2025-01-10 PROCEDURE — 97110 THERAPEUTIC EXERCISES: CPT | Mod: GP,CQ | Performed by: PHYSICAL THERAPY ASSISTANT

## 2025-01-10 RX ORDER — IPRATROPIUM BROMIDE AND ALBUTEROL SULFATE 2.5; .5 MG/3ML; MG/3ML
3 SOLUTION RESPIRATORY (INHALATION) EVERY 2 HOUR PRN
Status: ACTIVE | OUTPATIENT
Start: 2025-01-10

## 2025-01-10 RX ADMIN — SALINE NASAL SPRAY 1 SPRAY: 1.5 SOLUTION NASAL at 11:53

## 2025-01-10 RX ADMIN — APIXABAN 2.5 MG: 2.5 TABLET, FILM COATED ORAL at 08:04

## 2025-01-10 RX ADMIN — Medication 100 MG: at 08:05

## 2025-01-10 RX ADMIN — ASPIRIN 81 MG: 81 TABLET, COATED ORAL at 08:05

## 2025-01-10 RX ADMIN — TAMSULOSIN HYDROCHLORIDE 0.4 MG: 0.4 CAPSULE ORAL at 22:12

## 2025-01-10 RX ADMIN — OXYCODONE HYDROCHLORIDE 5 MG: 5 TABLET ORAL at 13:48

## 2025-01-10 RX ADMIN — PANTOPRAZOLE SODIUM 40 MG: 40 TABLET, DELAYED RELEASE ORAL at 06:21

## 2025-01-10 RX ADMIN — PIPERACILLIN SODIUM AND TAZOBACTAM SODIUM 2.25 G: 2; .25 INJECTION, SOLUTION INTRAVENOUS at 08:05

## 2025-01-10 RX ADMIN — DOCUSATE SODIUM 100 MG: 100 CAPSULE, LIQUID FILLED ORAL at 22:12

## 2025-01-10 RX ADMIN — FOLIC ACID 1 MG: 1 TABLET ORAL at 08:04

## 2025-01-10 RX ADMIN — ALLOPURINOL 300 MG: 300 TABLET ORAL at 08:04

## 2025-01-10 RX ADMIN — ACETAMINOPHEN 650 MG: 325 TABLET, FILM COATED ORAL at 17:20

## 2025-01-10 RX ADMIN — HYDROMORPHONE HYDROCHLORIDE 0.2 MG: 0.2 INJECTION, SOLUTION INTRAMUSCULAR; INTRAVENOUS; SUBCUTANEOUS at 16:46

## 2025-01-10 RX ADMIN — ACETAMINOPHEN 650 MG: 325 TABLET, FILM COATED ORAL at 06:21

## 2025-01-10 RX ADMIN — APIXABAN 2.5 MG: 2.5 TABLET, FILM COATED ORAL at 22:12

## 2025-01-10 RX ADMIN — PIPERACILLIN SODIUM AND TAZOBACTAM SODIUM 2.25 G: 2; .25 INJECTION, SOLUTION INTRAVENOUS at 22:15

## 2025-01-10 RX ADMIN — OXYCODONE HYDROCHLORIDE 5 MG: 5 TABLET ORAL at 20:48

## 2025-01-10 RX ADMIN — POLYETHYLENE GLYCOL 3350 17 G: 17 POWDER, FOR SOLUTION ORAL at 08:05

## 2025-01-10 RX ADMIN — PIPERACILLIN SODIUM AND TAZOBACTAM SODIUM 2.25 G: 2; .25 INJECTION, SOLUTION INTRAVENOUS at 02:09

## 2025-01-10 RX ADMIN — DOCUSATE SODIUM 100 MG: 100 CAPSULE, LIQUID FILLED ORAL at 08:04

## 2025-01-10 RX ADMIN — Medication 3 L/MIN: at 21:37

## 2025-01-10 RX ADMIN — PIPERACILLIN SODIUM AND TAZOBACTAM SODIUM 2.25 G: 2; .25 INJECTION, SOLUTION INTRAVENOUS at 14:07

## 2025-01-10 RX ADMIN — OXYCODONE HYDROCHLORIDE 5 MG: 5 TABLET ORAL at 06:21

## 2025-01-10 RX ADMIN — PREDNISONE 40 MG: 20 TABLET ORAL at 08:05

## 2025-01-10 RX ADMIN — DONEPEZIL HYDROCHLORIDE 5 MG: 5 TABLET ORAL at 22:12

## 2025-01-10 RX ADMIN — MULTIPLE VITAMINS W/ MINERALS TAB 1 TABLET: TAB ORAL at 08:04

## 2025-01-10 RX ADMIN — ACETAMINOPHEN 650 MG: 325 TABLET, FILM COATED ORAL at 11:53

## 2025-01-10 RX ADMIN — ATORVASTATIN CALCIUM 10 MG: 10 TABLET, FILM COATED ORAL at 08:05

## 2025-01-10 ASSESSMENT — COGNITIVE AND FUNCTIONAL STATUS - GENERAL
TURNING FROM BACK TO SIDE WHILE IN FLAT BAD: A LITTLE
MOVING FROM LYING ON BACK TO SITTING ON SIDE OF FLAT BED WITH BEDRAILS: A LITTLE
MOBILITY SCORE: 14
DRESSING REGULAR UPPER BODY CLOTHING: A LOT
MOVING FROM LYING ON BACK TO SITTING ON SIDE OF FLAT BED WITH BEDRAILS: A LITTLE
WALKING IN HOSPITAL ROOM: A LOT
DAILY ACTIVITIY SCORE: 16
STANDING UP FROM CHAIR USING ARMS: A LOT
MOVING FROM LYING ON BACK TO SITTING ON SIDE OF FLAT BED WITH BEDRAILS: A LITTLE
MOBILITY SCORE: 13
TURNING FROM BACK TO SIDE WHILE IN FLAT BAD: A LITTLE
CLIMB 3 TO 5 STEPS WITH RAILING: A LOT
MOVING TO AND FROM BED TO CHAIR: A LOT
CLIMB 3 TO 5 STEPS WITH RAILING: TOTAL
CLIMB 3 TO 5 STEPS WITH RAILING: TOTAL
TURNING FROM BACK TO SIDE WHILE IN FLAT BAD: A LITTLE
HELP NEEDED FOR BATHING: A LOT
STANDING UP FROM CHAIR USING ARMS: A LOT
WALKING IN HOSPITAL ROOM: A LOT
TOILETING: A LOT
DAILY ACTIVITIY SCORE: 16
DRESSING REGULAR LOWER BODY CLOTHING: A LOT
STANDING UP FROM CHAIR USING ARMS: A LOT
MOBILITY SCORE: 13
MOVING TO AND FROM BED TO CHAIR: A LOT
TOILETING: A LOT
DRESSING REGULAR LOWER BODY CLOTHING: A LOT
TURNING FROM BACK TO SIDE WHILE IN FLAT BAD: A LITTLE
MOBILITY SCORE: 14
STANDING UP FROM CHAIR USING ARMS: A LOT
HELP NEEDED FOR BATHING: A LOT
MOVING FROM LYING ON BACK TO SITTING ON SIDE OF FLAT BED WITH BEDRAILS: A LITTLE
WALKING IN HOSPITAL ROOM: A LOT
WALKING IN HOSPITAL ROOM: A LOT
MOVING TO AND FROM BED TO CHAIR: A LOT
MOVING TO AND FROM BED TO CHAIR: A LOT
CLIMB 3 TO 5 STEPS WITH RAILING: A LOT
DRESSING REGULAR UPPER BODY CLOTHING: A LOT

## 2025-01-10 ASSESSMENT — LIFESTYLE VARIABLES
VISUAL DISTURBANCES: NOT PRESENT
ANXIETY: NO ANXIETY, AT EASE
ORIENTATION AND CLOUDING OF SENSORIUM: ORIENTED AND CAN DO SERIAL ADDITIONS
TOTAL SCORE: 0
AUDITORY DISTURBANCES: NOT PRESENT
NAUSEA AND VOMITING: NO NAUSEA AND NO VOMITING
AGITATION: NORMAL ACTIVITY
TREMOR: NO TREMOR
HEADACHE, FULLNESS IN HEAD: NOT PRESENT
PAROXYSMAL SWEATS: NO SWEAT VISIBLE

## 2025-01-10 ASSESSMENT — PAIN DESCRIPTION - ORIENTATION
ORIENTATION: RIGHT

## 2025-01-10 ASSESSMENT — PAIN - FUNCTIONAL ASSESSMENT
PAIN_FUNCTIONAL_ASSESSMENT: 0-10

## 2025-01-10 ASSESSMENT — PAIN DESCRIPTION - LOCATION
LOCATION: KNEE

## 2025-01-10 ASSESSMENT — PAIN SCALES - GENERAL
PAINLEVEL_OUTOF10: 9
PAINLEVEL_OUTOF10: 3
PAINLEVEL_OUTOF10: 2
PAINLEVEL_OUTOF10: 4
PAINLEVEL_OUTOF10: 2
PAINLEVEL_OUTOF10: 3
PAINLEVEL_OUTOF10: 4
PAINLEVEL_OUTOF10: 6
PAINLEVEL_OUTOF10: 4

## 2025-01-10 NOTE — PROGRESS NOTES
01/10/25 1430   Discharge Planning   Expected Discharge Disposition SNF           Patient is improving. He is off pressors and swan cath is out. His oxygen demand is decreased and he is on 3L NC. [facility] will let us know tomorrow if able to accept depends on bed availability. Called patient's wife Marion Samuel for more back up choices. Emailed her SNF list to [email or web address].  Patient's wife called me back and her choices are:  1.[facility]  2. [facility] in [place]  3.[facility]  4. [facility]  Requested from [facility] to send referrals.  Patient was downgraded from ICU to med-surg and will be transferred to med- surg floor.

## 2025-01-10 NOTE — PROGRESS NOTES
"Mikhail Moses is a 79 y.o. male on day 2 of admission presenting with Unilateral primary osteoarthritis, right knee.    Subjective   Pt states he had a bowel movement yesterday. Reports his breathing is even better today. No significant overnight events.    Objective     Physical Exam  Vitals reviewed.   HENT:      Head: Normocephalic.      Nose: Nose normal.      Mouth/Throat:      Mouth: Mucous membranes are moist.      Pharynx: Oropharynx is clear.   Eyes:      Extraocular Movements: Extraocular movements intact.      Pupils: Pupils are equal, round, and reactive to light.   Cardiovascular:      Rate and Rhythm: Normal rate and regular rhythm.      Heart sounds: Normal heart sounds.   Pulmonary:      Effort: Pulmonary effort is normal. No respiratory distress.      Breath sounds: No stridor. No wheezing.      Comments: Diminished breath sounds bilaterally  Abdominal:      General: Bowel sounds are normal. There is no distension.      Palpations: Abdomen is soft.      Tenderness: There is no abdominal tenderness.   Genitourinary:     Comments: Paul draining bruna clear urine  Musculoskeletal:         General: Normal range of motion.      Cervical back: Normal range of motion.   Skin:     General: Skin is warm and dry.      Capillary Refill: Capillary refill takes less than 2 seconds.   Neurological:      Mental Status: He is alert and oriented to person, place, and time.   Psychiatric:         Mood and Affect: Mood normal.         Thought Content: Thought content normal.         Last Recorded Vitals  Blood pressure (!) 120/100, pulse 77, temperature 36.3 °C (97.3 °F), temperature source Temporal, resp. rate 15, height 1.727 m (5' 8\"), weight 105 kg (232 lb 2.3 oz), SpO2 99%.  Intake/Output last 3 Shifts:  I/O last 3 completed shifts:  In: 780 (7.4 mL/kg) [P.O.:480; IV Piggyback:300]  Out: 2005 (19 mL/kg) [Urine:2005 (0.5 mL/kg/hr)]  Weight: 105.3 kg     Relevant Results    Current Facility-Administered " Medications:     acetaminophen (Tylenol) tablet 650 mg, 650 mg, oral, q6h ANGEL, SOTERO Martinez, DNP, 650 mg at 01/10/25 0621    allopurinol (Zyloprim) tablet 300 mg, 300 mg, oral, Daily, SOTERO Martinez, DNP, 300 mg at 01/09/25 0852    apixaban (Eliquis) tablet 2.5 mg, 2.5 mg, oral, BID, 2.5 mg at 01/09/25 2111 **FOLLOWED BY** [START ON 1/12/2025] apixaban (Eliquis) tablet 5 mg, 5 mg, oral, BID, RENEA Glass-CNP    aspirin EC tablet 81 mg, 81 mg, oral, Daily, Chanel White, APRKENNEDY-CNP, 81 mg at 01/09/25 1406    atorvastatin (Lipitor) tablet 10 mg, 10 mg, oral, Daily, SOTERO Martinez, DNP, 10 mg at 01/09/25 0852    benzocaine-menthol (Cepastat Sore Throat) lozenge 1 lozenge, 1 lozenge, Mouth/Throat, q4h PRN, SOTERO Martinez, DNP    bisacodyl (Dulcolax) EC tablet 10 mg, 10 mg, oral, Daily PRN, SOTERO Martinez, DNP    bisacodyl (Dulcolax) suppository 10 mg, 10 mg, rectal, Daily PRN, SOTERO Martinez, DNP    calcium carbonate (Tums) chewable tablet 500 mg, 500 mg, oral, 4x daily PRN, RENEA Glass-CNP, 500 mg at 01/09/25 1712    cyclobenzaprine (Flexeril) tablet 5 mg, 5 mg, oral, TID PRN, SOTERO Martinez, DNP, 5 mg at 01/07/25 2056    dextrose 50 % injection 12.5 g, 12.5 g, intravenous, q15 min PRN, Chanel M Mix, APRN-CNP    dextrose 50 % injection 25 g, 25 g, intravenous, q15 min CHRISTINENChanel, APRN-CNP    docusate sodium (Colace) capsule 100 mg, 100 mg, oral, BID, Hebert Howellrimick, APRN-CNP, DNP, 100 mg at 01/09/25 2111    donepezil (Aricept) tablet 5 mg, 5 mg, oral, Nightly, Hebert HowellriBARBARA bartonN-CNP, DNP, 5 mg at 01/09/25 2111    folic acid (Folvite) tablet 1 mg, 1 mg, oral, Daily, Hebert Howellrimick, APRN-CNP, DNP, 1 mg at 01/09/25 0852    [Held by provider] furosemide (Lasix) tablet 40 mg, 40 mg, oral, Daily, Hebert Abbasitarimick, APRN-CNP, DNP, 40 mg at 01/07/25 1709    glucagon (Glucagen) injection 1 mg, 1  mg, intramuscular, q15 min PRN, SOTERO Glass    glucagon (Glucagen) injection 1 mg, 1 mg, intramuscular, q15 min PRN, SOTERO Glass    HYDROmorphone PF (Dilaudid) injection 0.2 mg, 0.2 mg, intravenous, q2h PRN, SOTERO Martinez, DNP, 0.2 mg at 01/08/25 2330    insulin lispro injection 0-10 Units, 0-10 Units, subcutaneous, TID AC, SOTERO Glass, 2 Units at 01/09/25 0852    ipratropium-albuteroL (Duo-Neb) 0.5-2.5 mg/3 mL nebulizer solution 3 mL, 3 mL, nebulization, q4h PRN, SOTERO Martinez DNP    [Held by provider] lisinopril tablet 20 mg, 20 mg, oral, Daily, SOTERO Martinez, LANDON, 20 mg at 01/07/25 2059    metoclopramide (Reglan) tablet 10 mg, 10 mg, oral, q6h PRN **OR** metoclopramide (Reglan) injection 10 mg, 10 mg, intravenous, q6h PRN, SOTERO Martinez DNP    [Held by provider] metoprolol succinate XL (Toprol-XL) 24 hr tablet 25 mg, 25 mg, oral, Daily, SOTERO Martinez DNP    multivitamin with minerals 1 tablet, 1 tablet, oral, Daily, Steffanie Davis MD, 1 tablet at 01/09/25 0852    naloxone (Narcan) injection 0.2 mg, 0.2 mg, intravenous, q5 min PRN, SOTERO Martinez, LANDON, 0.2 mg at 01/08/25 0920    ondansetron (Zofran) tablet 4 mg, 4 mg, oral, q8h PRN **OR** ondansetron (Zofran) injection 4 mg, 4 mg, intravenous, q8h PRN, SOTERO Martinez DNP, 4 mg at 01/07/25 1719    oxyCODONE (Roxicodone) immediate release tablet 5 mg, 5 mg, oral, q6h PRN, SOTERO Martinez, DNP, 5 mg at 01/10/25 0621    oxygen (O2) therapy, , inhalation, Continuous PRN - O2/gases, SOTERO Martinez, DNP, 3 L/min at 01/09/25 2118    pantoprazole (ProtoNix) EC tablet 40 mg, 40 mg, oral, Daily before breakfast, SOTERO Martinez, DNP, 40 mg at 01/10/25 0621    piperacillin-tazobactam (Zosyn) 2.25 g in dextrose (iso) IV 50 mL, 2.25 g, intravenous, q6h, Sandra Herrera MD    polyethylene glycol  (Glycolax, Miralax) packet 17 g, 17 g, oral, Daily, Hebert A Elroytarian, APRN-CNP, DNP, 17 g at 01/09/25 0852    predniSONE (Deltasone) tablet 40 mg, 40 mg, oral, Daily, Chanel White, APRN-CNP    [Held by provider] pregabalin (Lyrica) capsule 300 mg, 300 mg, oral, BID, Hebert A Elroytarian, APRN-CNP, DNP, 300 mg at 01/07/25 2059    sodium chloride (Ocean) 0.65 % nasal spray 1 spray, 1 spray, Each Nostril, 4x daily PRN, Chanel White, APRN-CNP    tamsulosin (Flomax) 24 hr capsule 0.4 mg, 0.4 mg, oral, Nightly, Hebert Abbasitarimick, APRN-CNP, DNP, 0.4 mg at 01/09/25 2111    thiamine (Vitamin B-1) tablet 100 mg, 100 mg, oral, Daily, Hebert Abbasitarimick, APRN-CNP, DNP, 100 mg at 01/09/25 0852     Results for orders placed or performed during the hospital encounter of 01/07/25 (from the past 24 hours)   POCT GLUCOSE   Result Value Ref Range    POCT Glucose 170 (H) 74 - 99 mg/dL   CBC   Result Value Ref Range    WBC 17.2 (H) 4.4 - 11.3 x10*3/uL    nRBC 0.0 0.0 - 0.0 /100 WBCs    RBC 3.26 (L) 4.50 - 5.90 x10*6/uL    Hemoglobin 9.7 (L) 13.5 - 17.5 g/dL    Hematocrit 29.8 (L) 41.0 - 52.0 %    MCV 91 80 - 100 fL    MCH 29.8 26.0 - 34.0 pg    MCHC 32.6 32.0 - 36.0 g/dL    RDW 14.2 11.5 - 14.5 %    Platelets 176 150 - 450 x10*3/uL   POCT GLUCOSE   Result Value Ref Range    POCT Glucose 119 (H) 74 - 99 mg/dL   POCT GLUCOSE   Result Value Ref Range    POCT Glucose 126 (H) 74 - 99 mg/dL   POCT GLUCOSE   Result Value Ref Range    POCT Glucose 154 (H) 74 - 99 mg/dL   POCT GLUCOSE   Result Value Ref Range    POCT Glucose 134 (H) 74 - 99 mg/dL      Assessment/Plan   Mikhail Moses is a 79 year old male with a previous medical history of ETOH use, anemia, BPH, CAD s/p PCI (multiple), CKD, COPD, GERD, Gout, NSTEMI, HLD, hypertension, PAH, urethral stricture, and OA who was admitted s/p left total knee replacement with Dr. Bradford. Pt had worsening hypoxia requiring airvo and hypotension, he was transferred to the ICU for ongoing  management. Initially there was concern for cardiogenic shock d/t possible PE vs septic shock vs vasoplegia post surgery. V/Q scan indicated a low probability of PE. He also had a RHC done yesterday that showed elevated left and right sided filling pressures and a normal cardiac output of 6.5. Given his improvement with antibiotics and time it is believed his shock was due to his multifocal PNA or vasoplegia. His shock has resolved. Additionally his hypoxia has improved and he is only requiring 3L NC.    Plan:     Neuro:  Hx of ETOH and dementia  Expected post op pain  - Serial neuro and pain assessments   - Scheduled tylenol and PRN oxycodone/dilaudid for pain  - AF bundle  - Continue donepezil   - Continue thiamine and folic acid  - Monitor for signs of withdrawal   - CIWA protocol     CV:  Hx of CAD s/p PCI, NSTEMI, DVT, HLD, HTN, and PAH  Septic shock vs vasoplegia post total knee replacement~ Resolved  TTE done official read pending  - Continuous telemetry   - Fluid resuscitation as indicated  - Hold all home antihypertensives for now  - ASA/Statin  - Cardiology following  - Continue Eliquis    Pulm:  Hx of COPD  Acute hypoxemic respiratory failure~ improving  Multifocal PNA  Currently oxygenating well on 3L NC  - Continuous pulse oximetry  - Wean supplemental O2 as able while keeping SpO2 greater than 92%  - Antibiotics as below  - Continue steroids for now; decrease to 40mg daily dose  - PRN DuoNebs  - BPH/ICS    GI:  Hx of GERD  - Regular Diet  - PPI for GI ppx  - Bowel regimen    Renal/:  Hx of Urethral stricture s/p urethroplasty and urethral dilation, CKD baseline CR of 1.1   JUMA on CKD likely prerenal d/t hypotension  - Trend renal function  - Replete electrolytes as indicated  - Continue Flomax  - Maintain ulrich per urology  - Urology following    Endocrine:  Hx of gout  Hyperglycemia  - SSI AC/HS  - Continue allopurinol    Heme:  Hx of Anemia d/t chronic disease  - Daily CBC  - Monitor for s/s of  bleeding    MSK:  Hx of OA  S/P left total knee replacement with Dr. Bradford  - PT/OT  - OOB to chair ambulation as tolerated  - Ortho following    ID:  Leukocytosis  Multifocal PNA  - Monitor temps  - Trend WBCs  - Continue Zosyn    ICU checklist:  Vent/O2: 3L NC  Lines/ Devices: PIVs, Paul  AntiBx: Zosyn  Diet: Regular  GI Prophylaxis: PPI  DVT Prophylaxis: Eliquis  Code Status: FULL CODE  Dispo: [facility]      This critically ill patient continues to be at-risk for clinically significant deterioration / failure due to the above mentioned dysfunctional, unstable organ systems.  I have personally identified and managed all complex critical care issues to prevent aforementioned clinical deterioration.  Critical care time is spent at bedside and/or the immediate area and has included, but is not limited to, the review of diagnostic tests, labs, radiographs, serial assessments of hemodynamics, respiratory status, ventilatory management, and family updates.   CRITICAL CARE TIME: 45 minutes       Chanel White APRN-CNP

## 2025-01-10 NOTE — PROGRESS NOTES
"Orthopaedic Surgery Progress Note    Subjective:  Currently off vasopressors. On 3L NC. Feeling well, pain well controlled. Denies fevers/chills or systemic symptoms. Denies chest pain or SOB.    O:  /77   Pulse 65   Temp 36.4 °C (97.5 °F) (Temporal)   Resp 11   Ht 1.727 m (5' 8\")   Wt 105 kg (232 lb 2.3 oz) Comment: done by night shift  SpO2 99%   BMI 35.30 kg/m²     Gen: arousable, NAD, appropriately conversational  Cardiac: RRR to peripheral palpation  Resp: nonlabored on RA    MSK:  RLE  - Mepilex dressing c/d/I, lateral dressing with some strikethrough, no drainage  - Fires DF/PF/EHL/FHL  - Residual numbness from block over medial calf  - Foot warm, well perfused  - DP/PT pulse, brisk cap refill  - Compartments soft and compressible    A/P: 79 y.o. male s/p R TKA on 1/7 with Dr. Bradford.  Postop course c/b worsening hypoxia and transferred to ICU POD1, now off of pressors and off HFNC. HD status improving in ICU.    Plan:  - Weight bearing: WBAT RLE  - DVT ppx: SCDs, Eliquis  - Diet: Regular  - Pain: Tylenol, oxycodone 5/10  - Antibiotics: empiric Zosyn per Medicine  - FEN: HLIV with good PO intake  - Bowel Regimen: Colace, senna, dulcolax  - PT/OT when able  - Continue home medications  - Appreciate ICU care    Dispo: pending clinical course     Zheng Craven MD  Orthopedic Surgery PGY-1  Available via [employer]    While admitted, this patient will be followed by the Ortho Joints Team. Please contact below residents with any questions (available via Epic Chat).     First call: Zheng Craven, PGY-1  Second call: Rhonda Henriquez PGY-2  Third call: Susan Gusman, PGY-4    On weekends and after 6PM:  [place] [facility] [place] [facility] Please reach out to the orthopaedic on-call resident ([phone])  At [facility]: Please reach out to the orthopaedic on-call HEATHER or resident (please refer to [employer]genda)    "

## 2025-01-10 NOTE — PROGRESS NOTES
Pharmacy Medication History     Source of Information: Per patient wife over the phone, patient going to nursing home for rehabilitation , patient took all scheduled meds Monday the 6th except Eliquis 2.5 mg, also changed the way patient taking pantoprazole 40 mg , Instead of 1 tab 2 times a day, It's 1 tab. Daily per patient wife.     Additional concerns with the patient's PTA list.   N/A   The following updates were made to the Prior to Admission medication list:     Medications ADDED:   N/A  Medications CHANGED:  Pantoprazole 40 mg from 2 times a day to once daily   Medications REMOVED:   N/A  Medications NOT TAKING:   Eliquis 5 mg   Peridex  Aricept 5 mg   Proscar 5 mg     Allergy reviewed : Yes    Meds 2 Beds : No    Outpatient pharmacy confirmed and updated in chart : Yes    Pharmacy name: [facility] ( [place]. )    The list below reflectives the updated PTA list. Please review each medication in order reconciliation for additional clarification and justification.    Prior to Admission Medications   Prescriptions Last Dose Informant   allopurinol (Zyloprim) 300 mg tablet 1/6/2025    Sig: Take 1 tablet (300 mg) by mouth once daily.   apixaban (Eliquis) 5 mg tablet 1/3/2025    Sig: Take 1 tablet (5 mg) by mouth 2 times a day. Do not start before January 20, 2024.   atorvastatin (Lipitor) 10 mg tablet 1/6/2025    Sig: Take 1 tablet (10 mg) by mouth once daily.   chlorhexidine (Peridex) 0.12 % solution Not Taking    Sig: 15 ml swish and spit for 30 seconds night prior to surgery and morning of surgery   Patient not taking: Reported on 1/10/2025   donepezil (Aricept) 5 mg tablet Not Taking    Sig: Take 1 tablet (5 mg) by mouth once daily at bedtime.   Patient not taking: Reported on 1/7/2025   ergocalciferol (Vitamin D-2) 1.25 MG (68088 UT) capsule Past Week    Sig: Take 1 capsule (50,000 Units) by mouth 1 (one) time per week.   finasteride (Proscar) 5 mg tablet Not Taking    Sig: Take 1 tablet (5 mg) by  mouth early in the morning..   Patient not taking: Reported on 1/7/2025   furosemide (Lasix) 40 mg tablet 1/6/2025    Sig: Take 1 tablet (40 mg) by mouth once daily.   metoprolol succinate XL (Toprol-XL) 25 mg 24 hr tablet 1/6/2025    Sig: Take 1 tablet (25 mg) by mouth once daily.   nitroglycerin (Nitrostat) 0.4 mg SL tablet Not Taking    Sig: Place 1 tablet (0.4 mg) under the tongue every 5 minutes if needed.   Patient not taking: Reported on 1/7/2025   pantoprazole (ProtoNix) 40 mg EC tablet 1/7/2025 Morning    Sig: Take 1 tablet (40 mg) by mouth once daily.   pregabalin (Lyrica) 300 mg capsule Not Taking    Sig: Take 1 capsule (300 mg) by mouth 2 times a day.   Patient not taking: Reported on 1/7/2025   ramipril (Altace) 10 mg capsule 1/6/2025    Sig: Take 1 capsule (10 mg) by mouth once daily.   tamsulosin (Flomax) 0.4 mg 24 hr capsule 1/6/2025    Sig: Take 1 capsule (0.4 mg) by mouth once daily at bedtime.      Facility-Administered Medications: None       The list below reflectives the updated allergy list. Please review each documented allergy for additional clarification and justification.    No Known Allergies       01/10/25 at 1:56 PM Levar Conrad

## 2025-01-10 NOTE — PROGRESS NOTES
Physical Therapy    Physical Therapy Treatment    Patient Name: Mikhail Moses  MRN: [id number]  Department: [place] [address]  Room: [address] [place] [address]  Today's Date: 1/10/2025  Time Calculation  Start Time: 1435  Stop Time: 1500  Time Calculation (min): 25 min         Assessment/Plan   PT Assessment  End of Session Communication: Bedside nurse  Assessment Comment:  (pt demo improved tolerance to increased activity)  End of Session Patient Position: Up in chair, Alarm on  PT Plan  Inpatient/Swing Bed or Outpatient: Inpatient  PT Plan  Treatment/Interventions: Bed mobility, Transfer training, Gait training, Therapeutic activity, Therapeutic exercise  PT Plan: Ongoing PT  PT Frequency: BID  PT Discharge Recommendations: Moderate intensity level of continued care  Equipment Recommended upon Discharge: Wheeled walker, Straight cane (pt owns recommended equipment)  PT Recommended Transfer Status: Assistive device, Assist x1 (with walker)  PT - OK to Discharge: Yes (per PT POC)      General Visit Information:   PT  Visit  PT Received On: 01/10/25  General  Reason for Referral: s/p R TKA with Dr Bradford  Referred By: Hamida Padilla PA-C  Prior to Session Communication: Bedside nurse  Patient Position Received: Bed, 3 rail up, Alarm on  Preferred Learning Style: auditory, kinesthetic, verbal  General Comment:  (pt agreeable to tx.)    Subjective   Precautions:  Precautions  LE Weight Bearing Status: Weight Bearing as Tolerated  Medical Precautions: Fall precautions  Post-Surgical Precautions: Right total knee precautions    Vital Signs (Past 2hrs)                 Objective   Pain:  Pain Assessment  Pain Assessment: 0-10  0-10 (Numeric) Pain Score: 2  Pain Type: Surgical pain  Pain Location: Knee  Pain Orientation: Right  Cognition:  Cognition  Orientation Level: Oriented X4  Coordination:     Postural Control:     Extremity/Trunk Assessments:    Activity Tolerance:  Activity Tolerance  Endurance: Decreased tolerance for upright  activites  Treatments:  Therapeutic Exercise  Therapeutic Exercise Performed: Yes  Therapeutic Exercise Activity 1:  (pt performed supine ther ex: AP,QS,GS,heel slides,SAQ,SLR x 10 reps wiht BLE's increased cues for proper breathing and technique)         Bed Mobility  Bed Mobility: Yes  Bed Mobility 1  Bed Mobility 1: Supine to sitting, Sitting to supine  Level of Assistance 1: Minimum assistance, Minimal verbal cues, Minimal tactile cues  Bed Mobility Comments 1:  (pt req min cues for proper sequencing and hand placement.)    Ambulation/Gait Training  Ambulation/Gait Training Performed: Yes  Ambulation/Gait Training 1  Surface 1: Level tile  Device 1: Rolling walker  Gait Support Devices: Gait belt  Assistance 1: Moderate assistance, Moderate verbal cues, Moderate tactile cues  Quality of Gait 1: Narrow base of support, Inconsistent stride length, Decreased step length, Antalgic  Comments/Distance (ft) 1: 20ft (pt req increased time and cues to complete distance.)  Transfers  Transfer: Yes  Transfer 1  Transfer From 1: Sit to, Stand to  Technique 1: Sit to stand, Stand to sit  Transfer Device 1: Walker, Gait belt  Transfer Level of Assistance 1: Moderate assistance, Moderate verbal cues, Moderate tactile cues  Trials/Comments 1:  (pt req increased cues for proper hand placement.)         Outcome Measures:  [facility] Basic Mobility  Turning from your back to your side while in a flat bed without using bedrails: A little  Moving from lying on your back to sitting on the side of a flat bed without using bedrails: A little  Moving to and from bed to chair (including a wheelchair): A lot  Standing up from a chair using your arms (e.g. wheelchair or bedside chair): A lot  To walk in hospital room: A lot  Climbing 3-5 steps with railing: Total  Basic Mobility - Total Score: 13    Education Documentation  Handouts, taught by Jarad Horne PTA at 1/10/2025  4:32 PM.  Learner: Patient  Readiness: Acceptance  Method:  Explanation  Response: Needs Reinforcement    Precautions, taught by Jarad Horne PTA at 1/10/2025  4:32 PM.  Learner: Patient  Readiness: Acceptance  Method: Explanation  Response: Needs Reinforcement    Body Mechanics, taught by Jarad Horne PTA at 1/10/2025  4:32 PM.  Learner: Patient  Readiness: Acceptance  Method: Explanation  Response: Needs Reinforcement    Home Exercise Program, taught by Jarad Horne PTA at 1/10/2025  4:32 PM.  Learner: Patient  Readiness: Acceptance  Method: Explanation  Response: Needs Reinforcement    Mobility Training, taught by Jarad Horne PTA at 1/10/2025  4:32 PM.  Learner: Patient  Readiness: Acceptance  Method: Explanation  Response: Needs Reinforcement    Handouts, taught by Jarad Horne PTA at 1/10/2025  1:51 PM.  Learner: Patient  Readiness: Acceptance  Method: Explanation  Response: Verbalizes Understanding, Needs Reinforcement    Precautions, taught by Jarad Horne PTA at 1/10/2025  1:51 PM.  Learner: Patient  Readiness: Acceptance  Method: Explanation  Response: Verbalizes Understanding, Needs Reinforcement    Body Mechanics, taught by Jarad Horne PTA at 1/10/2025  1:51 PM.  Learner: Patient  Readiness: Acceptance  Method: Explanation  Response: Verbalizes Understanding, Needs Reinforcement    Home Exercise Program, taught by Jarad Horne PTA at 1/10/2025  1:51 PM.  Learner: Patient  Readiness: Acceptance  Method: Explanation  Response: Verbalizes Understanding, Needs Reinforcement    Mobility Training, taught by Jarad Horne PTA at 1/10/2025  1:51 PM.  Learner: Patient  Readiness: Acceptance  Method: Explanation  Response: Verbalizes Understanding, Needs Reinforcement    Education Comments  No comments found.        OP EDUCATION:       Encounter Problems       Encounter Problems (Active)       Mobility       STG - Patient will independently ambulate >100' with RW on level surfaces.        Start:  01/07/25    Expected End:  01/14/25            STG - Patient will ascend and descend four stairs using 1 rail and cane with SBA.       Start:  01/07/25    Expected End:  01/14/25               PT Transfers       STG - Patient to transfer to and from sit to supine independently from flat bed.  (Progressing)       Start:  01/07/25    Expected End:  01/14/25            STG - Patient will transfer sit to and from stand independently with RW. (Progressing)       Start:  01/07/25    Expected End:  01/14/25            Goal 1- HEP (Progressing)       Start:  01/07/25    Expected End:  01/14/25       Pt will recall 100% of TKA HEP with written handout independently.          PT Goal 2- ROM (Progressing)       Start:  01/07/25    Expected End:  01/14/25       Pt will increase R knee supine extension ROM to >/= -5 degrees and seated flexion ROM to >/=100 degrees.         STG - Patient will perform car transfer with RW and SBA.  (Progressing)       Start:  01/07/25                   Pain - Adult

## 2025-01-10 NOTE — PROGRESS NOTES
"Mikhail Moses is a 79 y.o. male on day 2 of admission presenting with Unilateral primary osteoarthritis, right knee.    Subjective   Transferred out of ICU. Feeling short of breath currently as he was ambulating about his room.       Objective   GEN: appears short of breath  ENT: wearing O2 nasal cannula at 4L/min  CV: RRR, no m/g/r  LUNGS: moderate effort, clear bilaterally, no w/r/r    Physical Exam    Last Recorded Vitals  Blood pressure 146/64, pulse 89, temperature 36.4 °C (97.6 °F), temperature source Oral, resp. rate 18, height 1.727 m (5' 8\"), weight 105 kg (232 lb 2.3 oz), SpO2 95%.  Intake/Output last 3 Shifts:  I/O last 3 completed shifts:  In: 780 (7.4 mL/kg) [P.O.:480; IV Piggyback:300]  Out: 2005 (19 mL/kg) [Urine:2005 (0.5 mL/kg/hr)]  Weight: 105.3 kg     Relevant Results  Scheduled medications  acetaminophen, 650 mg, oral, q6h ANGEL  allopurinol, 300 mg, oral, Daily  apixaban, 2.5 mg, oral, BID   Followed by  [START ON 1/12/2025] apixaban, 5 mg, oral, BID  aspirin, 81 mg, oral, Daily  atorvastatin, 10 mg, oral, Daily  docusate sodium, 100 mg, oral, BID  donepezil, 5 mg, oral, Nightly  folic acid, 1 mg, oral, Daily  [Held by provider] furosemide, 40 mg, oral, Daily  insulin lispro, 0-10 Units, subcutaneous, TID AC  [Held by provider] lisinopril, 20 mg, oral, Daily  [Held by provider] metoprolol succinate XL, 25 mg, oral, Daily  multivitamin with minerals, 1 tablet, oral, Daily  pantoprazole, 40 mg, oral, Daily before breakfast  piperacillin-tazobactam, 2.25 g, intravenous, q6h  polyethylene glycol, 17 g, oral, Daily  predniSONE, 40 mg, oral, Daily  tamsulosin, 0.4 mg, oral, Nightly  thiamine, 100 mg, oral, Daily      Continuous medications     PRN medications  PRN medications: benzocaine-menthol, bisacodyl, bisacodyl, calcium carbonate, cyclobenzaprine, dextrose, dextrose, glucagon, glucagon, HYDROmorphone, ipratropium-albuteroL, metoclopramide **OR** metoclopramide, naloxone, ondansetron **OR** " ondansetron, oxyCODONE, oxygen, sodium chloride    Results for orders placed or performed during the hospital encounter of 01/07/25 (from the past 24 hours)   POCT GLUCOSE   Result Value Ref Range    POCT Glucose 126 (H) 74 - 99 mg/dL   POCT GLUCOSE   Result Value Ref Range    POCT Glucose 154 (H) 74 - 99 mg/dL   POCT GLUCOSE   Result Value Ref Range    POCT Glucose 134 (H) 74 - 99 mg/dL   CBC   Result Value Ref Range    WBC 14.5 (H) 4.4 - 11.3 x10*3/uL    nRBC 0.0 0.0 - 0.0 /100 WBCs    RBC 3.26 (L) 4.50 - 5.90 x10*6/uL    Hemoglobin 9.3 (L) 13.5 - 17.5 g/dL    Hematocrit 29.5 (L) 41.0 - 52.0 %    MCV 91 80 - 100 fL    MCH 28.5 26.0 - 34.0 pg    MCHC 31.5 (L) 32.0 - 36.0 g/dL    RDW 14.2 11.5 - 14.5 %    Platelets 200 150 - 450 x10*3/uL   Renal Function Panel   Result Value Ref Range    Glucose 130 (H) 74 - 99 mg/dL    Sodium 138 136 - 145 mmol/L    Potassium 4.0 3.5 - 5.3 mmol/L    Chloride 103 98 - 107 mmol/L    Bicarbonate 25 21 - 32 mmol/L    Anion Gap 14 10 - 20 mmol/L    Urea Nitrogen 44 (H) 6 - 23 mg/dL    Creatinine 2.57 (H) 0.50 - 1.30 mg/dL    eGFR 25 (L) >60 mL/min/1.73m*2    Calcium 7.6 (L) 8.6 - 10.3 mg/dL    Phosphorus 3.6 2.5 - 4.9 mg/dL    Albumin 3.0 (L) 3.4 - 5.0 g/dL   Magnesium   Result Value Ref Range    Magnesium 2.03 1.60 - 2.40 mg/dL   POCT GLUCOSE   Result Value Ref Range    POCT Glucose 145 (H) 74 - 99 mg/dL                                   Assessment/Plan   Assessment & Plan  Acute hypoxic respiratory failure (Multi)    Summary:  79 y.o. male admitted on 1/7/2025  9:36 AM for elective right total knee arthroplasty. 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 CXR initially appeared to be pulmonary edema. His V/Q SPECT scan is low/moderate probability for P.E. CT chest notable for bilateral lower lobe consolidation and atelectasis concerning for aspiration pneumonia. Cardiac cath yesterday with low SVR and high cardiac index suggestive of distributive shock.      Problem  list:  -acute hypoxic respiratory failure  -bilateral lower lobe pneumonia     Recommendations:  -continue with antibiotics for aspiration pneumonia  -can start tapering off steroids  -titrate FiO2 to maintain SpO2 90-92%. Currently on 4L/min.  -PRN BIPAP at night for suspected ERIC  -bronchodilators    Mare De La Garza,   Pulmonary & Critical Care  1/10/2025 3:08 PM        Delivery

## 2025-01-10 NOTE — PROGRESS NOTES
Occupational Therapy                 Therapy Communication Note    Patient Name: Mikhail Moses  MRN: [id number]  Department: [address]  Room: [address] [place], [place] [address]  Today's Date: 1/10/2025     Discipline: Occupational Therapy    OT Missed Visit: Yes     Missed Visit Reason: Missed Visit Reason: Patient refused (pt refusing OT evaluation at this time, reports did not sleep well and wants to sleep this morning.)    Missed Time: Attempt

## 2025-01-10 NOTE — SIGNIFICANT EVENT
Respiratory eval completed, triage 4-TID. Dr. Odilon cole with keeping PRN     01/10/25 1234   Medical Gas Therapy/Pulse Ox   Medical Gas Therapy Supplemental oxygen   Medical Gas Delivery Method Nasal cannula   Humidification Yes   SpO2 93 %   Patient Activity During SpO2 Measurement At rest   Patient Evaluation Program   Pulmonary Status 3   Surgical Status 0   Chest X-Ray 1   Respiratory Pattern 0   Mental Status 0   Breath Sounds 2   Cough 0   Level of Activity 0   Oxygen Required for Sp02 Greater Than or Equal to 92% 1   Respiratory Acuity Score 7   Triage Level Triage - 4, Score of 4-10   Frequency TID and Q2 PRN (intermittant wheezing,  TRIAGE - 3&4)

## 2025-01-10 NOTE — PROGRESS NOTES
Occupational Therapy                 Therapy Communication Note    Patient Name: Mikhail Moses  MRN: [id number]  Department: [address]  Room: [address] [place], [place] [address]  Today's Date: 1/10/2025     Discipline: Occupational Therapy    OT Missed Visit: Yes     Missed Visit Reason: Missed Visit Reason: Other (Comment) (pt currently transferring to another floor, not available for OT eval)    Missed Time: Attempt

## 2025-01-10 NOTE — SIGNIFICANT EVENT
Transfer  Handoff has been given to Dr Donald about events regarding the patients admission and their POC. Pt will transfer to [facility] and has been accepted by Dr Donald for continued care.

## 2025-01-10 NOTE — CARE PLAN
Problem: Pain  Goal: Takes deep breaths with improved pain control throughout the shift  Outcome: Progressing  Goal: Turns in bed with improved pain control throughout the shift  Outcome: Progressing  Goal: Walks with improved pain control throughout the shift  Outcome: Progressing  Goal: Performs ADL's with improved pain control throughout shift  Outcome: Progressing  Goal: Participates in PT with improved pain control throughout the shift  Outcome: Progressing  Goal: Free from opioid side effects throughout the shift  Outcome: Progressing  Goal: Free from acute confusion related to pain meds throughout the shift  Outcome: Progressing     Problem: Pain - Adult  Goal: Verbalizes/displays adequate comfort level or baseline comfort level  Outcome: Progressing     Problem: Safety - Adult  Goal: Free from fall injury  Outcome: Progressing     Problem: Discharge Planning  Goal: Discharge to home or other facility with appropriate resources  Outcome: Progressing     Problem: Chronic Conditions and Co-morbidities  Goal: Patient's chronic conditions and co-morbidity symptoms are monitored and maintained or improved  Outcome: Progressing     Problem: Skin  Goal: Decreased wound size/increased tissue granulation at next dressing change  Outcome: Progressing  Goal: Participates in plan/prevention/treatment measures  Outcome: Progressing  Goal: Prevent/manage excess moisture  Outcome: Progressing  Goal: Prevent/minimize sheer/friction injuries  Outcome: Progressing  Goal: Promote/optimize nutrition  Outcome: Progressing  Goal: Promote skin healing  Outcome: Progressing     Problem: Fall/Injury  Goal: Not fall by end of shift  Outcome: Progressing  Goal: Be free from injury by end of the shift  Outcome: Progressing  Goal: Verbalize understanding of personal risk factors for fall in the hospital  Outcome: Progressing  Goal: Verbalize understanding of risk factor reduction measures to prevent injury from fall in the  home  Outcome: Progressing  Goal: Use assistive devices by end of the shift  Outcome: Progressing  Goal: Pace activities to prevent fatigue by end of the shift  Outcome: Progressing   The patient's goals for the shift include      The clinical goals for the shift include Pt will remain safe and not fall during shift    Over the shift, the patient did not make progress toward the following goals. Barriers to progression include . Recommendations to address these barriers include .

## 2025-01-10 NOTE — PROGRESS NOTES
Physical Therapy    Physical Therapy Treatment    Patient Name: Mikhail Moses  MRN: [id number]  Department: [place] [address] ICU  Room: [address] [place] [address]  Today's Date: 1/10/2025  Time Calculation  Start Time: 1101  Stop Time: 1128  Time Calculation (min): 27 min         Assessment/Plan   PT Assessment  End of Session Communication: Bedside nurse  Assessment Comment:  (pt demo fair miky to increased activity)  End of Session Patient Position: Up in chair, Alarm on  PT Plan  Inpatient/Swing Bed or Outpatient: Inpatient  PT Plan  Treatment/Interventions: Bed mobility, Transfer training, Gait training, Stair training, Therapeutic activity, Therapeutic exercise  PT Plan: Ongoing PT  PT Frequency: BID  PT Discharge Recommendations: Moderate intensity level of continued care  Equipment Recommended upon Discharge: Wheeled walker, Straight cane (pt owns recommended equipment)  PT Recommended Transfer Status: Assistive device, Assist x1 (with walker)  PT - OK to Discharge: Yes (per PT POC)      General Visit Information:   PT  Visit  PT Received On: 01/10/25  General  Reason for Referral: s/p R TKA with Dr Bradford  Referred By: Hamida Padilla PA-C  Prior to Session Communication: Bedside nurse  Patient Position Received: Bed, 3 rail up, Alarm on  Preferred Learning Style: auditory, kinesthetic, verbal  General Comment:  (pt agreeable to tx)    Subjective   Precautions:  Precautions  LE Weight Bearing Status: Weight Bearing as Tolerated  Medical Precautions: Fall precautions  Post-Surgical Precautions: Right total knee precautions    Vital Signs (Past 2hrs)        Date/Time Vitals Session Patient Position Pulse Resp SpO2 BP MAP (mmHg)    01/10/25 1234 --  --  93  --  93 %  --  --                         Objective   Pain:  Pain Assessment  Pain Assessment: 0-10  0-10 (Numeric) Pain Score: 2  Pain Type: Surgical pain  Pain Location: Knee  Pain Orientation: Right  Cognition:  Cognition  Orientation Level: Oriented X4  Coordination:      Postural Control:     Extremity/Trunk Assessments:    Activity Tolerance:  Activity Tolerance  Endurance: Decreased tolerance for upright activites  Treatments:  Therapeutic Exercise  Therapeutic Exercise Performed: Yes  Therapeutic Exercise Activity 1:  (pt performed supine ther ex: AP,QS,GS,heel slides,SAQ,SLR x 10 reps wiht BLE's increased cues for proper breathing and technique)         Bed Mobility  Bed Mobility: Yes  Bed Mobility 1  Bed Mobility 1: Supine to sitting  Level of Assistance 1: Minimum assistance, Minimal verbal cues, Minimal tactile cues  Bed Mobility Comments 1:  (pt req min cues for proper sequencing and hand placement.)    Ambulation/Gait Training  Ambulation/Gait Training Performed: Yes  Ambulation/Gait Training 1  Surface 1: Level tile  Device 1: Rolling walker  Gait Support Devices: Gait belt  Assistance 1: Moderate assistance, Moderate verbal cues, Moderate tactile cues  Quality of Gait 1: Narrow base of support, Inconsistent stride length, Decreased step length, Antalgic  Comments/Distance (ft) 1: 3ft (pt demo slow unsteady short steps bed to chair with increased assitance needed.)  Transfers  Transfer: Yes  Transfer 1  Transfer From 1: Sit to, Stand to  Technique 1: Sit to stand, Stand to sit  Transfer Device 1: Walker, Gait belt  Transfer Level of Assistance 1: Moderate assistance, Moderate verbal cues, Moderate tactile cues  Trials/Comments 1:  (pt req increased cues for proper hand placement.)         Outcome Measures:  [facility] Basic Mobility  Turning from your back to your side while in a flat bed without using bedrails: A little  Moving from lying on your back to sitting on the side of a flat bed without using bedrails: A little  Moving to and from bed to chair (including a wheelchair): A lot  Standing up from a chair using your arms (e.g. wheelchair or bedside chair): A lot  To walk in hospital room: A lot  Climbing 3-5 steps with railing: Total  Basic Mobility - Total Score:  13    FSS-ICU  Ambulation: Walks <50 feet with any assistance x1 or walks any distance with assistance x2 people  Rolling: Moderate assistance (performs 50 - 74% of task)  Sitting: Moderate assistance (performs 50 - 74% of task)  Transfer Sit-to-Stand: Moderate assistance (performs 50 - 74% of task)  Transfer Supine-to-Sit: Moderate assistance (performs 50 - 74% of task)  Total Score: 13    Education Documentation  Handouts, taught by Jarad Horne PTA at 1/10/2025  1:51 PM.  Learner: Patient  Readiness: Acceptance  Method: Explanation  Response: Verbalizes Understanding, Needs Reinforcement    Precautions, taught by Jarad Horne PTA at 1/10/2025  1:51 PM.  Learner: Patient  Readiness: Acceptance  Method: Explanation  Response: Verbalizes Understanding, Needs Reinforcement    Body Mechanics, taught by Jarad Horne PTA at 1/10/2025  1:51 PM.  Learner: Patient  Readiness: Acceptance  Method: Explanation  Response: Verbalizes Understanding, Needs Reinforcement    Home Exercise Program, taught by Jarad Horne PTA at 1/10/2025  1:51 PM.  Learner: Patient  Readiness: Acceptance  Method: Explanation  Response: Verbalizes Understanding, Needs Reinforcement    Mobility Training, taught by Jarad Horne PTA at 1/10/2025  1:51 PM.  Learner: Patient  Readiness: Acceptance  Method: Explanation  Response: Verbalizes Understanding, Needs Reinforcement    Education Comments  No comments found.        OP EDUCATION:       Encounter Problems       Encounter Problems (Active)       Mobility       STG - Patient will independently ambulate >100' with RW on level surfaces.       Start:  01/07/25    Expected End:  01/14/25            STG - Patient will ascend and descend four stairs using 1 rail and cane with SBA.       Start:  01/07/25    Expected End:  01/14/25               PT Transfers       STG - Patient to transfer to and from sit to supine independently from flat bed.  (Progressing)        Start:  01/07/25    Expected End:  01/14/25            STG - Patient will transfer sit to and from stand independently with RW. (Progressing)       Start:  01/07/25    Expected End:  01/14/25            Goal 1- HEP (Progressing)       Start:  01/07/25    Expected End:  01/14/25       Pt will recall 100% of TKA HEP with written handout independently.          PT Goal 2- ROM (Progressing)       Start:  01/07/25    Expected End:  01/14/25       Pt will increase R knee supine extension ROM to >/= -5 degrees and seated flexion ROM to >/=100 degrees.         STG - Patient will perform car transfer with RW and SBA.  (Progressing)       Start:  01/07/25                   Pain - Adult

## 2025-01-11 LAB
ALBUMIN SERPL BCP-MCNC: 2.8 G/DL (ref 3.4–5)
ANION GAP SERPL CALC-SCNC: 10 MMOL/L (ref 10–20)
BUN SERPL-MCNC: 41 MG/DL (ref 6–23)
CALCIUM SERPL-MCNC: 7.5 MG/DL (ref 8.6–10.3)
CHLORIDE SERPL-SCNC: 110 MMOL/L (ref 98–107)
CO2 SERPL-SCNC: 25 MMOL/L (ref 21–32)
CREAT SERPL-MCNC: 1.59 MG/DL (ref 0.5–1.3)
EGFRCR SERPLBLD CKD-EPI 2021: 44 ML/MIN/1.73M*2
ERYTHROCYTE [DISTWIDTH] IN BLOOD BY AUTOMATED COUNT: 14.2 % (ref 11.5–14.5)
GLUCOSE BLD MANUAL STRIP-MCNC: 123 MG/DL (ref 74–99)
GLUCOSE BLD MANUAL STRIP-MCNC: 134 MG/DL (ref 74–99)
GLUCOSE BLD MANUAL STRIP-MCNC: 148 MG/DL (ref 74–99)
GLUCOSE SERPL-MCNC: 118 MG/DL (ref 74–99)
HCT VFR BLD AUTO: 28.9 % (ref 41–52)
HGB BLD-MCNC: 8.7 G/DL (ref 13.5–17.5)
MAGNESIUM SERPL-MCNC: 2.08 MG/DL (ref 1.6–2.4)
MCH RBC QN AUTO: 29.7 PG (ref 26–34)
MCHC RBC AUTO-ENTMCNC: 30.1 G/DL (ref 32–36)
MCV RBC AUTO: 99 FL (ref 80–100)
NRBC BLD-RTO: 0.2 /100 WBCS (ref 0–0)
PHOSPHATE SERPL-MCNC: 2.8 MG/DL (ref 2.5–4.9)
PLATELET # BLD AUTO: 158 X10*3/UL (ref 150–450)
POTASSIUM SERPL-SCNC: 3.7 MMOL/L (ref 3.5–5.3)
RBC # BLD AUTO: 2.93 X10*6/UL (ref 4.5–5.9)
SODIUM SERPL-SCNC: 141 MMOL/L (ref 136–145)
WBC # BLD AUTO: 10.1 X10*3/UL (ref 4.4–11.3)

## 2025-01-11 PROCEDURE — 2500000004 HC RX 250 GENERAL PHARMACY W/ HCPCS (ALT 636 FOR OP/ED)

## 2025-01-11 PROCEDURE — 97166 OT EVAL MOD COMPLEX 45 MIN: CPT | Mod: GO

## 2025-01-11 PROCEDURE — 2500000001 HC RX 250 WO HCPCS SELF ADMINISTERED DRUGS (ALT 637 FOR MEDICARE OP)

## 2025-01-11 PROCEDURE — 36415 COLL VENOUS BLD VENIPUNCTURE: CPT

## 2025-01-11 PROCEDURE — 2500000002 HC RX 250 W HCPCS SELF ADMINISTERED DRUGS (ALT 637 FOR MEDICARE OP, ALT 636 FOR OP/ED)

## 2025-01-11 PROCEDURE — 83735 ASSAY OF MAGNESIUM: CPT

## 2025-01-11 PROCEDURE — 80069 RENAL FUNCTION PANEL: CPT

## 2025-01-11 PROCEDURE — 85027 COMPLETE CBC AUTOMATED: CPT

## 2025-01-11 PROCEDURE — 99232 SBSQ HOSP IP/OBS MODERATE 35: CPT | Performed by: STUDENT IN AN ORGANIZED HEALTH CARE EDUCATION/TRAINING PROGRAM

## 2025-01-11 PROCEDURE — 2500000001 HC RX 250 WO HCPCS SELF ADMINISTERED DRUGS (ALT 637 FOR MEDICARE OP): Performed by: STUDENT IN AN ORGANIZED HEALTH CARE EDUCATION/TRAINING PROGRAM

## 2025-01-11 PROCEDURE — 1200000002 HC GENERAL ROOM WITH TELEMETRY DAILY

## 2025-01-11 PROCEDURE — 82947 ASSAY GLUCOSE BLOOD QUANT: CPT

## 2025-01-11 RX ADMIN — ACETAMINOPHEN 650 MG: 325 TABLET, FILM COATED ORAL at 23:35

## 2025-01-11 RX ADMIN — DOCUSATE SODIUM 100 MG: 100 CAPSULE, LIQUID FILLED ORAL at 09:13

## 2025-01-11 RX ADMIN — PIPERACILLIN SODIUM AND TAZOBACTAM SODIUM 2.25 G: 2; .25 INJECTION, SOLUTION INTRAVENOUS at 09:20

## 2025-01-11 RX ADMIN — ASPIRIN 81 MG: 81 TABLET, COATED ORAL at 09:13

## 2025-01-11 RX ADMIN — ALLOPURINOL 300 MG: 300 TABLET ORAL at 09:13

## 2025-01-11 RX ADMIN — POLYETHYLENE GLYCOL 3350 17 G: 17 POWDER, FOR SOLUTION ORAL at 09:13

## 2025-01-11 RX ADMIN — CYCLOBENZAPRINE HYDROCHLORIDE 5 MG: 5 TABLET, FILM COATED ORAL at 11:57

## 2025-01-11 RX ADMIN — Medication 100 MG: at 09:13

## 2025-01-11 RX ADMIN — APIXABAN 2.5 MG: 2.5 TABLET, FILM COATED ORAL at 09:20

## 2025-01-11 RX ADMIN — ACETAMINOPHEN 650 MG: 325 TABLET, FILM COATED ORAL at 09:13

## 2025-01-11 RX ADMIN — HYDROMORPHONE HYDROCHLORIDE 0.2 MG: 0.2 INJECTION, SOLUTION INTRAMUSCULAR; INTRAVENOUS; SUBCUTANEOUS at 11:57

## 2025-01-11 RX ADMIN — PIPERACILLIN SODIUM AND TAZOBACTAM SODIUM 2.25 G: 2; .25 INJECTION, SOLUTION INTRAVENOUS at 02:27

## 2025-01-11 RX ADMIN — FOLIC ACID 1 MG: 1 TABLET ORAL at 09:13

## 2025-01-11 RX ADMIN — MULTIPLE VITAMINS W/ MINERALS TAB 1 TABLET: TAB ORAL at 09:13

## 2025-01-11 RX ADMIN — OXYCODONE HYDROCHLORIDE 5 MG: 5 TABLET ORAL at 22:34

## 2025-01-11 RX ADMIN — ACETAMINOPHEN 650 MG: 325 TABLET, FILM COATED ORAL at 02:27

## 2025-01-11 RX ADMIN — PREDNISONE 40 MG: 20 TABLET ORAL at 09:13

## 2025-01-11 RX ADMIN — PIPERACILLIN SODIUM AND TAZOBACTAM SODIUM 2.25 G: 2; .25 INJECTION, SOLUTION INTRAVENOUS at 15:33

## 2025-01-11 RX ADMIN — TAMSULOSIN HYDROCHLORIDE 0.4 MG: 0.4 CAPSULE ORAL at 22:34

## 2025-01-11 RX ADMIN — APIXABAN 2.5 MG: 2.5 TABLET, FILM COATED ORAL at 22:34

## 2025-01-11 RX ADMIN — OXYCODONE HYDROCHLORIDE 5 MG: 5 TABLET ORAL at 02:27

## 2025-01-11 RX ADMIN — OXYCODONE HYDROCHLORIDE 5 MG: 5 TABLET ORAL at 10:02

## 2025-01-11 RX ADMIN — DONEPEZIL HYDROCHLORIDE 5 MG: 5 TABLET ORAL at 22:34

## 2025-01-11 RX ADMIN — ACETAMINOPHEN 650 MG: 325 TABLET, FILM COATED ORAL at 17:33

## 2025-01-11 RX ADMIN — ATORVASTATIN CALCIUM 10 MG: 10 TABLET, FILM COATED ORAL at 09:13

## 2025-01-11 RX ADMIN — CYCLOBENZAPRINE HYDROCHLORIDE 5 MG: 5 TABLET, FILM COATED ORAL at 17:33

## 2025-01-11 RX ADMIN — PANTOPRAZOLE SODIUM 40 MG: 40 TABLET, DELAYED RELEASE ORAL at 09:13

## 2025-01-11 RX ADMIN — PIPERACILLIN SODIUM AND TAZOBACTAM SODIUM 2.25 G: 2; .25 INJECTION, SOLUTION INTRAVENOUS at 22:33

## 2025-01-11 ASSESSMENT — ACTIVITIES OF DAILY LIVING (ADL)
BATHING_ASSISTANCE: MODERATE
ADL_ASSISTANCE: INDEPENDENT

## 2025-01-11 ASSESSMENT — LIFESTYLE VARIABLES
ANXIETY: NO ANXIETY, AT EASE
NAUSEA AND VOMITING: MILD NAUSEA WITH NO VOMITING
AGITATION: NORMAL ACTIVITY
HEADACHE, FULLNESS IN HEAD: NOT PRESENT
AUDITORY DISTURBANCES: NOT PRESENT
ORIENTATION AND CLOUDING OF SENSORIUM: ORIENTED AND CAN DO SERIAL ADDITIONS
TREMOR: NO TREMOR
TOTAL SCORE: 1
PAROXYSMAL SWEATS: NO SWEAT VISIBLE
VISUAL DISTURBANCES: NOT PRESENT

## 2025-01-11 ASSESSMENT — PAIN DESCRIPTION - ORIENTATION
ORIENTATION: RIGHT

## 2025-01-11 ASSESSMENT — PAIN SCALES - GENERAL
PAINLEVEL_OUTOF10: 3
PAINLEVEL_OUTOF10: 4
PAINLEVEL_OUTOF10: 6
PAINLEVEL_OUTOF10: 4
PAINLEVEL_OUTOF10: 4
PAINLEVEL_OUTOF10: 5 - MODERATE PAIN
PAINLEVEL_OUTOF10: 6
PAINLEVEL_OUTOF10: 6
PAINLEVEL_OUTOF10: 0 - NO PAIN

## 2025-01-11 ASSESSMENT — COGNITIVE AND FUNCTIONAL STATUS - GENERAL
TOILETING: A LITTLE
MOBILITY SCORE: 16
STANDING UP FROM CHAIR USING ARMS: A LOT
DAILY ACTIVITIY SCORE: 17
HELP NEEDED FOR BATHING: A LOT
MOVING TO AND FROM BED TO CHAIR: A LITTLE
PERSONAL GROOMING: A LITTLE
TURNING FROM BACK TO SIDE WHILE IN FLAT BAD: A LITTLE
MOVING FROM LYING ON BACK TO SITTING ON SIDE OF FLAT BED WITH BEDRAILS: A LITTLE
DRESSING REGULAR LOWER BODY CLOTHING: A LOT
WALKING IN HOSPITAL ROOM: A LITTLE
DRESSING REGULAR UPPER BODY CLOTHING: A LITTLE
CLIMB 3 TO 5 STEPS WITH RAILING: A LOT
PERSONAL GROOMING: A LITTLE
DAILY ACTIVITIY SCORE: 17
DRESSING REGULAR UPPER BODY CLOTHING: A LITTLE
HELP NEEDED FOR BATHING: A LOT
DRESSING REGULAR LOWER BODY CLOTHING: A LITTLE
TOILETING: A LOT

## 2025-01-11 ASSESSMENT — PAIN DESCRIPTION - LOCATION
LOCATION: KNEE

## 2025-01-11 ASSESSMENT — PAIN - FUNCTIONAL ASSESSMENT
PAIN_FUNCTIONAL_ASSESSMENT: 0-10
PAIN_FUNCTIONAL_ASSESSMENT: WONG-BAKER FACES
PAIN_FUNCTIONAL_ASSESSMENT: 0-10

## 2025-01-11 ASSESSMENT — PAIN DESCRIPTION - DESCRIPTORS
DESCRIPTORS: ACHING

## 2025-01-11 NOTE — CARE PLAN
Problem: Pain  Goal: Walks with improved pain control throughout the shift  Outcome: Progressing  Goal: Free from opioid side effects throughout the shift  Outcome: Progressing     Problem: Skin  Goal: Promote/optimize nutrition  Outcome: Progressing  Flowsheets (Taken 1/11/2025 1801)  Promote/optimize nutrition: Monitor/record intake including meals   The patient's goals for the shift include      The clinical goals for the shift include pain control    Over the shift, the patient did make progress toward the following goals. Barriers to progression include recent surgery. Recommendations to address these barriers include assessing pain q4, administering medications as needed, increasing activity.
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
## 2025-01-11 NOTE — PROGRESS NOTES
"Orthopaedic Surgery Progress Note    Subjective:  NAEON. Doing well. Off pressors and O2. Ambulation and overall status improved from yesterday per nursing.     O:  /70 (BP Location: Right arm, Patient Position: Lying)   Pulse 78   Temp 37.1 °C (98.8 °F) (Temporal)   Resp 17   Ht 1.727 m (5' 8\")   Wt 105 kg (232 lb 2.3 oz) Comment: done by night shift  SpO2 93%   BMI 35.30 kg/m²     Gen: arousable, NAD, appropriately conversational  Cardiac: RRR to peripheral palpation  Resp: nonlabored on RA    MSK:  RLE  - Mepilex dressing c/d/I, lateral dressing with some strikethrough, no drainage  - Fires DF/PF/EHL/FHL  - Residual numbness from block over medial calf  - Foot warm, well perfused  - DP/PT pulse, brisk cap refill  - Compartments soft and compressible    A/P: 79 y.o. male s/p R TKA on 1/7 with Dr. Bradford.  Postop course c/b worsening hypoxia and transferred to ICU POD1, now off of pressors and off HFNC. HD status improving in ICU.    Plan:  - Weight bearing: WBAT RLE  - DVT ppx: SCDs, Eliquis  - Diet: Regular  - Pain: Tylenol, oxycodone 5/10  - Antibiotics: empiric Zosyn per Medicine  - FEN: HLIV with good PO intake  - Bowel Regimen: Colace, senna, dulcolax  - PT/OT when able  - Continue home medications  - Appreciate ICU care    Dispo: pending clinical course     Reviewed and approved by ANETTE STEVENS on 1/11/25 at 10:07 AM.     While admitted, this patient will be followed by the Ortho Joints Team. Please contact below residents with any questions (available via Epic Chat).     First call: Zheng Craven, PGY-1  Second call: Rhonda Henriquez, PGY-2  Third call: Susan Gusman, PGY-4    On weekends and after 6PM:  At [facility]: Please reach out to the orthopaedic on-call resident ([phone])  At [facility]: Please reach out to the orthopaedic on-call HEATHER or resident (please refer to Qgenda)  Mikhail Moses is a 79 y.o. male on day 3 of admission presenting with Unilateral primary osteoarthritis, " right knee.

## 2025-01-11 NOTE — PROGRESS NOTES
Physical Therapy                 Therapy Communication Note    Patient Name: Mikhail Moses  MRN: [id number]  Department: [place] [address]  Room: [address] [place] [address]  Today's Date: 1/11/2025     Discipline: Physical Therapy          Missed Visit Reason: Missed Visit Reason: Patient refused (Pt just returned to bed, after ambulating to the bathroom and sitting up in the BSC all morning. Pt declined therapy. Encouragement provided.)    Missed Time: Attempt    Comment:

## 2025-01-11 NOTE — PROGRESS NOTES
"Mikhail Moses is a 79 y.o. male on day 3 of admission presenting with Unilateral primary osteoarthritis, right knee.      Subjective   NAEO. Sitting with NC out of his nose. Had some bleeding from the cath site (from cath yesterday). Otherwise \"doing ok\"       Objective     Last Recorded Vitals  /70 (BP Location: Right arm, Patient Position: Lying)   Pulse 78   Temp 37.1 °C (98.8 °F) (Temporal)   Resp 17   Wt 105 kg (232 lb 2.3 oz) Comment: done by night shift  SpO2 93%   Intake/Output last 3 Shifts:    Intake/Output Summary (Last 24 hours) at 1/11/2025 0814  Last data filed at 1/11/2025 0600  Gross per 24 hour   Intake 1430.02 ml   Output 2550 ml   Net -1119.98 ml       Admission Weight  Weight: 98.8 kg (217 lb 13 oz) (01/07/25 0959)    Daily Weight  01/09/25 : 105 kg (232 lb 2.3 oz)    Image Results  Transthoracic Echo (TTE) [facility], [address] [place] [address]               Tel [phone] and Fax [phone]    TRANSTHORACIC ECHOCARDIOGRAM REPORT       Patient Name:       MIKHAIL MOSES     [place] Physician:    [id number] John Yanes DO  Study Date:         1/8/2025            Ordering Provider:    [id number] KEILY CAR  MRN/PID:            [id number]            Fellow:  Accession#:         [id number]        Nurse:  Date of Birth/Age:  1945 / 79      Sonographer:          USR                      years  Gender assigned at  M                   Additional Staff:  Birth:  Height:             172.00 cm           Admit Date:           1/7/2025  Weight:             98.00 kg            Admission Status:     Inpatient -                                                                Critical/Stat                                                                (within 1-3                                                                " hours)  BSA / BMI:          2.11 m2 / 33.13     Encounter#:           [id number]                      kg/m2  Blood Pressure:     62/45 mmHg          Department Location:  [facility] ICU    Study Type:    TRANSTHORACIC ECHO (TTE) COMPLETE  Diagnosis/ICD: Cardiomegaly-I51.7  Indication:    shock, cardiomegaly  CPT Code:      Echo Complete w Full Doppler-07087    Patient History:  Pertinent History: HTN, Hyperlipidemia and CAD. MI, Hypotension.    Study Detail: The following Echo studies were performed: 2D, M-Mode, Doppler and                color flow. Technically challenging study due to body habitus,                poor acoustic windows, patient lying in supine position and                prominent lung artifact. Definity used as a contrast agent for                endocardial border definition. Total contrast used for this                procedure was 4 mL via IV push.       PHYSICIAN INTERPRETATION:  Left Ventricle: Left ventricular ejection fraction is mildly decreased, by visual estimate at 45-50%. Left venticular wall motion is abnormal. The left ventricular cavity size is normal. There is normal septal and normal posterior left ventricular wall thickness. The interventricular septum is flattened in systole and diastole, consistent with right ventricular pressure and volume overload. Left ventricular diastolic filling is indeterminate. There is no definite left ventricular thrombus visualized.  LV Wall Scoring:  The entire apex is hypokinetic.    Left Atrium: The left atrium was not well visualized.  Right Ventricle: The right ventricle was not well visualized. Unable to determine right ventricular systolic function.  Right Atrium: The right atrium is normal in size.  Aortic Valve: The aortic valve is structurally normal. The aortic valve dimensionless index is 0.88. There is no evidence of aortic valve regurgitation. The peak instantaneous gradient of the aortic valve is 4 mmHg. The mean gradient of the aortic  valve is 2 mmHg.  Mitral Valve: The mitral valve is normal in structure. There is trace mitral valve regurgitation.  Tricuspid Valve: The tricuspid valve was not well visualized. No evidence of tricuspid regurgitation.  Pulmonic Valve: The pulmonic valve is structurally normal. There is no indication of pulmonic valve regurgitation.  Pericardium: There is no pericardial effusion noted.  Aorta: The aortic root is abnormal. There is mild dilatation of the ascending aorta. There is mild dilatation of the aortic root.  In comparison to the previous echocardiogram(s): There are no prior studies on this patient for comparison purposes.       CONCLUSIONS   1. Poorly visualized anatomical structures due to suboptimal image quality.   2. Entire apex is abnormal.   3. Left ventricular ejection fraction is mildly decreased, by visual estimate at 45-50%.   4. No left ventricular thrombus visualized.   5. Unable to determine right ventricular systolic function.   6. Right ventricle not well seen. Some interventricular septal flattening which could represent right sided pressure and volume overload.    QUANTITATIVE DATA SUMMARY:     2D MEASUREMENTS:          Normal Ranges:  IVSd:            0.91 cm  (0.6-1.1cm)  LVPWd:           0.75 cm  (0.6-1.1cm)  LVIDd:           4.60 cm  (3.9-5.9cm)  LVIDs:           2.99 cm  LV Mass Index:   59 g/m2  LVEDV Index:     53 ml/m2  LV % FS          35.0 %       LA VOLUME:                    Normal Ranges:  LA Vol A4C:        50.1 ml    (22+/-6mL/m2)  LA Vol A2C:        48.0 ml  LA Vol BP:         49.5 ml  LA Vol Index A4C:  23.8ml/m2  LA Vol Index A2C:  22.8 ml/m2  LA Vol Index BP:   23.5 ml/m2  LA Area A4C:       17.0 cm2  LA Area A2C:       16.8 cm2  LA Major Axis A4C: 4.9 cm  LA Major Axis A2C: 5.0 cm  LA Volume Index:   23.5 ml/m2  LA Vol A4C:        47.6 ml  LA Vol A2C:        44.4 ml  LA Vol Index BSA:  21.8 ml/m2       RA VOLUME BY A/L METHOD:          Normal Ranges:  RA Area A4C:              19.6 cm2       AORTA MEASUREMENTS:         Normal Ranges:  Ao Sinus, d:        3.50 cm (2.1-3.5cm)  Ao STJ, d:          3.80 cm (1.7-3.4cm)  Asc Ao, d:          3.90 cm (2.1-3.4cm)       LV SYSTOLIC FUNCTION BY 2D PLANIMETRY (MOD):                       Normal Ranges:  EF-A4C View:    59 % (>=55%)  EF-A2C View:    42 %  EF-Biplane:     53 %  EF-Visual:      48 %  LV EF Reported: 48 %       LV DIASTOLIC FUNCTION:             Normal Ranges:  MV Peak E:             0.44 m/s    (0.7-1.2 m/s)  MV Peak A:             0.56 m/s    (0.42-0.7 m/s)  E/A Ratio:             0.79        (1.0-2.2)  MV e'                  0.055 m/s   (>8.0)  MV lateral e'          0.06 m/s  MV medial e'           0.05 m/s  E/e' Ratio:            7.97        (<8.0)  PulmV Sys Cory:         14.10 cm/s  PulmV Haynes Cory:        11.70 cm/s  PulmV S/D Cory:         1.20  PulmV A Revs Cory:      15.26 cm/s  PulmV A Revs Dur:      106.57 msec       MITRAL VALVE:          Normal Ranges:  MV DT:        205 msec (150-240msec)       AORTIC VALVE:                     Normal Ranges:  AoV Vmax:                1.02 m/s (<=1.7m/s)  AoV Peak P.2 mmHg (<20mmHg)  AoV Mean P.3 mmHg (1.7-11.5mmHg)  LVOT Max Cory:            0.84 m/s (<=1.1m/s)  AoV VTI:                 18.82 cm (18-25cm)  LVOT VTI:                16.58 cm  LVOT Diameter:           2.15 cm  (1.8-2.4cm)  AoV Area, VTI:           3.20 cm2 (2.5-5.5cm2)  AoV Area,Vmax:           2.98 cm2 (2.5-4.5cm2)  AoV Dimensionless Index: 0.88       RIGHT VENTRICLE:  RV Basal 3.40 cm  RV Mid   2.40 cm  RV Major 6.7 cm  TAPSE:   18.0 mm       TRICUSPID VALVE/RVSP:          Normal Ranges:  Peak TR Velocity:     1.19 m/s  Est. RA Pressure:     3 mmHg  RV Syst Pressure:     9 mmHg   (< 30mmHg)  IVC Diam:             1.90 cm       PULMONIC VALVE:          Normal Ranges:  PV Accel Time:  137 msec (>120ms)  PV Max Cory:     0.9 m/s  (0.6-0.9m/s)  PV Max PG:      3.2 mmHg       Pulmonary  Veins:  PulmV A Revs Dur: 106.57 msec  PulmV A Revs Cory: 15.26 cm/s  PulmV Haynes Cory:   11.70 cm/s  PulmV S/D Cory:    1.20  PulmV Sys Cory:    14.10 cm/s       [id number] John Yanes DO  Electronically signed on 1/8/2025 at 11:40:52 AM       Wall Scoring       ** Final (Updated) **      Physical Exam  Constitutional:       General: He is not in acute distress.     Appearance: Normal appearance. He is not ill-appearing.   HENT:      Mouth/Throat:      Mouth: Mucous membranes are moist.   Eyes:      Extraocular Movements: Extraocular movements intact.      Pupils: Pupils are equal, round, and reactive to light.   Cardiovascular:      Rate and Rhythm: Normal rate and regular rhythm.      Heart sounds: No murmur heard.     No friction rub. No gallop.   Pulmonary:      Effort: Pulmonary effort is normal. No respiratory distress.      Breath sounds: No wheezing, rhonchi or rales.      Comments: Decreased breath sounds at bilateral bases  Abdominal:      General: Bowel sounds are normal. There is no distension.      Palpations: Abdomen is soft. There is no mass.      Tenderness: There is no abdominal tenderness. There is no guarding or rebound.   Genitourinary:     Comments: Paul draining yellow urine  Musculoskeletal:         General: No swelling, tenderness or deformity.      Cervical back: Normal range of motion and neck supple.   Skin:     General: Skin is warm and dry.      Findings: No bruising or rash.   Neurological:      General: No focal deficit present.      Mental Status: He is alert and oriented to person, place, and time. Mental status is at baseline.      Motor: No weakness.   Psychiatric:         Mood and Affect: Mood normal.         Behavior: Behavior normal.         Thought Content: Thought content normal.         Relevant Results  Scheduled medications  acetaminophen, 650 mg, oral, q6h ANGEL  allopurinol, 300 mg, oral, Daily  apixaban, 2.5 mg, oral, BID   Followed by  [START ON 1/12/2025] apixaban, 5 mg, oral,  BID  aspirin, 81 mg, oral, Daily  atorvastatin, 10 mg, oral, Daily  docusate sodium, 100 mg, oral, BID  donepezil, 5 mg, oral, Nightly  folic acid, 1 mg, oral, Daily  [Held by provider] furosemide, 40 mg, oral, Daily  insulin lispro, 0-10 Units, subcutaneous, TID AC  [Held by provider] lisinopril, 20 mg, oral, Daily  [Held by provider] metoprolol succinate XL, 25 mg, oral, Daily  multivitamin with minerals, 1 tablet, oral, Daily  pantoprazole, 40 mg, oral, Daily before breakfast  piperacillin-tazobactam, 2.25 g, intravenous, q6h  polyethylene glycol, 17 g, oral, Daily  predniSONE, 40 mg, oral, Daily  tamsulosin, 0.4 mg, oral, Nightly  thiamine, 100 mg, oral, Daily      Continuous medications     PRN medications  PRN medications: benzocaine-menthol, bisacodyl, bisacodyl, calcium carbonate, cyclobenzaprine, dextrose, dextrose, glucagon, glucagon, HYDROmorphone, ipratropium-albuteroL, metoclopramide **OR** metoclopramide, naloxone, ondansetron **OR** ondansetron, oxyCODONE, oxygen, sodium chloride    Results for orders placed or performed during the hospital encounter of 01/07/25 (from the past 24 hours)   CBC   Result Value Ref Range    WBC 14.5 (H) 4.4 - 11.3 x10*3/uL    nRBC 0.0 0.0 - 0.0 /100 WBCs    RBC 3.26 (L) 4.50 - 5.90 x10*6/uL    Hemoglobin 9.3 (L) 13.5 - 17.5 g/dL    Hematocrit 29.5 (L) 41.0 - 52.0 %    MCV 91 80 - 100 fL    MCH 28.5 26.0 - 34.0 pg    MCHC 31.5 (L) 32.0 - 36.0 g/dL    RDW 14.2 11.5 - 14.5 %    Platelets 200 150 - 450 x10*3/uL   Renal Function Panel   Result Value Ref Range    Glucose 130 (H) 74 - 99 mg/dL    Sodium 138 136 - 145 mmol/L    Potassium 4.0 3.5 - 5.3 mmol/L    Chloride 103 98 - 107 mmol/L    Bicarbonate 25 21 - 32 mmol/L    Anion Gap 14 10 - 20 mmol/L    Urea Nitrogen 44 (H) 6 - 23 mg/dL    Creatinine 2.57 (H) 0.50 - 1.30 mg/dL    eGFR 25 (L) >60 mL/min/1.73m*2    Calcium 7.6 (L) 8.6 - 10.3 mg/dL    Phosphorus 3.6 2.5 - 4.9 mg/dL    Albumin 3.0 (L) 3.4 - 5.0 g/dL   Magnesium    Result Value Ref Range    Magnesium 2.03 1.60 - 2.40 mg/dL   POCT GLUCOSE   Result Value Ref Range    POCT Glucose 145 (H) 74 - 99 mg/dL   POCT GLUCOSE   Result Value Ref Range    POCT Glucose 123 (H) 74 - 99 mg/dL       This patient has a urinary catheter   Reason for the urinary catheter remaining today? urinary retention/bladder outlet obstruction, acute or chronic- maintain ulrich until after dc per urology due to glans adhesions and meatal stenosis    Assessment & Plan  Unilateral primary osteoarthritis, right knee    Localized osteoarthritis of right knee    Osteoarthritis of right knee, unspecified osteoarthritis type    Acute hypoxic respiratory failure (Multi)    Shock (Multi)    Shock, during or resulting from a surgical procedure    Hx of COPD  Acute hypoxemic respiratory failure~ improving  Multifocal PNA  Currently oxygenating well on 3L NC  - Continuous pulse oximetry  - Wean supplemental O2 as able while keeping SpO2 greater than 92%  - c/w zosyn  - Continue steroids for now; decrease to 40mg daily dose  - PRN DuoNebs  - BPH/ICS  - Pulm following    Hx of Urethral stricture s/p urethroplasty and urethral dilation, CKD baseline CR of 1.1   JUMA on CKD likely prerenal d/t hypotension  - Trend renal function  - Replete electrolytes as indicated  - Continue Flomax  - Maintain ulrich per urology  - Urology following -> recommending keeping ulrich through after discharge due to urethral swelling. Fu with urology outpatient    Leukocytosis  Multifocal PNA  - Monitor temps  - Trend WBCs  - Continue Zosyn    Hx of OA  S/P left total knee replacement with Dr. Bradford  - PT/OT  - OOB to chair ambulation as tolerated  - Ortho following    Hx of Anemia d/t chronic disease  - Daily CBC  - Monitor for s/s of bleeding    Hx of gout  Hyperglycemia  - SSI AC/HS  - Continue allopurinol    Hx of CAD s/p PCI, NSTEMI, DVT, HLD, HTN, and PAH  Septic shock vs vasoplegia post total knee replacement~ Resolved  TTE done  official read pending  - Continuous telemetry   - Fluid resuscitation as indicated  - Hold all home antihypertensives for now  >>consider adding antihypertensive tomrw  - ASA/Statin  - Cardiology following  - Continue Eliquis    Hx of ETOH and dementia  Expected post op pain  - Serial neuro and pain assessments   - Scheduled tylenol and PRN oxycodone/dilaudid for pain  - AF bundle  - Continue donepezil   - Continue thiamine and folic acid  - Monitor for signs of withdrawal   - CIWA protocol     Hx of GERD  - Regular Diet  - PPI for GI ppx  - Bowel regimen    Discharge once pt is off oxygen (none at baseline); SNF referrals pending. Maintain ulrich at discharge         Emmy Donald MD

## 2025-01-11 NOTE — CARE PLAN
The patient's goals for the shift include      The clinical goals for the shift include pt remains hds

## 2025-01-12 VITALS
WEIGHT: 232.14 LBS | SYSTOLIC BLOOD PRESSURE: 181 MMHG | HEART RATE: 88 BPM | BODY MASS INDEX: 35.18 KG/M2 | RESPIRATION RATE: 18 BRPM | DIASTOLIC BLOOD PRESSURE: 87 MMHG | OXYGEN SATURATION: 93 % | TEMPERATURE: 98 F | HEIGHT: 68 IN

## 2025-01-12 DIAGNOSIS — E55.9 VITAMIN D DEFICIENCY: ICD-10-CM

## 2025-01-12 LAB
ANION GAP SERPL CALC-SCNC: 8 MMOL/L (ref 10–20)
BACTERIA BLD CULT: NORMAL
BACTERIA BLD CULT: NORMAL
BUN SERPL-MCNC: 30 MG/DL (ref 6–23)
CALCIUM SERPL-MCNC: 7.8 MG/DL (ref 8.6–10.3)
CHLORIDE SERPL-SCNC: 111 MMOL/L (ref 98–107)
CO2 SERPL-SCNC: 27 MMOL/L (ref 21–32)
CREAT SERPL-MCNC: 1.25 MG/DL (ref 0.5–1.3)
EGFRCR SERPLBLD CKD-EPI 2021: 59 ML/MIN/1.73M*2
ERYTHROCYTE [DISTWIDTH] IN BLOOD BY AUTOMATED COUNT: 13.8 % (ref 11.5–14.5)
GLUCOSE BLD MANUAL STRIP-MCNC: 124 MG/DL (ref 74–99)
GLUCOSE BLD MANUAL STRIP-MCNC: 144 MG/DL (ref 74–99)
GLUCOSE BLD MANUAL STRIP-MCNC: 155 MG/DL (ref 74–99)
GLUCOSE SERPL-MCNC: 129 MG/DL (ref 74–99)
HCT VFR BLD AUTO: 29.3 % (ref 41–52)
HGB BLD-MCNC: 9.9 G/DL (ref 13.5–17.5)
MCH RBC QN AUTO: 29.1 PG (ref 26–34)
MCHC RBC AUTO-ENTMCNC: 33.8 G/DL (ref 32–36)
MCV RBC AUTO: 86 FL (ref 80–100)
NRBC BLD-RTO: 0 /100 WBCS (ref 0–0)
PLATELET # BLD AUTO: 159 X10*3/UL (ref 150–450)
POTASSIUM SERPL-SCNC: 3.1 MMOL/L (ref 3.5–5.3)
RBC # BLD AUTO: 3.4 X10*6/UL (ref 4.5–5.9)
SODIUM SERPL-SCNC: 143 MMOL/L (ref 136–145)
WBC # BLD AUTO: 9.9 X10*3/UL (ref 4.4–11.3)

## 2025-01-12 PROCEDURE — 2500000004 HC RX 250 GENERAL PHARMACY W/ HCPCS (ALT 636 FOR OP/ED)

## 2025-01-12 PROCEDURE — 2500000001 HC RX 250 WO HCPCS SELF ADMINISTERED DRUGS (ALT 637 FOR MEDICARE OP)

## 2025-01-12 PROCEDURE — 2500000002 HC RX 250 W HCPCS SELF ADMINISTERED DRUGS (ALT 637 FOR MEDICARE OP, ALT 636 FOR OP/ED)

## 2025-01-12 PROCEDURE — 99232 SBSQ HOSP IP/OBS MODERATE 35: CPT | Performed by: STUDENT IN AN ORGANIZED HEALTH CARE EDUCATION/TRAINING PROGRAM

## 2025-01-12 PROCEDURE — 80048 BASIC METABOLIC PNL TOTAL CA: CPT | Performed by: STUDENT IN AN ORGANIZED HEALTH CARE EDUCATION/TRAINING PROGRAM

## 2025-01-12 PROCEDURE — 99231 SBSQ HOSP IP/OBS SF/LOW 25: CPT | Performed by: INTERNAL MEDICINE

## 2025-01-12 PROCEDURE — 2500000001 HC RX 250 WO HCPCS SELF ADMINISTERED DRUGS (ALT 637 FOR MEDICARE OP): Performed by: INTERNAL MEDICINE

## 2025-01-12 PROCEDURE — 36415 COLL VENOUS BLD VENIPUNCTURE: CPT | Performed by: STUDENT IN AN ORGANIZED HEALTH CARE EDUCATION/TRAINING PROGRAM

## 2025-01-12 PROCEDURE — 2500000002 HC RX 250 W HCPCS SELF ADMINISTERED DRUGS (ALT 637 FOR MEDICARE OP, ALT 636 FOR OP/ED): Performed by: INTERNAL MEDICINE

## 2025-01-12 PROCEDURE — 85027 COMPLETE CBC AUTOMATED: CPT | Performed by: STUDENT IN AN ORGANIZED HEALTH CARE EDUCATION/TRAINING PROGRAM

## 2025-01-12 PROCEDURE — 1200000002 HC GENERAL ROOM WITH TELEMETRY DAILY

## 2025-01-12 PROCEDURE — 82947 ASSAY GLUCOSE BLOOD QUANT: CPT

## 2025-01-12 PROCEDURE — 97110 THERAPEUTIC EXERCISES: CPT | Mod: GP,CQ

## 2025-01-12 PROCEDURE — 2500000001 HC RX 250 WO HCPCS SELF ADMINISTERED DRUGS (ALT 637 FOR MEDICARE OP): Performed by: STUDENT IN AN ORGANIZED HEALTH CARE EDUCATION/TRAINING PROGRAM

## 2025-01-12 RX ORDER — CLONIDINE HYDROCHLORIDE 0.2 MG/1
0.2 TABLET ORAL ONCE
Status: COMPLETED | OUTPATIENT
Start: 2025-01-12 | End: 2025-01-12

## 2025-01-12 RX ORDER — CYCLOBENZAPRINE HCL 5 MG
5 TABLET ORAL 3 TIMES DAILY PRN
Qty: 30 TABLET | Refills: 0 | Status: SHIPPED | OUTPATIENT
Start: 2025-01-12

## 2025-01-12 RX ORDER — POTASSIUM CHLORIDE 20 MEQ/1
40 TABLET, EXTENDED RELEASE ORAL ONCE
Status: COMPLETED | OUTPATIENT
Start: 2025-01-12 | End: 2025-01-12

## 2025-01-12 RX ORDER — NALOXONE HYDROCHLORIDE 0.4 MG/ML
0.2 INJECTION, SOLUTION INTRAMUSCULAR; INTRAVENOUS; SUBCUTANEOUS EVERY 5 MIN PRN
Qty: 1 ML | Status: SHIPPED | OUTPATIENT
Start: 2025-01-12

## 2025-01-12 RX ORDER — ASPIRIN 81 MG/1
81 TABLET ORAL DAILY
Start: 2025-01-13

## 2025-01-12 RX ORDER — METOCLOPRAMIDE 10 MG/1
10 TABLET ORAL EVERY 6 HOURS PRN
Start: 2025-01-12

## 2025-01-12 RX ORDER — ONDANSETRON 4 MG/1
4 TABLET, FILM COATED ORAL EVERY 8 HOURS PRN
Start: 2025-01-12

## 2025-01-12 RX ADMIN — METOCLOPRAMIDE 10 MG: 5 INJECTION, SOLUTION INTRAMUSCULAR; INTRAVENOUS at 20:59

## 2025-01-12 RX ADMIN — OXYCODONE HYDROCHLORIDE 5 MG: 5 TABLET ORAL at 23:37

## 2025-01-12 RX ADMIN — PIPERACILLIN SODIUM AND TAZOBACTAM SODIUM 2.25 G: 2; .25 INJECTION, SOLUTION INTRAVENOUS at 09:41

## 2025-01-12 RX ADMIN — PIPERACILLIN SODIUM AND TAZOBACTAM SODIUM 2.25 G: 2; .25 INJECTION, SOLUTION INTRAVENOUS at 04:44

## 2025-01-12 RX ADMIN — PREDNISONE 40 MG: 20 TABLET ORAL at 08:53

## 2025-01-12 RX ADMIN — CYCLOBENZAPRINE HYDROCHLORIDE 5 MG: 5 TABLET, FILM COATED ORAL at 04:46

## 2025-01-12 RX ADMIN — APIXABAN 2.5 MG: 2.5 TABLET, FILM COATED ORAL at 08:54

## 2025-01-12 RX ADMIN — HYDROMORPHONE HYDROCHLORIDE 0.2 MG: 0.2 INJECTION, SOLUTION INTRAMUSCULAR; INTRAVENOUS; SUBCUTANEOUS at 03:01

## 2025-01-12 RX ADMIN — PIPERACILLIN SODIUM AND TAZOBACTAM SODIUM 2.25 G: 2; .25 INJECTION, SOLUTION INTRAVENOUS at 14:26

## 2025-01-12 RX ADMIN — ACETAMINOPHEN 650 MG: 325 TABLET, FILM COATED ORAL at 17:29

## 2025-01-12 RX ADMIN — POTASSIUM CHLORIDE 40 MEQ: 1500 TABLET, EXTENDED RELEASE ORAL at 20:59

## 2025-01-12 RX ADMIN — ONDANSETRON HYDROCHLORIDE 4 MG: 4 TABLET, FILM COATED ORAL at 08:53

## 2025-01-12 RX ADMIN — DONEPEZIL HYDROCHLORIDE 5 MG: 5 TABLET ORAL at 20:59

## 2025-01-12 RX ADMIN — DOCUSATE SODIUM 100 MG: 100 CAPSULE, LIQUID FILLED ORAL at 20:59

## 2025-01-12 RX ADMIN — METOCLOPRAMIDE 10 MG: 10 TABLET ORAL at 12:26

## 2025-01-12 RX ADMIN — MULTIPLE VITAMINS W/ MINERALS TAB 1 TABLET: TAB ORAL at 08:54

## 2025-01-12 RX ADMIN — FOLIC ACID 1 MG: 1 TABLET ORAL at 08:53

## 2025-01-12 RX ADMIN — CLONIDINE HYDROCHLORIDE 0.2 MG: 0.2 TABLET ORAL at 20:59

## 2025-01-12 RX ADMIN — ACETAMINOPHEN 650 MG: 325 TABLET, FILM COATED ORAL at 23:37

## 2025-01-12 RX ADMIN — ACETAMINOPHEN 650 MG: 325 TABLET, FILM COATED ORAL at 05:00

## 2025-01-12 RX ADMIN — OXYCODONE HYDROCHLORIDE 5 MG: 5 TABLET ORAL at 04:45

## 2025-01-12 RX ADMIN — ASPIRIN 81 MG: 81 TABLET, COATED ORAL at 08:54

## 2025-01-12 RX ADMIN — APIXABAN 5 MG: 5 TABLET, FILM COATED ORAL at 20:59

## 2025-01-12 RX ADMIN — ALLOPURINOL 300 MG: 300 TABLET ORAL at 08:56

## 2025-01-12 RX ADMIN — PANTOPRAZOLE SODIUM 40 MG: 40 TABLET, DELAYED RELEASE ORAL at 07:02

## 2025-01-12 RX ADMIN — ONDANSETRON 4 MG: 2 INJECTION, SOLUTION INTRAMUSCULAR; INTRAVENOUS at 23:37

## 2025-01-12 RX ADMIN — ACETAMINOPHEN 650 MG: 325 TABLET, FILM COATED ORAL at 12:26

## 2025-01-12 RX ADMIN — PIPERACILLIN SODIUM AND TAZOBACTAM SODIUM 2.25 G: 2; .25 INJECTION, SOLUTION INTRAVENOUS at 20:59

## 2025-01-12 RX ADMIN — Medication 100 MG: at 08:54

## 2025-01-12 RX ADMIN — TAMSULOSIN HYDROCHLORIDE 0.4 MG: 0.4 CAPSULE ORAL at 20:59

## 2025-01-12 RX ADMIN — ATORVASTATIN CALCIUM 10 MG: 10 TABLET, FILM COATED ORAL at 08:54

## 2025-01-12 ASSESSMENT — PAIN DESCRIPTION - LOCATION
LOCATION: KNEE

## 2025-01-12 ASSESSMENT — PAIN SCALES - GENERAL
PAINLEVEL_OUTOF10: 6
PAINLEVEL_OUTOF10: 5 - MODERATE PAIN
PAINLEVEL_OUTOF10: 6
PAINLEVEL_OUTOF10: 7
PAINLEVEL_OUTOF10: 5 - MODERATE PAIN

## 2025-01-12 ASSESSMENT — PAIN - FUNCTIONAL ASSESSMENT
PAIN_FUNCTIONAL_ASSESSMENT: 0-10
PAIN_FUNCTIONAL_ASSESSMENT: 0-10

## 2025-01-12 ASSESSMENT — LIFESTYLE VARIABLES
PAROXYSMAL SWEATS: NO SWEAT VISIBLE
ANXIETY: MILDLY ANXIOUS
VISUAL DISTURBANCES: NOT PRESENT
AGITATION: NORMAL ACTIVITY
TOTAL SCORE: 2
PAROXYSMAL SWEATS: NO SWEAT VISIBLE
ORIENTATION AND CLOUDING OF SENSORIUM: ORIENTED AND CAN DO SERIAL ADDITIONS
ANXIETY: NO ANXIETY, AT EASE
AUDITORY DISTURBANCES: NOT PRESENT
TREMOR: NO TREMOR
NAUSEA AND VOMITING: 2
TOTAL SCORE: 2
AUDITORY DISTURBANCES: NOT PRESENT
HEADACHE, FULLNESS IN HEAD: NOT PRESENT
NAUSEA AND VOMITING: MILD NAUSEA WITH NO VOMITING
TREMOR: NO TREMOR
HEADACHE, FULLNESS IN HEAD: NOT PRESENT
VISUAL DISTURBANCES: NOT PRESENT
ORIENTATION AND CLOUDING OF SENSORIUM: ORIENTED AND CAN DO SERIAL ADDITIONS
AGITATION: NORMAL ACTIVITY

## 2025-01-12 ASSESSMENT — COGNITIVE AND FUNCTIONAL STATUS - GENERAL
PERSONAL GROOMING: A LITTLE
MOVING FROM LYING ON BACK TO SITTING ON SIDE OF FLAT BED WITH BEDRAILS: A LITTLE
MOVING TO AND FROM BED TO CHAIR: A LOT
STANDING UP FROM CHAIR USING ARMS: A LOT
CLIMB 3 TO 5 STEPS WITH RAILING: TOTAL
TURNING FROM BACK TO SIDE WHILE IN FLAT BAD: A LITTLE
MOVING TO AND FROM BED TO CHAIR: A LOT
MOBILITY SCORE: 11
TURNING FROM BACK TO SIDE WHILE IN FLAT BAD: A LOT
DRESSING REGULAR LOWER BODY CLOTHING: A LOT
CLIMB 3 TO 5 STEPS WITH RAILING: TOTAL
MOVING FROM LYING ON BACK TO SITTING ON SIDE OF FLAT BED WITH BEDRAILS: A LOT
MOBILITY SCORE: 13
STANDING UP FROM CHAIR USING ARMS: A LOT
DAILY ACTIVITIY SCORE: 15
WALKING IN HOSPITAL ROOM: A LOT
WALKING IN HOSPITAL ROOM: A LOT
HELP NEEDED FOR BATHING: A LOT
TOILETING: A LOT
DRESSING REGULAR UPPER BODY CLOTHING: A LOT

## 2025-01-12 ASSESSMENT — PAIN DESCRIPTION - ORIENTATION
ORIENTATION: RIGHT

## 2025-01-12 ASSESSMENT — PAIN SCALES - PAIN ASSESSMENT IN ADVANCED DEMENTIA (PAINAD): TOTALSCORE: MEDICATION (SEE MAR)

## 2025-01-12 NOTE — PROGRESS NOTES
"Physical Therapy    Physical Therapy Treatment    Patient Name: Mikhail Moses  MRN: [id number]  Department: [place] [id number]  Room: [address] [place] [address]  Today's Date: 1/12/2025  Time Calculation  Start Time: 1248  Stop Time: 1306  Time Calculation (min): 18 min         Assessment/Plan   PT Assessment  PT Assessment Results: Decreased strength, Decreased range of motion, Decreased mobility, Obesity, Pain, Orthopedic restrictions  Rehab Prognosis: Good  Barriers to Discharge Home: No anticipated barriers  Evaluation/Treatment Tolerance: Patient tolerated treatment well  Strengths: Ability to acquire knowledge  Barriers to Participation:  (nausea and general \"weakness\" from not eating.)  End of Session Communication: Bedside nurse  Assessment Comment: Pt limiting therapeutic activity due to nausea and needing to eat lunch as noted above. Pt able to perform all HEP without physical assist and min cues to correct form.  End of Session Patient Position: Bed, 3 rail up, Alarm on  PT Plan  Inpatient/Swing Bed or Outpatient: Inpatient  PT Plan  Treatment/Interventions: Therapeutic exercise, Home exercise program  PT Plan: Ongoing PT  PT Frequency: BID  PT Discharge Recommendations: Moderate intensity level of continued care  Equipment Recommended upon Discharge: Wheeled walker, Straight cane (pt owns)  PT Recommended Transfer Status: Assist x1, Assistive device  PT - OK to Discharge: Yes (per POC)      General Visit Information:   PT  Visit  PT Received On: 01/12/25  General  Reason for Referral: s/p R TKA with Dr Bradford  Referred By: Hamida Padilla PA-C  Family/Caregiver Present: Yes  Caregiver Feedback: spouse in room  Prior to Session Communication: Bedside nurse  Patient Position Received: Bed, 3 rail up, Alarm on  Preferred Learning Style: auditory, verbal  General Comment: Pt stating he  has not eaten since prior to sx and Is feeling weak because of it, and nausea discourageing him from eating. He is awaiting lunch and says he will " transfer to chair with RN assist if nausea is under control, later this afternoon. Wife present and agreeable. Pt agreeing to participate in supine HEP.    Subjective   Precautions:  Precautions  Hearing/Visual Limitations: WFL  LE Weight Bearing Status: Weight Bearing as Tolerated  Medical Precautions: Fall precautions  Post-Surgical Precautions: Right total knee precautions    Vital Signs (Past 2hrs)                 Objective   Pain:  Pain Assessment  Pain Assessment: 0-10  0-10 (Numeric) Pain Score: 5 - Moderate pain  Pain Type: Surgical pain  Pain Location: Knee  Pain Orientation: Right  Pain Interventions: Cold applied (RN had given pain meds 20 minutes prior to tx.)  Cognition:  Cognition  Orientation Level: Oriented X4  Coordination:  Movements are Fluid and Coordinated: Yes    Activity Tolerance:  Activity Tolerance  Endurance: Tolerates 10 - 20 min exercise with multiple rests  Treatments:  Therapeutic Exercise  Therapeutic Exercise Performed: Yes  Therapeutic Exercise Activity 1: Continued instruction given to correctly perform HEP: AP, HS, GS, QS, SAQ, SLR, all x 12 reps each. Cues needed to correct form to assure max benefits. No physical assist needed.    Outcome Measures:  [facility] Basic Mobility  Turning from your back to your side while in a flat bed without using bedrails: A little  Moving from lying on your back to sitting on the side of a flat bed without using bedrails: A little  Moving to and from bed to chair (including a wheelchair): A lot  Standing up from a chair using your arms (e.g. wheelchair or bedside chair): A lot  To walk in hospital room: A lot  Climbing 3-5 steps with railing: Total  Basic Mobility - Total Score: 13    Education Documentation  Handouts, taught by Mariana Morin PTA at 1/12/2025  1:39 PM.  Learner: Patient  Readiness: Acceptance  Method: Explanation, Demonstration  Response: Verbalizes Understanding, Demonstrated Understanding  Comment: HEP    Precautions, taught by  Mariana Morin PTA at 1/12/2025  1:39 PM.  Learner: Patient  Readiness: Acceptance  Method: Explanation, Demonstration  Response: Verbalizes Understanding, Demonstrated Understanding  Comment: HEP    Body Mechanics, taught by Mariana Morin PTA at 1/12/2025  1:39 PM.  Learner: Patient  Readiness: Acceptance  Method: Explanation, Demonstration  Response: Verbalizes Understanding, Demonstrated Understanding  Comment: HEP    Home Exercise Program, taught by Mariana Morin PTA at 1/12/2025  1:39 PM.  Learner: Patient  Readiness: Acceptance  Method: Explanation, Demonstration  Response: Verbalizes Understanding, Demonstrated Understanding  Comment: HEP    Mobility Training, taught by Mariana Morin PTA at 1/12/2025  1:39 PM.  Learner: Patient  Readiness: Acceptance  Method: Explanation, Demonstration  Response: Verbalizes Understanding, Demonstrated Understanding  Comment: HEP    Education Comments  No comments found.        OP EDUCATION:       Encounter Problems       Encounter Problems (Active)       Mobility       STG - Patient will independently ambulate >100' with RW on level surfaces.       Start:  01/07/25    Expected End:  01/14/25            STG - Patient will ascend and descend four stairs using 1 rail and cane with SBA.       Start:  01/07/25    Expected End:  01/14/25               PT Transfers       STG - Patient to transfer to and from sit to supine independently from flat bed.  (Progressing)       Start:  01/07/25    Expected End:  01/14/25            STG - Patient will transfer sit to and from stand independently with RW. (Progressing)       Start:  01/07/25    Expected End:  01/14/25            Goal 1- HEP (Progressing)       Start:  01/07/25    Expected End:  01/14/25       Pt will recall 100% of TKA HEP with written handout independently.          PT Goal 2- ROM (Progressing)       Start:  01/07/25    Expected End:  01/14/25       Pt will increase R knee supine extension ROM to >/= -5 degrees and  seated flexion ROM to >/=100 degrees.         STG - Patient will perform car transfer with RW and SBA.  (Progressing)       Start:  01/07/25                   Pain - Adult

## 2025-01-12 NOTE — PROGRESS NOTES
"Mikhail Moses is a 79 y.o. male on day 4 of admission presenting with Unilateral primary osteoarthritis, right knee.    Subjective   Has been weaned off O2 and is now on room air. Complaining of nausea, but no vomiting. States his breathing feels fine.       Objective   GEN: laying in bed. Appears nauseous  CV: RRR, no m/g/r  LUNGS: good effort, clear bilaterally, no w/r/r    Physical Exam    Last Recorded Vitals  Blood pressure 174/74, pulse 86, temperature 36.4 °C (97.6 °F), resp. rate 19, height 1.727 m (5' 8\"), weight 105 kg (232 lb 2.3 oz), SpO2 94%.  Intake/Output last 3 Shifts:  I/O last 3 completed shifts:  In: 1290 (12.3 mL/kg) [P.O.:990; IV Piggyback:300]  Out: 3950 (37.5 mL/kg) [Urine:3950 (1 mL/kg/hr)]  Weight: 105.3 kg     Relevant Results  Scheduled medications  acetaminophen, 650 mg, oral, q6h ANGEL  allopurinol, 300 mg, oral, Daily  apixaban, 2.5 mg, oral, BID   Followed by  apixaban, 5 mg, oral, BID  aspirin, 81 mg, oral, Daily  atorvastatin, 10 mg, oral, Daily  docusate sodium, 100 mg, oral, BID  donepezil, 5 mg, oral, Nightly  folic acid, 1 mg, oral, Daily  [Held by provider] furosemide, 40 mg, oral, Daily  insulin lispro, 0-10 Units, subcutaneous, TID AC  [Held by provider] lisinopril, 20 mg, oral, Daily  [Held by provider] metoprolol succinate XL, 25 mg, oral, Daily  multivitamin with minerals, 1 tablet, oral, Daily  pantoprazole, 40 mg, oral, Daily before breakfast  piperacillin-tazobactam, 2.25 g, intravenous, q6h  polyethylene glycol, 17 g, oral, Daily  predniSONE, 40 mg, oral, Daily  tamsulosin, 0.4 mg, oral, Nightly  thiamine, 100 mg, oral, Daily      Continuous medications     PRN medications  PRN medications: benzocaine-menthol, bisacodyl, bisacodyl, calcium carbonate, cyclobenzaprine, dextrose, dextrose, glucagon, glucagon, HYDROmorphone, ipratropium-albuteroL, metoclopramide **OR** metoclopramide, naloxone, ondansetron **OR** ondansetron, oxyCODONE, oxygen, sodium chloride    Results " for orders placed or performed during the hospital encounter of 01/07/25 (from the past 24 hours)   POCT GLUCOSE   Result Value Ref Range    POCT Glucose 134 (H) 74 - 99 mg/dL   POCT GLUCOSE   Result Value Ref Range    POCT Glucose 148 (H) 74 - 99 mg/dL                                     Assessment/Plan   Assessment & Plan  Acute hypoxic respiratory failure (Multi)    Summary:  79 y.o. male admitted on 1/7/2025  9:36 AM for elective right total knee arthroplasty. 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 CXR initially appeared to be pulmonary edema. His V/Q SPECT scan is low/moderate probability for P.E. CT chest notable for bilateral lower lobe consolidation and atelectasis concerning for aspiration pneumonia. Cardiac cath with low SVR and high cardiac index suggestive of distributive shock. On room air as of 1/12/2025.     Problem list:  -acute hypoxic respiratory failure - resolved  -bilateral lower lobe pneumonia     Recommendations:  -complete course of antibiotics for aspiration pneumonia  -final day of steroids today  -titrate FiO2 to maintain SpO2 90-92%. Currently on room air  -PRN BIPAP at night for suspected ERIC  -bronchodilators  -will sign off. Thank you for allowing us to participate in this patient's care.       Mare De La Garza, DO  Pulmonary & Critical Care  1/12/2025 8:48 AM       complains of pain/discomfort

## 2025-01-12 NOTE — PROGRESS NOTES
"Orthopaedic Surgery Progress Note    Subjective:  NAEON. Doing well. Off pressors and O2. Reolves respiratory failure. Coming to the end of PNA treatment.     O:  /74 (BP Location: Right arm, Patient Position: Lying)   Pulse 86   Temp 36.4 °C (97.6 °F)   Resp 19   Ht 1.727 m (5' 8\")   Wt 105 kg (232 lb 2.3 oz) Comment: done by night shift  SpO2 94%   BMI 35.30 kg/m²     Gen: arousable, NAD, appropriately conversational  Cardiac: RRR to peripheral palpation  Resp: nonlabored on RA    MSK:  RLE  - Mepilex dressing c/d/I, lateral dressing with some strikethrough, no drainage  - Fires DF/PF/EHL/FHL  - Residual numbness from block over medial calf  - Foot warm, well perfused  - DP/PT pulse, brisk cap refill  - Compartments soft and compressible    A/P: 79 y.o. male s/p R TKA on 1/7 with Dr. Bradford.  Postop course c/b worsening hypoxia and transferred to ICU POD1, now off of pressors and off HFNC. HD status improving in ICU.    Plan:  - Weight bearing: WBAT RLE  - DVT ppx: SCDs, Eliquis  - Diet: Regular  - Pain: Tylenol, oxycodone 5/10  - Antibiotics: empiric Zosyn per Medicine  - FEN: HLIV with good PO intake  - Bowel Regimen: Colace, senna, dulcolax  - PT/OT when able  - Continue home medications  - Appreciate medicine care    Dispo: pending clinical course     Reviewed and approved by ANETTE STEVENS on 1/12/25 at 10:05 AM.     While admitted, this patient will be followed by the Ortho Joints Team. Please contact below residents with any questions (available via Epic Chat).     First call: Zheng Craven, PGY-1  Second call: Rhonda Henriquez, PGY-2  Third call: Susan Gusman, PGY-4    On weekends and after 6PM:  At [facility]: Please reach out to the orthopaedic on-call resident ([phone])  At [facility]: Please reach out to the orthopaedic on-call HEATHER or resident (please refer to Qgenda)  Mikhail Moses is a 79 y.o. male on day 4 of admission presenting with Unilateral primary osteoarthritis, right " knee.

## 2025-01-12 NOTE — PROGRESS NOTES
Mikhail Moses is a 79 y.o. male on day 4 of admission presenting with Unilateral primary osteoarthritis, right knee.      Subjective   NAEO. This morning, pt reports that he has been feeling very nauseous for the past couple of days since leaving the ICU and has not eaten much. States that he woke up this morning around 3am, had a bowel movement, then felt nauseous again. Afraid to eat food because he feels like he will throw up. Otherwise, without symptoms, no dyspnea, off oxygen.    Later in the afternoon, met with patient and his wife at bedside. Pt was able to eat peaches and a burger without complications or emesis. Wife thinks that he is nauseous since he is taking all his medications including narcotics on an empty stomach. Encouraged pt to keep up PO intake as tolerated.        Objective     Last Recorded Vitals  /74 (BP Location: Right arm, Patient Position: Lying)   Pulse 86   Temp 36.4 °C (97.6 °F)   Resp 19   Wt 105 kg (232 lb 2.3 oz) Comment: done by night shift  SpO2 94%   Intake/Output last 3 Shifts:    Intake/Output Summary (Last 24 hours) at 1/12/2025 1435  Last data filed at 1/12/2025 0524  Gross per 24 hour   Intake 780 ml   Output 1750 ml   Net -970 ml       Admission Weight  Weight: 98.8 kg (217 lb 13 oz) (01/07/25 0959)    Daily Weight  01/09/25 : 105 kg (232 lb 2.3 oz)    Image Results  Transthoracic Echo (TTE) [facility], [address] [place] [address]               Tel [phone] and Fax [phone]    TRANSTHORACIC ECHOCARDIOGRAM REPORT       Patient Name:       MIKHAIL MOSES     Reading Physician:    [id number] John Yanes DO  Study Date:         1/8/2025            Ordering Provider:    [id number] KEILY CAR  MRN/PID:            [id number]            Fellow:  Accession#:         [id number]        Nurse:  Date of  Birth/Age:  1945 / 79      Sonographer:          USR                      years  Gender assigned at  M                   Additional Staff:  Birth:  Height:             172.00 cm           Admit Date:           1/7/2025  Weight:             98.00 kg            Admission Status:     Inpatient -                                                                Critical/Stat                                                                (within 1-3                                                                hours)  BSA / BMI:          2.11 m2 / 33.13     Encounter#:           [id number]                      kg/m2  Blood Pressure:     62/45 mmHg          Department Location:  [facility]    Study Type:    TRANSTHORACIC ECHO (TTE) COMPLETE  Diagnosis/ICD: Cardiomegaly-I51.7  Indication:    shock, cardiomegaly  CPT Code:      Echo Complete w Full Doppler-[id number]    Patient History:  Pertinent History: HTN, Hyperlipidemia and CAD. MI, Hypotension.    Study Detail: The following Echo studies were performed: 2D, M-Mode, Doppler and                color flow. Technically challenging study due to body habitus,                poor acoustic windows, patient lying in supine position and                prominent lung artifact. Definity used as a contrast agent for                endocardial border definition. Total contrast used for this                procedure was 4 mL via IV push.       PHYSICIAN INTERPRETATION:  Left Ventricle: Left ventricular ejection fraction is mildly decreased, by visual estimate at 45-50%. Left venticular wall motion is abnormal. The left ventricular cavity size is normal. There is normal septal and normal posterior left ventricular wall thickness. The interventricular septum is flattened in systole and diastole, consistent with right ventricular pressure and volume overload. Left ventricular diastolic filling is indeterminate. There is no definite left ventricular thrombus visualized.  LV Wall  Scoring:  The entire apex is hypokinetic.    Left Atrium: The left atrium was not well visualized.  Right Ventricle: The right ventricle was not well visualized. Unable to determine right ventricular systolic function.  Right Atrium: The right atrium is normal in size.  Aortic Valve: The aortic valve is structurally normal. The aortic valve dimensionless index is 0.88. There is no evidence of aortic valve regurgitation. The peak instantaneous gradient of the aortic valve is 4 mmHg. The mean gradient of the aortic valve is 2 mmHg.  Mitral Valve: The mitral valve is normal in structure. There is trace mitral valve regurgitation.  Tricuspid Valve: The tricuspid valve was not well visualized. No evidence of tricuspid regurgitation.  Pulmonic Valve: The pulmonic valve is structurally normal. There is no indication of pulmonic valve regurgitation.  Pericardium: There is no pericardial effusion noted.  Aorta: The aortic root is abnormal. There is mild dilatation of the ascending aorta. There is mild dilatation of the aortic root.  In comparison to the previous echocardiogram(s): There are no prior studies on this patient for comparison purposes.       CONCLUSIONS   1. Poorly visualized anatomical structures due to suboptimal image quality.   2. Entire apex is abnormal.   3. Left ventricular ejection fraction is mildly decreased, by visual estimate at 45-50%.   4. No left ventricular thrombus visualized.   5. Unable to determine right ventricular systolic function.   6. Right ventricle not well seen. Some interventricular septal flattening which could represent right sided pressure and volume overload.    QUANTITATIVE DATA SUMMARY:     2D MEASUREMENTS:          Normal Ranges:  IVSd:            0.91 cm  (0.6-1.1cm)  LVPWd:           0.75 cm  (0.6-1.1cm)  LVIDd:           4.60 cm  (3.9-5.9cm)  LVIDs:           2.99 cm  LV Mass Index:   59 g/m2  LVEDV Index:     53 ml/m2  LV % FS          35.0 %       LA VOLUME:                     Normal Ranges:  LA Vol A4C:        50.1 ml    (22+/-6mL/m2)  LA Vol A2C:        48.0 ml  LA Vol BP:         49.5 ml  LA Vol Index A4C:  23.8ml/m2  LA Vol Index A2C:  22.8 ml/m2  LA Vol Index BP:   23.5 ml/m2  LA Area A4C:       17.0 cm2  LA Area A2C:       16.8 cm2  LA Major Axis A4C: 4.9 cm  LA Major Axis A2C: 5.0 cm  LA Volume Index:   23.5 ml/m2  LA Vol A4C:        47.6 ml  LA Vol A2C:        44.4 ml  LA Vol Index BSA:  21.8 ml/m2       RA VOLUME BY A/L METHOD:          Normal Ranges:  RA Area A4C:             19.6 cm2       AORTA MEASUREMENTS:         Normal Ranges:  Ao Sinus, d:        3.50 cm (2.1-3.5cm)  Ao STJ, d:          3.80 cm (1.7-3.4cm)  Asc Ao, d:          3.90 cm (2.1-3.4cm)       LV SYSTOLIC FUNCTION BY 2D PLANIMETRY (MOD):                       Normal Ranges:  EF-A4C View:    59 % (>=55%)  EF-A2C View:    42 %  EF-Biplane:     53 %  EF-Visual:      48 %  LV EF Reported: 48 %       LV DIASTOLIC FUNCTION:             Normal Ranges:  MV Peak E:             0.44 m/s    (0.7-1.2 m/s)  MV Peak A:             0.56 m/s    (0.42-0.7 m/s)  E/A Ratio:             0.79        (1.0-2.2)  MV e'                  0.055 m/s   (>8.0)  MV lateral e'          0.06 m/s  MV medial e'           0.05 m/s  E/e' Ratio:            7.97        (<8.0)  PulmV Sys Cory:         14.10 cm/s  PulmV Haynes Cory:        11.70 cm/s  PulmV S/D Cory:         1.20  PulmV A Revs Cory:      15.26 cm/s  PulmV A Revs Dur:      106.57 msec       MITRAL VALVE:          Normal Ranges:  MV DT:        205 msec (150-240msec)       AORTIC VALVE:                     Normal Ranges:  AoV Vmax:                1.02 m/s (<=1.7m/s)  AoV Peak P.2 mmHg (<20mmHg)  AoV Mean P.3 mmHg (1.7-11.5mmHg)  LVOT Max Cory:            0.84 m/s (<=1.1m/s)  AoV VTI:                 18.82 cm (18-25cm)  LVOT VTI:                16.58 cm  LVOT Diameter:           2.15 cm  (1.8-2.4cm)  AoV Area, VTI:           3.20 cm2 (2.5-5.5cm2)  AoV Area,Vmax:            2.98 cm2 (2.5-4.5cm2)  AoV Dimensionless Index: 0.88       RIGHT VENTRICLE:  RV Basal 3.40 cm  RV Mid   2.40 cm  RV Major 6.7 cm  TAPSE:   18.0 mm       TRICUSPID VALVE/RVSP:          Normal Ranges:  Peak TR Velocity:     1.19 m/s  Est. RA Pressure:     3 mmHg  RV Syst Pressure:     9 mmHg   (< 30mmHg)  IVC Diam:             1.90 cm       PULMONIC VALVE:          Normal Ranges:  PV Accel Time:  137 msec (>120ms)  PV Max Cory:     0.9 m/s  (0.6-0.9m/s)  PV Max PG:      3.2 mmHg       Pulmonary Veins:  PulmV A Revs Dur: 106.57 msec  PulmV A Revs Cory: 15.26 cm/s  PulmV Haynes Cory:   11.70 cm/s  PulmV S/D Cory:    1.20  PulmV Sys Cory:    14.10 cm/s       [id number] John Yanes DO  Electronically signed on 1/8/2025 at 11:40:52 AM       Wall Scoring       ** Final (Updated) **    Physical Exam  Constitutional:       General: He is not in acute distress.     Appearance: Normal appearance. He is not ill-appearing.   HENT:      Mouth/Throat:      Mouth: Mucous membranes are moist.   Eyes:      Extraocular Movements: Extraocular movements intact.      Pupils: Pupils are equal, round, and reactive to light.   Cardiovascular:      Rate and Rhythm: Normal rate and regular rhythm.      Heart sounds: No murmur heard.     No friction rub. No gallop.   Pulmonary:      Effort: Pulmonary effort is normal. No respiratory distress. No NC.     Breath sounds: No wheezing, rhonchi or rales.      Comments: Decreased breath sounds at bilateral bases  Abdominal:      General: Bowel sounds are normal. There is no distension.      Palpations: Abdomen is soft. There is no mass.      Tenderness: There is no abdominal tenderness. There is no guarding or rebound.   Genitourinary:     Comments: Paul draining yellow urine  Musculoskeletal:         General: No swelling, tenderness or deformity.      Cervical back: Normal range of motion and neck supple.   Skin:     General: Skin is warm and dry.      Findings: No bruising or rash.   Neurological:       General: No focal deficit present.      Mental Status: He is alert and oriented to person, place, and time. Mental status is at baseline.      Motor: No weakness.   Psychiatric:         Mood and Affect: Mood normal.         Behavior: Behavior normal.         Thought Content: Thought content normal.     Relevant Results  Scheduled medications  acetaminophen, 650 mg, oral, q6h ANGEL  allopurinol, 300 mg, oral, Daily  apixaban, 5 mg, oral, BID  aspirin, 81 mg, oral, Daily  atorvastatin, 10 mg, oral, Daily  docusate sodium, 100 mg, oral, BID  donepezil, 5 mg, oral, Nightly  folic acid, 1 mg, oral, Daily  [Held by provider] furosemide, 40 mg, oral, Daily  insulin lispro, 0-10 Units, subcutaneous, TID AC  [Held by provider] lisinopril, 20 mg, oral, Daily  [Held by provider] metoprolol succinate XL, 25 mg, oral, Daily  multivitamin with minerals, 1 tablet, oral, Daily  pantoprazole, 40 mg, oral, Daily before breakfast  piperacillin-tazobactam, 2.25 g, intravenous, q6h  polyethylene glycol, 17 g, oral, Daily  tamsulosin, 0.4 mg, oral, Nightly  thiamine, 100 mg, oral, Daily      Continuous medications     PRN medications  PRN medications: benzocaine-menthol, bisacodyl, bisacodyl, calcium carbonate, cyclobenzaprine, dextrose, dextrose, glucagon, glucagon, HYDROmorphone, ipratropium-albuteroL, metoclopramide **OR** metoclopramide, naloxone, ondansetron **OR** ondansetron, oxyCODONE, oxygen, sodium chloride    Results for orders placed or performed during the hospital encounter of 01/07/25 (from the past 24 hours)   POCT GLUCOSE   Result Value Ref Range    POCT Glucose 148 (H) 74 - 99 mg/dL   POCT GLUCOSE   Result Value Ref Range    POCT Glucose 124 (H) 74 - 99 mg/dL   CBC   Result Value Ref Range    WBC 9.9 4.4 - 11.3 x10*3/uL    nRBC 0.0 0.0 - 0.0 /100 WBCs    RBC 3.40 (L) 4.50 - 5.90 x10*6/uL    Hemoglobin 9.9 (L) 13.5 - 17.5 g/dL    Hematocrit 29.3 (L) 41.0 - 52.0 %    MCV 86 80 - 100 fL    MCH 29.1 26.0 - 34.0 pg     MCHC 33.8 32.0 - 36.0 g/dL    RDW 13.8 11.5 - 14.5 %    Platelets 159 150 - 450 x10*3/uL   Basic Metabolic Panel   Result Value Ref Range    Glucose 129 (H) 74 - 99 mg/dL    Sodium 143 136 - 145 mmol/L    Potassium 3.1 (L) 3.5 - 5.3 mmol/L    Chloride 111 (H) 98 - 107 mmol/L    Bicarbonate 27 21 - 32 mmol/L    Anion Gap 8 (L) 10 - 20 mmol/L    Urea Nitrogen 30 (H) 6 - 23 mg/dL    Creatinine 1.25 0.50 - 1.30 mg/dL    eGFR 59 (L) >60 mL/min/1.73m*2    Calcium 7.8 (L) 8.6 - 10.3 mg/dL   POCT GLUCOSE   Result Value Ref Range    POCT Glucose 144 (H) 74 - 99 mg/dL     Assessment & Plan  Unilateral primary osteoarthritis, right knee    Localized osteoarthritis of right knee    Osteoarthritis of right knee, unspecified osteoarthritis type    Acute hypoxic respiratory failure (Multi)    Shock (Multi)    Shock, during or resulting from a surgical procedure    Hx of COPD  Acute hypoxemic respiratory failure~ RESOLVED, ON RA  Multifocal PNA- RESOLVED  - Continuous pulse oximetry  - Wean supplemental O2 as able while keeping SpO2 greater than 92%  - finished course of zosyn  - Continue steroids for now; decrease to 40mg daily dose  - PRN DuoNebs  - BPH/ICS  - Pulm following     Hx of Urethral stricture s/p urethroplasty and urethral dilation, CKD baseline CR of 1.1   JUMA on CKD likely prerenal d/t hypotension  - Trend renal function  - Replete electrolytes as indicated  - Continue Flomax  - Maintain ulrich per urology  - Urology following -> recommending keeping ulrich through after discharge due to urethral swelling. Fu with urology outpatient     Leukocytosis RESOLVED  Multifocal PNA RESOLVED  - Monitor temps  - Trend WBCs  - Continue Zosyn     Hx of OA  S/P left total knee replacement with Dr. Bradford  - PT/OT  - OOB to chair ambulation as tolerated  - Ortho following     Hx of Anemia d/t chronic disease  - Daily CBC  - Monitor for s/s of bleeding     Hx of gout  Hyperglycemia  - SSI AC/HS  - Continue allopurinol     Hx of  CAD s/p PCI, NSTEMI, DVT, HLD, HTN, and PAH  Septic shock vs vasoplegia post total knee replacement~ Resolved  TTE done official read pending  - Continuous telemetry   - Fluid resuscitation as indicated  - Hold all home antihypertensives for now  >>consider adding antihypertensive tomrw  - ASA/Statin  - Cardiology following  - Continue Eliquis     Hx of ETOH and dementia  Expected post op pain  - Serial neuro and pain assessments   - Scheduled tylenol and PRN oxycodone/dilaudid for pain  - AF bundle  - Continue donepezil   - Continue thiamine and folic acid  - Monitor for signs of withdrawal   - CIWA protocol      Hx of GERD  - Regular Diet  - PPI for GI ppx  - Bowel regimen     Discharge: med ready. Pt's #1 SNF choice is not able to accept him today but able to accept him tomorrow morning at 9am. If pt remains stable, plan to dc at 9am tomrw. Maintain ulrich at discharge    Only RFP in the AM to track potassium    Emmy Donald MD

## 2025-01-12 NOTE — CARE PLAN
The patient's goals for the shift include      The clinical goals for the shift include pt will remain safe without a fall throughout the shift      Problem: Pain  Goal: Takes deep breaths with improved pain control throughout the shift  Outcome: Progressing  Goal: Turns in bed with improved pain control throughout the shift  Outcome: Progressing  Goal: Walks with improved pain control throughout the shift  Outcome: Progressing  Goal: Performs ADL's with improved pain control throughout shift  Outcome: Progressing  Goal: Participates in PT with improved pain control throughout the shift  Outcome: Progressing  Goal: Free from opioid side effects throughout the shift  Outcome: Progressing  Goal: Free from acute confusion related to pain meds throughout the shift  Outcome: Progressing     Problem: Pain - Adult  Goal: Verbalizes/displays adequate comfort level or baseline comfort level  Outcome: Progressing     Problem: Safety - Adult  Goal: Free from fall injury  Outcome: Progressing     Problem: Discharge Planning  Goal: Discharge to home or other facility with appropriate resources  Outcome: Progressing     Problem: Chronic Conditions and Co-morbidities  Goal: Patient's chronic conditions and co-morbidity symptoms are monitored and maintained or improved  Outcome: Progressing     Problem: Skin  Goal: Decreased wound size/increased tissue granulation at next dressing change  Outcome: Progressing  Goal: Participates in plan/prevention/treatment measures  Outcome: Progressing  Goal: Prevent/manage excess moisture  Outcome: Progressing  Goal: Prevent/minimize sheer/friction injuries  Outcome: Progressing  Goal: Promote/optimize nutrition  Outcome: Progressing  Goal: Promote skin healing  Outcome: Progressing     Problem: Fall/Injury  Goal: Not fall by end of shift  Outcome: Progressing  Goal: Be free from injury by end of the shift  Outcome: Progressing  Goal: Verbalize understanding of personal risk factors for fall in  the hospital  Outcome: Progressing  Goal: Verbalize understanding of risk factor reduction measures to prevent injury from fall in the home  Outcome: Progressing  Goal: Use assistive devices by end of the shift  Outcome: Progressing  Goal: Pace activities to prevent fatigue by end of the shift  Outcome: Progressing     Problem: Bathing  Goal: STG - Patient will bathe body SBA.  Outcome: Progressing     Problem: Dressings Lower Extremities  Goal: STG - Patient to complete lower body dressing Min A.  Outcome: Progressing     Problem: Dressing Upper Extremities  Goal: LTG - Patient will complete upper body dressing independently.   Outcome: Progressing     Problem: Grooming  Goal: STG - Patient completes grooming while standing in front of sink (Indep).  Outcome: Progressing     Problem: Toileting  Goal: STG - Patient will complete toileting tasks with Mod I.   Outcome: Progressing

## 2025-01-13 ENCOUNTER — APPOINTMENT (OUTPATIENT)
Dept: RADIOLOGY | Facility: HOSPITAL | Age: 80
End: 2025-01-13
Payer: MEDICARE

## 2025-01-13 ENCOUNTER — APPOINTMENT (OUTPATIENT)
Dept: CARDIOLOGY | Facility: HOSPITAL | Age: 80
End: 2025-01-13
Payer: MEDICARE

## 2025-01-13 LAB
ANION GAP SERPL CALC-SCNC: 8 MMOL/L (ref 10–20)
BUN SERPL-MCNC: 23 MG/DL (ref 6–23)
CALCIUM SERPL-MCNC: 7.8 MG/DL (ref 8.6–10.3)
CHLORIDE SERPL-SCNC: 110 MMOL/L (ref 98–107)
CO2 SERPL-SCNC: 27 MMOL/L (ref 21–32)
CREAT SERPL-MCNC: 1.03 MG/DL (ref 0.5–1.3)
EGFRCR SERPLBLD CKD-EPI 2021: 74 ML/MIN/1.73M*2
ERYTHROCYTE [DISTWIDTH] IN BLOOD BY AUTOMATED COUNT: 14.1 % (ref 11.5–14.5)
ERYTHROCYTE [DISTWIDTH] IN BLOOD BY AUTOMATED COUNT: 14.1 % (ref 11.5–14.5)
GLUCOSE BLD MANUAL STRIP-MCNC: 105 MG/DL (ref 74–99)
GLUCOSE BLD MANUAL STRIP-MCNC: 108 MG/DL (ref 74–99)
GLUCOSE BLD MANUAL STRIP-MCNC: 120 MG/DL (ref 74–99)
GLUCOSE SERPL-MCNC: 108 MG/DL (ref 74–99)
HCT VFR BLD AUTO: 31.5 % (ref 41–52)
HCT VFR BLD AUTO: 38.4 % (ref 41–52)
HGB BLD-MCNC: 10.8 G/DL (ref 13.5–17.5)
HGB BLD-MCNC: 12 G/DL (ref 13.5–17.5)
HOLD SPECIMEN: NORMAL
HOLD SPECIMEN: NORMAL
LACTATE SERPL-SCNC: 1 MMOL/L (ref 0.4–2)
LACTATE SERPL-SCNC: 3.2 MMOL/L (ref 0.4–2)
MCH RBC QN AUTO: 28.3 PG (ref 26–34)
MCH RBC QN AUTO: 29.6 PG (ref 26–34)
MCHC RBC AUTO-ENTMCNC: 31.3 G/DL (ref 32–36)
MCHC RBC AUTO-ENTMCNC: 34.3 G/DL (ref 32–36)
MCV RBC AUTO: 86 FL (ref 80–100)
MCV RBC AUTO: 91 FL (ref 80–100)
NRBC BLD-RTO: 0.4 /100 WBCS (ref 0–0)
NRBC BLD-RTO: 0.4 /100 WBCS (ref 0–0)
PLATELET # BLD AUTO: 170 X10*3/UL (ref 150–450)
PLATELET # BLD AUTO: 231 X10*3/UL (ref 150–450)
POTASSIUM SERPL-SCNC: 4.1 MMOL/L (ref 3.5–5.3)
RBC # BLD AUTO: 3.65 X10*6/UL (ref 4.5–5.9)
RBC # BLD AUTO: 4.24 X10*6/UL (ref 4.5–5.9)
SODIUM SERPL-SCNC: 141 MMOL/L (ref 136–145)
WBC # BLD AUTO: 10.6 X10*3/UL (ref 4.4–11.3)
WBC # BLD AUTO: 15.4 X10*3/UL (ref 4.4–11.3)

## 2025-01-13 PROCEDURE — 2500000001 HC RX 250 WO HCPCS SELF ADMINISTERED DRUGS (ALT 637 FOR MEDICARE OP): Performed by: STUDENT IN AN ORGANIZED HEALTH CARE EDUCATION/TRAINING PROGRAM

## 2025-01-13 PROCEDURE — 99232 SBSQ HOSP IP/OBS MODERATE 35: CPT | Performed by: STUDENT IN AN ORGANIZED HEALTH CARE EDUCATION/TRAINING PROGRAM

## 2025-01-13 PROCEDURE — 74177 CT ABD & PELVIS W/CONTRAST: CPT

## 2025-01-13 PROCEDURE — 2500000001 HC RX 250 WO HCPCS SELF ADMINISTERED DRUGS (ALT 637 FOR MEDICARE OP)

## 2025-01-13 PROCEDURE — 82947 ASSAY GLUCOSE BLOOD QUANT: CPT

## 2025-01-13 PROCEDURE — 93005 ELECTROCARDIOGRAM TRACING: CPT

## 2025-01-13 PROCEDURE — 93010 ELECTROCARDIOGRAM REPORT: CPT | Performed by: INTERNAL MEDICINE

## 2025-01-13 PROCEDURE — 2500000004 HC RX 250 GENERAL PHARMACY W/ HCPCS (ALT 636 FOR OP/ED): Performed by: INTERNAL MEDICINE

## 2025-01-13 PROCEDURE — 2500000002 HC RX 250 W HCPCS SELF ADMINISTERED DRUGS (ALT 637 FOR MEDICARE OP, ALT 636 FOR OP/ED)

## 2025-01-13 PROCEDURE — 2500000004 HC RX 250 GENERAL PHARMACY W/ HCPCS (ALT 636 FOR OP/ED)

## 2025-01-13 PROCEDURE — 83605 ASSAY OF LACTIC ACID: CPT | Performed by: STUDENT IN AN ORGANIZED HEALTH CARE EDUCATION/TRAINING PROGRAM

## 2025-01-13 PROCEDURE — 74177 CT ABD & PELVIS W/CONTRAST: CPT | Performed by: STUDENT IN AN ORGANIZED HEALTH CARE EDUCATION/TRAINING PROGRAM

## 2025-01-13 PROCEDURE — 2500000004 HC RX 250 GENERAL PHARMACY W/ HCPCS (ALT 636 FOR OP/ED): Performed by: STUDENT IN AN ORGANIZED HEALTH CARE EDUCATION/TRAINING PROGRAM

## 2025-01-13 PROCEDURE — 80048 BASIC METABOLIC PNL TOTAL CA: CPT | Performed by: STUDENT IN AN ORGANIZED HEALTH CARE EDUCATION/TRAINING PROGRAM

## 2025-01-13 PROCEDURE — 2550000001 HC RX 255 CONTRASTS: Performed by: STUDENT IN AN ORGANIZED HEALTH CARE EDUCATION/TRAINING PROGRAM

## 2025-01-13 PROCEDURE — 36415 COLL VENOUS BLD VENIPUNCTURE: CPT | Performed by: STUDENT IN AN ORGANIZED HEALTH CARE EDUCATION/TRAINING PROGRAM

## 2025-01-13 PROCEDURE — 85027 COMPLETE CBC AUTOMATED: CPT | Performed by: STUDENT IN AN ORGANIZED HEALTH CARE EDUCATION/TRAINING PROGRAM

## 2025-01-13 PROCEDURE — 1200000002 HC GENERAL ROOM WITH TELEMETRY DAILY

## 2025-01-13 RX ORDER — HYDRALAZINE HYDROCHLORIDE 20 MG/ML
20 INJECTION INTRAMUSCULAR; INTRAVENOUS ONCE
Status: DISCONTINUED | OUTPATIENT
Start: 2025-01-13 | End: 2025-01-13

## 2025-01-13 RX ORDER — ERGOCALCIFEROL 1.25 MG/1
50000 CAPSULE ORAL WEEKLY
Qty: 8 CAPSULE | Refills: 0 | Status: SHIPPED | OUTPATIENT
Start: 2025-01-13 | End: 2025-03-10

## 2025-01-13 RX ORDER — ENOXAPARIN SODIUM 100 MG/ML
40 INJECTION SUBCUTANEOUS DAILY
Status: DISCONTINUED | OUTPATIENT
Start: 2025-01-14 | End: 2025-01-14

## 2025-01-13 RX ORDER — PREGABALIN 100 MG/1
300 CAPSULE ORAL 2 TIMES DAILY
Status: DISCONTINUED | OUTPATIENT
Start: 2025-01-13 | End: 2025-01-17 | Stop reason: HOSPADM

## 2025-01-13 RX ORDER — HYDRALAZINE HYDROCHLORIDE 20 MG/ML
10 INJECTION INTRAMUSCULAR; INTRAVENOUS ONCE
Status: COMPLETED | OUTPATIENT
Start: 2025-01-13 | End: 2025-01-13

## 2025-01-13 RX ADMIN — METOCLOPRAMIDE 10 MG: 5 INJECTION, SOLUTION INTRAMUSCULAR; INTRAVENOUS at 14:10

## 2025-01-13 RX ADMIN — Medication 100 MG: at 09:24

## 2025-01-13 RX ADMIN — TAMSULOSIN HYDROCHLORIDE 0.4 MG: 0.4 CAPSULE ORAL at 20:51

## 2025-01-13 RX ADMIN — ALLOPURINOL 300 MG: 300 TABLET ORAL at 09:24

## 2025-01-13 RX ADMIN — PREGABALIN 300 MG: 100 CAPSULE ORAL at 20:52

## 2025-01-13 RX ADMIN — PIPERACILLIN SODIUM AND TAZOBACTAM SODIUM 2.25 G: 2; .25 INJECTION, SOLUTION INTRAVENOUS at 09:54

## 2025-01-13 RX ADMIN — PIPERACILLIN SODIUM AND TAZOBACTAM SODIUM 2.25 G: 2; .25 INJECTION, SOLUTION INTRAVENOUS at 15:34

## 2025-01-13 RX ADMIN — METOPROLOL SUCCINATE 25 MG: 25 TABLET, EXTENDED RELEASE ORAL at 09:24

## 2025-01-13 RX ADMIN — PANTOPRAZOLE SODIUM 40 MG: 40 TABLET, DELAYED RELEASE ORAL at 05:33

## 2025-01-13 RX ADMIN — CYCLOBENZAPRINE HYDROCHLORIDE 5 MG: 5 TABLET, FILM COATED ORAL at 09:29

## 2025-01-13 RX ADMIN — OXYCODONE HYDROCHLORIDE 5 MG: 5 TABLET ORAL at 08:43

## 2025-01-13 RX ADMIN — HYDROMORPHONE HYDROCHLORIDE 0.2 MG: 0.2 INJECTION, SOLUTION INTRAMUSCULAR; INTRAVENOUS; SUBCUTANEOUS at 13:56

## 2025-01-13 RX ADMIN — ASPIRIN 81 MG: 81 TABLET, COATED ORAL at 09:24

## 2025-01-13 RX ADMIN — ACETAMINOPHEN 650 MG: 325 TABLET, FILM COATED ORAL at 12:00

## 2025-01-13 RX ADMIN — HYDRALAZINE HYDROCHLORIDE 10 MG: 20 INJECTION INTRAMUSCULAR; INTRAVENOUS at 02:14

## 2025-01-13 RX ADMIN — IOHEXOL 75 ML: 350 INJECTION, SOLUTION INTRAVENOUS at 13:24

## 2025-01-13 RX ADMIN — HYDROMORPHONE HYDROCHLORIDE 0.2 MG: 0.2 INJECTION, SOLUTION INTRAMUSCULAR; INTRAVENOUS; SUBCUTANEOUS at 18:54

## 2025-01-13 RX ADMIN — PIPERACILLIN SODIUM AND TAZOBACTAM SODIUM 2.25 G: 2; .25 INJECTION, SOLUTION INTRAVENOUS at 02:15

## 2025-01-13 RX ADMIN — METOCLOPRAMIDE 10 MG: 5 INJECTION, SOLUTION INTRAMUSCULAR; INTRAVENOUS at 03:36

## 2025-01-13 RX ADMIN — ACETAMINOPHEN 650 MG: 325 TABLET, FILM COATED ORAL at 05:33

## 2025-01-13 RX ADMIN — OXYCODONE HYDROCHLORIDE 5 MG: 5 TABLET ORAL at 16:42

## 2025-01-13 RX ADMIN — APIXABAN 5 MG: 5 TABLET, FILM COATED ORAL at 09:24

## 2025-01-13 RX ADMIN — OXYCODONE HYDROCHLORIDE 5 MG: 5 TABLET ORAL at 22:06

## 2025-01-13 RX ADMIN — SODIUM CHLORIDE 1000 ML: 9 INJECTION, SOLUTION INTRAVENOUS at 13:57

## 2025-01-13 RX ADMIN — ATORVASTATIN CALCIUM 10 MG: 10 TABLET, FILM COATED ORAL at 09:24

## 2025-01-13 RX ADMIN — MULTIPLE VITAMINS W/ MINERALS TAB 1 TABLET: TAB ORAL at 09:24

## 2025-01-13 RX ADMIN — ONDANSETRON 4 MG: 2 INJECTION, SOLUTION INTRAMUSCULAR; INTRAVENOUS at 08:39

## 2025-01-13 RX ADMIN — FOLIC ACID 1 MG: 1 TABLET ORAL at 09:24

## 2025-01-13 RX ADMIN — LISINOPRIL 20 MG: 20 TABLET ORAL at 09:24

## 2025-01-13 RX ADMIN — DOCUSATE SODIUM 100 MG: 100 CAPSULE, LIQUID FILLED ORAL at 09:25

## 2025-01-13 RX ADMIN — METOCLOPRAMIDE 10 MG: 5 INJECTION, SOLUTION INTRAMUSCULAR; INTRAVENOUS at 14:11

## 2025-01-13 ASSESSMENT — PAIN DESCRIPTION - LOCATION
LOCATION: ABDOMEN
LOCATION: KNEE
LOCATION: KNEE
LOCATION: BACK
LOCATION: ABDOMEN

## 2025-01-13 ASSESSMENT — COGNITIVE AND FUNCTIONAL STATUS - GENERAL
STANDING UP FROM CHAIR USING ARMS: A LOT
WALKING IN HOSPITAL ROOM: A LOT
HELP NEEDED FOR BATHING: A LOT
PERSONAL GROOMING: A LOT
MOVING TO AND FROM BED TO CHAIR: A LOT
HELP NEEDED FOR BATHING: A LOT
MOVING FROM LYING ON BACK TO SITTING ON SIDE OF FLAT BED WITH BEDRAILS: A LOT
MOVING TO AND FROM BED TO CHAIR: A LOT
DAILY ACTIVITIY SCORE: 13
TURNING FROM BACK TO SIDE WHILE IN FLAT BAD: A LOT
TOILETING: A LOT
TOILETING: A LOT
MOBILITY SCORE: 12
DAILY ACTIVITIY SCORE: 13
PERSONAL GROOMING: A LOT
STANDING UP FROM CHAIR USING ARMS: A LOT
WALKING IN HOSPITAL ROOM: A LOT
CLIMB 3 TO 5 STEPS WITH RAILING: A LOT
DRESSING REGULAR UPPER BODY CLOTHING: A LOT
EATING MEALS: A LITTLE
MOVING FROM LYING ON BACK TO SITTING ON SIDE OF FLAT BED WITH BEDRAILS: A LOT
CLIMB 3 TO 5 STEPS WITH RAILING: A LOT
DRESSING REGULAR UPPER BODY CLOTHING: A LOT
DRESSING REGULAR LOWER BODY CLOTHING: A LOT
EATING MEALS: A LITTLE
TURNING FROM BACK TO SIDE WHILE IN FLAT BAD: A LOT
MOBILITY SCORE: 12
DRESSING REGULAR LOWER BODY CLOTHING: A LOT

## 2025-01-13 ASSESSMENT — PAIN - FUNCTIONAL ASSESSMENT
PAIN_FUNCTIONAL_ASSESSMENT: 0-10

## 2025-01-13 ASSESSMENT — PAIN DESCRIPTION - ORIENTATION
ORIENTATION: RIGHT
ORIENTATION: RIGHT

## 2025-01-13 ASSESSMENT — LIFESTYLE VARIABLES
AUDITORY DISTURBANCES: NOT PRESENT
NAUSEA AND VOMITING: MILD NAUSEA WITH NO VOMITING
HEADACHE, FULLNESS IN HEAD: NOT PRESENT
PAROXYSMAL SWEATS: NO SWEAT VISIBLE
VISUAL DISTURBANCES: NOT PRESENT
TOTAL SCORE: 2
AUDITORY DISTURBANCES: NOT PRESENT
AGITATION: NORMAL ACTIVITY
ANXIETY: NO ANXIETY, AT EASE
ORIENTATION AND CLOUDING OF SENSORIUM: ORIENTED AND CAN DO SERIAL ADDITIONS
NAUSEA AND VOMITING: MILD NAUSEA WITH NO VOMITING
HEADACHE, FULLNESS IN HEAD: NOT PRESENT
AGITATION: NORMAL ACTIVITY
HEADACHE, FULLNESS IN HEAD: NOT PRESENT
TREMOR: NO TREMOR
ORIENTATION AND CLOUDING OF SENSORIUM: ORIENTED AND CAN DO SERIAL ADDITIONS
PAROXYSMAL SWEATS: NO SWEAT VISIBLE
ANXIETY: MILDLY ANXIOUS
ANXIETY: NO ANXIETY, AT EASE
ORIENTATION AND CLOUDING OF SENSORIUM: ORIENTED AND CAN DO SERIAL ADDITIONS
TOTAL SCORE: 1
NAUSEA AND VOMITING: MILD NAUSEA WITH NO VOMITING
TREMOR: NO TREMOR
VISUAL DISTURBANCES: NOT PRESENT
TOTAL SCORE: 1
TREMOR: NO TREMOR
AUDITORY DISTURBANCES: NOT PRESENT
PAROXYSMAL SWEATS: NO SWEAT VISIBLE
AGITATION: NORMAL ACTIVITY
VISUAL DISTURBANCES: NOT PRESENT

## 2025-01-13 ASSESSMENT — PAIN SCALES - GENERAL
PAINLEVEL_OUTOF10: 7
PAINLEVEL_OUTOF10: 6
PAINLEVEL_OUTOF10: 2
PAINLEVEL_OUTOF10: 0 - NO PAIN
PAINLEVEL_OUTOF10: 6
PAINLEVEL_OUTOF10: 0 - NO PAIN
PAINLEVEL_OUTOF10: 9
PAINLEVEL_OUTOF10: 7

## 2025-01-13 ASSESSMENT — PAIN SCALES - PAIN ASSESSMENT IN ADVANCED DEMENTIA (PAINAD)
TOTALSCORE: MEDICATION (SEE MAR)
TOTALSCORE: MEDICATION (SEE MAR)

## 2025-01-13 NOTE — PROGRESS NOTES
Physical Therapy                 Therapy Communication Note    Patient Name: Mikhail Moses  MRN: [id number]  Department: [facility]  Room: [address] [place] [address]-A  Today's Date: 1/13/2025     Discipline: Physical Therapy    PT Missed Visit: Yes     Missed Visit Reason: Missed Visit Reason: Patient in a medical procedure (Attempted Pt f/u tx, per RN report pt currently off the floor at CT scan for SBO r/o.)    Missed Time: Attempt    Comment:

## 2025-01-13 NOTE — PROGRESS NOTES
01/13/25 1314   Discharge Planning   Expected Discharge Disposition SNF     Spoke with wife via phone. Discussed facility responses, [facility] not have bed, discussed accepting facilities- wife identified [facility] as [facility]. Wife aware of possible dc today.

## 2025-01-13 NOTE — PROGRESS NOTES
Physical Therapy                 Therapy Communication Note    Patient Name: Mikhail Moses  MRN: [id number]  Department: [place] [address]  Room: [address] [place] [address]  Today's Date: 1/13/2025     Discipline: Physical Therapy    PT Missed Visit: Yes     Missed Visit Reason: Missed Visit Reason: Patient refused (Attempted PT f/u tx, pt supine,falt affect reporting multiple loose BM's and recently up to the bathroom,declined tx at this time.)    Missed Time: Attempt    Comment:

## 2025-01-13 NOTE — PROGRESS NOTES
"Orthopaedic Surgery Progress Note    Subjective:  NAEON. Doing well on RNF. Coming to the end of PNA treatment. No concerns with RLE.     O:  /74 (BP Location: Right arm, Patient Position: Lying)   Pulse 91   Temp 36.3 °C (97.3 °F) (Oral)   Resp 17   Ht 1.727 m (5' 8\")   Wt 105 kg (232 lb 2.3 oz) Comment: done by night shift  SpO2 95%   BMI 35.30 kg/m²     Gen: arousable, NAD, appropriately conversational  Cardiac: RRR to peripheral palpation  Resp: nonlabored on RA    MSK:  RLE  - Mepilex dressing c/d/I, lateral dressing with some strikethrough, no drainage  - Fires DF/PF/EHL/FHL  - Residual numbness from block over medial calf  - Foot warm, well perfused  - DP/PT pulse, brisk cap refill  - Compartments soft and compressible    A/P: 79 y.o. male s/p R TKA on 1/7 with Dr. Bradford.  Postop course c/b worsening hypoxia and transferred to ICU POD1, now off of pressors and off HFNC now on RNF. Completed steroid tx yesterday for PNA and finishing abx tx.     Plan:  - Weight bearing: WBAT RLE  - DVT ppx: SCDs, Eliquis  - Diet: Regular  - Pain: Tylenol, oxycodone 5/10  - Antibiotics: empiric Zosyn per Medicine  - FEN: HLIV with good PO intake  - Bowel Regimen: Colace, senna, dulcolax  - PT/OT, rec for SNF  - Continue home medications  - Appreciate medicine care    Dispo: DC to SNF pending clinical course/medical clearance.    This patient was discussed with attending, Dr. Bradford.     Susan Gusman  Orthopedic Surgery PGY-4    While admitted, this patient will be followed by the Ortho Joints Team. Please contact below residents with any questions (available via Epic Chat).     First call: Zheng Craven, PGY-1  Second call: Rhonda Henriquez, PGY-2  Third call: Susan Gusman, PGY-4    On weekends and after 6PM:  At [facility]: Please reach out to the orthopaedic on-call resident ([phone])  At [facility]: Please reach out to the orthopaedic on-call HEATHER or resident (please refer to Qgenda)  "

## 2025-01-13 NOTE — PROGRESS NOTES
Mikhail Moses is a 79 y.o. male on day 5 of admission presenting with Unilateral primary osteoarthritis, right knee.      Subjective   Patient reports that overnight, he was having diffuse abdominal pains but that no action was taken. This morning, states that he had a BM in the bed like he couldn't make it to the bathroom fast enough. Also reporting persistent nausea. States that he has been dry heaving all night. Of note, pt ate dinner last night which he tolerated well. Abdominal exam as below. Did not want to discuss any further.       Objective     Last Recorded Vitals  /74 (BP Location: Right arm, Patient Position: Lying)   Pulse 91   Temp 36.3 °C (97.3 °F) (Oral)   Resp 17   Wt 105 kg (232 lb 2.3 oz) Comment: done by night shift  SpO2 95%   Intake/Output last 3 Shifts:    Intake/Output Summary (Last 24 hours) at 1/13/2025 1216  Last data filed at 1/13/2025 0432  Gross per 24 hour   Intake 960 ml   Output 1650 ml   Net -690 ml       Admission Weight  Weight: 98.8 kg (217 lb 13 oz) (01/07/25 0959)    Daily Weight  01/09/25 : 105 kg (232 lb 2.3 oz)    Image Results  Transthoracic Echo (TTE) [facility], [address] [place] [address]               Tel [phone] and Fax [phone]    TRANSTHORACIC ECHOCARDIOGRAM REPORT       Patient Name:       MIKHAIL MOSES     [place] Physician:    [id number] John Yanes DO  Study Date:         1/8/2025            Ordering Provider:    [id number] CAR  MRN/PID:            [id number]            Fellow:  Accession#:         [id number]        Nurse:  Date of Birth/Age:  1945 / 79      Sonographer:          USR                      years  Gender assigned at  M                   Additional Staff:  Birth:  Height:             172.00 cm           Admit Date:           1/7/2025  Weight:             98.00  kg            Admission Status:     Inpatient -                                                                Critical/Stat                                                                (within 1-3                                                                hours)  BSA / BMI:          2.11 m2 / 33.13     Encounter#:           [id number]                      kg/m2  Blood Pressure:     62/45 mmHg          Department Location:  [facility]    Study Type:    TRANSTHORACIC ECHO (TTE) COMPLETE  Diagnosis/ICD: Cardiomegaly-I51.7  Indication:    shock, cardiomegaly  CPT Code:      Echo Complete w Full Doppler-[id number]    Patient History:  Pertinent History: HTN, Hyperlipidemia and CAD. MI, Hypotension.    Study Detail: The following Echo studies were performed: 2D, M-Mode, Doppler and                color flow. Technically challenging study due to body habitus,                poor acoustic windows, patient lying in supine position and                prominent lung artifact. Definity used as a contrast agent for                endocardial border definition. Total contrast used for this                procedure was 4 mL via IV push.       PHYSICIAN INTERPRETATION:  Left Ventricle: Left ventricular ejection fraction is mildly decreased, by visual estimate at 45-50%. Left venticular wall motion is abnormal. The left ventricular cavity size is normal. There is normal septal and normal posterior left ventricular wall thickness. The interventricular septum is flattened in systole and diastole, consistent with right ventricular pressure and volume overload. Left ventricular diastolic filling is indeterminate. There is no definite left ventricular thrombus visualized.  LV Wall Scoring:  The entire apex is hypokinetic.    Left Atrium: The left atrium was not well visualized.  Right Ventricle: The right ventricle was not well visualized. Unable to determine right ventricular systolic function.  Right Atrium: The right atrium is normal  in size.  Aortic Valve: The aortic valve is structurally normal. The aortic valve dimensionless index is 0.88. There is no evidence of aortic valve regurgitation. The peak instantaneous gradient of the aortic valve is 4 mmHg. The mean gradient of the aortic valve is 2 mmHg.  Mitral Valve: The mitral valve is normal in structure. There is trace mitral valve regurgitation.  Tricuspid Valve: The tricuspid valve was not well visualized. No evidence of tricuspid regurgitation.  Pulmonic Valve: The pulmonic valve is structurally normal. There is no indication of pulmonic valve regurgitation.  Pericardium: There is no pericardial effusion noted.  Aorta: The aortic root is abnormal. There is mild dilatation of the ascending aorta. There is mild dilatation of the aortic root.  In comparison to the previous echocardiogram(s): There are no prior studies on this patient for comparison purposes.       CONCLUSIONS   1. Poorly visualized anatomical structures due to suboptimal image quality.   2. Entire apex is abnormal.   3. Left ventricular ejection fraction is mildly decreased, by visual estimate at 45-50%.   4. No left ventricular thrombus visualized.   5. Unable to determine right ventricular systolic function.   6. Right ventricle not well seen. Some interventricular septal flattening which could represent right sided pressure and volume overload.    QUANTITATIVE DATA SUMMARY:     2D MEASUREMENTS:          Normal Ranges:  IVSd:            0.91 cm  (0.6-1.1cm)  LVPWd:           0.75 cm  (0.6-1.1cm)  LVIDd:           4.60 cm  (3.9-5.9cm)  LVIDs:           2.99 cm  LV Mass Index:   59 g/m2  LVEDV Index:     53 ml/m2  LV % FS          35.0 %       LA VOLUME:                    Normal Ranges:  LA Vol A4C:        50.1 ml    (22+/-6mL/m2)  LA Vol A2C:        48.0 ml  LA Vol BP:         49.5 ml  LA Vol Index A4C:  23.8ml/m2  LA Vol Index A2C:  22.8 ml/m2  LA Vol Index BP:   23.5 ml/m2  LA Area A4C:       17.0 cm2  LA Area A2C:        16.8 cm2  LA Major Axis A4C: 4.9 cm  LA Major Axis A2C: 5.0 cm  LA Volume Index:   23.5 ml/m2  LA Vol A4C:        47.6 ml  LA Vol A2C:        44.4 ml  LA Vol Index BSA:  21.8 ml/m2       RA VOLUME BY A/L METHOD:          Normal Ranges:  RA Area A4C:             19.6 cm2       AORTA MEASUREMENTS:         Normal Ranges:  Ao Sinus, d:        3.50 cm (2.1-3.5cm)  Ao STJ, d:          3.80 cm (1.7-3.4cm)  Asc Ao, d:          3.90 cm (2.1-3.4cm)       LV SYSTOLIC FUNCTION BY 2D PLANIMETRY (MOD):                       Normal Ranges:  EF-A4C View:    59 % (>=55%)  EF-A2C View:    42 %  EF-Biplane:     53 %  EF-Visual:      48 %  LV EF Reported: 48 %       LV DIASTOLIC FUNCTION:             Normal Ranges:  MV Peak E:             0.44 m/s    (0.7-1.2 m/s)  MV Peak A:             0.56 m/s    (0.42-0.7 m/s)  E/A Ratio:             0.79        (1.0-2.2)  MV e'                  0.055 m/s   (>8.0)  MV lateral e'          0.06 m/s  MV medial e'           0.05 m/s  E/e' Ratio:            7.97        (<8.0)  PulmV Sys Cory:         14.10 cm/s  PulmV Haynes Cory:        11.70 cm/s  PulmV S/D Cory:         1.20  PulmV A Revs Cory:      15.26 cm/s  PulmV A Revs Dur:      106.57 msec       MITRAL VALVE:          Normal Ranges:  MV DT:        205 msec (150-240msec)       AORTIC VALVE:                     Normal Ranges:  AoV Vmax:                1.02 m/s (<=1.7m/s)  AoV Peak P.2 mmHg (<20mmHg)  AoV Mean P.3 mmHg (1.7-11.5mmHg)  LVOT Max Cory:            0.84 m/s (<=1.1m/s)  AoV VTI:                 18.82 cm (18-25cm)  LVOT VTI:                16.58 cm  LVOT Diameter:           2.15 cm  (1.8-2.4cm)  AoV Area, VTI:           3.20 cm2 (2.5-5.5cm2)  AoV Area,Vmax:           2.98 cm2 (2.5-4.5cm2)  AoV Dimensionless Index: 0.88       RIGHT VENTRICLE:  RV Basal 3.40 cm  RV Mid   2.40 cm  RV Major 6.7 cm  TAPSE:   18.0 mm       TRICUSPID VALVE/RVSP:          Normal Ranges:  Peak TR Velocity:     1.19 m/s  Est. RA Pressure:   "   3 mmHg  RV Syst Pressure:     9 mmHg   (< 30mmHg)  IVC Diam:             1.90 cm       PULMONIC VALVE:          Normal Ranges:  PV Accel Time:  137 msec (>120ms)  PV Max Cory:     0.9 m/s  (0.6-0.9m/s)  PV Max PG:      3.2 mmHg       Pulmonary Veins:  PulmV A Revs Dur: 106.57 msec  PulmV A Revs Cory: 15.26 cm/s  PulmV Haynes Cory:   11.70 cm/s  PulmV S/D Cory:    1.20  PulmV Sys Cory:    14.10 cm/s       [id number] John Yanes DO  Electronically signed on 1/8/2025 at 11:40:52 AM       Wall Scoring       ** Final (Updated) **      Physical Exam  Constitutional:       General: He is not in acute distress but very angry and upset.     Appearance: Normal appearance. He is not ill-appearing.   HENT:      Mouth/Throat:      Mouth: Mucous membranes are moist.   Eyes:      Extraocular Movements: Extraocular movements intact.      Pupils: Pupils are equal, round, and reactive to light.   Cardiovascular:      Rate and Rhythm: Normal rate and regular rhythm.      Heart sounds: No murmur heard.     No friction rub. No gallop.   Pulmonary:      Effort: Pulmonary effort is normal. No respiratory distress. No NC.     Breath sounds: No wheezing, rhonchi or rales.      Comments: Decreased breath sounds at bilateral bases  Abdominal:      General: Bowel sounds are NORMAL. There is no distension.      Palpations: Abdomen is soft. There is no mass.      Tenderness: Pt does not wince or react upon deep palpation of all quadrants. When asked where the pain is located, pt yells angrily \"everywhere.\" There is no guarding or rebound.   Genitourinary:     Comments: Paul draining yellow urine  Musculoskeletal:         General: No swelling, tenderness or deformity.      Cervical back: Normal range of motion and neck supple.   Skin:     General: Skin is warm and dry.      Findings: No bruising or rash.   Neurological:      General: No focal deficit present.      Mental Status: He is alert and oriented to person, place, and time. Mental status is at " baseline.      Motor: No weakness.   Psychiatric:         Mood and Affect: Mood normal.         Behavior: Behavior normal.         Thought Content: Thought content normal.     Relevant Results  Scheduled medications  acetaminophen, 650 mg, oral, q6h ANGEL  allopurinol, 300 mg, oral, Daily  apixaban, 5 mg, oral, BID  aspirin, 81 mg, oral, Daily  atorvastatin, 10 mg, oral, Daily  docusate sodium, 100 mg, oral, BID  donepezil, 5 mg, oral, Nightly  folic acid, 1 mg, oral, Daily  [Held by provider] furosemide, 40 mg, oral, Daily  insulin lispro, 0-10 Units, subcutaneous, TID AC  lisinopril, 20 mg, oral, Daily  metoprolol succinate XL, 25 mg, oral, Daily  multivitamin with minerals, 1 tablet, oral, Daily  pantoprazole, 40 mg, oral, Daily before breakfast  piperacillin-tazobactam, 2.25 g, intravenous, q6h  polyethylene glycol, 17 g, oral, Daily  sodium chloride, 1,000 mL, intravenous, Once  tamsulosin, 0.4 mg, oral, Nightly  thiamine, 100 mg, oral, Daily      Continuous medications     PRN medications  PRN medications: benzocaine-menthol, bisacodyl, bisacodyl, calcium carbonate, cyclobenzaprine, dextrose, dextrose, glucagon, glucagon, HYDROmorphone, ipratropium-albuteroL, metoclopramide **OR** metoclopramide, naloxone, ondansetron **OR** ondansetron, oxyCODONE, oxygen, sodium chloride    Results for orders placed or performed during the hospital encounter of 01/07/25 (from the past 24 hours)   POCT GLUCOSE   Result Value Ref Range    POCT Glucose 144 (H) 74 - 99 mg/dL   POCT GLUCOSE   Result Value Ref Range    POCT Glucose 155 (H) 74 - 99 mg/dL   POCT GLUCOSE   Result Value Ref Range    POCT Glucose 120 (H) 74 - 99 mg/dL   Basic Metabolic Panel   Result Value Ref Range    Glucose 108 (H) 74 - 99 mg/dL    Sodium 141 136 - 145 mmol/L    Potassium 4.1 3.5 - 5.3 mmol/L    Chloride 110 (H) 98 - 107 mmol/L    Bicarbonate 27 21 - 32 mmol/L    Anion Gap 8 (L) 10 - 20 mmol/L    Urea Nitrogen 23 6 - 23 mg/dL    Creatinine 1.03 0.50 -  1.30 mg/dL    eGFR 74 >60 mL/min/1.73m*2    Calcium 7.8 (L) 8.6 - 10.3 mg/dL   Lavender Top   Result Value Ref Range    Extra Tube Hold for add-ons.    CBC   Result Value Ref Range    WBC 15.4 (H) 4.4 - 11.3 x10*3/uL    nRBC 0.4 (H) 0.0 - 0.0 /100 WBCs    RBC 4.24 (L) 4.50 - 5.90 x10*6/uL    Hemoglobin 12.0 (L) 13.5 - 17.5 g/dL    Hematocrit 38.4 (L) 41.0 - 52.0 %    MCV 91 80 - 100 fL    MCH 28.3 26.0 - 34.0 pg    MCHC 31.3 (L) 32.0 - 36.0 g/dL    RDW 14.1 11.5 - 14.5 %    Platelets 231 150 - 450 x10*3/uL   Lactate   Result Value Ref Range    Lactate 3.2 (H) 0.4 - 2.0 mmol/L   POCT GLUCOSE   Result Value Ref Range    POCT Glucose 108 (H) 74 - 99 mg/dL     *Note: Due to a large number of results and/or encounters for the requested time period, some results have not been displayed. A complete set of results can be found in Results Review.       This patient has a urinary catheter   Reason for the urinary catheter remaining today? urinary retention/bladder outlet obstruction, acute or chronic    Assessment & Plan  Unilateral primary osteoarthritis, right knee    Localized osteoarthritis of right knee    Osteoarthritis of right knee, unspecified osteoarthritis type    Acute hypoxic respiratory failure (Multi)    Shock (Multi)    Shock, during or resulting from a surgical procedure    Abdominal pain diffusely  Nausea without emesis  -Pt has been on zosyn which should have been covering any intraabdominal infection  -Abdominal exam benign but pt is reporting diffuse 10/10 pain  -Lactate elevated at 3.2, repeat lactate pending for 2pm  -CT ap with contrast ordered and in progress (discussed with CT tech to run the scan with contrast though originally ordered without. CT tech able to accommodate) --> CT with small subcapsular perisplenic hypodensity representing hematoma which should not explain pts symptoms. No other acute process identified.   -CLD  -WBC increased to 15.4  -IVF 1L bolus over several hours given lower  EF  -Supportive measures with zofran and reglan PRN  -EKG to check on Qtc, previously 515. May need to hold zofran and reglan and give tigan or ativan for nausea    Hx of COPD  Acute hypoxemic respiratory failure~ RESOLVED, ON RA  Multifocal PNA- RESOLVED  - finishing course of zosyn  - Continue steroids for now; decrease to 40mg daily dose  - PRN DuoNebs  - BPH/ICS  - Pulm following     Hx of Urethral stricture s/p urethroplasty and urethral dilation, CKD baseline CR of 1.1   JUMA on CKD likely prerenal d/t hypotension  - Trend renal function  - Replete electrolytes as indicated  - Continue Flomax  - Maintain ulrich per urology  - Urology following -> recommending keeping ulrich through after discharge due to urethral swelling. Fu with urology outpatient     Hx of OA  S/P left total knee replacement with Dr. Bradford  - PT/OT  - OOB to chair ambulation as tolerated  - Ortho following     Hx of Anemia d/t chronic disease  - Daily CBC  - Monitor for s/s of bleeding     Hx of gout  Hyperglycemia  - SSI AC/HS  - Continue allopurinol     Hx of CAD s/p PCI, NSTEMI, DVT, HLD, HTN, and PAH  Septic shock vs vasoplegia post total knee replacement~ Resolved  TTE done official read pending  - Continuous telemetry   - Fluid resuscitation as indicated  - Hold all home antihypertensives for now  >>consider adding antihypertensive tomrw  - ASA/Statin  - Cardiology following  - Per wife, pt is no longer taking eliquis as DVT was provoked in Jan 2024. Will dc and start lovenox DVT ppx tomrw     Hx of ETOH and dementia  Expected post op pain  - Serial neuro and pain assessments   - Scheduled tylenol and PRN oxycodone/dilaudid for pain  - AF bundle  - Dc donepezil (clarified with wife, pt is not taking at home)  - Continue thiamine and folic acid  - Monitor for signs of withdrawal   - CIWA protocol      Hx of GERD  - Regular Diet  - PPI for GI ppx  - Bowel regimen     Discharge: pt has accepting facility but holding dc, awaiting CT ap  and normalization of lactate     Emmy Donald MD

## 2025-01-14 LAB
ALBUMIN SERPL BCP-MCNC: 2.8 G/DL (ref 3.4–5)
ALP SERPL-CCNC: 79 U/L (ref 33–136)
ALT SERPL W P-5'-P-CCNC: 12 U/L (ref 10–52)
ANION GAP SERPL CALC-SCNC: 12 MMOL/L (ref 10–20)
AST SERPL W P-5'-P-CCNC: 20 U/L (ref 9–39)
ATRIAL RATE: 82 BPM
BASOPHILS # BLD AUTO: 0.03 X10*3/UL (ref 0–0.1)
BASOPHILS NFR BLD AUTO: 0.3 %
BILIRUB SERPL-MCNC: 0.6 MG/DL (ref 0–1.2)
BUN SERPL-MCNC: 16 MG/DL (ref 6–23)
CALCIUM SERPL-MCNC: 7.6 MG/DL (ref 8.6–10.3)
CHLORIDE SERPL-SCNC: 110 MMOL/L (ref 98–107)
CO2 SERPL-SCNC: 26 MMOL/L (ref 21–32)
CREAT SERPL-MCNC: 1 MG/DL (ref 0.5–1.3)
EGFRCR SERPLBLD CKD-EPI 2021: 77 ML/MIN/1.73M*2
EOSINOPHIL # BLD AUTO: 0.75 X10*3/UL (ref 0–0.4)
EOSINOPHIL NFR BLD AUTO: 6.9 %
ERYTHROCYTE [DISTWIDTH] IN BLOOD BY AUTOMATED COUNT: 14.3 % (ref 11.5–14.5)
GLUCOSE BLD MANUAL STRIP-MCNC: 116 MG/DL (ref 74–99)
GLUCOSE BLD MANUAL STRIP-MCNC: 88 MG/DL (ref 74–99)
GLUCOSE SERPL-MCNC: 78 MG/DL (ref 74–99)
HCT VFR BLD AUTO: 32.6 % (ref 41–52)
HGB BLD-MCNC: 10.4 G/DL (ref 13.5–17.5)
IMM GRANULOCYTES # BLD AUTO: 0.45 X10*3/UL (ref 0–0.5)
IMM GRANULOCYTES NFR BLD AUTO: 4.1 % (ref 0–0.9)
LACTATE SERPL-SCNC: 0.9 MMOL/L (ref 0.4–2)
LYMPHOCYTES # BLD AUTO: 2.43 X10*3/UL (ref 0.8–3)
LYMPHOCYTES NFR BLD AUTO: 22.3 %
MCH RBC QN AUTO: 29.6 PG (ref 26–34)
MCHC RBC AUTO-ENTMCNC: 31.9 G/DL (ref 32–36)
MCV RBC AUTO: 93 FL (ref 80–100)
MONOCYTES # BLD AUTO: 0.81 X10*3/UL (ref 0.05–0.8)
MONOCYTES NFR BLD AUTO: 7.4 %
NEUTROPHILS # BLD AUTO: 6.43 X10*3/UL (ref 1.6–5.5)
NEUTROPHILS NFR BLD AUTO: 59 %
NRBC BLD-RTO: 0.4 /100 WBCS (ref 0–0)
P AXIS: 1 DEGREES
P OFFSET: 183 MS
P ONSET: 136 MS
PLATELET # BLD AUTO: 176 X10*3/UL (ref 150–450)
POTASSIUM SERPL-SCNC: 4.5 MMOL/L (ref 3.5–5.3)
PR INTERVAL: 152 MS
PROT SERPL-MCNC: 5 G/DL (ref 6.4–8.2)
Q ONSET: 212 MS
QRS COUNT: 14 BEATS
QRS DURATION: 140 MS
QT INTERVAL: 404 MS
QTC CALCULATION(BAZETT): 472 MS
QTC FREDERICIA: 448 MS
R AXIS: -48 DEGREES
RBC # BLD AUTO: 3.51 X10*6/UL (ref 4.5–5.9)
SODIUM SERPL-SCNC: 143 MMOL/L (ref 136–145)
T AXIS: 18 DEGREES
T OFFSET: 414 MS
VENTRICULAR RATE: 82 BPM
WBC # BLD AUTO: 10.9 X10*3/UL (ref 4.4–11.3)

## 2025-01-14 PROCEDURE — 2500000002 HC RX 250 W HCPCS SELF ADMINISTERED DRUGS (ALT 637 FOR MEDICARE OP, ALT 636 FOR OP/ED)

## 2025-01-14 PROCEDURE — 2500000001 HC RX 250 WO HCPCS SELF ADMINISTERED DRUGS (ALT 637 FOR MEDICARE OP)

## 2025-01-14 PROCEDURE — 1200000002 HC GENERAL ROOM WITH TELEMETRY DAILY

## 2025-01-14 PROCEDURE — 2500000001 HC RX 250 WO HCPCS SELF ADMINISTERED DRUGS (ALT 637 FOR MEDICARE OP): Performed by: STUDENT IN AN ORGANIZED HEALTH CARE EDUCATION/TRAINING PROGRAM

## 2025-01-14 PROCEDURE — 97535 SELF CARE MNGMENT TRAINING: CPT | Mod: GO

## 2025-01-14 PROCEDURE — 85025 COMPLETE CBC W/AUTO DIFF WBC: CPT | Performed by: STUDENT IN AN ORGANIZED HEALTH CARE EDUCATION/TRAINING PROGRAM

## 2025-01-14 PROCEDURE — 82947 ASSAY GLUCOSE BLOOD QUANT: CPT

## 2025-01-14 PROCEDURE — 99232 SBSQ HOSP IP/OBS MODERATE 35: CPT | Performed by: STUDENT IN AN ORGANIZED HEALTH CARE EDUCATION/TRAINING PROGRAM

## 2025-01-14 PROCEDURE — 97116 GAIT TRAINING THERAPY: CPT | Mod: GP,CQ | Performed by: PHYSICAL THERAPY ASSISTANT

## 2025-01-14 PROCEDURE — 80053 COMPREHEN METABOLIC PANEL: CPT | Performed by: STUDENT IN AN ORGANIZED HEALTH CARE EDUCATION/TRAINING PROGRAM

## 2025-01-14 PROCEDURE — 97110 THERAPEUTIC EXERCISES: CPT | Mod: GP,CQ | Performed by: PHYSICAL THERAPY ASSISTANT

## 2025-01-14 PROCEDURE — 83605 ASSAY OF LACTIC ACID: CPT | Performed by: STUDENT IN AN ORGANIZED HEALTH CARE EDUCATION/TRAINING PROGRAM

## 2025-01-14 PROCEDURE — 36415 COLL VENOUS BLD VENIPUNCTURE: CPT | Performed by: STUDENT IN AN ORGANIZED HEALTH CARE EDUCATION/TRAINING PROGRAM

## 2025-01-14 RX ADMIN — OXYCODONE HYDROCHLORIDE 5 MG: 5 TABLET ORAL at 18:27

## 2025-01-14 RX ADMIN — ACETAMINOPHEN 650 MG: 325 TABLET, FILM COATED ORAL at 06:01

## 2025-01-14 RX ADMIN — METOPROLOL SUCCINATE 25 MG: 25 TABLET, EXTENDED RELEASE ORAL at 10:56

## 2025-01-14 RX ADMIN — ACETAMINOPHEN 650 MG: 325 TABLET, FILM COATED ORAL at 00:05

## 2025-01-14 RX ADMIN — PREGABALIN 300 MG: 100 CAPSULE ORAL at 20:10

## 2025-01-14 RX ADMIN — ATORVASTATIN CALCIUM 10 MG: 10 TABLET, FILM COATED ORAL at 10:56

## 2025-01-14 RX ADMIN — ALLOPURINOL 300 MG: 300 TABLET ORAL at 10:56

## 2025-01-14 RX ADMIN — Medication 100 MG: at 10:57

## 2025-01-14 RX ADMIN — MULTIPLE VITAMINS W/ MINERALS TAB 1 TABLET: TAB ORAL at 10:57

## 2025-01-14 RX ADMIN — ACETAMINOPHEN 650 MG: 325 TABLET, FILM COATED ORAL at 17:51

## 2025-01-14 RX ADMIN — PANTOPRAZOLE SODIUM 40 MG: 40 TABLET, DELAYED RELEASE ORAL at 06:01

## 2025-01-14 RX ADMIN — LISINOPRIL 20 MG: 20 TABLET ORAL at 10:57

## 2025-01-14 RX ADMIN — ACETAMINOPHEN 650 MG: 325 TABLET, FILM COATED ORAL at 11:29

## 2025-01-14 RX ADMIN — PREGABALIN 300 MG: 100 CAPSULE ORAL at 10:58

## 2025-01-14 RX ADMIN — APIXABAN 2.5 MG: 2.5 TABLET, FILM COATED ORAL at 20:09

## 2025-01-14 RX ADMIN — FOLIC ACID 1 MG: 1 TABLET ORAL at 10:56

## 2025-01-14 RX ADMIN — ASPIRIN 81 MG: 81 TABLET, COATED ORAL at 10:56

## 2025-01-14 RX ADMIN — APIXABAN 2.5 MG: 2.5 TABLET, FILM COATED ORAL at 10:56

## 2025-01-14 RX ADMIN — TAMSULOSIN HYDROCHLORIDE 0.4 MG: 0.4 CAPSULE ORAL at 20:09

## 2025-01-14 RX ADMIN — OXYCODONE HYDROCHLORIDE 5 MG: 5 TABLET ORAL at 11:03

## 2025-01-14 ASSESSMENT — ENCOUNTER SYMPTOMS
FEVER: 0
ABDOMINAL DISTENTION: 0
VOMITING: 0
ABDOMINAL PAIN: 0
SHORTNESS OF BREATH: 0
DIARRHEA: 1

## 2025-01-14 ASSESSMENT — PAIN SCALES - PAIN ASSESSMENT IN ADVANCED DEMENTIA (PAINAD): TOTALSCORE: MEDICATION (SEE MAR)

## 2025-01-14 ASSESSMENT — PAIN DESCRIPTION - LOCATION
LOCATION: KNEE

## 2025-01-14 ASSESSMENT — PAIN DESCRIPTION - ORIENTATION
ORIENTATION: RIGHT

## 2025-01-14 ASSESSMENT — COGNITIVE AND FUNCTIONAL STATUS - GENERAL
DRESSING REGULAR LOWER BODY CLOTHING: A LOT
TURNING FROM BACK TO SIDE WHILE IN FLAT BAD: A LITTLE
WALKING IN HOSPITAL ROOM: A LOT
DRESSING REGULAR LOWER BODY CLOTHING: A LITTLE
HELP NEEDED FOR BATHING: A LOT
MOVING TO AND FROM BED TO CHAIR: A LOT
MOVING TO AND FROM BED TO CHAIR: A LOT
DAILY ACTIVITIY SCORE: 18
MOBILITY SCORE: 13
CLIMB 3 TO 5 STEPS WITH RAILING: A LOT
TURNING FROM BACK TO SIDE WHILE IN FLAT BAD: A LITTLE
DAILY ACTIVITIY SCORE: 16
STANDING UP FROM CHAIR USING ARMS: A LOT
DRESSING REGULAR UPPER BODY CLOTHING: A LITTLE
DRESSING REGULAR LOWER BODY CLOTHING: A LOT
WALKING IN HOSPITAL ROOM: A LOT
TOILETING: A LOT
PERSONAL GROOMING: A LOT
TURNING FROM BACK TO SIDE WHILE IN FLAT BAD: A LITTLE
PERSONAL GROOMING: A LOT
CLIMB 3 TO 5 STEPS WITH RAILING: TOTAL
MOVING FROM LYING ON BACK TO SITTING ON SIDE OF FLAT BED WITH BEDRAILS: A LITTLE
TOILETING: A LOT
TOILETING: A LOT
HELP NEEDED FOR BATHING: A LITTLE
WALKING IN HOSPITAL ROOM: A LOT
MOVING FROM LYING ON BACK TO SITTING ON SIDE OF FLAT BED WITH BEDRAILS: A LITTLE
STANDING UP FROM CHAIR USING ARMS: A LOT
DRESSING REGULAR UPPER BODY CLOTHING: A LITTLE
STANDING UP FROM CHAIR USING ARMS: A LOT
HELP NEEDED FOR BATHING: A LITTLE
CLIMB 3 TO 5 STEPS WITH RAILING: A LOT
MOVING TO AND FROM BED TO CHAIR: A LOT
MOBILITY SCORE: 14
DRESSING REGULAR UPPER BODY CLOTHING: A LITTLE
MOBILITY SCORE: 14
DAILY ACTIVITIY SCORE: 16
MOVING FROM LYING ON BACK TO SITTING ON SIDE OF FLAT BED WITH BEDRAILS: A LITTLE

## 2025-01-14 ASSESSMENT — PAIN SCALES - GENERAL
PAINLEVEL_OUTOF10: 6
PAINLEVEL_OUTOF10: 5 - MODERATE PAIN
PAINLEVEL_OUTOF10: 5 - MODERATE PAIN
PAINLEVEL_OUTOF10: 2
PAINLEVEL_OUTOF10: 2
PAINLEVEL_OUTOF10: 3
PAINLEVEL_OUTOF10: 5 - MODERATE PAIN
PAINLEVEL_OUTOF10: 6

## 2025-01-14 ASSESSMENT — PAIN - FUNCTIONAL ASSESSMENT
PAIN_FUNCTIONAL_ASSESSMENT: 0-10

## 2025-01-14 ASSESSMENT — ACTIVITIES OF DAILY LIVING (ADL): HOME_MANAGEMENT_TIME_ENTRY: 12

## 2025-01-14 ASSESSMENT — PAIN DESCRIPTION - DESCRIPTORS: DESCRIPTORS: ACHING

## 2025-01-14 NOTE — PROGRESS NOTES
Physical Therapy    Physical Therapy Treatment    Patient Name: Mikhail Moses  MRN: [id number]  Department: [place] [id number]  Room: [address] [place] [address]  Today's Date: 1/14/2025  Time Calculation  Start Time: 0956  Stop Time: 1022  Time Calculation (min): 26 min         Assessment/Plan   PT Assessment  End of Session Communication: Bedside nurse  Assessment Comment:  (pt dmeo fair miky to increased activity)  End of Session Patient Position: Up in chair, Alarm on  PT Plan  Inpatient/Swing Bed or Outpatient: Inpatient  PT Plan  Treatment/Interventions: Bed mobility, Transfer training, Gait training, Therapeutic activity, Therapeutic exercise  PT Plan: Ongoing PT  PT Frequency: BID  PT Discharge Recommendations: Moderate intensity level of continued care  Equipment Recommended upon Discharge: Wheeled walker, Straight cane (pt owns)  PT Recommended Transfer Status: Assist x1, Assistive device  PT - OK to Discharge:  (per PT POC)      General Visit Information:   PT  Visit  PT Received On: 01/14/25  General  Reason for Referral: s/p R TKA with Dr Bradford  Referred By: Hamida Padilla PA-C  PT Missed Visit: Yes  Missed Visit Reason: Patient in a medical procedure (Attempted Pt f/u tx, per RN report pt currently off the floor at CT scan for SBO r/o.)  Family/Caregiver Present: Yes  Caregiver Feedback: spouse in room  Prior to Session Communication: Bedside nurse  Patient Position Received: Bed, 3 rail up, Alarm on  Preferred Learning Style: auditory, kinesthetic, verbal  General Comment:  (pt agreeable to tx.)    Subjective   Precautions:  Precautions  LE Weight Bearing Status: Weight Bearing as Tolerated  Medical Precautions: Fall precautions    Vital Signs (Past 2hrs)                 Objective   Pain:  Pain Assessment  Pain Assessment: 0-10  0-10 (Numeric) Pain Score: 3  Cognition:  Cognition  Orientation Level: Oriented X4  Coordination:     Postural Control:     Extremity/Trunk Assessments:    Activity Tolerance:      Treatments:  Therapeutic Exercise  Therapeutic Exercise Performed: Yes  Therapeutic Exercise Activity 1: pt performed supine ther ex: AP, HS, GS, QS, SAQ, SLR, 10-12 reps each. pt req increased cues for proper breathing and technqiue.         Bed Mobility  Bed Mobility: Yes  Bed Mobility 1  Bed Mobility 1: Supine to sitting  Level of Assistance 1: Contact guard  Bed Mobility Comments 1:  (pt req min cues to complete sequencing.)    Ambulation/Gait Training  Ambulation/Gait Training Performed: Yes  Ambulation/Gait Training 1  Surface 1: Level tile  Device 1: Rolling walker  Gait Support Devices: Gait belt  Assistance 1: Moderate assistance, Minimal verbal cues, Minimal tactile cues  Quality of Gait 1: Narrow base of support, Inconsistent stride length, Decreased step length, Antalgic  Comments/Distance (ft) 1: 20ft x 2 (pt demo slwo antaligc short steps,fwd flex posture, min SOB,SPO2 reported to RN,pt reported no dizziness or lightheadedness.)  Transfers  Transfer: Yes  Transfer 1  Transfer From 1: Sit to, Stand to  Technique 1: Sit to stand, Stand to sit  Transfer Device 1: Walker, Gait belt  Transfer Level of Assistance 1: Moderate assistance, Moderate verbal cues, Moderate tactile cues  Trials/Comments 1:  (pt req increased cues for proper hand placement to complete safely)         Outcome Measures:  [facility] Basic Mobility  Turning from your back to your side while in a flat bed without using bedrails: A little  Moving from lying on your back to sitting on the side of a flat bed without using bedrails: A little  Moving to and from bed to chair (including a wheelchair): A lot  Standing up from a chair using your arms (e.g. wheelchair or bedside chair): A lot  To walk in hospital room: A lot  Climbing 3-5 steps with railing: Total  Basic Mobility - Total Score: 13    Education Documentation  Handouts, taught by Jarad Horne PTA at 1/14/2025  1:05 PM.  Learner: Patient  Readiness: Acceptance  Method:  Explanation  Response: Needs Reinforcement    Precautions, taught by Jarad Horne PTA at 1/14/2025  1:05 PM.  Learner: Patient  Readiness: Acceptance  Method: Explanation  Response: Needs Reinforcement    Body Mechanics, taught by Jarad Horne PTA at 1/14/2025  1:05 PM.  Learner: Patient  Readiness: Acceptance  Method: Explanation  Response: Needs Reinforcement    Home Exercise Program, taught by Jarad Horne PTA at 1/14/2025  1:05 PM.  Learner: Patient  Readiness: Acceptance  Method: Explanation  Response: Needs Reinforcement    Mobility Training, taught by Jarad Horne PTA at 1/14/2025  1:05 PM.  Learner: Patient  Readiness: Acceptance  Method: Explanation  Response: Needs Reinforcement    Education Comments  No comments found.        OP EDUCATION:       Encounter Problems       Encounter Problems (Active)       Mobility       STG - Patient will independently ambulate >100' with RW on level surfaces.       Start:  01/07/25    Expected End:  01/14/25            STG - Patient will ascend and descend four stairs using 1 rail and cane with SBA.       Start:  01/07/25    Expected End:  01/14/25               PT Transfers       STG - Patient to transfer to and from sit to supine independently from flat bed.  (Progressing)       Start:  01/07/25    Expected End:  01/14/25            STG - Patient will transfer sit to and from stand independently with RW. (Progressing)       Start:  01/07/25    Expected End:  01/14/25            Goal 1- HEP (Progressing)       Start:  01/07/25    Expected End:  01/14/25       Pt will recall 100% of TKA HEP with written handout independently.          PT Goal 2- ROM (Progressing)       Start:  01/07/25    Expected End:  01/14/25       Pt will increase R knee supine extension ROM to >/= -5 degrees and seated flexion ROM to >/=100 degrees.         STG - Patient will perform car transfer with RW and SBA.  (Progressing)       Start:  01/07/25                    Pain - Adult

## 2025-01-14 NOTE — PROGRESS NOTES
[facility] Hospitalist Progress Note        Between 7AM-7PM please message me via Epic Secure Chat.  After 7PM please page Nocturnist on call.        Assessment/Plan     Distributive shock - resolved  Suspected aspiration PNA - completed abx  Acute hypoxic resp failure - resolved  LA/leukocytosis - resolved  Nausea/abdominal pain - improving  Possible small subcapsular hematoma   S/p elective R TKA  Urinary retention  CAD w/ hx multiple PCIs/stents  HFmrEF  HTN/HLD  Hx provoked DVT (was off anticoag at this point)  Dementia  Hx COPD  Hx pulmonary HTN  CKD 2  Hx of Urethral stricture s/p urethroplasty and urethral dilation   Hx anemia of chronic disease  Hx alcohol use disorder  Hx gout    - advance diet today and see how her tolerates. He would like 1 more day in the hospital would plan on discharging him 1/15  - Ortho -> WBAT RLE, Eliquis on board for post-op DVT ppx. Pain/nausea control. Diet as tolerated.   - Urology has seen -> recommending keeping ulrich through after discharge due to urethral swelling. Fu with urology outpatient, referral in place  - pulm/cardio signed off    Fluids: None  Lytes: Replete as needed  Nutrition: Regular/low sodium  Ulrich: Yes  Invasive lines: None  Drains: None    DVT Prophylaxis:  Eliquis      Discharge Planning: SNF      I personally examined the patient and reviewed chart.  Plan of care was discussed with patient, all questions answered.    Total time spent: At least 38 minutes, providing counseling or in coordination of care. Total time on this day of visit includes record and documentation review before and after visit including documentation and time not explicitly included on EMR time stamp.      Subjective     Mikhail Moses is a 79 y.o. male on day 6 of admission presenting with Unilateral primary osteoarthritis, right knee.    NAEON. Overall stable, reports his abdominal pain and nausea are a bit better today. Having multiple loose stools. Has been up walking. Wanting to try  "more solid foods.    Review of Systems   Constitutional:  Negative for fever.   Respiratory:  Negative for shortness of breath.    Cardiovascular:  Negative for chest pain.   Gastrointestinal:  Positive for diarrhea. Negative for abdominal distention, abdominal pain and vomiting.       Objective     Physical Exam  Constitutional:       General: He is not in acute distress.  Cardiovascular:      Rate and Rhythm: Normal rate and regular rhythm.   Pulmonary:      Effort: Pulmonary effort is normal.      Breath sounds: Normal breath sounds.   Abdominal:      General: There is no distension.      Palpations: Abdomen is soft.   Genitourinary:     Comments:   Paul  Neurological:      Mental Status: He is alert. Mental status is at baseline.         Last Recorded Vitals  Blood pressure 168/78, pulse 81, temperature 35.9 °C (96.6 °F), temperature source Temporal, resp. rate 25, height 1.727 m (5' 8\"), weight 105 kg (232 lb 2.3 oz), SpO2 93%.  Intake/Output last 3 Shifts:  I/O last 3 completed shifts:  In: 1340 (12.7 mL/kg) [P.O.:1340]  Out: 2015 (19.1 mL/kg) [Urine:2015 (0.5 mL/kg/hr)]  Weight: 105.3 kg     Relevant Results  Results for orders placed or performed during the hospital encounter of 01/07/25 (from the past 24 hours)   POCT GLUCOSE   Result Value Ref Range    POCT Glucose 108 (H) 74 - 99 mg/dL   POCT GLUCOSE   Result Value Ref Range    POCT Glucose 105 (H) 74 - 99 mg/dL   Lactate   Result Value Ref Range    Lactate 1.0 0.4 - 2.0 mmol/L   CBC   Result Value Ref Range    WBC 10.6 4.4 - 11.3 x10*3/uL    nRBC 0.4 (H) 0.0 - 0.0 /100 WBCs    RBC 3.65 (L) 4.50 - 5.90 x10*6/uL    Hemoglobin 10.8 (L) 13.5 - 17.5 g/dL    Hematocrit 31.5 (L) 41.0 - 52.0 %    MCV 86 80 - 100 fL    MCH 29.6 26.0 - 34.0 pg    MCHC 34.3 32.0 - 36.0 g/dL    RDW 14.1 11.5 - 14.5 %    Platelets 170 150 - 450 x10*3/uL   Green Top   Result Value Ref Range    Extra Tube Hold for add-ons.    CBC and Auto Differential   Result Value Ref Range    WBC " 10.9 4.4 - 11.3 x10*3/uL    nRBC 0.4 (H) 0.0 - 0.0 /100 WBCs    RBC 3.51 (L) 4.50 - 5.90 x10*6/uL    Hemoglobin 10.4 (L) 13.5 - 17.5 g/dL    Hematocrit 32.6 (L) 41.0 - 52.0 %    MCV 93 80 - 100 fL    MCH 29.6 26.0 - 34.0 pg    MCHC 31.9 (L) 32.0 - 36.0 g/dL    RDW 14.3 11.5 - 14.5 %    Platelets 176 150 - 450 x10*3/uL    Neutrophils % 59.0 40.0 - 80.0 %    Immature Granulocytes %, Automated 4.1 (H) 0.0 - 0.9 %    Lymphocytes % 22.3 13.0 - 44.0 %    Monocytes % 7.4 2.0 - 10.0 %    Eosinophils % 6.9 0.0 - 6.0 %    Basophils % 0.3 0.0 - 2.0 %    Neutrophils Absolute 6.43 (H) 1.60 - 5.50 x10*3/uL    Immature Granulocytes Absolute, Automated 0.45 0.00 - 0.50 x10*3/uL    Lymphocytes Absolute 2.43 0.80 - 3.00 x10*3/uL    Monocytes Absolute 0.81 (H) 0.05 - 0.80 x10*3/uL    Eosinophils Absolute 0.75 (H) 0.00 - 0.40 x10*3/uL    Basophils Absolute 0.03 0.00 - 0.10 x10*3/uL   Comprehensive Metabolic Panel   Result Value Ref Range    Glucose 78 74 - 99 mg/dL    Sodium 143 136 - 145 mmol/L    Potassium 4.5 3.5 - 5.3 mmol/L    Chloride 110 (H) 98 - 107 mmol/L    Bicarbonate 26 21 - 32 mmol/L    Anion Gap 12 10 - 20 mmol/L    Urea Nitrogen 16 6 - 23 mg/dL    Creatinine 1.00 0.50 - 1.30 mg/dL    eGFR 77 >60 mL/min/1.73m*2    Calcium 7.6 (L) 8.6 - 10.3 mg/dL    Albumin 2.8 (L) 3.4 - 5.0 g/dL    Alkaline Phosphatase 79 33 - 136 U/L    Total Protein 5.0 (L) 6.4 - 8.2 g/dL    AST 20 9 - 39 U/L    Bilirubin, Total 0.6 0.0 - 1.2 mg/dL    ALT 12 10 - 52 U/L   Lactate   Result Value Ref Range    Lactate 0.9 0.4 - 2.0 mmol/L   POCT GLUCOSE   Result Value Ref Range    POCT Glucose 88 74 - 99 mg/dL       Imaging Results  CT abdomen pelvis w IV contrast    Result Date: 1/13/2025  Interpreted By:  Melissa Peña, STUDY: CT ABDOMEN PELVIS W IV CONTRAST;  1/13/2025 1:28 pm   INDICATION: Signs/Symptoms:new abd pain, elevated lactate.   COMPARISON: None   ACCESSION NUMBER(S): [id number]   ORDERING CLINICIAN: TONIO LAWSON   TECHNIQUE: Axial CT  of the abdomen and pelvis was performed following intravenous administration of 75 ml of contrast Omnipaque 350 with coronal and sagittal reconstruction.   FINDINGS: Lower chest: Small to moderate bilateral pleural effusions are present with adjacent atelectasis.   Liver: No mass.   Biliary: No intrahepatic or extrahepatic bile duct dilation. No cholelithiasis.   Spleen: There is a thin rim of hypodensity in the lateral subcapsular region of the spleen measuring up to 8 mm in thickness. No mass. No splenomegaly.   Pancreas: No mass or duct dilation.   Adrenals: Normal.   Kidneys: Vascular calcification is noted in the posterior midpole of the right kidney. Otherwise, no suspicious mass, calculus or hydronephrosis is visualized in either kidney.   GI tract: A moderate-sized hiatal hernia containing the proximal 1/3 of the stomach is noted without evidence of obstruction. No bowel wall thickening or dilation. The appendix is normal. There is a small amount nonspecific dependent hypodensities within the cecum (series 201, image 114).   Lymph nodes: No abdominopelvic lymphadenopathy.   Mesentery/peritoneum: No free fluid, free air or fluid collection.   Vasculature: Moderate to severe abdominal aortic atherosclerotic calcifications without aneurysmal dilation. The celiac trunk and SMA are patent. The hepatic, portal, superior mesenteric and splenic veins are well opacified. The IVC is normal.   Pelvis: No free fluid, free air or fluid collection. The bladder is underdistended containing a Paul balloon in small amount of postprocedural air. There is nonspecific circumferential bladder wall thickening likely in part related to underdistention.   Bones/Soft tissues: Moderate L2-L3 degenerative disc disease and minimal grade 1 anterolisthesis of L4 over L5 without pars defects. There are midline anterior abdominal wall postoperative changes likely related to prior ventral hernia repair without evidence of recurrence. Mild  diffuse body wall edema is noted.       A thin rim of perisplenic hypodensity could represent a small subcapsular hematoma.   Small amount of dependent hyperdensity within the cecum may be related to ingested material versus blood products. Consider trending serum hematocrit.   No bowel obstruction or pneumatosis.   MACRO: None   Signed by: Melissa Peña 1/13/2025 2:04 PM Dictation workstation:   [id number]      Medications  acetaminophen, 650 mg, oral, q6h ANGEL  allopurinol, 300 mg, oral, Daily  apixaban, 2.5 mg, oral, BID  aspirin, 81 mg, oral, Daily  atorvastatin, 10 mg, oral, Daily  docusate sodium, 100 mg, oral, BID  folic acid, 1 mg, oral, Daily  furosemide, 40 mg, oral, Daily  lisinopril, 20 mg, oral, Daily  metoprolol succinate XL, 25 mg, oral, Daily  multivitamin with minerals, 1 tablet, oral, Daily  pantoprazole, 40 mg, oral, Daily before breakfast  polyethylene glycol, 17 g, oral, Daily  pregabalin, 300 mg, oral, BID  tamsulosin, 0.4 mg, oral, Nightly  thiamine, 100 mg, oral, Daily       PRN medications: benzocaine-menthol, bisacodyl, bisacodyl, calcium carbonate, cyclobenzaprine, dextrose, dextrose, glucagon, glucagon, HYDROmorphone, ipratropium-albuteroL, metoclopramide **OR** metoclopramide, naloxone, ondansetron **OR** ondansetron, oxyCODONE, oxygen, sodium chloride                Ulysses Bower MD  [facility] Medicine

## 2025-01-14 NOTE — PROGRESS NOTES
Physical Therapy                 Therapy Communication Note    Patient Name: Mikhail Moses  MRN: [id number]  Department: [place] [address]  Room: [address] [place] [address]  Today's Date: 1/14/2025     Discipline: Physical Therapy    PT Missed Visit: Yes     Missed Visit Reason: Missed Visit Reason: Patient refused (AttemptedPT f/u tx, pt supine,reporting increased fatigue and pain didn't rate,declining tx at this time,RN notified.)    Missed Time: Attempt    Comment:

## 2025-01-14 NOTE — PROGRESS NOTES
"Orthopaedic Surgery Progress Note    Subjective:  CT A/P obtained yesterday for nausea/vomiting and elevated lactate, demonstrating no obstruction or acute pathology. Lactate and WBC normalized, patient feeling better today without acute concerns.    O:  /85 (BP Location: Left arm, Patient Position: Lying)   Pulse 77   Temp 36.5 °C (97.7 °F) (Oral)   Resp 18   Ht 1.727 m (5' 8\")   Wt 105 kg (232 lb 2.3 oz) Comment: done by night shift  SpO2 94%   BMI 35.30 kg/m²     Gen: arousable, NAD, appropriately conversational  Cardiac: RRR to peripheral palpation  Resp: nonlabored on RA    MSK:  RLE  - Mepilex dressing c/d/I, lateral dressing with some strikethrough, no drainage  - Fires DF/PF/EHL/FHL  - Residual numbness from block over medial calf  - Foot warm, well perfused  - DP/PT pulse, brisk cap refill  - Compartments soft and compressible    A/P: 79 y.o. male s/p R TKA on 1/7 with Dr. Bradford.  Postop course c/b worsening hypoxia and transferred to ICU POD1, now off of pressors and off HFNC now on RNF. Completed steroid tx yesterday for PNA and finishing abx tx.     Plan:  - Weight bearing: WBAT RLE  - DVT ppx: SCDs, please keep on Eliquis postop for DVT ppx  - Diet: Regular  - Pain: Tylenol, oxycodone 5/10  - Antibiotics: empiric Zosyn per Medicine  - FEN: HLIV with good PO intake  - Bowel Regimen: Colace, senna, dulcolax  - PT/OT, rec for SNF  - Continue home medications  - Appreciate medicine care    Dispo: DC to SNF pending clinical course/medical clearance.    This patient was discussed with attending, Dr. Bradford.     Rhonda Henriquez, PGY-2  Orthopedic Surgery Resident  Available via [employer]     While admitted, this patient will be followed by the Ortho Joints Team. Please contact below residents with any questions (available via Epic Chat).     First call: Zheng Cravne, PGY-1  Second call: Rhonda Henriquez, PGY-2  Third call: Susan Gusman, PGY-4    On weekends and after 6PM:  At [facility]: " Please reach out to the orthopaedic on-call resident ([phone])  At [facility]: Please reach out to the orthopaedic on-call HEATHER or resident (please refer to Malena)

## 2025-01-14 NOTE — NURSING NOTE
Interdisciplinary team present: NP, PT, NM, CC, SW, Orthopedic Coordinator, and bedside RN.  Pain - controlled  Nausea - none  Discharge barrier -medical clearance  Discharge plan - SNF  Discharge date/time -pending

## 2025-01-14 NOTE — CARE PLAN
The patient's goals for the shift include      The clinical goals for the shift include Maintain pt. comfort    Over the shift, the patient did not make progress toward the following goals. Barriers to progression include patient's intolerance to pain. Recommendations to address these barriers include provide methods of distraction.

## 2025-01-14 NOTE — PROGRESS NOTES
01/14/25 1148   Discharge Planning   Expected Discharge Disposition SNF     Pt diet advanced to reg. Poss dc tomorrow. [facility].

## 2025-01-14 NOTE — PROGRESS NOTES
Occupational Therapy    Occupational Therapy Treatment    Name: Mikhail Moses  MRN: [id number]  Department: [facility]  Room: [facility]  Date: 01/14/25  Time Calculation  Start Time: 1125  Stop Time: 1137  Time Calculation (min): 12 min    Assessment:  OT Assessment: Pt seen for OT tx. Pt fatigues with activity. Pt  making slow steady progress towards goals. Pt would benefit from MOD intnesity therapy at d/c  Prognosis: Good  Barriers to Discharge Home: Caregiver assistance  Caregiver Assistance: Caregiver assistance needed per identified barriers - however, level of patient's required assistance exceeds assistance available at home  Evaluation/Treatment Tolerance: Patient limited by fatigue  Medical Staff Made Aware: Yes  End of Session Communication: Bedside nurse  End of Session Patient Position: Bed, 3 rail up, Alarm on  Plan:  Treatment Interventions: ADL retraining, Functional transfer training, Endurance training, Patient/family training, Equipment evaluation/education  OT Frequency: 3 times per week  OT Discharge Recommendations: Moderate intensity level of continued care  Equipment Recommended upon Discharge: Wheeled walker  OT Recommended Transfer Status: Minimal assist  OT - OK to Discharge: Yes    Subjective   Previous Visit Info:  OT Last Visit  OT Received On: 01/14/25  General:  General  Reason for Referral: s/p R TKA with Dr Bradford  Prior to Session Communication: Bedside nurse  Patient Position Received: Up in chair, Alarm off, not on at start of session  Preferred Learning Style: auditory, kinesthetic, verbal, visual  Precautions:  LE Weight Bearing Status: Weight Bearing as Tolerated  Medical Precautions: Fall precautions  Post-Surgical Precautions: Right total knee precautions    Vital Signs (Past 2hrs)        Date/Time Vitals Session Patient Position Pulse Resp SpO2 BP MAP (mmHg)    01/14/25 1103 --  --  --  --  96 %  --  --                        Pain Assessment:  Pain Assessment  Pain  Assessment: 0-10  0-10 (Numeric) Pain Score: 2  Pain Type: Acute pain, Surgical pain  Pain Location: Knee  Pain Orientation: Right  Pain Descriptors: Aching  Pain Frequency: Constant/continuous  Pain Onset: Ongoing  Clinical Progression: Gradually improving  Pain Interventions: Repositioned, Ambulation/increased activity, Distraction     Objective   Cognition:  Overall Cognitive Status: Within Functional Limits  Activities of Daily Living:      LE Dressing  LE Dressing: Yes  Sock Level of Assistance: Close supervision  LE Dressing Where Assessed: Chair     Bed Mobility/Transfers: Bed Mobility  Bed Mobility: Yes  Bed Mobility 1  Bed Mobility 1: Sitting to supine  Level of Assistance 1: Contact guard    Transfers  Transfer: Yes  Transfer 1  Technique 1: Sit to stand, Stand to sit  Transfer Device 1: Walker  Transfer Level of Assistance 1: Moderate assistance, Minimal verbal cues, Moderate tactile cues      Functional Mobility:  Functional Mobility  Functional Mobility Performed: Yes  Functional Mobility 1  Surface 1: Level tile  Device 1: Rolling walker  Assistance 1: Contact guard  Quality of Functional Mobility 1: Narrow base of support  Comments 1: Fatigue with activity  Sitting Balance:  Static Sitting Balance  Static Sitting-Balance Support: Feet supported  Static Sitting-Level of Assistance: Contact guard  Dynamic Sitting Balance  Dynamic Sitting-Balance Support: Feet supported  Dynamic Sitting-Level of Assistance: Contact guard  Dynamic Sitting-Balance: Reaching for objects  Standing Balance:  Static Standing Balance  Static Standing-Balance Support: Bilateral upper extremity supported  Static Standing-Level of Assistance: Contact guard  Dynamic Standing Balance  Dynamic Standing-Balance Support: Bilateral upper extremity supported  Dynamic Standing-Level of Assistance: Minimum assistance     Therapy/Activity: Therapeutic Activity  Therapeutic Activity Performed: Yes  Therapeutic Activity 1: Reviewed  precautions, pt states understanding. per pt has had knee sx in the past  Therapeutic Activity 2: reviewed pursed lip breathing and energy conservation techniques. Pt able to demonstrate breathing techniques with some cues. Per pt he has not felt any SOB.    Outcome Measures:  [facility] Daily Activity  Putting on and taking off regular lower body clothing: A little  Bathing (including washing, rinsing, drying): A lot  Putting on and taking off regular upper body clothing: A little  Toileting, which includes using toilet, bedpan or urinal: A lot  Taking care of personal grooming such as brushing teeth: None  Eating Meals: None  Daily Activity - Total Score: 18        Education Documentation  Home Exercise Program, taught by Aleta Jacobs, OT at 1/14/2025 12:08 PM.  Learner: Patient  Readiness: Acceptance  Method: Explanation, Demonstration  Response: Verbalizes Understanding, Needs Reinforcement    Body Mechanics, taught by Aleta Jacobs, OT at 1/14/2025 12:08 PM.  Learner: Patient  Readiness: Acceptance  Method: Explanation, Demonstration  Response: Verbalizes Understanding, Needs Reinforcement    Precautions, taught by Aleta Jacobs OT at 1/14/2025 12:08 PM.  Learner: Patient  Readiness: Acceptance  Method: Explanation, Demonstration  Response: Verbalizes Understanding, Needs Reinforcement    ADL Training, taught by Aleta Jacobs OT at 1/14/2025 12:08 PM.  Learner: Patient  Readiness: Acceptance  Method: Explanation, Demonstration  Response: Verbalizes Understanding, Needs Reinforcement    Education Comments  No comments found.      Goals:  Encounter Problems       Encounter Problems (Active)       Bathing       STG - Patient will bathe body SBA. (Progressing)       Start:  01/11/25    Expected End:  01/25/25               Dressing Upper Extremities       LTG - Patient will complete upper body dressing independently.  (Progressing)       Start:  01/11/25    Expected End:  01/25/25                Dressings Lower Extremities       STG - Patient to complete lower body dressing Min A. (Progressing)       Start:  01/11/25    Expected End:  01/25/25               Grooming       STG - Patient completes grooming while standing in front of sink (Indep). (Progressing)       Start:  01/11/25    Expected End:  01/25/25               Toileting       STG - Patient will complete toileting tasks with Mod I.  (Progressing)       Start:  01/11/25    Expected End:  01/25/25

## 2025-01-14 NOTE — CARE PLAN
The patient's goals for the shift include      The clinical goals for the shift include pt will remain safe throughout the shift

## 2025-01-15 LAB
GLUCOSE BLD MANUAL STRIP-MCNC: 98 MG/DL (ref 74–99)
GLUCOSE BLD MANUAL STRIP-MCNC: 99 MG/DL (ref 74–99)

## 2025-01-15 PROCEDURE — 2500000001 HC RX 250 WO HCPCS SELF ADMINISTERED DRUGS (ALT 637 FOR MEDICARE OP)

## 2025-01-15 PROCEDURE — 99232 SBSQ HOSP IP/OBS MODERATE 35: CPT | Performed by: STUDENT IN AN ORGANIZED HEALTH CARE EDUCATION/TRAINING PROGRAM

## 2025-01-15 PROCEDURE — 2500000001 HC RX 250 WO HCPCS SELF ADMINISTERED DRUGS (ALT 637 FOR MEDICARE OP): Performed by: STUDENT IN AN ORGANIZED HEALTH CARE EDUCATION/TRAINING PROGRAM

## 2025-01-15 PROCEDURE — 97116 GAIT TRAINING THERAPY: CPT | Mod: GP,CQ

## 2025-01-15 PROCEDURE — 2500000005 HC RX 250 GENERAL PHARMACY W/O HCPCS

## 2025-01-15 PROCEDURE — 2500000002 HC RX 250 W HCPCS SELF ADMINISTERED DRUGS (ALT 637 FOR MEDICARE OP, ALT 636 FOR OP/ED)

## 2025-01-15 PROCEDURE — 97110 THERAPEUTIC EXERCISES: CPT | Mod: GP,CQ

## 2025-01-15 PROCEDURE — 1200000002 HC GENERAL ROOM WITH TELEMETRY DAILY

## 2025-01-15 PROCEDURE — 2500000004 HC RX 250 GENERAL PHARMACY W/ HCPCS (ALT 636 FOR OP/ED): Performed by: STUDENT IN AN ORGANIZED HEALTH CARE EDUCATION/TRAINING PROGRAM

## 2025-01-15 PROCEDURE — 82947 ASSAY GLUCOSE BLOOD QUANT: CPT

## 2025-01-15 PROCEDURE — 2500000004 HC RX 250 GENERAL PHARMACY W/ HCPCS (ALT 636 FOR OP/ED)

## 2025-01-15 RX ADMIN — Medication 2.5 L/MIN: at 09:00

## 2025-01-15 RX ADMIN — DOCUSATE SODIUM 100 MG: 100 CAPSULE, LIQUID FILLED ORAL at 10:15

## 2025-01-15 RX ADMIN — ALLOPURINOL 300 MG: 300 TABLET ORAL at 10:16

## 2025-01-15 RX ADMIN — PREGABALIN 300 MG: 100 CAPSULE ORAL at 10:15

## 2025-01-15 RX ADMIN — ACETAMINOPHEN 650 MG: 325 TABLET, FILM COATED ORAL at 00:30

## 2025-01-15 RX ADMIN — OXYCODONE HYDROCHLORIDE 5 MG: 5 TABLET ORAL at 17:39

## 2025-01-15 RX ADMIN — ATORVASTATIN CALCIUM 10 MG: 10 TABLET, FILM COATED ORAL at 10:15

## 2025-01-15 RX ADMIN — FOLIC ACID 1 MG: 1 TABLET ORAL at 10:16

## 2025-01-15 RX ADMIN — OXYCODONE HYDROCHLORIDE 5 MG: 5 TABLET ORAL at 22:24

## 2025-01-15 RX ADMIN — LISINOPRIL 20 MG: 20 TABLET ORAL at 10:16

## 2025-01-15 RX ADMIN — ACETAMINOPHEN 650 MG: 325 TABLET, FILM COATED ORAL at 11:56

## 2025-01-15 RX ADMIN — TAMSULOSIN HYDROCHLORIDE 0.4 MG: 0.4 CAPSULE ORAL at 21:24

## 2025-01-15 RX ADMIN — PANTOPRAZOLE SODIUM 40 MG: 40 TABLET, DELAYED RELEASE ORAL at 06:31

## 2025-01-15 RX ADMIN — OXYCODONE HYDROCHLORIDE 5 MG: 5 TABLET ORAL at 10:16

## 2025-01-15 RX ADMIN — MULTIPLE VITAMINS W/ MINERALS TAB 1 TABLET: TAB ORAL at 10:15

## 2025-01-15 RX ADMIN — Medication 100 MG: at 10:16

## 2025-01-15 RX ADMIN — POLYETHYLENE GLYCOL 3350 17 G: 17 POWDER, FOR SOLUTION ORAL at 10:17

## 2025-01-15 RX ADMIN — PREGABALIN 300 MG: 100 CAPSULE ORAL at 21:24

## 2025-01-15 RX ADMIN — ACETAMINOPHEN 650 MG: 325 TABLET, FILM COATED ORAL at 06:31

## 2025-01-15 RX ADMIN — FUROSEMIDE 40 MG: 40 TABLET ORAL at 10:16

## 2025-01-15 RX ADMIN — METOPROLOL SUCCINATE 25 MG: 25 TABLET, EXTENDED RELEASE ORAL at 10:16

## 2025-01-15 RX ADMIN — Medication 1 L/MIN: at 15:37

## 2025-01-15 RX ADMIN — APIXABAN 2.5 MG: 2.5 TABLET, FILM COATED ORAL at 10:16

## 2025-01-15 RX ADMIN — DOCUSATE SODIUM 100 MG: 100 CAPSULE, LIQUID FILLED ORAL at 21:24

## 2025-01-15 RX ADMIN — ASPIRIN 81 MG: 81 TABLET, COATED ORAL at 10:16

## 2025-01-15 RX ADMIN — HYDROMORPHONE HYDROCHLORIDE 0.2 MG: 0.2 INJECTION, SOLUTION INTRAMUSCULAR; INTRAVENOUS; SUBCUTANEOUS at 14:49

## 2025-01-15 RX ADMIN — ACETAMINOPHEN 650 MG: 325 TABLET, FILM COATED ORAL at 17:40

## 2025-01-15 RX ADMIN — APIXABAN 2.5 MG: 2.5 TABLET, FILM COATED ORAL at 21:24

## 2025-01-15 ASSESSMENT — PAIN - FUNCTIONAL ASSESSMENT
PAIN_FUNCTIONAL_ASSESSMENT: 0-10

## 2025-01-15 ASSESSMENT — COGNITIVE AND FUNCTIONAL STATUS - GENERAL
MOVING FROM LYING ON BACK TO SITTING ON SIDE OF FLAT BED WITH BEDRAILS: A LITTLE
WALKING IN HOSPITAL ROOM: A LITTLE
MOBILITY SCORE: 14
MOVING TO AND FROM BED TO CHAIR: A LOT
DRESSING REGULAR UPPER BODY CLOTHING: A LITTLE
CLIMB 3 TO 5 STEPS WITH RAILING: A LOT
WALKING IN HOSPITAL ROOM: A LITTLE
TURNING FROM BACK TO SIDE WHILE IN FLAT BAD: A LITTLE
TURNING FROM BACK TO SIDE WHILE IN FLAT BAD: A LITTLE
STANDING UP FROM CHAIR USING ARMS: A LOT
STANDING UP FROM CHAIR USING ARMS: A LITTLE
MOVING FROM LYING ON BACK TO SITTING ON SIDE OF FLAT BED WITH BEDRAILS: A LITTLE
TOILETING: A LITTLE
MOVING TO AND FROM BED TO CHAIR: A LITTLE
DRESSING REGULAR LOWER BODY CLOTHING: A LITTLE
DAILY ACTIVITIY SCORE: 20
HELP NEEDED FOR BATHING: A LITTLE
CLIMB 3 TO 5 STEPS WITH RAILING: TOTAL
MOBILITY SCORE: 17

## 2025-01-15 ASSESSMENT — PAIN DESCRIPTION - LOCATION
LOCATION: KNEE

## 2025-01-15 ASSESSMENT — PAIN SCALES - GENERAL
PAINLEVEL_OUTOF10: 6
PAINLEVEL_OUTOF10: 6
PAINLEVEL_OUTOF10: 8
PAINLEVEL_OUTOF10: 5 - MODERATE PAIN
PAINLEVEL_OUTOF10: 3
PAINLEVEL_OUTOF10: 3
PAINLEVEL_OUTOF10: 5 - MODERATE PAIN
PAINLEVEL_OUTOF10: 4

## 2025-01-15 ASSESSMENT — PAIN DESCRIPTION - ORIENTATION
ORIENTATION: RIGHT

## 2025-01-15 ASSESSMENT — ENCOUNTER SYMPTOMS
ABDOMINAL DISTENTION: 0
SHORTNESS OF BREATH: 0
ABDOMINAL PAIN: 0
VOMITING: 0
FEVER: 0
DIARRHEA: 1

## 2025-01-15 NOTE — PROGRESS NOTES
"Physical Therapy                 Therapy Communication Note    Patient Name: Mikhail Moses  MRN: [id number]  Department: [place] [address]  Room: [address] [place] [address]  Today's Date: 1/15/2025     Discipline: Physical Therapy    PT Missed Visit: Yes     Missed Visit Reason: Missed Visit Reason: Patient refused (Pt asleep in bed. Stating \"no more therapy today\".  reporting \"too much pain\" in his back. RN aware.)    Missed Time: Attempt      "

## 2025-01-15 NOTE — PROGRESS NOTES
"Physical Therapy    Physical Therapy Treatment    Patient Name: Mikhail Moses  MRN: [id number]  Department: [place] [address]  Room: [address] [place] [address]  Today's Date: 1/15/2025  Time Calculation  Start Time: 1025  Stop Time: 1050  Time Calculation (min): 25 min         Assessment/Plan   PT Assessment  PT Assessment Results: Decreased strength, Decreased range of motion, Decreased mobility, Obesity, Pain, Orthopedic restrictions  Rehab Prognosis: Good  Barriers to Discharge Home: No anticipated barriers  Evaluation/Treatment Tolerance: Patient limited by pain  Strengths: Ability to acquire knowledge, Attitude of self, Support of Caregivers  End of Session Communication: Bedside nurse  Assessment Comment: Pt demonstrating limited dynamic standing activity due to increased pain with WB on R LE. Pt stating \"I need to sit down\" when ambulating, limiting his duration.  End of Session Patient Position: Up in chair, Alarm on  PT Plan  Inpatient/Swing Bed or Outpatient: Inpatient  PT Plan  Treatment/Interventions: Bed mobility, Transfer training, Gait training, Endurance training, Range of motion, Therapeutic exercise, Therapeutic activity, Home exercise program  PT Plan: Ongoing PT  PT Frequency: BID  PT Discharge Recommendations: Moderate intensity level of continued care  Equipment Recommended upon Discharge: Wheeled walker  PT Recommended Transfer Status: Assist x1, Assistive device  PT - OK to Discharge: Yes (per PT POC)      General Visit Information:   PT  Visit  PT Received On: 01/15/25  General  Reason for Referral: s/p R TKA with Dr Bradford  Family/Caregiver Present: No  Prior to Session Communication: Bedside nurse  Patient Position Received: Bed, 3 rail up, Alarm on  Preferred Learning Style: auditory, kinesthetic, verbal, visual  General Comment: Pt complained of being light headed/dizzy wtih sitting and standing though it slowed his participation, it was not limiting.       Subjective "   Precautions:  Precautions  Hearing/Visual Limitations: WFL  LE Weight Bearing Status: Weight Bearing as Tolerated  Medical Precautions: Fall precautions  Post-Surgical Precautions: Right total knee precautions            Objective   Pain:  Pain Assessment  Pain Assessment: 0-10  0-10 (Numeric) Pain Score: 5 - Moderate pain (Pain meds taken prior to this tx. INcreased with R LE WB)  Pain Type: Surgical pain  Pain Location: Knee  Pain Orientation: Right  Cognition:  Cognition  Orientation Level: Oriented X4  Coordination:  Movements are Fluid and Coordinated: Yes  Postural Control:  Postural Control  Postural Control: Within Functional Limits  Static Sitting Balance  Static Sitting-Balance Support: No upper extremity supported, Feet supported  Static Sitting-Level of Assistance: Distant supervision  Static Sitting-Comment/Number of Minutes: EOB  Dynamic Sitting Balance  Dynamic Sitting-Balance Support: Bilateral upper extremity supported, Feet supported  Dynamic Sitting-Level of Assistance: Close supervision  Dynamic Sitting-Balance: Forward lean (scooting)  Static Standing Balance  Static Standing-Balance Support: Bilateral upper extremity supported  Static Standing-Level of Assistance: Contact guard  Static Standing-Comment/Number of Minutes: Fww support  Dynamic Standing Balance  Dynamic Standing-Balance Support: Bilateral upper extremity supported  Dynamic Standing-Level of Assistance: Contact guard  Dynamic Standing-Balance: Turning  Dynamic Standing-Comments: FWW support    Activity Tolerance:  Activity Tolerance  Endurance: Tolerates 10 - 20 min exercise with multiple rests  Treatments:  Therapeutic Exercise  Therapeutic Exercise Performed: Yes  Therapeutic Exercise Activity 1: Continued pt instruction for R TKA HEP. Min cues needed to correct form for max benefits. Performed AP, GS, QS, HS, HA, SAQ, SLR, all x 15 reps each.         Bed Mobility  Bed Mobility: Yes  Bed Mobility 1  Bed Mobility 1: Supine to  sitting  Level of Assistance 1: Close supervision, Minimal verbal cues  Bed Mobility Comments 1: using R rail to pull on. Flat bed surface.  Bed Mobility 2  Bed Mobility  2: Scooting  Level of Assistance 2: Close supervision  Bed Mobility Comments 2: in sitting toward EOB    Ambulation/Gait Training  Ambulation/Gait Training Performed: Yes  Ambulation/Gait Training 1  Surface 1: Level tile  Device 1: Rolling walker  Gait Support Devices: Gait belt  Assistance 1: Minimum assistance  Quality of Gait 1: Narrow base of support, Inconsistent stride length, Decreased step length, Antalgic  Comments/Distance (ft) 1: x 18 ft. Pt complained of high level knee pain with WB, limiting distance.  Transfers  Transfer: Yes  Transfer 1  Transfer From 1: Sit to, Stand to  Technique 1: Sit to stand, Stand to sit  Transfer Device 1: Walker, Gait belt  Transfer Level of Assistance 1: Moderate assistance, Minimal verbal cues  Trials/Comments 1: from EOB, cues needed for safe and strategic hand placement.    Outcome Measures:  [facility] Basic Mobility  Turning from your back to your side while in a flat bed without using bedrails: A little  Moving from lying on your back to sitting on the side of a flat bed without using bedrails: A little  Moving to and from bed to chair (including a wheelchair): A lot  Standing up from a chair using your arms (e.g. wheelchair or bedside chair): A lot  To walk in hospital room: A little  Climbing 3-5 steps with railing: Total  Basic Mobility - Total Score: 14    Education Documentation  Handouts, taught by Mariana Morin PTA at 1/15/2025 12:40 PM.  Learner: Patient  Readiness: Acceptance  Method: Explanation, Demonstration  Response: Verbalizes Understanding, Demonstrated Understanding, Needs Reinforcement    Precautions, taught by Mariana Morin PTA at 1/15/2025 12:40 PM.  Learner: Patient  Readiness: Acceptance  Method: Explanation, Demonstration  Response: Verbalizes Understanding, Demonstrated  Understanding, Needs Reinforcement    Body Mechanics, taught by Mariana Morin PTA at 1/15/2025 12:40 PM.  Learner: Patient  Readiness: Acceptance  Method: Explanation, Demonstration  Response: Verbalizes Understanding, Demonstrated Understanding, Needs Reinforcement    Home Exercise Program, taught by Mariana Morin PTA at 1/15/2025 12:40 PM.  Learner: Patient  Readiness: Acceptance  Method: Explanation, Demonstration  Response: Verbalizes Understanding, Demonstrated Understanding, Needs Reinforcement    Mobility Training, taught by Mariana Morin PTA at 1/15/2025 12:40 PM.  Learner: Patient  Readiness: Acceptance  Method: Explanation, Demonstration  Response: Verbalizes Understanding, Demonstrated Understanding, Needs Reinforcement    Education Comments  No comments found.        OP EDUCATION:       Encounter Problems       Encounter Problems (Active)       Mobility       STG - Patient will independently ambulate >100' with RW on level surfaces. (Not Progressing)       Start:  01/07/25    Expected End:  01/14/25            STG - Patient will ascend and descend four stairs using 1 rail and cane with SBA. (Not Progressing)       Start:  01/07/25    Expected End:  01/14/25               PT Transfers       STG - Patient to transfer to and from sit to supine independently from flat bed.  (Progressing)       Start:  01/07/25    Expected End:  01/14/25            STG - Patient will transfer sit to and from stand independently with RW. (Progressing)       Start:  01/07/25    Expected End:  01/14/25            Goal 1- HEP (Progressing)       Start:  01/07/25    Expected End:  01/14/25       Pt will recall 100% of TKA HEP with written handout independently.          PT Goal 2- ROM (Progressing)       Start:  01/07/25    Expected End:  01/14/25       Pt will increase R knee supine extension ROM to >/= -5 degrees and seated flexion ROM to >/=100 degrees.         STG - Patient will perform car transfer with RW and SBA.   (Progressing)       Start:  01/07/25                   Pain - Adult

## 2025-01-15 NOTE — PROGRESS NOTES
"Orthopaedic Surgery Progress Note    Subjective:  NAEO. VSS. Sleeping comfortably, patient feeling better today without acute concerns.    O:  /68 (BP Location: Left arm, Patient Position: Lying)   Pulse 84   Temp 36.7 °C (98.1 °F) (Oral)   Resp 18   Ht 1.727 m (5' 8\")   Wt 105 kg (232 lb 2.3 oz) Comment: done by night shift  SpO2 93%   BMI 35.30 kg/m²     Gen: arousable, NAD, appropriately conversational  Cardiac: RRR to peripheral palpation  Resp: nonlabored on RA    MSK:  RLE  - Mepilex dressing c/d/I, lateral dressing with some strikethrough, no drainage  - Fires DF/PF/EHL/FHL  - Residual numbness from block over medial calf  - Foot warm, well perfused  - DP/PT pulse, brisk cap refill  - Compartments soft and compressible    A/P: 79 y.o. male s/p R TKA on 1/7 with Dr. Bradford.  Postop course c/b worsening hypoxia and transferred to ICU POD1, now off of pressors and off HFNC now on RNF. Completed steroid tx yesterday for PNA and finishing abx tx.     Plan:  - Weight bearing: WBAT RLE  - DVT ppx: SCDs, please keep on Eliquis postop for DVT ppx  - Diet: Regular  - Pain: Tylenol, oxycodone 5/10  - Antibiotics: empiric Zosyn per Medicine  - FEN: HLIV with good PO intake  - Bowel Regimen: Colace, senna, dulcolax  - PT/OT, rec for SNF  - Continue home medications  - Appreciate medicine care    Dispo: DC to SNF pending clinical course/medical clearance. Per Medicine note, anticipate DC today.     This patient was discussed with attending, Dr. Bradford.     Susan Gusman, PGY-4  Orthopedic Surgery Resident  Available via [employer]     While admitted, this patient will be followed by the Ortho Joints Team. Please contact below residents with any questions (available via Epic Chat).     First call: Zheng Craven, PGY-1  Second call: Rhonda Henriquez PGY-2  Third call: Susan Gusman, PGY-4    On weekends and after 6PM:  At [facility]: Please reach out to the orthopaedic on-call resident ([phone])  At " [facility]: Please reach out to the orthopaedic on-call HEATHER or resident (please refer to Malena)

## 2025-01-15 NOTE — CARE PLAN
The patient's goals for the shift include      The clinical goals for the shift include Pt will remain HDS throughout shift

## 2025-01-15 NOTE — PROGRESS NOTES
[facility] Hospitalist Progress Note        Between 7AM-7PM please message me via Epic Secure Chat.  After 7PM please page Nocturnist on call.        Assessment/Plan     Distributive shock - resolved  Suspected aspiration PNA - completed abx  Acute hypoxic resp failure - intermittently requiring low dose NC  LA/leukocytosis - resolved  Nausea/abdominal pain - improving  Possible small subcapsular hematoma   S/p elective R TKA  Urinary retention  CAD w/ hx multiple PCIs/stents  HFmrEF  HTN/HLD  Hx provoked DVT (was off anticoag at this point)  Dementia  Hx COPD  Hx pulmonary HTN  CKD 2  Hx of Urethral stricture s/p urethroplasty and urethral dilation   Hx anemia of chronic disease  Hx alcohol use disorder  Hx gout    - thus far tolerating advanced diet. He would like 1 more day in the hospital would plan on discharging him 1/16  - Ortho -> WBAT RLE, Eliquis on board for post-op DVT ppx. Pain/nausea control. Diet as tolerated  - Urology has seen -> recommending keeping ulrich through after discharge due to urethral swelling. Fu with urology outpatient, referral in place but he is planning to follow up with his urologist  - pulm/cardio signed off    Discussed care plan with his wife    Fluids: None  Lytes: Replete as needed  Nutrition: Regular/low sodium  Lurich: Yes  Invasive lines: None  Drains: None    DVT Prophylaxis:  Eliquis      Discharge Planning: SNF      I personally examined the patient and reviewed chart.  Plan of care was discussed with patient, all questions answered.    Total time spent: At least 38 minutes, providing counseling or in coordination of care. Total time on this day of visit includes record and documentation review before and after visit including documentation and time not explicitly included on EMR time stamp.      Subjective     Mikhail Moses is a 79 y.o. male on day 7 of admission presenting with Unilateral primary osteoarthritis, right knee.    NAEON. Overall stable, reports his abdominal  "pain and nausea are a bit better today. Up in chair today    Review of Systems   Constitutional:  Negative for fever.   Respiratory:  Negative for shortness of breath.    Cardiovascular:  Negative for chest pain.   Gastrointestinal:  Positive for diarrhea. Negative for abdominal distention, abdominal pain and vomiting.       Objective     Physical Exam  Constitutional:       General: He is not in acute distress.  Cardiovascular:      Rate and Rhythm: Normal rate and regular rhythm.   Pulmonary:      Effort: Pulmonary effort is normal.      Breath sounds: Normal breath sounds.   Abdominal:      General: There is no distension.      Palpations: Abdomen is soft.   Genitourinary:     Comments:   Paul  Neurological:      Mental Status: He is alert. Mental status is at baseline.         Last Recorded Vitals  Blood pressure 148/70, pulse 88, temperature 36.9 °C (98.5 °F), resp. rate 18, height 1.727 m (5' 8\"), weight 105 kg (232 lb 2.3 oz), SpO2 92%.  Intake/Output last 3 Shifts:  I/O last 3 completed shifts:  In: 1000 (9.5 mL/kg) [P.O.:1000]  Out: 1175 (11.2 mL/kg) [Urine:1175 (0.3 mL/kg/hr)]  Weight: 105.3 kg     Relevant Results  Results for orders placed or performed during the hospital encounter of 01/07/25 (from the past 24 hours)   POCT GLUCOSE   Result Value Ref Range    POCT Glucose 116 (H) 74 - 99 mg/dL   POCT GLUCOSE   Result Value Ref Range    POCT Glucose 98 74 - 99 mg/dL       Imaging Results  Electrocardiogram, 12-lead PRN ACS symptoms    Result Date: 1/14/2025  Sinus rhythm with occasional Premature ventricular complexes and Premature atrial complexes Left axis deviation Right bundle branch block Inferior infarct (cited on or before 26-DEC-2023) Anterolateral infarct (cited on or before 26-DEC-2023) Abnormal ECG Confirmed by Mikal Means ([id number]) on 1/14/2025 12:55:16 PM    CT abdomen pelvis w IV contrast    Result Date: 1/13/2025  Interpreted By:  Melissa Peña, STUDY: CT ABDOMEN PELVIS W IV " CONTRAST;  1/13/2025 1:28 pm   INDICATION: Signs/Symptoms:new abd pain, elevated lactate.   COMPARISON: None   ACCESSION NUMBER(S): [id number]   ORDERING CLINICIAN: TONIO LAWSON   TECHNIQUE: Axial CT of the abdomen and pelvis was performed following intravenous administration of 75 ml of contrast Omnipaque 350 with coronal and sagittal reconstruction.   FINDINGS: Lower chest: Small to moderate bilateral pleural effusions are present with adjacent atelectasis.   Liver: No mass.   Biliary: No intrahepatic or extrahepatic bile duct dilation. No cholelithiasis.   Spleen: There is a thin rim of hypodensity in the lateral subcapsular region of the spleen measuring up to 8 mm in thickness. No mass. No splenomegaly.   Pancreas: No mass or duct dilation.   Adrenals: Normal.   Kidneys: Vascular calcification is noted in the posterior midpole of the right kidney. Otherwise, no suspicious mass, calculus or hydronephrosis is visualized in either kidney.   GI tract: A moderate-sized hiatal hernia containing the proximal 1/3 of the stomach is noted without evidence of obstruction. No bowel wall thickening or dilation. The appendix is normal. There is a small amount nonspecific dependent hypodensities within the cecum (series 201, image 114).   Lymph nodes: No abdominopelvic lymphadenopathy.   Mesentery/peritoneum: No free fluid, free air or fluid collection.   Vasculature: Moderate to severe abdominal aortic atherosclerotic calcifications without aneurysmal dilation. The celiac trunk and SMA are patent. The hepatic, portal, superior mesenteric and splenic veins are well opacified. The IVC is normal.   Pelvis: No free fluid, free air or fluid collection. The bladder is underdistended containing a Paul balloon in small amount of postprocedural air. There is nonspecific circumferential bladder wall thickening likely in part related to underdistention.   Bones/Soft tissues: Moderate L2-L3 degenerative disc disease and minimal  grade 1 anterolisthesis of L4 over L5 without pars defects. There are midline anterior abdominal wall postoperative changes likely related to prior ventral hernia repair without evidence of recurrence. Mild diffuse body wall edema is noted.       A thin rim of perisplenic hypodensity could represent a small subcapsular hematoma.   Small amount of dependent hyperdensity within the cecum may be related to ingested material versus blood products. Consider trending serum hematocrit.   No bowel obstruction or pneumatosis.   MACRO: None   Signed by: Melissa Peña 1/13/2025 2:04 PM Dictation workstation:   [id number]      Medications  acetaminophen, 650 mg, oral, q6h ANGEL  allopurinol, 300 mg, oral, Daily  apixaban, 2.5 mg, oral, BID  aspirin, 81 mg, oral, Daily  atorvastatin, 10 mg, oral, Daily  docusate sodium, 100 mg, oral, BID  folic acid, 1 mg, oral, Daily  furosemide, 40 mg, oral, Daily  lisinopril, 20 mg, oral, Daily  metoprolol succinate XL, 25 mg, oral, Daily  multivitamin with minerals, 1 tablet, oral, Daily  pantoprazole, 40 mg, oral, Daily before breakfast  polyethylene glycol, 17 g, oral, Daily  pregabalin, 300 mg, oral, BID  tamsulosin, 0.4 mg, oral, Nightly  thiamine, 100 mg, oral, Daily       PRN medications: benzocaine-menthol, bisacodyl, bisacodyl, calcium carbonate, cyclobenzaprine, dextrose, dextrose, glucagon, glucagon, HYDROmorphone, ipratropium-albuteroL, metoclopramide **OR** metoclopramide, naloxone, ondansetron **OR** ondansetron, oxyCODONE, oxygen, sodium chloride                Ulysses Bower MD  [facility] Medicine

## 2025-01-16 ENCOUNTER — APPOINTMENT (OUTPATIENT)
Dept: ORTHOPEDIC SURGERY | Facility: CLINIC | Age: 80
End: 2025-01-16
Payer: MEDICARE

## 2025-01-16 VITALS
HEART RATE: 74 BPM | WEIGHT: 232.14 LBS | DIASTOLIC BLOOD PRESSURE: 55 MMHG | HEIGHT: 68 IN | TEMPERATURE: 97.8 F | BODY MASS INDEX: 35.18 KG/M2 | SYSTOLIC BLOOD PRESSURE: 109 MMHG | OXYGEN SATURATION: 94 % | RESPIRATION RATE: 18 BRPM

## 2025-01-16 LAB
GLUCOSE BLD MANUAL STRIP-MCNC: 101 MG/DL (ref 74–99)
GLUCOSE BLD MANUAL STRIP-MCNC: 113 MG/DL (ref 74–99)

## 2025-01-16 PROCEDURE — 82947 ASSAY GLUCOSE BLOOD QUANT: CPT

## 2025-01-16 PROCEDURE — 2500000002 HC RX 250 W HCPCS SELF ADMINISTERED DRUGS (ALT 637 FOR MEDICARE OP, ALT 636 FOR OP/ED)

## 2025-01-16 PROCEDURE — 2500000001 HC RX 250 WO HCPCS SELF ADMINISTERED DRUGS (ALT 637 FOR MEDICARE OP): Performed by: STUDENT IN AN ORGANIZED HEALTH CARE EDUCATION/TRAINING PROGRAM

## 2025-01-16 PROCEDURE — 2500000001 HC RX 250 WO HCPCS SELF ADMINISTERED DRUGS (ALT 637 FOR MEDICARE OP)

## 2025-01-16 PROCEDURE — 1200000002 HC GENERAL ROOM WITH TELEMETRY DAILY

## 2025-01-16 PROCEDURE — 97110 THERAPEUTIC EXERCISES: CPT | Mod: GP,CQ

## 2025-01-16 PROCEDURE — 99239 HOSP IP/OBS DSCHRG MGMT >30: CPT | Performed by: STUDENT IN AN ORGANIZED HEALTH CARE EDUCATION/TRAINING PROGRAM

## 2025-01-16 PROCEDURE — 97116 GAIT TRAINING THERAPY: CPT | Mod: GP,CQ

## 2025-01-16 RX ADMIN — ATORVASTATIN CALCIUM 10 MG: 10 TABLET, FILM COATED ORAL at 09:25

## 2025-01-16 RX ADMIN — METOPROLOL SUCCINATE 25 MG: 25 TABLET, EXTENDED RELEASE ORAL at 09:24

## 2025-01-16 RX ADMIN — ACETAMINOPHEN 650 MG: 325 TABLET, FILM COATED ORAL at 13:08

## 2025-01-16 RX ADMIN — FOLIC ACID 1 MG: 1 TABLET ORAL at 09:27

## 2025-01-16 RX ADMIN — LISINOPRIL 20 MG: 20 TABLET ORAL at 09:25

## 2025-01-16 RX ADMIN — PANTOPRAZOLE SODIUM 40 MG: 40 TABLET, DELAYED RELEASE ORAL at 06:45

## 2025-01-16 RX ADMIN — Medication 100 MG: at 09:24

## 2025-01-16 RX ADMIN — DOCUSATE SODIUM 100 MG: 100 CAPSULE, LIQUID FILLED ORAL at 09:26

## 2025-01-16 RX ADMIN — MULTIPLE VITAMINS W/ MINERALS TAB 1 TABLET: TAB ORAL at 09:25

## 2025-01-16 RX ADMIN — OXYCODONE HYDROCHLORIDE 5 MG: 5 TABLET ORAL at 06:49

## 2025-01-16 RX ADMIN — APIXABAN 2.5 MG: 2.5 TABLET, FILM COATED ORAL at 09:24

## 2025-01-16 RX ADMIN — ASPIRIN 81 MG: 81 TABLET, COATED ORAL at 09:24

## 2025-01-16 RX ADMIN — TAMSULOSIN HYDROCHLORIDE 0.4 MG: 0.4 CAPSULE ORAL at 20:24

## 2025-01-16 RX ADMIN — ACETAMINOPHEN 650 MG: 325 TABLET, FILM COATED ORAL at 18:02

## 2025-01-16 RX ADMIN — PREGABALIN 300 MG: 100 CAPSULE ORAL at 20:24

## 2025-01-16 RX ADMIN — OXYCODONE HYDROCHLORIDE 5 MG: 5 TABLET ORAL at 13:09

## 2025-01-16 RX ADMIN — DOCUSATE SODIUM 100 MG: 100 CAPSULE, LIQUID FILLED ORAL at 20:24

## 2025-01-16 RX ADMIN — ACETAMINOPHEN 650 MG: 325 TABLET, FILM COATED ORAL at 06:45

## 2025-01-16 RX ADMIN — APIXABAN 2.5 MG: 2.5 TABLET, FILM COATED ORAL at 20:24

## 2025-01-16 RX ADMIN — PREGABALIN 300 MG: 100 CAPSULE ORAL at 09:25

## 2025-01-16 RX ADMIN — FUROSEMIDE 40 MG: 40 TABLET ORAL at 09:24

## 2025-01-16 RX ADMIN — ALLOPURINOL 300 MG: 300 TABLET ORAL at 09:25

## 2025-01-16 ASSESSMENT — ENCOUNTER SYMPTOMS
ARTHRALGIAS: 1
VOMITING: 0
FEVER: 0
ABDOMINAL PAIN: 0
ABDOMINAL DISTENTION: 0
SHORTNESS OF BREATH: 0

## 2025-01-16 ASSESSMENT — COGNITIVE AND FUNCTIONAL STATUS - GENERAL
HELP NEEDED FOR BATHING: A LITTLE
MOVING TO AND FROM BED TO CHAIR: A LITTLE
DRESSING REGULAR LOWER BODY CLOTHING: A LITTLE
DRESSING REGULAR LOWER BODY CLOTHING: A LITTLE
MOVING FROM LYING ON BACK TO SITTING ON SIDE OF FLAT BED WITH BEDRAILS: A LITTLE
TURNING FROM BACK TO SIDE WHILE IN FLAT BAD: A LITTLE
TOILETING: A LITTLE
CLIMB 3 TO 5 STEPS WITH RAILING: A LITTLE
MOBILITY SCORE: 16
WALKING IN HOSPITAL ROOM: A LITTLE
TOILETING: A LITTLE
HELP NEEDED FOR BATHING: A LITTLE
EATING MEALS: A LITTLE
CLIMB 3 TO 5 STEPS WITH RAILING: A LITTLE
STANDING UP FROM CHAIR USING ARMS: A LITTLE
DAILY ACTIVITIY SCORE: 20
CLIMB 3 TO 5 STEPS WITH RAILING: A LITTLE
DRESSING REGULAR LOWER BODY CLOTHING: A LITTLE
TOILETING: A LITTLE
DRESSING REGULAR UPPER BODY CLOTHING: A LITTLE
MOVING FROM LYING ON BACK TO SITTING ON SIDE OF FLAT BED WITH BEDRAILS: A LITTLE
PERSONAL GROOMING: A LITTLE
MOVING TO AND FROM BED TO CHAIR: A LITTLE
STANDING UP FROM CHAIR USING ARMS: A LITTLE
HELP NEEDED FOR BATHING: A LITTLE
WALKING IN HOSPITAL ROOM: A LITTLE
MOBILITY SCORE: 18
TURNING FROM BACK TO SIDE WHILE IN FLAT BAD: A LITTLE
DAILY ACTIVITIY SCORE: 21
MOBILITY SCORE: 23
MOBILITY SCORE: 23
CLIMB 3 TO 5 STEPS WITH RAILING: TOTAL
DRESSING REGULAR UPPER BODY CLOTHING: A LITTLE
DAILY ACTIVITIY SCORE: 18

## 2025-01-16 ASSESSMENT — PAIN - FUNCTIONAL ASSESSMENT
PAIN_FUNCTIONAL_ASSESSMENT: 0-10

## 2025-01-16 ASSESSMENT — PAIN SCALES - GENERAL
PAINLEVEL_OUTOF10: 5 - MODERATE PAIN
PAINLEVEL_OUTOF10: 5 - MODERATE PAIN
PAINLEVEL_OUTOF10: 4
PAINLEVEL_OUTOF10: 6
PAINLEVEL_OUTOF10: 4
PAINLEVEL_OUTOF10: 3
PAINLEVEL_OUTOF10: 3
PAINLEVEL_OUTOF10: 4

## 2025-01-16 ASSESSMENT — PAIN DESCRIPTION - LOCATION
LOCATION: KNEE

## 2025-01-16 ASSESSMENT — PAIN DESCRIPTION - ORIENTATION
ORIENTATION: RIGHT

## 2025-01-16 NOTE — DISCHARGE SUMMARY
[facility] Hospitalist Discharge Summary        Mikhail TenorioB: 1945MRN: [id number]    ADMIT DATE: 2025DISCHARGE DATE: 2025    PRIMARYCARE PHYSICIAN: Josué Kay MD    VISIT STATUS: Inpatient    CODE STATUS: Full Code    CONSULTANTS:  Cardio  Pulm  Urology    HOSPITAL COURSE:    Distributive shock - resolved  Suspected aspiration PNA - completed abx  Acute hypoxic resp failure - intermittently requiring low dose NC  LA/leukocytosis - resolved  Nausea/abdominal pain - improving  Possible small subcapsular hematoma   S/p elective R TKA  Urinary retention  CAD w/ hx multiple PCIs/stents  HFmrEF  HTN/HLD  Hx provoked DVT (was off anticoag at this point)  Dementia  Hx COPD  Hx pulmonary HTN  CKD 2  Hx of Urethral stricture s/p urethroplasty and urethral dilation   Hx anemia of chronic disease  Hx alcohol use disorder  Hx gout     - ok to DC to SNF today  - Ortho -> WBAT RLE, Eliquis on board for post-op DVT ppx. Pain/nausea control. Diet as tolerated  - Urology has seen -> recommending keeping ulrich through after discharge due to urethral swelling. Fu with urology outpatient, referral in place but he is planning to follow up with his urologist  - pulm/cardio signed off    DAY OF DISCHARGE:  Review of Systems   Constitutional:  Negative for fever.   Respiratory:  Negative for shortness of breath.    Cardiovascular:  Negative for chest pain.   Gastrointestinal:  Negative for abdominal distention, abdominal pain and vomiting.   Musculoskeletal:  Positive for arthralgias.       Patient Vitals for the past 24 hrs:   BP Temp Temp src Pulse Resp SpO2   25 0756 156/72 36.9 °C (98.4 °F) Oral 76 17 94 %   25 0500 132/68 36.9 °C (98.5 °F) Oral 65 16 94 %   01/15/25 2043 126/52 -- -- 74 18 92 %   01/15/25 1537 137/53 36.8 °C (98.2 °F) -- 75 18 97 %       Average, Min, and Max forlast 24 hours Vitals:  TEMPERATURE: Temp  Av.9 °C (98.4 °F)  Min: 36.8 °C (98.2 °F)  Max: 36.9 °C (98.5 °F)    RESPIRATIONS  RANGE: Resp  Av.3  Min: 16  Max: 18    PULSE RANGE: Pulse  Av.5  Min: 65  Max: 76    BLOOD PRESSURE RANGE: Systolic (24hrs), Av , Min:126 , Max:156   ; Diastolic (24hrs), Av, Min:52, Max:72      PULSE OXIMETRYRANGE: SpO2  Av.3 %  Min: 92 %  Max: 97 %    I/O last 3 completed shifts:  In: 500 (4.7 mL/kg) [P.O.:500]  Out: 425 (4 mL/kg) [Urine:425 (0.1 mL/kg/hr)]  Weight: 105.3 kg     Physical Exam  Constitutional:       General: He is not in acute distress.  Neurological:      Mental Status: He is alert.           Discharge Meds       Your medication list        START taking these medications        Instructions Last Dose Given Next Dose Due   acetaminophen 500 mg tablet  Commonly known as: Tylenol Extra Strength      Take 2 tablets (1,000 mg) by mouth every 6 hours if needed for mild pain (1 - 3).       aspirin 81 mg EC tablet      Take 1 tablet (81 mg) by mouth once daily.       docusate sodium 100 mg capsule  Commonly known as: Colace      Take 1 capsule (100 mg) by mouth 2 times a day.       naloxone 0.4 mg/mL injection  Commonly known as: Narcan      Infuse 0.5 mL (0.2 mg) into a venous catheter every 5 minutes if needed for respiratory depression.       ondansetron 4 mg tablet  Commonly known as: Zofran      Take 1 tablet (4 mg) by mouth every 8 hours if needed for nausea or vomiting.       polyethylene glycol 17 gram/dose powder  Commonly known as: Glycolax, Miralax      Mix 1 cap (17g)of powder into 8 ounces of fluid and drink once daily.              CHANGE how you take these medications        Instructions Last Dose Given Next Dose Due   apixaban 2.5 mg tablet  Commonly known as: Eliquis  What changed:   medication strength  how much to take      Take 1 tablet (2.5 mg) by mouth 2 times a day for 22 days.       pantoprazole 40 mg EC tablet  Commonly known as: ProtoNix  What changed: additional instructions      Take 1 tablet (40 mg) by mouth once daily. Do not crush, chew, or split.               CONTINUE taking these medications        Instructions Last Dose Given Next Dose Due   allopurinol 300 mg tablet  Commonly known as: Zyloprim      Take 1 tablet (300 mg) by mouth once daily.       atorvastatin 10 mg tablet  Commonly known as: Lipitor           ergocalciferol 1.25 MG (33997 UT) capsule  Commonly known as: Vitamin D-2      Take 1 capsule (50,000 Units) by mouth 1 (one) time per week.       furosemide 40 mg tablet  Commonly known as: Lasix           metoprolol succinate XL 25 mg 24 hr tablet  Commonly known as: Toprol-XL           pregabalin 300 mg capsule  Commonly known as: Lyrica           ramipril 10 mg capsule  Commonly known as: Altace           tamsulosin 0.4 mg 24 hr capsule  Commonly known as: Flomax                  STOP taking these medications      chlorhexidine 0.12 % solution  Commonly known as: Peridex        donepezil 5 mg tablet  Commonly known as: Aricept        finasteride 5 mg tablet  Commonly known as: Proscar        nitroglycerin 0.4 mg SL tablet  Commonly known as: Nitrostat               ASK your doctor about these medications        Instructions Last Dose Given Next Dose Due   oxyCODONE 5 mg immediate release tablet  Commonly known as: Roxicodone  Ask about: Should I take this medication?      Take 1 tablet (5 mg) by mouth every 6 hours if needed for severe pain (7 - 10) for up to 7 days.       traMADol 50 mg tablet  Commonly known as: Ultram  Ask about: Should I take this medication?      Take 1 tablet (50 mg) by mouth every 6 hours if needed for severe pain (7 - 10) for up to 7 days.                 Where to Get Your Medications        These medications were sent to [id number] - [place], [place] - [address]  [address] [address]      Phone: [phone]   ergocalciferol 1.25 MG (05062 UT) capsule       These medications were sent to [facility]  [address] , [address], [facility] [address]      Hours: 8 AM  to 6 PM Mon-Fri, 9 AM to 1 PM Saturday Phone: [phone]   acetaminophen 500 mg tablet  docusate sodium 100 mg capsule  naloxone 0.4 mg/mL injection  oxyCODONE 5 mg immediate release tablet  pantoprazole 40 mg EC tablet  polyethylene glycol 17 gram/dose powder  traMADol 50 mg tablet       Information about where to get these medications is not yet available    Ask your nurse or doctor about these medications  apixaban 2.5 mg tablet  aspirin 81 mg EC tablet  ondansetron 4 mg tablet           FOLLOW UP TESTING, PENDING RESULTS OR REFERRALS AT FOLLOW UP VISIT:   [X] Yes as outlined above   [ ] No    DISPOSITION: SNF    FACILITY NAME: [facility]     Follow up with PCP Josué Kay MD    Outpatient Follow-Up Appointments  Future Appointments   Date Time Provider Department [place]   2/13/2025  9:00 AM Hamida Padilla PA-C [id number] [facility]   2/20/2025  2:20 PM Hamida Padilla PA-C [id number] [facility]   5/30/2025 11:00 AM Josué Kay MD [id number] [facility] personally examined the patient and reviewed chart.  Plan of care was discussed with patient and family, all questions answered.    DISCHARGE TIME: > 30 minutes providing counseling or in coordination of care. Total time on this day of visit includes record and documentation review before and after visit including documentation and time not explicitly included on EMR time stamp.    Ulysses Bower MD  [facility]

## 2025-01-16 NOTE — CARE PLAN
The clinical goals for the shift include pt remains hds    Over the shift, the patient did not make progress toward the following goals. Barriers to progression include       Problem: Pain  Goal: Takes deep breaths with improved pain control throughout the shift  Outcome: Progressing  Goal: Turns in bed with improved pain control throughout the shift  Outcome: Progressing  Goal: Walks with improved pain control throughout the shift  Outcome: Progressing  Goal: Performs ADL's with improved pain control throughout shift  Outcome: Progressing  Goal: Participates in PT with improved pain control throughout the shift  Outcome: Progressing  Goal: Free from opioid side effects throughout the shift  Outcome: Progressing  Goal: Free from acute confusion related to pain meds throughout the shift  Outcome: Progressing     Problem: Pain - Adult  Goal: Verbalizes/displays adequate comfort level or baseline comfort level  Outcome: Progressing  Flowsheets (Taken 1/16/2025 1143)  Verbalizes/displays adequate comfort level or baseline comfort level:   Encourage patient to monitor pain and request assistance   Assess pain using appropriate pain scale   Administer analgesics based on type and severity of pain and evaluate response   Implement non-pharmacological measures as appropriate and evaluate response   Consider cultural and social influences on pain and pain management   Notify Licensed Independent Practitioner if interventions unsuccessful or patient reports new pain     Problem: Safety - Adult  Goal: Free from fall injury  Outcome: Progressing  Flowsheets (Taken 1/16/2025 1143)  Free from fall injury: Instruct family/caregiver on patient safety     Problem: Discharge Planning  Goal: Discharge to home or other facility with appropriate resources  Outcome: Progressing  Flowsheets (Taken 1/16/2025 1143)  Discharge to home or other facility with appropriate resources:   Identify barriers to discharge with patient and caregiver    Arrange for needed discharge resources and transportation as appropriate   Identify discharge learning needs (meds, wound care, etc)   Arrange for interpreters to assist at discharge as needed   Refer to discharge planning if patient needs post-hospital services based on physician order or complex needs related to functional status, cognitive ability or social support system     Problem: Chronic Conditions and Co-morbidities  Goal: Patient's chronic conditions and co-morbidity symptoms are monitored and maintained or improved  Outcome: Progressing  Flowsheets (Taken 1/16/2025 1143)  Care Plan - Patient's Chronic Conditions and Co-Morbidity Symptoms are Monitored and Maintained or Improved:   Monitor and assess patient's chronic conditions and comorbid symptoms for stability, deterioration, or improvement   Collaborate with multidisciplinary team to address chronic and comorbid conditions and prevent exacerbation or deterioration   Update acute care plan with appropriate goals if chronic or comorbid symptoms are exacerbated and prevent overall improvement and discharge     Problem: Skin  Goal: Decreased wound size/increased tissue granulation at next dressing change  Outcome: Progressing  Flowsheets (Taken 1/16/2025 1143)  Decreased wound size/increased tissue granulation at next dressing change:   Promote sleep for wound healing   Protective dressings over bony prominences   Utilize specialty bed per algorithm  Goal: Participates in plan/prevention/treatment measures  Outcome: Progressing  Flowsheets (Taken 1/16/2025 1143)  Participates in plan/prevention/treatment measures:   Discuss with provider PT/OT consult   Elevate heels   Increase activity/out of bed for meals  Goal: Prevent/manage excess moisture  Outcome: Progressing  Flowsheets (Taken 1/16/2025 1143)  Prevent/manage excess moisture:   Cleanse incontinence/protect with barrier cream   Monitor for/manage infection if present   Follow provider orders for  dressing changes   Use wicking fabric (obtain order)   Moisturize dry skin  Goal: Prevent/minimize sheer/friction injuries  Outcome: Progressing  Flowsheets (Taken 1/16/2025 1143)  Prevent/minimize sheer/friction injuries:   Complete micro-shifts as needed if patient unable. Adjust patient position to relieve pressure points, not a full turn   Increase activity/out of bed for meals   Use pull sheet   HOB 30 degrees or less   Utilize specialty bed per algorithm  Goal: Promote/optimize nutrition  Outcome: Progressing  Flowsheets (Taken 1/16/2025 1143)  Promote/optimize nutrition:   Assist with feeding   Monitor/record intake including meals   Consume > 50% meals/supplements   Offer water/supplements/favorite foods   Discuss with provider if NPO > 2 days   Reassess MST if dietician not consulted  Goal: Promote skin healing  Outcome: Progressing  Flowsheets (Taken 1/16/2025 1143)  Promote skin healing:   Assess skin/pad under line(s)/device(s)   Protective dressings over bony prominences   Turn/reposition every 2 hours/use positioning/transfer devices   Ensure correct size (line/device) and apply per  instructions   Rotate device position/do not position patient on device     Problem: Fall/Injury  Goal: Not fall by end of shift  Outcome: Progressing  Goal: Be free from injury by end of the shift  Outcome: Progressing  Goal: Verbalize understanding of personal risk factors for fall in the hospital  Outcome: Progressing  Goal: Verbalize understanding of risk factor reduction measures to prevent injury from fall in the home  Outcome: Progressing  Goal: Use assistive devices by end of the shift  Outcome: Progressing  Goal: Pace activities to prevent fatigue by end of the shift  Outcome: Progressing     Problem: Bathing  Goal: STG - Patient will bathe body SBA.  Outcome: Progressing     Problem: Dressings Lower Extremities  Goal: STG - Patient to complete lower body dressing Min A.  Outcome: Progressing      Problem: Dressing Upper Extremities  Goal: LTG - Patient will complete upper body dressing independently.   Outcome: Progressing     Problem: Grooming  Goal: STG - Patient completes grooming while standing in front of sink (Indep).  Outcome: Progressing     Problem: Toileting  Goal: STG - Patient will complete toileting tasks with Mod I.   Outcome: Progressing

## 2025-01-16 NOTE — PROGRESS NOTES
01/16/25 1509   Discharge Planning   Expected Discharge Disposition SNF   Does the patient need discharge transport arranged? Yes   RoundTrip coordination needed? Yes   Has discharge transport been arranged? Yes   What day is the transport expected? 01/16/25   What time is the transport expected? 1800     Patient will discharge to [facility] SNF. Transport scheduled for 6pm. Wife, care team and facility aware. Bedside rn provided with report number.

## 2025-01-16 NOTE — PROGRESS NOTES
"Orthopaedic Surgery Progress Note    Subjective:  NAEO. VSS. Sleeping comfortably. No N/T in RLE.     O:  /72 (BP Location: Left arm)   Pulse 76   Temp 36.9 °C (98.4 °F) (Oral)   Resp 17   Ht 1.727 m (5' 8\")   Wt 105 kg (232 lb 2.3 oz) Comment: done by night shift  SpO2 94%   BMI 35.30 kg/m²     Gen: arousable, NAD, appropriately conversational  Cardiac: RRR to peripheral palpation  Resp: nonlabored on RA    MSK:  RLE  - Incision c/d/i  - Fires DF/PF/EHL/FHL  - Residual numbness from block over medial calf  - Foot warm, well perfused  - DP/PT pulse, brisk cap refill  - Compartments soft and compressible    A/P: 79 y.o. male s/p R TKA on 1/7 with Dr. Bradford.  Postop course c/b worsening hypoxia and transferred to ICU POD1, now off of pressors and off HFNC now on RNF. Completed steroid tx yesterday for PNA and finishing abx tx.     Plan:  - Weight bearing: WBAT RLE  - DVT ppx: SCDs, please keep on Eliquis postop for DVT ppx  - Diet: Regular  - Pain: Tylenol, oxycodone 5/10  - Antibiotics: empiric Zosyn per Medicine  - FEN: HLIV with good PO intake  - Bowel Regimen: Colace, senna, dulcolax  - PT/OT, rec for SNF  - Continue home medications  - Appreciate medicine care    Dispo: DC to SNF pending clinical course/medical clearance. Per Medicine note, anticipate DC today vs tomorrow    This patient was discussed with attending, Dr. Bradford.     Susan Gusman, PGY-4  Orthopedic Surgery Resident  Available via WeGame     While admitted, this patient will be followed by the Ortho Joints Team. Please contact below residents with any questions (available via Epic Chat).     First call: Zheng Craven, PGY-1  Second call: Rhonda Henriquez, PGY-2  Third call: Susan Gusman, PGY-4    On weekends and after 6PM:  At [facility]: Please reach out to the orthopaedic on-call resident ([phone])  At [facility]: Please reach out to the orthopaedic on-call HEATHER or resident (please refer to Malena)  "

## 2025-01-16 NOTE — PROGRESS NOTES
"Occupational Therapy                 Therapy Communication Note    Patient Name: Mikhail Moses  MRN: [id number]  Department: [place] [address]  Room: [address] [place] [address]  Today's Date: 1/16/2025     Discipline: Occupational Therapy    OT Missed Visit: Yes     Missed Visit Reason: Missed Visit Reason: Patient refused (attempted to see pt for OT tx. Pt declined at this time, stating \"I had PT and I am not doing anymore today\")    Missed Time: Attempt  "

## 2025-01-16 NOTE — PROGRESS NOTES
"Physical Therapy    Physical Therapy Treatment    Patient Name: Mikhail Moses  MRN: [id number]  Department: [place] [address]  Room: [facility]532  Today's Date: 1/16/2025  Time Calculation  Start Time: 1133  Stop Time: 1157  Time Calculation (min): 24 min         Assessment/Plan   PT Assessment  PT Assessment Results: Decreased strength, Decreased range of motion, Decreased mobility, Obesity, Pain, Orthopedic restrictions  Rehab Prognosis: Good  Evaluation/Treatment Tolerance: Patient limited by fatigue, Patient limited by pain  Strengths: Ability to acquire knowledge, Support and attitude of living partners  End of Session Communication: Bedside nurse  Assessment Comment: Pt demonstrated increased tolerance for ambulation this session, increasing his distance to 39'.  Fatigue and pain limiting his activity duration periods.  End of Session Patient Position: Up in chair, Alarm on  PT Plan  Inpatient/Swing Bed or Outpatient: Inpatient  PT Plan  Treatment/Interventions: Bed mobility, Transfer training, Gait training, Strengthening, Endurance training, Range of motion, Therapeutic exercise, Therapeutic activity, Home exercise program  PT Plan: Ongoing PT  PT Frequency: BID  PT Discharge Recommendations: Moderate intensity level of continued care  Equipment Recommended upon Discharge: Wheeled walker  PT Recommended Transfer Status: Assist x1, Assistive device  PT - OK to Discharge: Yes (per PT POC)      General Visit Information:   PT  Visit  PT Received On: 01/16/25  General  Reason for Referral: s/p R TKA with Dr Bradford  Referred By: Hamida Padilla PA-C  PT Missed Visit: Yes  Missed Visit Reason: Patient refused (Pt asleep in bed. Stating \"no more therapy today\".  reporting \"too much pain\" in his back. RN aware.)  Family/Caregiver Present: No  Prior to Session Communication: Bedside nurse  Patient Position Received: Bed, 3 rail up, Alarm on  Preferred Learning Style: auditory, kinesthetic, verbal, visual  General Comment: " When asked about his caughing during this session, pt stated he developed a caugh yesterday. This PTA alerted RN who was not aware.    Subjective   Precautions:  Precautions  Hearing/Visual Limitations: WFL  LE Weight Bearing Status: Weight Bearing as Tolerated  Medical Precautions: Fall precautions  Post-Surgical Precautions: Right total knee precautions    Vital Signs (Past 2hrs)                 Objective   Pain:  Pain Assessment  Pain Assessment: 0-10  0-10 (Numeric) Pain Score: 4  Pain Type: Surgical pain  Pain Location: Knee  Pain Orientation: Right  Pain Interventions:  (pt due for pain meds at this time.)  Cognition:  Cognition  Orientation Level: Oriented X4  Coordination:  Movements are Fluid and Coordinated: Yes  Postural Control:  Postural Control  Postural Control: Within Functional Limits  Static Sitting Balance  Static Sitting-Balance Support: No upper extremity supported, Feet supported  Static Sitting-Level of Assistance: Distant supervision  Dynamic Sitting Balance  Dynamic Sitting-Balance Support: Bilateral upper extremity supported, Feet supported  Dynamic Sitting-Level of Assistance: Close supervision  Dynamic Sitting-Balance: Forward lean  Static Standing Balance  Static Standing-Balance Support: Bilateral upper extremity supported  Static Standing-Level of Assistance: Close supervision  Static Standing-Comment/Number of Minutes: FWW support  Dynamic Standing Balance  Dynamic Standing-Balance Support: Bilateral upper extremity supported  Dynamic Standing-Level of Assistance: Close supervision  Dynamic Standing-Balance: Turning  Dynamic Standing-Comments: FWW support    Activity Tolerance:  Activity Tolerance  Endurance: Tolerates 10 - 20 min exercise with multiple rests  Treatments:  Therapeutic Exercise  Therapeutic Exercise Performed: Yes  Therapeutic Exercise Activity 1: Continued pt instruction for R TKA HEP. Min cues needed to correct form for max benefits. Performed AP, GS, QS, HS, HA,  SAQ, SLR, all x 20 reps total each. Rest needed to manage fatigue and pain with ex.         Bed Mobility  Bed Mobility: Yes  Bed Mobility 1  Bed Mobility 1: Supine to sitting  Level of Assistance 1: Close supervision  Bed Mobility Comments 1: HOB minimally elevated, right rail used.    Ambulation/Gait Training  Ambulation/Gait Training Performed: Yes  Ambulation/Gait Training 1  Surface 1: Level tile  Device 1: Rolling walker  Gait Support Devices: Gait belt  Assistance 1: Close supervision  Quality of Gait 1: Narrow base of support, Inconsistent stride length, Decreased step length, Antalgic  Comments/Distance (ft) 1: x 39 ft,  with inconsistant fluidity. Increased time and effort needed to complete.  Transfers  Transfer: Yes  Transfer 1  Transfer From 1: Sit to, Stand to  Technique 1: Sit to stand, Stand to sit  Transfer Device 1: Walker, Gait belt  Transfer Level of Assistance 1: Moderate assistance  Trials/Comments 1: from EOB. No rails used.  Transfers 2  Technique 2: Sit to stand, Stand to sit  Transfer Device 2: Walker, Gait belt  Transfer Level of Assistance 2: Minimum assistance  Trials/Comments 2: from/to chair with armrests    Outcome Measures:  [facility] Basic Mobility  Turning from your back to your side while in a flat bed without using bedrails: A little  Moving from lying on your back to sitting on the side of a flat bed without using bedrails: A little  Moving to and from bed to chair (including a wheelchair): A little  Standing up from a chair using your arms (e.g. wheelchair or bedside chair): A little  To walk in hospital room: A little  Climbing 3-5 steps with railing: Total  Basic Mobility - Total Score: 16    Education Documentation  Handouts, taught by Mariana Morin PTA at 1/16/2025 12:24 PM.  Learner: Patient  Readiness: Acceptance  Method: Explanation, Demonstration  Response: Verbalizes Understanding, Demonstrated Understanding    Precautions, taught by Mariana Morin PTA at 1/16/2025 12:24  PM.  Learner: Patient  Readiness: Acceptance  Method: Explanation, Demonstration  Response: Verbalizes Understanding, Demonstrated Understanding    Body Mechanics, taught by Mariana Morin PTA at 1/16/2025 12:24 PM.  Learner: Patient  Readiness: Acceptance  Method: Explanation, Demonstration  Response: Verbalizes Understanding, Demonstrated Understanding    Home Exercise Program, taught by Mariana Morin PTA at 1/16/2025 12:24 PM.  Learner: Patient  Readiness: Acceptance  Method: Explanation, Demonstration  Response: Verbalizes Understanding, Demonstrated Understanding    Mobility Training, taught by Mariana Morin PTA at 1/16/2025 12:24 PM.  Learner: Patient  Readiness: Acceptance  Method: Explanation, Demonstration  Response: Verbalizes Understanding, Demonstrated Understanding    Education Comments  No comments found.        OP EDUCATION:       Encounter Problems       Encounter Problems (Active)       Mobility       STG - Patient will independently ambulate >100' with RW on level surfaces. (Progressing)       Start:  01/07/25    Expected End:  01/14/25            STG - Patient will ascend and descend four stairs using 1 rail and cane with SBA. (Not Progressing)       Start:  01/07/25    Expected End:  01/14/25               PT Transfers       STG - Patient to transfer to and from sit to supine independently from flat bed.  (Progressing)       Start:  01/07/25    Expected End:  01/14/25            STG - Patient will transfer sit to and from stand independently with RW. (Progressing)       Start:  01/07/25    Expected End:  01/14/25            Goal 1- HEP (Progressing)       Start:  01/07/25    Expected End:  01/14/25       Pt will recall 100% of TKA HEP with written handout independently.          PT Goal 2- ROM (Progressing)       Start:  01/07/25    Expected End:  01/14/25       Pt will increase R knee supine extension ROM to >/= -5 degrees and seated flexion ROM to >/=100 degrees.         STG - Patient will  perform car transfer with RW and SBA.  (Progressing)       Start:  01/07/25                   Pain - Adult

## 2025-01-17 ENCOUNTER — NURSING HOME VISIT (OUTPATIENT)
Dept: POST ACUTE CARE | Facility: EXTERNAL LOCATION | Age: 80
End: 2025-01-17
Payer: MEDICARE

## 2025-01-17 DIAGNOSIS — I50.22 HEART FAILURE WITH MID-RANGE EJECTION FRACTION (HFMEF): ICD-10-CM

## 2025-01-17 DIAGNOSIS — J69.0 ASPIRATION PNEUMONIA, UNSPECIFIED ASPIRATION PNEUMONIA TYPE, UNSPECIFIED LATERALITY, UNSPECIFIED PART OF LUNG (MULTI): ICD-10-CM

## 2025-01-17 DIAGNOSIS — F10.90 ALCOHOL USE DISORDER: ICD-10-CM

## 2025-01-17 DIAGNOSIS — Z97.8 INDWELLING FOLEY CATHETER PRESENT: ICD-10-CM

## 2025-01-17 DIAGNOSIS — I10 HYPERTENSION, UNSPECIFIED TYPE: ICD-10-CM

## 2025-01-17 DIAGNOSIS — Z96.652 STATUS POST LEFT KNEE REPLACEMENT: ICD-10-CM

## 2025-01-17 DIAGNOSIS — Z47.89 ORTHOPEDIC AFTERCARE: Primary | ICD-10-CM

## 2025-01-17 DIAGNOSIS — J44.9 CHRONIC OBSTRUCTIVE PULMONARY DISEASE, UNSPECIFIED COPD TYPE (MULTI): ICD-10-CM

## 2025-01-17 PROCEDURE — 99305 1ST NF CARE MODERATE MDM 35: CPT

## 2025-01-17 NOTE — PROGRESS NOTES
Date of Service: 1/17/25    HPI/Subjective  Mikhail Moses is a 79 y.o. male w/ history of CAD s/p multiple PCIs/stents, HFmrEF, HTN, HLD, DVT (2023), COPD, CKD, urethral stricture s/p urethroplasty and urethral dilation, gout, alcohol use disorder  Patient with recent hospitalization 1/8-1/16/25 initially had elective right knee replacement on 1/7/25 with Dr. Bradford however complicated with hypoxia c/f aspiration, shock and was transferred to ICU for pressor support.  Able to be weaned off pressors and weaned down to low nasal cannula, completed course of empiric abx. Course complicated with urine retention and had Paul placed, he does see urology Dr. Almaraz as an outpatient for urethral dilation every 6 to 8 weeks for urethral strictures.  Orthopedics following recommended continuing Eliquis for postop DVT prophylaxis, weightbearing as tolerated to right lower extremity.  PT/OT evaluated recommended SNF for which patient was agreeable.  Plan for follow-up with orthopedic and urology.    At bedside, patient reports doing well, endorses mild pain to right lower extremity, dressing is intact.  Endorses good p.o. intake, and having regular bowel movements.    Denies fevers, chills, weight loss, lightheadedness, dizziness, vision changes, sore throat, runny nose, CP, SOB, cough, palpitations, n/v/d, abd pain, black/bloody stools, or new numbness/weakness/tingling in arms/legs/face.    Other Medical, Surgical, Family, Social Hx, Allergies per chart in PCC.   Medication list reviewed. Please see PCC.     Objective:   General: NAD. NCAT. Aox3   HEENT: PERRLA. EOMI. MMM. Nares patent bl.  Cardiovascular: RRR. No MRG. S1/S2 wnl.   Respiratory: CTABL. No acute respiratory distress.   GI: Soft, NT abdomen. BS present x 4.   : No CVAT BL  MSK: ROM x 4. CTLS non-tender. Dressing in place RLE, no drainage noted  Extremities: No edema. Cap refill < 2 sec.   Skin: No rashes or bruises.   Neuro: Aox3. Cranial Nerves grossly  intact. Motor/sensory wnl.   Psych: Mood wnl.       Vital signs reviewed. Please see chart in PCC.     REVIEW OF SYSTEMS   ROS reviewed within HPI and is otherwise negative     Assessment and Plan  Encounter Diagnoses   Name Primary?    Orthopedic aftercare Yes    Status post left knee replacement     Aspiration pneumonia, unspecified aspiration pneumonia type, unspecified laterality, unspecified part of lung (Multi)     Heart failure with mid-range ejection fraction (HFmEF)     Hypertension, unspecified type     Chronic obstructive pulmonary disease, unspecified COPD type (Multi)     Alcohol use disorder     Indwelling Ulrich catheter present          -Abx finished in hospital  -Post op care per ortho. WBAT to RLE. Eliquis for DVT Ppx.   -Maintain ulrich and follow up with urology outpatient for evaluation and removal.   -Continue ASA, statin, lasix, metoprolol, ramipril, flomax, eliquis. Monitor for bleeding  -Monitor labs.   -Pre-Admission hospital notes reviewed  -Pertinent radiology, if any, reviewed   -Current rehab plan reviewed; continue pending any major changes  -Current medication therapy reviewed. Continue and monitor for adverse effects.  -Monitor vitals  -Monitor labs  -Continue home medications for chronic medical conditions.   -PT/OT as tolerated,   -Continue low salt diet advised     Charting was completed using voice recognition technology and may include unintended errors.    Zachariah Appiah, DO

## 2025-01-17 NOTE — Clinical Note
Patient: Mikhail Moses  : 1945    Encounter Date: 2025    Date of Service: 25    HPI/Subjective  Mikhail Moses is a 79 y.o. male w/ history of CAD s/p multiple PCIs/stents, HFmrEF, HTN, HLD, DVT (), COPD, CKD, urethral stricture s/p urethroplasty and urethral dilation, gout, alcohol use disorder  Patient with recent hospitalization -25 initially had elective right knee replacement on 25 with Dr. Bradford however complicated with hypoxia c/f aspiration, shock and was transferred to ICU for pressor support.  Able to be weaned off pressors and weaned down to low nasal cannula, completed course of empiric abx. Course complicated with urine retention and had Paul placed, he does see urology Dr. Almaraz as an outpatient for urethral dilation every 6 to 8 weeks for urethral strictures.  Orthopedics following recommended continuing Eliquis for postop DVT prophylaxis, weightbearing as tolerated to right lower extremity.  PT/OT evaluated recommended SNF for which patient was agreeable.  Plan for follow-up with orthopedic and urology.    At bedside, patient reports doing well, endorses mild pain to right lower extremity, dressing is intact.  Endorses good p.o. intake, and having regular bowel movements.    Denies fevers, chills, weight loss, lightheadedness, dizziness, vision changes, sore throat, runny nose, CP, SOB, cough, palpitations, n/v/d, abd pain, black/bloody stools, or new numbness/weakness/tingling in arms/legs/face.    Other Medical, Surgical, Family, Social Hx, Allergies per chart in PCC.   Medication list reviewed. Please see PCC.     Objective:   General: NAD. NCAT. Aox3   HEENT: PERRLA. EOMI. MMM. Nares patent bl.  Cardiovascular: RRR. No MRG. S1/S2 wnl.   Respiratory: CTABL. No acute respiratory distress.   GI: Soft, NT abdomen. BS present x 4.   : No CVAT BL  MSK: ROM x 4. CTLS non-tender. Dressing in place RLE, no drainage noted  Extremities: No edema. Cap refill < 2  sec.   Skin: No rashes or bruises.   Neuro: Aox3. Cranial Nerves grossly intact. Motor/sensory wnl.   Psych: Mood wnl.       Vital signs reviewed. Please see chart in PCC.     REVIEW OF SYSTEMS   ROS reviewed within HPI and is otherwise negative     Assessment and Plan    -Pre-Admission hospital notes reviewed  -Pertinent radiology, if any, reviewed   -Current rehab plan reviewed; continue pending any major changes  -Current medication therapy reviewed. Continue and monitor for adverse effects.  -Monitor vitals  -Monitor labs  -Continue home medications for chronic medical conditions.   -PT/OT as tolerated, WBAT RLE  -Continue eliquis for DVT ppx  -Continue low salt diet advised   -Will need follow-up with orthopedics Dr. Bradford and his urologist Dr. Almaraz for Paul removal    Charting was completed using voice recognition technology and may include unintended errors.    Zachariah Appiah DO      Electronically Signed By: Zachariah Appiah DO   1/21/25  8:03 AM

## 2025-02-13 ENCOUNTER — HOME HEALTH ADMISSION (OUTPATIENT)
Dept: HOME HEALTH SERVICES | Facility: HOME HEALTH | Age: 80
End: 2025-02-13
Payer: MEDICARE

## 2025-02-13 ENCOUNTER — PATIENT OUTREACH (OUTPATIENT)
Dept: PRIMARY CARE | Facility: CLINIC | Age: 80
End: 2025-02-13
Payer: MEDICARE

## 2025-02-13 ENCOUNTER — APPOINTMENT (OUTPATIENT)
Dept: ORTHOPEDIC SURGERY | Facility: CLINIC | Age: 80
End: 2025-02-13
Payer: MEDICARE

## 2025-02-13 ENCOUNTER — DOCUMENTATION (OUTPATIENT)
Dept: HOME HEALTH SERVICES | Facility: HOME HEALTH | Age: 80
End: 2025-02-13
Payer: MEDICARE

## 2025-02-13 RX ORDER — TRAMADOL HYDROCHLORIDE 50 MG/1
50 TABLET ORAL EVERY 8 HOURS PRN
COMMUNITY

## 2025-02-13 RX ORDER — ACETAMINOPHEN 500 MG
500 TABLET ORAL EVERY 6 HOURS PRN
COMMUNITY

## 2025-02-13 NOTE — PROGRESS NOTES
Discharge facility: [facility]   Discharge diagnosis:  UTI (urinary tract infection)    Acute kidney injury    Adrenal insufficiency    Severe sepsis    Hypotension      Admission date: 2/10/25  Discharge date: 2/12/25    PCP Appointment Date: 2/17/25  Specialist Appointment Date: 2/28/25 [facility],  Hospital Encounter and Summary: Linked    Transitional Care Management Enrollment with Ciera Salinas RN (02/13/2025)     See Discharge assessment below for further details    Wrap Up  Wrap Up Additional Comments: Spoke with patient's wife. She reports patient was initially in [facility] from 1-17-25 thru 2-10-25 after having R TKA on 1/7 with Dr. Bradford. He was then sent to [facility] from 2-10-2-12-25 and she declined him returning there. She did not receive discharge instructions from initial hospital discharge or From SNF. She received discharge medications list from [facility] that has several discrepencies noted. She is uncertain if patient is to be taking Ramipril Oral Capsule 10 MG Give 1 capsule by mouth one time a day as he had been taking prior to intitial hospital stay , not listed on [facility] discharge medications.  Listed on [facility] discharge medications but patient does not have and did not receive prescription for is Ultram 50 mg oral tablet  50 mg 1 tabs, ORAL, [id number], PRN, 60 tabs,Zestril 40 mg oral tablet  40 mg 1 tabs, ORAL, DAILY, Zofran 4 mg oral tablet  4 mg 1 tabs, ORAL, [id number], PRN. She reports he has like 3 pills left of Lyrica 300 mg oral capsule  300 mg 1 caps. She did call Dr Jordan regarding refill but  will not refill until patient is seen in office.  Patient states he does feel nasueated this morning. He was uncertain what medications he was receiving at either hospital stay. Encouraged her to monitor blood pressure, and pulse and write down for follow up appt. Message sent to PCP for clarification on medications thata were listed and patient does not have  and if suppose to be taking , to please ssend over prescriptions to [facility]. Wife aware she should be receiving call back from this nurse or PCP office with clarification. (2/13/2025 10:47 AM)    Medications  Medications reviewed with patient/caregiver?: Yes (Reviewed with patient's wife) (2/13/2025 10:47 AM)  Is the patient having any side effects they believe may be caused by any medication additions or changes?: No (2/13/2025 10:47 AM)  Does the patient have all medications ordered at discharge?: No (2/13/2025 10:47 AM)  What is keeping the patient from filling the prescriptions?: Lost script/didn't receive (see note) (2/13/2025 10:47 AM)  Care Management Interventions: Provided patient education; Advised patient to call provider (2/13/2025 10:47 AM)  Prescription Comments: Spouse reports no prescriptions were sent (2/13/2025 10:47 AM)  Is the patient taking all medications as directed (includes completed medication regime)?: No (2/13/2025 10:47 AM)  What is preventing the patient from taking all medications as directed?: Other (Comment) (Medication discrepencies, patient does not have some medications on list, some medications atient had been taking prior not listed) (2/13/2025 10:47 AM)  Care Management Interventions: Notified provider; Provided patient education (2/13/2025 10:47 AM)  Medication Comments: See note (2/13/2025 10:47 AM)  Follow Up Tasks: Medication reconciliation issues (2/13/2025 10:47 AM)    Appointments  Does the patient have a primary care provider?: Yes (2/13/2025 10:47 AM)  Care Management Interventions: Verified appointment date/time/provider (Soonest appt available for 2-17-25 due to medication discrepencies) (2/13/2025 10:47 AM)  Has the patient kept scheduled appointments due by today?: Yes (2/13/2025 10:47 AM)  Care Management Interventions: Advised patient to keep appointment; Educated on importance of keeping appointment (2/13/2025 10:47 AM)  Follow Up Tasks: -- (n/a) (2/13/2025  10:47 AM)    Self Management  What is the home health agency?:  Home [facility] (2/13/2025 10:47 AM)  Has home health visited the patient within 72 hours of discharge?: Call prior to 72 hours (2/13/2025 10:47 AM)  Home Health Interventions: -- (n/a) (2/13/2025 10:47 AM)  What Durable Medical Equipment (DME) was ordered?: n/a (2/13/2025 10:47 AM)  Has all Durable Medical Equipment (DME) been delivered?: -- (n/a) (2/13/2025 10:47 AM)  DME Interventions: -- (n/a) (2/13/2025 10:47 AM)  Care Management Interventions: Notified PCP/provider (2/13/2025 10:47 AM)  Follow Up Tasks: -- (n/a) (2/13/2025 10:47 AM)    Patient Teaching  Does the patient have access to their discharge instructions?: Yes (From [facility] only) (2/13/2025 10:47 AM)  Care Management Interventions: Reviewed instructions with patient (Reviewed with patient's wife) (2/13/2025 10:47 AM)  What is the patient's perception of their health status since discharge?: Improving (2/13/2025 10:47 AM)  Is the patient/caregiver able to teach back the hierarchy of who to call/visit for symptoms/problems? PCP, Specialist, Home Health nurse, Urgent Care, ED, 911: Yes (2/13/2025 10:47 AM)  Patient/Caregiver Education Comments: Instructed on hospital discharge instructions. Instructed on new and changed medications. Instructed if increased shortness of breath or chest pains to call 911. Provided my contact information and encouraged to call with any questions. (2/13/2025 10:47 AM)

## 2025-02-13 NOTE — HH CARE COORDINATION
Home Care received a Referral for Nursing and Physical Therapy. We have processed the referral for a Start of Care on 24-48 HOURS .     If you have any questions or concerns, please feel free to contact us at [phone]. Follow the prompts, enter your five digit zip code, and you will be directed to your care team on [facility] 3.

## 2025-02-14 ENCOUNTER — HOME CARE VISIT (OUTPATIENT)
Dept: HOME HEALTH SERVICES | Facility: HOME HEALTH | Age: 80
End: 2025-02-14
Payer: MEDICARE

## 2025-02-14 ENCOUNTER — APPOINTMENT (OUTPATIENT)
Dept: ORTHOPEDIC SURGERY | Facility: CLINIC | Age: 80
End: 2025-02-14
Payer: MEDICARE

## 2025-02-14 VITALS
SYSTOLIC BLOOD PRESSURE: 100 MMHG | OXYGEN SATURATION: 93 % | RESPIRATION RATE: 12 BRPM | DIASTOLIC BLOOD PRESSURE: 60 MMHG | HEART RATE: 76 BPM | TEMPERATURE: 96.7 F

## 2025-02-14 PROCEDURE — G0151 HHCP-SERV OF PT,EA 15 MIN: HCPCS | Mod: HHH

## 2025-02-14 PROCEDURE — 169592 NO-PAY CLAIM PROCEDURE

## 2025-02-14 PROCEDURE — G0299 HHS/HOSPICE OF RN EA 15 MIN: HCPCS | Mod: HHH

## 2025-02-14 ASSESSMENT — ENCOUNTER SYMPTOMS
LOWEST PAIN SEVERITY IN PAST 24 HOURS: 5/10
PAIN: SEE NOTE
APPETITE LEVEL: GOOD
HIGHEST PAIN SEVERITY IN PAST 24 HOURS: 6/10
PAIN: 1
PAIN LOCATION: RIGHT KNEE
PAIN LOCATION - PAIN SEVERITY: 5/10
PAIN SEVERITY GOAL: 5/10
PERSON REPORTING PAIN: PATIENT
PERSON REPORTING PAIN: PATIENT
PAIN: 1

## 2025-02-14 ASSESSMENT — GAIT ASSESSMENTS
STEP CONTINUITY: 1 - STEPS APPEAR CONTINUOUS
STEP SYMMETRY: 1 - RIGHT AND LEFT STEP LENGTH APPEAR EQUAL
TRUNK SCORE: 0
WALKING STANCE: 1 - HEELS ALMOST TOUCHING WHILE WALKING
GAIT SCORE: 7
BALANCE AND GAIT SCORE: 13
INITIATION OF GAIT IMMEDIATELY AFTER GO: 1 - NO HESITANCY
PATH: 1 - MILD/MODERATE DEVIATION OR USES WALKING AID
TRUNK: 0 - MARKED SWAY OR USES WALKING AID
PATH SCORE: 1

## 2025-02-14 ASSESSMENT — BALANCE ASSESSMENTS
ARISES: 1 - ABLE, USES ARMS TO HELP
EYES CLOSED AT MAXIMUM POSITION NUDGED: 0 - UNSTEADY
SITTING DOWN: 1 - USES ARMS OR NOT SMOOTH MOTION
TURNING 360 DEGREES STEPS: 0 - DISCONTINUOUS STEPS
STANDING BALANCE: 1 - STEADY BUT WIDE STANCE AND USES CANE OR OTHER SUPPORT
ARISING SCORE: 1
NUDGED SCORE: 0
IMMEDIATE STANDING BALANCE FIRST 5 SECONDS: 1 - STEADY BUT USES WALKER OR OTHER SUPPORT
BALANCE SCORE: 6
NUDGED: 0 - BEGINS TO FALL
ATTEMPTS TO ARISE: 1 - ABLE, REQUIRES MORE THAN ONE ATTEMPT
SITTING BALANCE: 1 - STEADY, SAFE

## 2025-02-14 ASSESSMENT — ACTIVITIES OF DAILY LIVING (ADL)
OASIS_M1830: 03
AMBULATION ASSISTANCE ON FLAT SURFACES: 1
ENTERING_EXITING_HOME: NEEDS ASSISTANCE
AMBULATION_DISTANCE/DURATION_TOLERATED: 80 FT

## 2025-02-17 ENCOUNTER — APPOINTMENT (OUTPATIENT)
Dept: PRIMARY CARE | Facility: CLINIC | Age: 80
End: 2025-02-17
Payer: MEDICARE

## 2025-02-18 ENCOUNTER — HOME CARE VISIT (OUTPATIENT)
Dept: HOME HEALTH SERVICES | Facility: HOME HEALTH | Age: 80
End: 2025-02-18
Payer: MEDICARE

## 2025-02-18 PROCEDURE — G0151 HHCP-SERV OF PT,EA 15 MIN: HCPCS | Mod: HHH

## 2025-02-18 ASSESSMENT — ENCOUNTER SYMPTOMS
PAIN LOCATION - PAIN SEVERITY: 1/10
LOWEST PAIN SEVERITY IN PAST 24 HOURS: 1/10
PERSON REPORTING PAIN: PATIENT
PAIN: 1
PAIN LOCATION: RIGHT KNEE
HIGHEST PAIN SEVERITY IN PAST 24 HOURS: 6/10

## 2025-02-20 ENCOUNTER — APPOINTMENT (OUTPATIENT)
Dept: ORTHOPEDIC SURGERY | Facility: CLINIC | Age: 80
End: 2025-02-20
Payer: MEDICARE

## 2025-02-20 ENCOUNTER — HOME CARE VISIT (OUTPATIENT)
Dept: HOME HEALTH SERVICES | Facility: HOME HEALTH | Age: 80
End: 2025-02-20
Payer: MEDICARE

## 2025-02-20 VITALS
DIASTOLIC BLOOD PRESSURE: 56 MMHG | HEART RATE: 82 BPM | TEMPERATURE: 97.4 F | SYSTOLIC BLOOD PRESSURE: 106 MMHG | RESPIRATION RATE: 12 BRPM | OXYGEN SATURATION: 96 %

## 2025-02-20 PROCEDURE — G0151 HHCP-SERV OF PT,EA 15 MIN: HCPCS | Mod: HHH

## 2025-02-20 PROCEDURE — G0299 HHS/HOSPICE OF RN EA 15 MIN: HCPCS | Mod: HHH

## 2025-02-20 ASSESSMENT — ENCOUNTER SYMPTOMS
LOWEST PAIN SEVERITY IN PAST 24 HOURS: 2/10
PAIN: SEE NOTE
PAIN LOCATION - PAIN SEVERITY: 3/10
PERSON REPORTING PAIN: PATIENT
PAIN: 1
HIGHEST PAIN SEVERITY IN PAST 24 HOURS: 2/10
APPETITE LEVEL: GOOD
SUBJECTIVE PAIN PROGRESSION: WAXING AND WANING
PAIN: 1
PAIN SEVERITY GOAL: 2/10
PAIN LOCATION: RIGHT KNEE

## 2025-02-22 ENCOUNTER — HOME CARE VISIT (OUTPATIENT)
Dept: HOME HEALTH SERVICES | Facility: HOME HEALTH | Age: 80
End: 2025-02-22
Payer: MEDICARE

## 2025-02-24 ENCOUNTER — HOME CARE VISIT (OUTPATIENT)
Dept: HOME HEALTH SERVICES | Facility: HOME HEALTH | Age: 80
End: 2025-02-24
Payer: MEDICARE

## 2025-02-24 ENCOUNTER — APPOINTMENT (OUTPATIENT)
Dept: RADIOLOGY | Facility: HOSPITAL | Age: 80
End: 2025-02-24
Payer: MEDICARE

## 2025-02-24 ENCOUNTER — HOSPITAL ENCOUNTER (INPATIENT)
Facility: HOSPITAL | Age: 80
LOS: 1 days | Discharge: HOME HEALTH | End: 2025-02-25
Attending: STUDENT IN AN ORGANIZED HEALTH CARE EDUCATION/TRAINING PROGRAM | Admitting: INTERNAL MEDICINE
Payer: MEDICARE

## 2025-02-24 ENCOUNTER — APPOINTMENT (OUTPATIENT)
Dept: PRIMARY CARE | Facility: CLINIC | Age: 80
End: 2025-02-24
Payer: MEDICARE

## 2025-02-24 ENCOUNTER — APPOINTMENT (OUTPATIENT)
Dept: CARDIOLOGY | Facility: HOSPITAL | Age: 80
End: 2025-02-24
Payer: MEDICARE

## 2025-02-24 DIAGNOSIS — E83.42 HYPOMAGNESEMIA: ICD-10-CM

## 2025-02-24 DIAGNOSIS — N39.0 ACUTE LOWER UTI: ICD-10-CM

## 2025-02-24 DIAGNOSIS — R53.1 GENERALIZED WEAKNESS: Primary | ICD-10-CM

## 2025-02-24 LAB
ALBUMIN SERPL BCP-MCNC: 3.9 G/DL (ref 3.4–5)
ALP SERPL-CCNC: 115 U/L (ref 33–136)
ALT SERPL W P-5'-P-CCNC: 6 U/L (ref 10–52)
ANION GAP SERPL CALC-SCNC: 14 MMOL/L (ref 10–20)
APPEARANCE UR: CLEAR
AST SERPL W P-5'-P-CCNC: 17 U/L (ref 9–39)
BACTERIA #/AREA URNS AUTO: ABNORMAL /HPF
BASOPHILS # BLD AUTO: 0.08 X10*3/UL (ref 0–0.1)
BASOPHILS NFR BLD AUTO: 1.1 %
BILIRUB SERPL-MCNC: 0.8 MG/DL (ref 0–1.2)
BILIRUB UR STRIP.AUTO-MCNC: NEGATIVE MG/DL
BUN SERPL-MCNC: 18 MG/DL (ref 6–23)
CALCIUM SERPL-MCNC: 9.5 MG/DL (ref 8.6–10.3)
CARDIAC TROPONIN I PNL SERPL HS: 10 NG/L (ref 0–20)
CHLORIDE SERPL-SCNC: 105 MMOL/L (ref 98–107)
CO2 SERPL-SCNC: 24 MMOL/L (ref 21–32)
COLOR UR: YELLOW
CREAT SERPL-MCNC: 1.35 MG/DL (ref 0.5–1.3)
CRP SERPL-MCNC: 1.46 MG/DL
EGFRCR SERPLBLD CKD-EPI 2021: 53 ML/MIN/1.73M*2
EOSINOPHIL # BLD AUTO: 0.15 X10*3/UL (ref 0–0.4)
EOSINOPHIL NFR BLD AUTO: 2.1 %
ERYTHROCYTE [DISTWIDTH] IN BLOOD BY AUTOMATED COUNT: 15.8 % (ref 11.5–14.5)
ERYTHROCYTE [SEDIMENTATION RATE] IN BLOOD BY WESTERGREN METHOD: 59 MM/H (ref 0–20)
FLUAV RNA RESP QL NAA+PROBE: NOT DETECTED
FLUBV RNA RESP QL NAA+PROBE: NOT DETECTED
GLUCOSE SERPL-MCNC: 120 MG/DL (ref 74–99)
GLUCOSE UR STRIP.AUTO-MCNC: NORMAL MG/DL
HCT VFR BLD AUTO: 37.1 % (ref 41–52)
HGB BLD-MCNC: 12.1 G/DL (ref 13.5–17.5)
HYALINE CASTS #/AREA URNS AUTO: ABNORMAL /LPF
IMM GRANULOCYTES # BLD AUTO: 0.02 X10*3/UL (ref 0–0.5)
IMM GRANULOCYTES NFR BLD AUTO: 0.3 % (ref 0–0.9)
KETONES UR STRIP.AUTO-MCNC: NEGATIVE MG/DL
LACTATE SERPL-SCNC: 1.7 MMOL/L (ref 0.4–2)
LEUKOCYTE ESTERASE UR QL STRIP.AUTO: ABNORMAL
LYMPHOCYTES # BLD AUTO: 1.6 X10*3/UL (ref 0.8–3)
LYMPHOCYTES NFR BLD AUTO: 22.1 %
MAGNESIUM SERPL-MCNC: 0.86 MG/DL (ref 1.6–2.4)
MCH RBC QN AUTO: 28.6 PG (ref 26–34)
MCHC RBC AUTO-ENTMCNC: 32.6 G/DL (ref 32–36)
MCV RBC AUTO: 88 FL (ref 80–100)
MONOCYTES # BLD AUTO: 0.52 X10*3/UL (ref 0.05–0.8)
MONOCYTES NFR BLD AUTO: 7.2 %
MUCOUS THREADS #/AREA URNS AUTO: ABNORMAL /LPF
NEUTROPHILS # BLD AUTO: 4.87 X10*3/UL (ref 1.6–5.5)
NEUTROPHILS NFR BLD AUTO: 67.2 %
NITRITE UR QL STRIP.AUTO: NEGATIVE
NRBC BLD-RTO: 0 /100 WBCS (ref 0–0)
PH UR STRIP.AUTO: 5.5 [PH]
PLATELET # BLD AUTO: 224 X10*3/UL (ref 150–450)
POTASSIUM SERPL-SCNC: 3.7 MMOL/L (ref 3.5–5.3)
PROT SERPL-MCNC: 7.1 G/DL (ref 6.4–8.2)
PROT UR STRIP.AUTO-MCNC: ABNORMAL MG/DL
RBC # BLD AUTO: 4.23 X10*6/UL (ref 4.5–5.9)
RBC # UR STRIP.AUTO: NEGATIVE MG/DL
RBC #/AREA URNS AUTO: ABNORMAL /HPF
SARS-COV-2 RNA RESP QL NAA+PROBE: NOT DETECTED
SODIUM SERPL-SCNC: 139 MMOL/L (ref 136–145)
SP GR UR STRIP.AUTO: 1.02
SQUAMOUS #/AREA URNS AUTO: ABNORMAL /HPF
UROBILINOGEN UR STRIP.AUTO-MCNC: NORMAL MG/DL
WBC # BLD AUTO: 7.2 X10*3/UL (ref 4.4–11.3)
WBC #/AREA URNS AUTO: ABNORMAL /HPF
WBC CLUMPS #/AREA URNS AUTO: ABNORMAL /HPF

## 2025-02-24 PROCEDURE — 85025 COMPLETE CBC W/AUTO DIFF WBC: CPT | Performed by: PHYSICIAN ASSISTANT

## 2025-02-24 PROCEDURE — 85652 RBC SED RATE AUTOMATED: CPT | Performed by: PHYSICIAN ASSISTANT

## 2025-02-24 PROCEDURE — 1200000002 HC GENERAL ROOM WITH TELEMETRY DAILY

## 2025-02-24 PROCEDURE — 2500000001 HC RX 250 WO HCPCS SELF ADMINISTERED DRUGS (ALT 637 FOR MEDICARE OP): Performed by: INTERNAL MEDICINE

## 2025-02-24 PROCEDURE — 71045 X-RAY EXAM CHEST 1 VIEW: CPT

## 2025-02-24 PROCEDURE — 1210000001 HC SEMI-PRIVATE ROOM DAILY

## 2025-02-24 PROCEDURE — 71045 X-RAY EXAM CHEST 1 VIEW: CPT | Performed by: RADIOLOGY

## 2025-02-24 PROCEDURE — 2500000002 HC RX 250 W HCPCS SELF ADMINISTERED DRUGS (ALT 637 FOR MEDICARE OP, ALT 636 FOR OP/ED): Performed by: INTERNAL MEDICINE

## 2025-02-24 PROCEDURE — 73560 X-RAY EXAM OF KNEE 1 OR 2: CPT | Mod: RT

## 2025-02-24 PROCEDURE — 36415 COLL VENOUS BLD VENIPUNCTURE: CPT | Performed by: PHYSICIAN ASSISTANT

## 2025-02-24 PROCEDURE — 96366 THER/PROPH/DIAG IV INF ADDON: CPT

## 2025-02-24 PROCEDURE — 96375 TX/PRO/DX INJ NEW DRUG ADDON: CPT

## 2025-02-24 PROCEDURE — 83735 ASSAY OF MAGNESIUM: CPT | Performed by: PHYSICIAN ASSISTANT

## 2025-02-24 PROCEDURE — 96365 THER/PROPH/DIAG IV INF INIT: CPT

## 2025-02-24 PROCEDURE — 84460 ALANINE AMINO (ALT) (SGPT): CPT | Performed by: PHYSICIAN ASSISTANT

## 2025-02-24 PROCEDURE — 93005 ELECTROCARDIOGRAM TRACING: CPT

## 2025-02-24 PROCEDURE — 99285 EMERGENCY DEPT VISIT HI MDM: CPT | Mod: 25 | Performed by: STUDENT IN AN ORGANIZED HEALTH CARE EDUCATION/TRAINING PROGRAM

## 2025-02-24 PROCEDURE — 73560 X-RAY EXAM OF KNEE 1 OR 2: CPT | Mod: RIGHT SIDE | Performed by: RADIOLOGY

## 2025-02-24 PROCEDURE — 87077 CULTURE AEROBIC IDENTIFY: CPT | Mod: PARLAB | Performed by: PHYSICIAN ASSISTANT

## 2025-02-24 PROCEDURE — 99223 1ST HOSP IP/OBS HIGH 75: CPT | Performed by: INTERNAL MEDICINE

## 2025-02-24 PROCEDURE — 2500000004 HC RX 250 GENERAL PHARMACY W/ HCPCS (ALT 636 FOR OP/ED): Performed by: PHYSICIAN ASSISTANT

## 2025-02-24 PROCEDURE — 83605 ASSAY OF LACTIC ACID: CPT | Performed by: PHYSICIAN ASSISTANT

## 2025-02-24 PROCEDURE — 86140 C-REACTIVE PROTEIN: CPT | Performed by: PHYSICIAN ASSISTANT

## 2025-02-24 PROCEDURE — 84484 ASSAY OF TROPONIN QUANT: CPT | Performed by: PHYSICIAN ASSISTANT

## 2025-02-24 PROCEDURE — 96376 TX/PRO/DX INJ SAME DRUG ADON: CPT

## 2025-02-24 PROCEDURE — 81001 URINALYSIS AUTO W/SCOPE: CPT | Performed by: PHYSICIAN ASSISTANT

## 2025-02-24 PROCEDURE — 87636 SARSCOV2 & INF A&B AMP PRB: CPT | Performed by: PHYSICIAN ASSISTANT

## 2025-02-24 RX ORDER — POLYETHYLENE GLYCOL 3350 17 G/17G
17 POWDER, FOR SOLUTION ORAL DAILY PRN
Status: DISCONTINUED | OUTPATIENT
Start: 2025-02-24 | End: 2025-02-25 | Stop reason: HOSPADM

## 2025-02-24 RX ORDER — MAGNESIUM SULFATE HEPTAHYDRATE 40 MG/ML
4 INJECTION, SOLUTION INTRAVENOUS ONCE
Status: COMPLETED | OUTPATIENT
Start: 2025-02-24 | End: 2025-02-24

## 2025-02-24 RX ORDER — PANTOPRAZOLE SODIUM 40 MG/1
40 TABLET, DELAYED RELEASE ORAL 2 TIMES DAILY
Status: DISCONTINUED | OUTPATIENT
Start: 2025-02-24 | End: 2025-02-25 | Stop reason: HOSPADM

## 2025-02-24 RX ORDER — ONDANSETRON HYDROCHLORIDE 2 MG/ML
4 INJECTION, SOLUTION INTRAVENOUS ONCE
Status: COMPLETED | OUTPATIENT
Start: 2025-02-24 | End: 2025-02-24

## 2025-02-24 RX ORDER — ACETAMINOPHEN 325 MG/1
650 TABLET ORAL EVERY 6 HOURS PRN
Status: DISCONTINUED | OUTPATIENT
Start: 2025-02-24 | End: 2025-02-25 | Stop reason: HOSPADM

## 2025-02-24 RX ORDER — HYDROMORPHONE HYDROCHLORIDE 0.2 MG/ML
0.2 INJECTION INTRAMUSCULAR; INTRAVENOUS; SUBCUTANEOUS EVERY 4 HOURS PRN
Status: DISCONTINUED | OUTPATIENT
Start: 2025-02-24 | End: 2025-02-25 | Stop reason: HOSPADM

## 2025-02-24 RX ORDER — TAMSULOSIN HYDROCHLORIDE 0.4 MG/1
0.4 CAPSULE ORAL NIGHTLY
Status: DISCONTINUED | OUTPATIENT
Start: 2025-02-24 | End: 2025-02-25 | Stop reason: HOSPADM

## 2025-02-24 RX ORDER — VANCOMYCIN HYDROCHLORIDE 1 G/20ML
INJECTION, POWDER, LYOPHILIZED, FOR SOLUTION INTRAVENOUS DAILY PRN
Status: DISCONTINUED | OUTPATIENT
Start: 2025-02-24 | End: 2025-02-25 | Stop reason: HOSPADM

## 2025-02-24 RX ORDER — LISINOPRIL 20 MG/1
20 TABLET ORAL DAILY
Status: DISCONTINUED | OUTPATIENT
Start: 2025-02-24 | End: 2025-02-25 | Stop reason: HOSPADM

## 2025-02-24 RX ORDER — CEFTRIAXONE 1 G/50ML
1 INJECTION, SOLUTION INTRAVENOUS EVERY 24 HOURS
Status: DISCONTINUED | OUTPATIENT
Start: 2025-02-25 | End: 2025-02-25 | Stop reason: HOSPADM

## 2025-02-24 RX ORDER — CEFTRIAXONE 1 G/50ML
1 INJECTION, SOLUTION INTRAVENOUS ONCE
Status: COMPLETED | OUTPATIENT
Start: 2025-02-24 | End: 2025-02-25

## 2025-02-24 RX ORDER — ATORVASTATIN CALCIUM 10 MG/1
10 TABLET, FILM COATED ORAL NIGHTLY
Status: DISCONTINUED | OUTPATIENT
Start: 2025-02-24 | End: 2025-02-25 | Stop reason: HOSPADM

## 2025-02-24 RX ORDER — METOPROLOL SUCCINATE 25 MG/1
25 TABLET, EXTENDED RELEASE ORAL DAILY
Status: DISCONTINUED | OUTPATIENT
Start: 2025-02-24 | End: 2025-02-25 | Stop reason: HOSPADM

## 2025-02-24 RX ORDER — FUROSEMIDE 40 MG/1
40 TABLET ORAL DAILY
Status: DISCONTINUED | OUTPATIENT
Start: 2025-02-24 | End: 2025-02-25 | Stop reason: HOSPADM

## 2025-02-24 RX ORDER — OXYCODONE AND ACETAMINOPHEN 5; 325 MG/1; MG/1
1 TABLET ORAL EVERY 6 HOURS PRN
Status: DISCONTINUED | OUTPATIENT
Start: 2025-02-24 | End: 2025-02-25 | Stop reason: HOSPADM

## 2025-02-24 RX ORDER — ALLOPURINOL 300 MG/1
300 TABLET ORAL DAILY
Status: DISCONTINUED | OUTPATIENT
Start: 2025-02-24 | End: 2025-02-25 | Stop reason: HOSPADM

## 2025-02-24 RX ORDER — PREGABALIN 75 MG/1
300 CAPSULE ORAL 2 TIMES DAILY
Status: DISCONTINUED | OUTPATIENT
Start: 2025-02-24 | End: 2025-02-25 | Stop reason: HOSPADM

## 2025-02-24 RX ORDER — ASPIRIN 81 MG/1
81 TABLET ORAL DAILY
Status: DISCONTINUED | OUTPATIENT
Start: 2025-02-24 | End: 2025-02-25 | Stop reason: HOSPADM

## 2025-02-24 RX ADMIN — CEFTRIAXONE SODIUM 1 G: 1 INJECTION, SOLUTION INTRAVENOUS at 16:19

## 2025-02-24 RX ADMIN — MAGNESIUM SULFATE HEPTAHYDRATE 4 G: 40 INJECTION, SOLUTION INTRAVENOUS at 12:10

## 2025-02-24 RX ADMIN — ONDANSETRON 4 MG: 2 INJECTION INTRAMUSCULAR; INTRAVENOUS at 16:57

## 2025-02-24 RX ADMIN — PANTOPRAZOLE SODIUM 40 MG: 40 TABLET, DELAYED RELEASE ORAL at 21:59

## 2025-02-24 RX ADMIN — TAMSULOSIN HYDROCHLORIDE 0.4 MG: 0.4 CAPSULE ORAL at 21:59

## 2025-02-24 RX ADMIN — METOPROLOL SUCCINATE 25 MG: 25 TABLET, EXTENDED RELEASE ORAL at 21:58

## 2025-02-24 RX ADMIN — ATORVASTATIN CALCIUM 10 MG: 10 TABLET, FILM COATED ORAL at 21:59

## 2025-02-24 RX ADMIN — ONDANSETRON 4 MG: 2 INJECTION INTRAMUSCULAR; INTRAVENOUS at 10:34

## 2025-02-24 ASSESSMENT — ENCOUNTER SYMPTOMS
ACTIVITY CHANGE: 1
RESPIRATORY NEGATIVE: 1
NUMBNESS: 1
WOUND: 1
APPETITE CHANGE: 1
ENDOCRINE NEGATIVE: 1
FATIGUE: 1
ARTHRALGIAS: 1
CARDIOVASCULAR NEGATIVE: 1
HEMATOLOGIC/LYMPHATIC NEGATIVE: 1
UNEXPECTED WEIGHT CHANGE: 1
GASTROINTESTINAL NEGATIVE: 1
EYES NEGATIVE: 1
PSYCHIATRIC NEGATIVE: 1

## 2025-02-24 ASSESSMENT — LIFESTYLE VARIABLES
HAVE YOU EVER FELT YOU SHOULD CUT DOWN ON YOUR DRINKING: NO
TOTAL SCORE: 0
EVER FELT BAD OR GUILTY ABOUT YOUR DRINKING: NO
EVER HAD A DRINK FIRST THING IN THE MORNING TO STEADY YOUR NERVES TO GET RID OF A HANGOVER: NO
HAVE PEOPLE ANNOYED YOU BY CRITICIZING YOUR DRINKING: NO

## 2025-02-24 NOTE — ED TRIAGE NOTES
Pt presents to ED for report of nausea, diarrhea, and decreased appetite since Friday. Pt denies urinary symptoms, abdominal pain, CP, SOB.

## 2025-02-24 NOTE — PROGRESS NOTES
Pharmacy Medication History Review    Mikhail Moses is a 80 y.o. male admitted for No Principal Problem: There is no principal problem currently on the Problem List. Please update the Problem List and refresh.. Pharmacy reviewed the patient's [id number] medications and allergies for accuracy.    The list below reflectives the updated PTA list. Please review each medication in order reconciliation for additional clarification and justification.  Prior to Admission medications    Medication Sig Start Date End Date Taking? Authorizing Provider   acetaminophen (Tylenol) 500 mg tablet Take 1 tablet (500 mg) by mouth every 6 hours if needed for mild pain (1 - 3). Take 2 tabs as needed every 6 hours   Yes Historical Provider, MD   allopurinol (Zyloprim) 300 mg tablet Take 1 tablet (300 mg) by mouth once daily. 11/26/24  Yes Josué Kay MD   aspirin 81 mg EC tablet Take 1 tablet (81 mg) by mouth once daily. 1/13/25  Yes Emmy Donald MD   atorvastatin (Lipitor) 10 mg tablet Take 1 tablet (10 mg) by mouth once daily at bedtime. 5/6/16  Yes Historical Provider, MD   furosemide (Lasix) 40 mg tablet Take 1 tablet (40 mg) by mouth once daily. 8/13/17  Yes Historical Provider, MD   metoprolol succinate XL (Toprol-XL) 25 mg 24 hr tablet Take 1 tablet (25 mg) by mouth once daily. 4/26/18  Yes Historical Provider, MD   ondansetron (Zofran) 4 mg tablet Take 1 tablet (4 mg) by mouth every 8 hours if needed for nausea or vomiting. 1/12/25  Yes Emmy Donald MD   pantoprazole (ProtoNix) 40 mg EC tablet Take 1 tablet (40 mg) by mouth once daily. Do not crush, chew, or split.  Patient taking differently: Take 1 tablet (40 mg) by mouth 2 times a day. Do not crush, chew, or split. 1/6/25 2/24/25 Yes Hamiad Padilla PA-C   pregabalin (Lyrica) 300 mg capsule Take 1 capsule (300 mg) by mouth 2 times a day.   Yes Historical Provider, MD   ramipril (Altace) 10 mg capsule Take 1 capsule (10 mg) by mouth once daily. 5/6/16   Yes Historical Provider, MD   tamsulosin (Flomax) 0.4 mg 24 hr capsule Take 1 capsule (0.4 mg) by mouth once daily at bedtime.   Yes Historical Provider, MD   traMADol (Ultram) 50 mg tablet Take 1 tablet (50 mg) by mouth every 8 hours if needed for severe pain (7 - 10).   Yes Historical Provider, MD   apixaban (Eliquis) 2.5 mg tablet Take 1 tablet (2.5 mg) by mouth 2 times a day for 22 days.  Patient not taking: Reported on 2/24/2025 1/16/25 2/7/25 no Ulysses Bower MD   ergocalciferol (Vitamin D-2) 1.25 MG (42209 UT) capsule Take 1 capsule (50,000 Units) by mouth 1 (one) time per week.  Patient taking differently: Take 1 capsule (50,000 Units) by mouth 1 (one) time per week. Every Monday Morning 1/13/25 3/10/25 yes Josué Kay MD   naloxone (Narcan) 0.4 mg/mL injection Infuse 0.5 mL (0.2 mg) into a venous catheter every 5 minutes if needed for respiratory depression.  Patient not taking: Reported on 2/13/2025 1/12/25  no Emmy Donald MD   polyethylene glycol (Glycolax, Miralax) 17 gram/dose powder Mix 1 cap (17g)of powder into 8 ounces of fluid and drink once daily.  Patient not taking: Mix of powder and drink. Reported on 2/24/2025 1/6/25  no Hamida Padilla PA-C        The list below reflectives the updated allergy list. Please review each documented allergy for additional clarification and justification.  Allergies  Reviewed by Tiffanie Sterling on 2/24/2025   No Known Allergies         Below are additional concerns with the patient's PTA list.      Tiffanie Sterling
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
## 2025-02-25 ENCOUNTER — PHARMACY VISIT (OUTPATIENT)
Dept: PHARMACY | Facility: CLINIC | Age: 80
End: 2025-02-25
Payer: COMMERCIAL

## 2025-02-25 ENCOUNTER — HOME CARE VISIT (OUTPATIENT)
Dept: HOME HEALTH SERVICES | Facility: HOME HEALTH | Age: 80
End: 2025-02-25
Payer: MEDICARE

## 2025-02-25 VITALS
TEMPERATURE: 95.4 F | DIASTOLIC BLOOD PRESSURE: 72 MMHG | RESPIRATION RATE: 20 BRPM | HEIGHT: 68 IN | BODY MASS INDEX: 28.64 KG/M2 | SYSTOLIC BLOOD PRESSURE: 145 MMHG | WEIGHT: 189 LBS | HEART RATE: 72 BPM | OXYGEN SATURATION: 91 %

## 2025-02-25 LAB
ALBUMIN SERPL BCP-MCNC: 3.7 G/DL (ref 3.4–5)
ANION GAP SERPL CALC-SCNC: 15 MMOL/L (ref 10–20)
BUN SERPL-MCNC: 17 MG/DL (ref 6–23)
CALCIUM SERPL-MCNC: 9.7 MG/DL (ref 8.6–10.3)
CHLORIDE SERPL-SCNC: 104 MMOL/L (ref 98–107)
CO2 SERPL-SCNC: 25 MMOL/L (ref 21–32)
CREAT SERPL-MCNC: 1.39 MG/DL (ref 0.5–1.3)
EGFRCR SERPLBLD CKD-EPI 2021: 51 ML/MIN/1.73M*2
ERYTHROCYTE [DISTWIDTH] IN BLOOD BY AUTOMATED COUNT: 15.9 % (ref 11.5–14.5)
GLUCOSE SERPL-MCNC: 119 MG/DL (ref 74–99)
HCT VFR BLD AUTO: 35.3 % (ref 41–52)
HGB BLD-MCNC: 11.2 G/DL (ref 13.5–17.5)
HOLD SPECIMEN: NORMAL
MAGNESIUM SERPL-MCNC: 2.09 MG/DL (ref 1.6–2.4)
MCH RBC QN AUTO: 28.6 PG (ref 26–34)
MCHC RBC AUTO-ENTMCNC: 31.7 G/DL (ref 32–36)
MCV RBC AUTO: 90 FL (ref 80–100)
NRBC BLD-RTO: 0 /100 WBCS (ref 0–0)
PHOSPHATE SERPL-MCNC: 3.8 MG/DL (ref 2.5–4.9)
PLATELET # BLD AUTO: 210 X10*3/UL (ref 150–450)
POTASSIUM SERPL-SCNC: 3.9 MMOL/L (ref 3.5–5.3)
RBC # BLD AUTO: 3.91 X10*6/UL (ref 4.5–5.9)
SODIUM SERPL-SCNC: 140 MMOL/L (ref 136–145)
URATE SERPL-MCNC: 2.6 MG/DL (ref 4–7.5)
WBC # BLD AUTO: 8.4 X10*3/UL (ref 4.4–11.3)

## 2025-02-25 PROCEDURE — 2500000001 HC RX 250 WO HCPCS SELF ADMINISTERED DRUGS (ALT 637 FOR MEDICARE OP): Performed by: INTERNAL MEDICINE

## 2025-02-25 PROCEDURE — 97161 PT EVAL LOW COMPLEX 20 MIN: CPT | Mod: GP

## 2025-02-25 PROCEDURE — 2500000005 HC RX 250 GENERAL PHARMACY W/O HCPCS: Performed by: INTERNAL MEDICINE

## 2025-02-25 PROCEDURE — 36415 COLL VENOUS BLD VENIPUNCTURE: CPT | Performed by: INTERNAL MEDICINE

## 2025-02-25 PROCEDURE — RXMED WILLOW AMBULATORY MEDICATION CHARGE

## 2025-02-25 PROCEDURE — 84550 ASSAY OF BLOOD/URIC ACID: CPT | Performed by: INTERNAL MEDICINE

## 2025-02-25 PROCEDURE — 80069 RENAL FUNCTION PANEL: CPT | Performed by: INTERNAL MEDICINE

## 2025-02-25 PROCEDURE — 83735 ASSAY OF MAGNESIUM: CPT | Performed by: INTERNAL MEDICINE

## 2025-02-25 PROCEDURE — 97165 OT EVAL LOW COMPLEX 30 MIN: CPT | Mod: GO

## 2025-02-25 PROCEDURE — 99239 HOSP IP/OBS DSCHRG MGMT >30: CPT | Performed by: INTERNAL MEDICINE

## 2025-02-25 PROCEDURE — 2500000004 HC RX 250 GENERAL PHARMACY W/ HCPCS (ALT 636 FOR OP/ED): Performed by: INTERNAL MEDICINE

## 2025-02-25 PROCEDURE — 85027 COMPLETE CBC AUTOMATED: CPT | Performed by: INTERNAL MEDICINE

## 2025-02-25 RX ORDER — TALC
6 POWDER (GRAM) TOPICAL NIGHTLY
Status: DISCONTINUED | OUTPATIENT
Start: 2025-02-25 | End: 2025-02-25 | Stop reason: HOSPADM

## 2025-02-25 RX ORDER — AMOXICILLIN AND CLAVULANATE POTASSIUM 500; 125 MG/1; MG/1
1 TABLET, FILM COATED ORAL 2 TIMES DAILY
Qty: 6 TABLET | Refills: 0 | Status: SHIPPED | OUTPATIENT
Start: 2025-02-25 | End: 2025-02-28

## 2025-02-25 RX ORDER — DIPHENHYDRAMINE HYDROCHLORIDE 50 MG/ML
25 INJECTION INTRAMUSCULAR; INTRAVENOUS ONCE
Status: COMPLETED | OUTPATIENT
Start: 2025-02-25 | End: 2025-02-25

## 2025-02-25 RX ORDER — DIPHENHYDRAMINE HCL 25 MG
25 CAPSULE ORAL EVERY 6 HOURS PRN
Status: DISCONTINUED | OUTPATIENT
Start: 2025-02-25 | End: 2025-02-25 | Stop reason: HOSPADM

## 2025-02-25 RX ORDER — TRAZODONE HYDROCHLORIDE 50 MG/1
25 TABLET ORAL NIGHTLY PRN
Status: DISCONTINUED | OUTPATIENT
Start: 2025-02-25 | End: 2025-02-25 | Stop reason: HOSPADM

## 2025-02-25 RX ORDER — DIPHENHYDRAMINE HYDROCHLORIDE 50 MG/ML
50 INJECTION INTRAMUSCULAR; INTRAVENOUS ONCE
Status: CANCELLED | OUTPATIENT
Start: 2025-02-25 | End: 2025-02-25

## 2025-02-25 RX ORDER — LABETALOL HYDROCHLORIDE 5 MG/ML
10 INJECTION, SOLUTION INTRAVENOUS EVERY 4 HOURS PRN
Status: DISCONTINUED | OUTPATIENT
Start: 2025-02-25 | End: 2025-02-25 | Stop reason: HOSPADM

## 2025-02-25 RX ADMIN — PANTOPRAZOLE SODIUM 40 MG: 40 TABLET, DELAYED RELEASE ORAL at 09:58

## 2025-02-25 RX ADMIN — METOPROLOL SUCCINATE 25 MG: 25 TABLET, EXTENDED RELEASE ORAL at 09:58

## 2025-02-25 RX ADMIN — DIPHENHYDRAMINE HYDROCHLORIDE 25 MG: 50 INJECTION, SOLUTION INTRAMUSCULAR; INTRAVENOUS at 01:38

## 2025-02-25 RX ADMIN — VANCOMYCIN HYDROCHLORIDE 1500 MG: 1.5 INJECTION, POWDER, LYOPHILIZED, FOR SOLUTION INTRAVENOUS at 00:42

## 2025-02-25 RX ADMIN — Medication 6 MG: at 00:42

## 2025-02-25 RX ADMIN — DIPHENHYDRAMINE HYDROCHLORIDE, ZINC ACETATE: 2; .1 CREAM TOPICAL at 02:23

## 2025-02-25 RX ADMIN — ASPIRIN 81 MG: 81 TABLET, COATED ORAL at 09:58

## 2025-02-25 SDOH — SOCIAL STABILITY: SOCIAL INSECURITY: WITHIN THE LAST YEAR, HAVE YOU BEEN AFRAID OF YOUR PARTNER OR EX-PARTNER?: NO

## 2025-02-25 SDOH — SOCIAL STABILITY: SOCIAL INSECURITY: WERE YOU ABLE TO COMPLETE ALL THE BEHAVIORAL HEALTH SCREENINGS?: YES

## 2025-02-25 SDOH — ECONOMIC STABILITY: INCOME INSECURITY: IN THE PAST 12 MONTHS HAS THE ELECTRIC, GAS, OIL, OR WATER COMPANY THREATENED TO SHUT OFF SERVICES IN YOUR HOME?: NO

## 2025-02-25 SDOH — SOCIAL STABILITY: SOCIAL INSECURITY: ARE THERE ANY APPARENT SIGNS OF INJURIES/BEHAVIORS THAT COULD BE RELATED TO ABUSE/NEGLECT?: NO

## 2025-02-25 SDOH — SOCIAL STABILITY: SOCIAL INSECURITY: DO YOU FEEL UNSAFE GOING BACK TO THE PLACE WHERE YOU ARE LIVING?: NO

## 2025-02-25 SDOH — ECONOMIC STABILITY: FOOD INSECURITY: WITHIN THE PAST 12 MONTHS, THE FOOD YOU BOUGHT JUST DIDN'T LAST AND YOU DIDN'T HAVE MONEY TO GET MORE.: NEVER TRUE

## 2025-02-25 SDOH — SOCIAL STABILITY: SOCIAL INSECURITY: HAVE YOU HAD ANY THOUGHTS OF HARMING ANYONE ELSE?: NO

## 2025-02-25 SDOH — SOCIAL STABILITY: SOCIAL INSECURITY: WITHIN THE LAST YEAR, HAVE YOU BEEN HUMILIATED OR EMOTIONALLY ABUSED IN OTHER WAYS BY YOUR PARTNER OR EX-PARTNER?: NO

## 2025-02-25 SDOH — SOCIAL STABILITY: SOCIAL INSECURITY: DO YOU FEEL ANYONE HAS EXPLOITED OR TAKEN ADVANTAGE OF YOU FINANCIALLY OR OF YOUR PERSONAL PROPERTY?: NO

## 2025-02-25 SDOH — SOCIAL STABILITY: SOCIAL INSECURITY: ARE YOU OR HAVE YOU BEEN THREATENED OR ABUSED PHYSICALLY, EMOTIONALLY, OR SEXUALLY BY ANYONE?: NO

## 2025-02-25 SDOH — ECONOMIC STABILITY: FOOD INSECURITY: WITHIN THE PAST 12 MONTHS, YOU WORRIED THAT YOUR FOOD WOULD RUN OUT BEFORE YOU GOT THE MONEY TO BUY MORE.: NEVER TRUE

## 2025-02-25 SDOH — SOCIAL STABILITY: SOCIAL INSECURITY: DOES ANYONE TRY TO KEEP YOU FROM HAVING/CONTACTING OTHER FRIENDS OR DOING THINGS OUTSIDE YOUR HOME?: NO

## 2025-02-25 SDOH — SOCIAL STABILITY: SOCIAL INSECURITY: HAVE YOU HAD THOUGHTS OF HARMING ANYONE ELSE?: NO

## 2025-02-25 SDOH — SOCIAL STABILITY: SOCIAL INSECURITY: HAS ANYONE EVER THREATENED TO HURT YOUR FAMILY OR YOUR PETS?: NO

## 2025-02-25 SDOH — SOCIAL STABILITY: SOCIAL INSECURITY: ABUSE: ADULT

## 2025-02-25 ASSESSMENT — COGNITIVE AND FUNCTIONAL STATUS - GENERAL
PERSONAL GROOMING: A LITTLE
HELP NEEDED FOR BATHING: A LITTLE
DRESSING REGULAR LOWER BODY CLOTHING: A LOT
STANDING UP FROM CHAIR USING ARMS: A LITTLE
STANDING UP FROM CHAIR USING ARMS: A LITTLE
MOVING TO AND FROM BED TO CHAIR: A LITTLE
TURNING FROM BACK TO SIDE WHILE IN FLAT BAD: A LITTLE
MOBILITY SCORE: 19
TOILETING: A LITTLE
WALKING IN HOSPITAL ROOM: A LITTLE
MOBILITY SCORE: 18
CLIMB 3 TO 5 STEPS WITH RAILING: A LOT
TOILETING: A LITTLE
TURNING FROM BACK TO SIDE WHILE IN FLAT BAD: A LITTLE
DRESSING REGULAR UPPER BODY CLOTHING: A LITTLE
MOVING TO AND FROM BED TO CHAIR: A LITTLE
DAILY ACTIVITIY SCORE: 19
DRESSING REGULAR LOWER BODY CLOTHING: A LITTLE
DAILY ACTIVITIY SCORE: 19
WALKING IN HOSPITAL ROOM: A LITTLE
PATIENT BASELINE BEDBOUND: NO
CLIMB 3 TO 5 STEPS WITH RAILING: A LITTLE
HELP NEEDED FOR BATHING: A LOT

## 2025-02-25 ASSESSMENT — LIFESTYLE VARIABLES
HOW MANY STANDARD DRINKS CONTAINING ALCOHOL DO YOU HAVE ON A TYPICAL DAY: PATIENT DOES NOT DRINK
SKIP TO QUESTIONS 9-10: 1
HOW OFTEN DO YOU HAVE 6 OR MORE DRINKS ON ONE OCCASION: NEVER
AUDIT-C TOTAL SCORE: 0
AUDIT-C TOTAL SCORE: 0
HOW OFTEN DO YOU HAVE A DRINK CONTAINING ALCOHOL: NEVER

## 2025-02-25 ASSESSMENT — PAIN SCALES - GENERAL
PAINLEVEL_OUTOF10: 0 - NO PAIN

## 2025-02-25 ASSESSMENT — ACTIVITIES OF DAILY LIVING (ADL)
ADEQUATE_TO_COMPLETE_ADL: YES
TOILETING: NEEDS ASSISTANCE
BATHING: NEEDS ASSISTANCE
WALKS IN HOME: NEEDS ASSISTANCE
FEEDING YOURSELF: INDEPENDENT
DRESSING YOURSELF: NEEDS ASSISTANCE
HEARING - LEFT EAR: FUNCTIONAL
HEARING - RIGHT EAR: FUNCTIONAL
GROOMING: NEEDS ASSISTANCE
PATIENT'S MEMORY ADEQUATE TO SAFELY COMPLETE DAILY ACTIVITIES?: YES
LACK_OF_TRANSPORTATION: NO
JUDGMENT_ADEQUATE_SAFELY_COMPLETE_DAILY_ACTIVITIES: YES

## 2025-02-25 ASSESSMENT — PAIN - FUNCTIONAL ASSESSMENT
PAIN_FUNCTIONAL_ASSESSMENT: 0-10
PAIN_FUNCTIONAL_ASSESSMENT: 0-10

## 2025-02-25 ASSESSMENT — PATIENT HEALTH QUESTIONNAIRE - PHQ9
SUM OF ALL RESPONSES TO PHQ9 QUESTIONS 1 & 2: 0
2. FEELING DOWN, DEPRESSED OR HOPELESS: NOT AT ALL
1. LITTLE INTEREST OR PLEASURE IN DOING THINGS: NOT AT ALL

## 2025-02-25 NOTE — CARE PLAN
The patient's goals for the shift include      The clinical goals for the shift include Participate in PT/OT      Problem: Skin  Goal: Promote/optimize nutrition  2/25/2025 1355 by Rachel Vega RN  Outcome: Met  2/25/2025 1108 by Rachel Vega RN  Outcome: Progressing  Flowsheets (Taken 2/25/2025 1108)  Promote/optimize nutrition: Monitor/record intake including meals     Problem: Fall/Injury  Goal: Not fall by end of shift  2/25/2025 1355 by Rachel Vega RN  Outcome: Met  2/25/2025 1108 by Rachel Vega RN  Outcome: Progressing  Goal: Be free from injury by end of the shift  2/25/2025 1355 by Rachel Vega RN  Outcome: Met  2/25/2025 1108 by Rachel Vega RN  Outcome: Progressing  Goal: Verbalize understanding of personal risk factors for fall in the hospital  2/25/2025 1355 by Rachel Vega RN  Outcome: Met  2/25/2025 1108 by Rachel Vega RN  Outcome: Progressing  Goal: Verbalize understanding of risk factor reduction measures to prevent injury from fall in the home  2/25/2025 1355 by Rachel Vega RN  Outcome: Met  2/25/2025 1108 by Rachel Vega RN  Outcome: Progressing  Goal: Use assistive devices by end of the shift  2/25/2025 1355 by Rachel Vega RN  Outcome: Met  2/25/2025 1108 by Rachel Vega RN  Outcome: Progressing  Goal: Pace activities to prevent fatigue by end of the shift  2/25/2025 1355 by Rachel Vega RN  Outcome: Met  2/25/2025 1108 by Rachel Vega RN  Outcome: Progressing     Problem: Pain  Goal: Takes deep breaths with improved pain control throughout the shift  2/25/2025 1355 by Rachel Vega RN  Outcome: Met  2/25/2025 1108 by Rachel Vega RN  Outcome: Progressing  Goal: Turns in bed with improved pain control throughout the shift  2/25/2025 1355 by Rachel Vega RN  Outcome: Met  2/25/2025 1108 by Rachel Vega, RN  Outcome: Progressing  Goal: Walks with improved pain control throughout the shift  2/25/2025 1355 by  Rachel Vega RN  Outcome: Met  2/25/2025 1108 by Rachel Vega RN  Outcome: Progressing  Goal: Performs ADL's with improved pain control throughout shift  2/25/2025 1355 by Rachel Veag RN  Outcome: Met  2/25/2025 1108 by Rachel Vega RN  Outcome: Progressing  Goal: Participates in PT with improved pain control throughout the shift  2/25/2025 1355 by Rachel Vega RN  Outcome: Met  2/25/2025 1108 by Rachel Vega RN  Outcome: Progressing  Goal: Free from opioid side effects throughout the shift  2/25/2025 1355 by Rachel Vega RN  Outcome: Met  2/25/2025 1108 by Rachel Vega RN  Outcome: Progressing  Goal: Free from acute confusion related to pain meds throughout the shift  2/25/2025 1355 by Rachel Vega RN  Outcome: Met  2/25/2025 1108 by Rachel Vega RN  Outcome: Progressing     Problem: Pain - Adult  Goal: Verbalizes/displays adequate comfort level or baseline comfort level  2/25/2025 1355 by Rachel Vega RN  Outcome: Met  2/25/2025 1108 by Rachel Vega RN  Outcome: Progressing     Problem: Safety - Adult  Goal: Free from fall injury  2/25/2025 1355 by Rachel Vega RN  Outcome: Met  2/25/2025 1108 by Rachel Vega RN  Outcome: Progressing     Problem: Discharge Planning  Goal: Discharge to home or other facility with appropriate resources  2/25/2025 1355 by Rachel Vega RN  Outcome: Met  2/25/2025 1108 by Rachel Vega RN  Outcome: Progressing     Problem: Chronic Conditions and Co-morbidities  Goal: Patient's chronic conditions and co-morbidity symptoms are monitored and maintained or improved  2/25/2025 1355 by Rachel Vega RN  Outcome: Met  2/25/2025 1108 by Rachel Vega RN  Outcome: Progressing     Problem: Nutrition  Goal: Nutrient intake appropriate for maintaining nutritional needs  2/25/2025 1355 by Rachel Vega RN  Outcome: Met  2/25/2025 1108 by Rachel Vega, RN  Outcome: Progressing

## 2025-02-25 NOTE — CONSULTS
Vancomycin Dosing by Pharmacy- INITIAL    Mikhail Moses is a 80 y.o. year old male who Pharmacy has been consulted for vancomycin dosing for osteomyelitis/septic arthritis. Based on the patient's indication and renal status this patient will be dosed based on a goal AUC of 400-600.   Renal function is currently stable.    Visit Vitals  /74   Pulse 87   Temp 36.9 °C (98.4 °F) (Tympanic)   Resp 18        Lab Results   Component Value Date    CREATININE 1.35 (H) 2025    CREATININE 1.00 2025    CREATININE 1.03 2025    CREATININE 1.25 2025      Patient weight is as follows:   Vitals:    25 0950   Weight: 85.7 kg (189 lb)       Cultures:  No results found for the encounter in last 14 days.     Temp (24hrs), Av.9 °C (98.4 °F), Min:36.9 °C (98.4 °F), Max:36.9 °C (98.4 °F)     Assessment/Plan     Patient will not be given a loading dose.  Will initiate vancomycin maintenance,  1500 mg every 24 hours.    This dosing regimen is predicted by [facility] to result in the following pharmacokinetic parameters:  Regimen: 1500 mg IV every 24 hours.  Start time: 22:56 on 2025  Exposure target: AUC24 (range)400-600 mg/L.hr   PET83-44: 408 mg/L.hr  AUC24,ss: 588 mg/L.hr  Probability of AUC24 > 400: 88 %  Ctrough,ss: 18.3 mg/L  Probability of Ctrough,ss > 20: 41 %    Follow-up level will be ordered on  at 0500 unless clinically indicated sooner.  Will continue to monitor renal function daily while on vancomycin and order serum creatinine at least every 48 hours if not already ordered.  Follow for continued vancomycin needs, clinical response, and signs/symptoms of toxicity.       Niurka Delgado [facility]

## 2025-02-25 NOTE — PROGRESS NOTES
Occupational Therapy    Evaluation    Patient Name: Mikhail Moses  MRN: [id number]  Department: [facility]  Room: [address] [place] [address]  Today's Date: 2/25/2025  Time Calculation  Start Time: 1042  Stop Time: 1053  Time Calculation (min): 11 min        Assessment:  End of Session Communication: Bedside nurse  End of Session Patient Position: Up in chair, Alarm on (call light in reach, all needs met)  OT Assessment Results: Decreased ADL status, Decreased upper extremity strength, Decreased endurance, Decreased functional mobility  Strengths: Living arrangement secure, Physical health, Premorbid level of function, Rehab experience, Support and attitude of living partners  Barriers to Participation: Comorbidities  Plan:  Treatment Interventions: ADL retraining, Functional transfer training, UE strengthening/ROM, Endurance training, Equipment evaluation/education, Compensatory technique education, Patient/family training  OT Frequency: 2 times per week  OT Discharge Recommendations: Low intensity level of continued care  OT - OK to Discharge: Yes (once medically appropriate to next level of care)  Treatment Interventions: ADL retraining, Functional transfer training, UE strengthening/ROM, Endurance training, Equipment evaluation/education, Compensatory technique education, Patient/family training    Subjective   Current Problem:  1. Generalized weakness        2. Acute lower UTI  amoxicillin-pot clavulanate (Augmentin) 500-125 mg tablet      3. Hypomagnesemia          General:  General  Reason for Referral: OT eval and tx; ADLs. dx:  Right knee pain, Post operative inflammation vs infection. chest x-ray: hiatal hernia. xray knee: satisfactor appearance of right knee arthroplasty. ortho consult: Do not recommend aspiration or any intervention at this time. Okay to continue ambulation with cane or walker  Referred By: Radames  Past Medical History Relevant to Rehab: 80 y.o. male with a PMHx of coronary artery disease, congestive heart  failure, hypertension, hyperlipidemia, dementia?, COPD, pulmonary hypertension, chronic kidney disease, anemia of chronic disease, alcohol use disorder, gout, arthritis with recent TKA who presented to [facility] on [facility]/24/[facility]25 with a chief complaint of weakness. Knee pain and loss of appetite. Patient is relatively poor historian, history obtained from him and his wife over the phone.  They stated that about 3 days before admission, he started having decreased appetite, and feeling nauseous.  He was also having pain in his right knee which he just had recent knee replacement 2 -4 weeks prior at [facility].  Patient stated that he will be resting comfortably in bed, and the pain will suddenly come like a spark, 3-4/10 in severity when mild and up to 7-8/10 when it is severe, the pain will be momentarily, sharp, nothing specific will provoke it.  He was able to ambulate with his cane. He stated that the he felt that there was pressure in his knee that was getting worse. He had very minimal orange secretion that came out of the knee on Friday but not anymore, no fever or chills as confirmed by the patient and his wife. He stated that he was having loss of appetite and he lost 30 pounds as per patient but he wasn't very reliable.  He denies any headaches, change in vision, difficulty swallowing, change in hearing, chest pain, SOB, palpitation, cough, abdominal pain,  change in bowel habits/urine.  He lives with his wife , and feels safe at home.  Family/Caregiver Present:  (patient spouse present, supportive)  Co-Treatment: PT  Co-Treatment Reason: for safety and to maximize therapeutic performance  Prior to Session Communication: Bedside nurse  Patient Position Received: Bed, 2 rail up, Alarm on  General Comment: patient pleasant and cooperative to participate  Precautions:  Medical Precautions: Fall precautions (R TKR precautions, WBAT, OOB with assist, alarms, tele)            Pain:  Pain Assessment  Pain  Assessment:  (denies pain)    Objective   Cognition:  Overall Cognitive Status: Within Functional Limits           Home Living:  Type of Home:  (per patient and spouse report; patient has been home from SNF for two weeks and has been working with home health PT. ranch style home, 3 KARO with HR.)  Bathroom Shower/Tub:  (tub shower on first floor they do not use. walk in shower in basement; has shower chair, HHS)  Bathroom Toilet:  (raised toilets within home)  Prior Function:  Level of [place]:  (independent in ADLs PLOF. patient uses SPC. spouse does IADLs and driving)       ADL:  ADL Comments: anticipate SBA- MIN A for ADLs based on performance with transfers/mobility this date  Activity Tolerance:  Endurance: Endurance does not limit participation in activity  Bed Mobility/Transfers: Bed Mobility  Bed Mobility:  (completed supine to sit with MIN A x 1)    Transfers  Transfer:  (completed STS with SBA with SPC)      Functional Mobility:  Functional Mobility  Functional Mobility Performed:  (engaged in simple mobility with use of SPC and SBA)     Sensation:  Sensation Comment: reports numbness/tingling in right hand; spouse reports this baseline  Strength:  Strength Comments: BUE ROM/MMT WFL for patient age      Outcome Measures:[facility] Daily Activity  Putting on and taking off regular lower body clothing: A little  Bathing (including washing, rinsing, drying): A little  Putting on and taking off regular upper body clothing: A little  Toileting, which includes using toilet, bedpan or urinal: A little  Taking care of personal grooming such as brushing teeth: A little  Eating Meals: None  Daily Activity - Total Score: 19        Education Documentation  Body Mechanics, taught by Mare Willis OT at 2/25/2025  1:49 PM.  Learner: Patient  Readiness: Acceptance  Method: Explanation  Response: Verbalizes Understanding, Needs Reinforcement    Precautions, taught by Mare Willis OT at 2/25/2025  1:49 PM.  Learner:  Patient  Readiness: Acceptance  Method: Explanation  Response: Verbalizes Understanding, Needs Reinforcement    ADL Training, taught by Mare Willis OT at 2/25/2025  1:49 PM.  Learner: Patient  Readiness: Acceptance  Method: Explanation  Response: Verbalizes Understanding, Needs Reinforcement    Education Comments  No comments found.        OP EDUCATION:       Goals:  Encounter Problems       Encounter Problems (Active)       OT Goals       STG- patient will complete LB dressing at MOD I with use of ae/ad/dme prn (Progressing)       Start:  02/25/25    Expected End:  03/11/25            STG- patient will complete toileting at MOD I with use of ae/ad/dme prn (Progressing)       Start:  02/25/25    Expected End:  03/11/25            STG-patient will complete transfers to/from bed, chair, commode at MOD I with use of ae/ad/dme prn (Progressing)       Start:  02/25/25    Expected End:  03/11/25            STG- patient will complete simple mobility at MOD I with use of ae/ad/dme prn (Progressing)       Start:  02/25/25    Expected End:  03/11/25            STG- patient will complete grooming at MOD I with use of ae/ad/dme prn (Progressing)       Start:  02/25/25    Expected End:  03/11/25

## 2025-02-25 NOTE — NURSING NOTE
Started Pt on first dose of vanco at 0042. Pt skin started itching around the tapped site of IV. Pt wanted Vanco stopped and physician called (vasnco stopped at 0115). Physician ordered IV benadryl. 10 min after benadryl  was administered Pt wanted IV taken out. At this time Pt cheeks are curtis red. Pt is refusing a new placement of IV; per Pt he will have a new IV place sometime in the a.m. Pt also is requesting topical cream to stop itching. Diphenhydramine cream ordered and applied.

## 2025-02-25 NOTE — CARE PLAN
The patient's goals for the shift include      The clinical goals for the shift include Participate in PT/OT      Problem: Skin  Goal: Promote/optimize nutrition  Outcome: Progressing  Flowsheets (Taken 2/25/2025 1108)  Promote/optimize nutrition: Monitor/record intake including meals     Problem: Fall/Injury  Goal: Not fall by end of shift  Outcome: Progressing  Goal: Be free from injury by end of the shift  Outcome: Progressing  Goal: Verbalize understanding of personal risk factors for fall in the hospital  Outcome: Progressing  Goal: Verbalize understanding of risk factor reduction measures to prevent injury from fall in the home  Outcome: Progressing  Goal: Use assistive devices by end of the shift  Outcome: Progressing  Goal: Pace activities to prevent fatigue by end of the shift  Outcome: Progressing     Problem: Pain  Goal: Takes deep breaths with improved pain control throughout the shift  Outcome: Progressing  Goal: Turns in bed with improved pain control throughout the shift  Outcome: Progressing  Goal: Walks with improved pain control throughout the shift  Outcome: Progressing  Goal: Performs ADL's with improved pain control throughout shift  Outcome: Progressing  Goal: Participates in PT with improved pain control throughout the shift  Outcome: Progressing  Goal: Free from opioid side effects throughout the shift  Outcome: Progressing  Goal: Free from acute confusion related to pain meds throughout the shift  Outcome: Progressing     Problem: Pain - Adult  Goal: Verbalizes/displays adequate comfort level or baseline comfort level  Outcome: Progressing     Problem: Safety - Adult  Goal: Free from fall injury  Outcome: Progressing     Problem: Discharge Planning  Goal: Discharge to home or other facility with appropriate resources  Outcome: Progressing     Problem: Chronic Conditions and Co-morbidities  Goal: Patient's chronic conditions and co-morbidity symptoms are monitored and maintained or  improved  Outcome: Progressing     Problem: Nutrition  Goal: Nutrient intake appropriate for maintaining nutritional needs  Outcome: Progressing

## 2025-02-25 NOTE — NURSING NOTE
This nurse was called to bedside to assist floor nurse with discharge of her patient.  Information was reviewed by patient and his wife, also at bedside.  Meds to beds were delivered for patient and all medications on patient's AVS were reviewed with patient.  PCP and follow up appointments in place; Home Health Care in place and appointments r/t HHC reviewed.  Patient voiced understanding of all.  Further education was provided to patient related to his admission diagnoses of Generalized Weakness and UTI and what to look for when needing to call the doctor.  IV and Tele removed, floor nurse aware of all.  Patient transported downstairs to wife's vehicle to return home.

## 2025-02-25 NOTE — DISCHARGE SUMMARY
Discharge Diagnosis  Generalized weakness, UTI, hypomagnesemia    Issues Requiring Follow-Up  Generalized weakness, UTI    Discharge Meds     Medication List      START taking these medications     amoxicillin-pot clavulanate 500-125 mg tablet; Commonly known as:   Augmentin; Take 1 tablet by mouth 2 times a day for 3 days.     CHANGE how you take these medications     pantoprazole 40 mg EC tablet; Commonly known as: ProtoNix; Take 1 tablet   (40 mg) by mouth once daily. Do not crush, chew, or split.; What changed:   when to take this     CONTINUE taking these medications     acetaminophen 500 mg tablet; Commonly known as: Tylenol   allopurinol 300 mg tablet; Commonly known as: Zyloprim; Take 1 tablet   (300 mg) by mouth once daily.   aspirin 81 mg EC tablet; Take 1 tablet (81 mg) by mouth once daily.   atorvastatin 10 mg tablet; Commonly known as: Lipitor   ergocalciferol 1250 mcg (50,000 units) capsule; Commonly known as:   Vitamin D-2; Take 1 capsule (50,000 Units) by mouth 1 (one) time per week.   furosemide 40 mg tablet; Commonly known as: Lasix   metoprolol succinate XL 25 mg 24 hr tablet; Commonly known as: Toprol-XL   ondansetron 4 mg tablet; Commonly known as: Zofran; Take 1 tablet (4 mg)   by mouth every 8 hours if needed for nausea or vomiting.   pregabalin 300 mg capsule; Commonly known as: Lyrica   ramipril 10 mg capsule; Commonly known as: Altace   tamsulosin 0.4 mg 24 hr capsule; Commonly known as: Flomax   traMADol 50 mg tablet; Commonly known as: Ultram     STOP taking these medications     apixaban 2.5 mg tablet; Commonly known as: Eliquis   naloxone 0.4 mg/mL injection; Commonly known as: Narcan   polyethylene glycol 17 gram/dose powder; Commonly known as: Glycolax,   Miralax       Test Results Pending At Discharge  Pending Labs       Order Current Status    Urine Culture In process            Hospital Course   79 y/o male PMHx HTN, HLD, CAD, CHF, COPD, pulmonary HTN, CKD, anemia of chronic  disease, gout, OA, recent right TKA presented to [facility] for complaints of weakness and knee pain.  His appetite has also been poor.  He had a right knee replacement at [facility] 1/7/25.  There was reported minimal drainage from his right knee.  Patient started on antibiotics, orthopedic surgery consulted.  He was monitored overnight, vital signs remained stable and he was afebrile.  Blood work did not show leukocytosis and orthopedic surgery evaulated patient, does not feel there is an infection there.  Imaging of right knee showed satisfactory appearance of right knee arthroplasty.  Low magnesium was replaced.  UA checked and there is positive leukocyte esterase.  Patient admits to some dysuria, and he was treated for UTI.  He is stable for discharge home to resume [facility], complete course of oral antibiotics.    I confirmed with his wife that patient DOES NOT have a history of atrial fibrillation.  Patient was on Eliquis postop knee surgery and he has completed the course.  He will not be on Eliquis at discharge.    Pertinent Physical Exam At Time of Discharge  Physical Exam  GEN:  awake, alert, not in acute distress  HEENT:  normocephalic, atraumatic, PERRL, EOM intact, nares patent  Cardio:  RRR, no murmurs  Resp:  CTAB, no wheezing  Abd:  +BS, soft, nontender  :  deferred  Neuro:  A&Ox3, CN2-12 grossly intact, no focal deficits  Msk:  no gross deformities, no joint effusions  Skin:  warm, dry, intact  Psych:  appropriate in mood and behavior       Outpatient Follow-Up  Future Appointments   Date Time Provider Department Center   2/27/2025 To Be Determined Arturo Patel PT [facility]   2/27/2025 To Be Determined Domenica Parker RN [facility]   2/28/2025  9:20 AM Hamida Padilla PA-C [id number] [facility]   3/5/2025 To Be Determined Arturo Patel PT [facility]   3/6/2025 To Be Determined Domenica Parker RN [facility]   3/12/2025 To Be Determined Arturo Patel PT [facility]   5/30/2025 11:00 AM Josué Kay MD  [id number] [place]         Servando Crum, DO

## 2025-02-25 NOTE — PROGRESS NOTES
02/25/25 1235   Discharge Planning   Living Arrangements Spouse/significant other   Support Systems Spouse/significant other   Type of Residence Private residence   Do you have animals or pets at home? No   Who is requesting discharge planning? Provider   Expected Discharge Disposition Home Health   Stroke Family Assessment   Stroke Family Assessment Needed No   Intensity of Service   Intensity of Service 0-30 min     Met with pt and his spouse, pt admit for knee pain and generalized weakness.  Orthopaedics consulted as pt just has TKR two weeks ago.  DC plan is for home with continued home care with [facility].   Currently pt is cane at baseline.  Home address and insurance, PCP confirmed.  Sandra Hankins RN [facility]

## 2025-02-25 NOTE — PROGRESS NOTES
Physical Therapy    Physical Therapy Evaluation    Patient Name: Mikhail Moses  MRN: [id number]  Today's Date: 2/25/2025   Time Calculation  Start Time: 1041  Stop Time: 1053  Time Calculation (min): 12 min  915/915-A    Assessment/Plan   PT Assessment  PT Assessment Results: Decreased strength, Decreased range of motion, Decreased endurance, Impaired balance, Decreased mobility  Rehab Prognosis: Good  Evaluation/Treatment Tolerance: Patient limited by fatigue  Strengths: Ability to acquire knowledge, Capable of completing ADLs semi/independent  Barriers to Participation: Comorbidities  End of Session Communication: Bedside nurse  Assessment Comment: Pt admitted with weakness and drainage from R knee after recent R TKA. Pt demos decreased endurance and strength. Recommend return to low intensity therapy [facility] PT.  End of Session Patient Position: Up in chair, Alarm on  IP OR SWING BED PT PLAN  Inpatient or Swing Bed: Inpatient  PT Plan  Treatment/Interventions: Bed mobility, Transfer training, Gait training, Stair training, Balance training, Strengthening, Endurance training, Therapeutic exercise, Positioning  PT Plan: Ongoing PT  PT Frequency: 3 times per week  PT Discharge Recommendations: Low intensity level of continued care  PT - OK to Discharge: Yes    Subjective     Current Problem:  1. Generalized weakness        2. Acute lower UTI  amoxicillin-pot clavulanate (Augmentin) 500-125 mg tablet      3. Hypomagnesemia          Patient Active Problem List   Diagnosis    Acute gouty arthropathy    Osteoarthritis of knees, bilateral    Spinal stenosis of lumbar region with radiculopathy    Essential hypertension    HLD (hyperlipidemia)    Coronary artery disease involving native coronary artery of native heart with angina pectoris    Abscess, neck    Acid reflux    Alcohol abuse    Cellulitis of foot, left    Chronic obstructive pulmonary disease (Multi)    Cellulitis of left hand    Familial hypercholesterolemia     Heart attack    Old myocardial infarction    Incisional hernia    Lumbar herniated disc    Obesity    Panacinar emphysema (Multi)    Post-traumatic anterior urethral stricture    Pulmonary arterial hypertension (Multi)    Stage 2 chronic kidney disease    Status post percutaneous transluminal coronary angioplasty    Lumbar radiculopathy    Unilateral primary osteoarthritis, left knee    Arthritis of left knee    Weakness    Acute deep vein thrombosis (DVT) of left femoral vein (Multi)    Right shoulder pain    Right rotator cuff tendonitis    Unilateral primary osteoarthritis, right knee    Sepsis with acute hypoxic respiratory failure without septic shock, due to unspecified organism (Multi)    Localized osteoarthritis of right knee    Osteoarthritis of right knee, unspecified osteoarthritis type    Shock (Multi)    Shock, during or resulting from a surgical procedure    Acute hypoxic respiratory failure (Multi)    Generalized weakness       General Visit Information:  General  Reason for Referral: PT eval and treat; impaired mobility  Referred By: Dr. Crum  Past Medical History Relevant to Rehab: Pt admitted 2/24 with generalized weakness, knee pain, and orange secretion out of knee. Ortho cleared pt for WBAT and no intervention needed at this time. (PMH: CAD, CHF, HTN, HLD, dementia, COPD, pulmonary HTN, CKD, anemia, ETOH use disorder, arthritis, recent R TKA)  Family/Caregiver Present: Yes  Caregiver Feedback: wife  Co-Treatment: OT  Co-Treatment Reason: to maximize patient safety and participation  Prior to Session Communication: Bedside nurse  Patient Position Received: Bed, 2 rail up, Alarm on  General Comment: Pt pleasant and ageeable to therapy    Home Living:  Home Living  Home Living Comments: Pt lives with his wife in a single level house with 3 KARO with HR. Pt has walk in shower in basement and tub/shower on main level with elevated toilet. Pt not driving recently. No falls. Wife completed  IADLs.    Prior Level of Function:  Prior Function Per Pt/Caregiver Report  Level of [place]: Needs assistance with homemaking, Independent with ADLs and functional transfers    Precautions:  Precautions  LE Weight Bearing Status: Weight Bearing as Tolerated  Medical Precautions: Fall precautions     Objective     Pain:  Pain Assessment  Pain Assessment: 0-10  0-10 (Numeric) Pain Score: 0 - No pain    Cognition:  Cognition  Overall Cognitive Status: Within Functional Limits    General Assessments:  General Observation  General Observation: R knee incision healing well   Activity Tolerance  Endurance: Decreased tolerance for upright activites  Sensation  Light Touch: No apparent deficits  Strength  Strength Comments: completed with WFL on BLE     Functional Assessments:     Bed Mobility  Bed Mobility:  (Pt completed supine to sit with [place] for trunk management. Denies dizziness. No increase in pain in R knee.)  Transfers  Transfer:  (from EOB to SPC with SBA. Denies dizziness.)  Ambulation/Gait Training  Ambulation/Gait Training Performed:  (completed ~50'x1 with use of SPC in LUE initially for proper positioning and then requested to switch to RUE for comfort. No acute LOB and SBA for safety.)        Extremity/Trunk Assessments:  RLE   RLE : Within Functional Limits  LLE   LLE : Within Functional Limits    Outcome Measures:     [facility] Basic Mobility  Turning from your back to your side while in a flat bed without using bedrails: None  Moving from lying on your back to sitting on the side of a flat bed without using bedrails: A little  Moving to and from bed to chair (including a wheelchair): A little  Standing up from a chair using your arms (e.g. wheelchair or bedside chair): A little  To walk in hospital room: A little  Climbing 3-5 steps with railing: A little  Basic Mobility - Total Score: 19    Goals:  Encounter Problems       Encounter Problems (Active)       PT Problem       PT Goal 1 STG - Pt will  transition supine <> sitting with MOD I  (Progressing)       Start:  02/25/25    Expected End:  03/04/25            PT Goal 2 STG - Pt will transfer STS with MOD I  (Progressing)       Start:  02/25/25    Expected End:  03/04/25            PT Goal 3 STG - Pt will amb 100' using SPC with MOD I  (Progressing)       Start:  02/25/25    Expected End:  03/04/25            PT Goal 4 STG - Pt will perform a B LE ther ex program of 2-3 sets of 10  (Progressing)       Start:  02/25/25    Expected End:  03/04/25               Pain - Adult            Education Documentation  Precautions, taught by Shanda Hernandez, PT at 2/25/2025  1:42 PM.  Learner: Patient  Readiness: Acceptance  Method: Explanation  Response: Verbalizes Understanding, Needs Reinforcement    Mobility Training, taught by Shanda Hernandez, PT at 2/25/2025  1:42 PM.  Learner: Patient  Readiness: Acceptance  Method: Explanation  Response: Verbalizes Understanding, Needs Reinforcement    Education Comments  No comments found.

## 2025-02-25 NOTE — CONSULTS
Reason For Consult  Right knee pain and history of drainage    History Of Present Illness  Mikhail Moses is a 80 y.o. male presenting with weakness, some difficulty walking.  Diagnosed with UTI and currently on IV antibiotic treatment.  Patient underwent right total knee arthroplasty January,?  6 weeks ago by Dr. Bradford of .  Recovery has been generally unremarkable per patient although a few days ago upper portion of incision had a tiny opening with a couple drops of clear yellow fluid draining.  Denies any other unusual issues or symptoms with this right knee.  Patient is currently on ceftriaxone.  Patient underwent a lumbar laminectomy presumably for stenosis last fall and after a slow recovery was able to proceed with this right total knee January 2025.  Patient describes a difficult recovery with multiple stays in an ECF a year ago when he underwent a left total knee arthroplasty..     Past Medical History  He has a past medical history of Alcohol abuse, Anemia, BPH (benign prostatic hyperplasia), CAD (coronary artery disease), Chronic edema, CKD (chronic kidney disease), COPD (chronic obstructive pulmonary disease) (Multi), GERD (gastroesophageal reflux disease), Gout, H/O non-ST elevation myocardial infarction (NSTEMI) (07/2021), History of blood transfusion, HLD (hyperlipidemia), HTN (hypertension), deep venous thrombosis (2023), OA (osteoarthritis), Old myocardial infarction, PAH (pulmonary artery hypertension) (Multi), Sepsis with acute hypoxic respiratory failure without septic shock, due to unspecified organism (Multi) (10/2024), and Urethral stricture.    Surgical History  He has a past surgical history that includes Splenectomy, total; Coronary angioplasty with stent; Urethroplasty; Fracture surgery; Hernia repair; Carpal tunnel release (Right); Knee Arthroplasty (Left, 2023); Lumbar spine surgery (10/2024); Cataract extraction (Bilateral); and Cardiac catheterization (N/A, 1/8/2025).    "  Social History  He reports that he quit smoking about 20 years ago. His smoking use included cigarettes. He started smoking about 61 years ago. He has a 61.5 pack-year smoking history. He has never used smokeless tobacco. He reports that he does not currently use alcohol after a past usage of about 28.0 standard drinks of alcohol per week. He reports that he does not use drugs.    Family History  Family History   Problem Relation Name Age of Onset    No Known Problems Mother      Heart attack Father      No Known Problems Half-Sister      No Known Problems Half-Sister          Allergies  Patient has no known allergies.    Review of Systems       Physical Exam  Right knee has unremarkable appearance with very subtle hyperemia surrounding the incision for about 1 cm.  The upper incision has a punctate dry scab.  No drainage at all.  Range of motion is 2/105.  1+ effusion present but with no warmth or erythema.  No pain on palpation.  The motion is painless.  Patella tracks well and.  He has excellent extension power in the knee is stable in all planes and directions.  With his cane the patient was able to safely ambulate across the room as well as get up and down from bed.  This was done without significant pain in his right knee or leg.  Overall unremarkable postop appearance for a right total knee arthroplasty.     Last Recorded Vitals  Blood pressure 147/69, pulse 91, temperature 36.9 °C (98.4 °F), temperature source Temporal, resp. rate 18, height 1.727 m (5' 7.99\"), weight 85.7 kg (189 lb), SpO2 93%.    Relevant Results    AP lateral right knee shows total knee arthroplasty in good position without unusual findings present.  Implant soft tissues unremarkable.  Scheduled medications  [Held by provider] allopurinol, 300 mg, oral, Daily  apixaban, 2.5 mg, oral, BID  aspirin, 81 mg, oral, Daily  atorvastatin, 10 mg, oral, Nightly  cefTRIAXone, 1 g, intravenous, q24h  [Held by provider] furosemide, 40 mg, oral, " Daily  [Held by provider] lisinopril, 20 mg, oral, Daily  melatonin, 6 mg, oral, Nightly  metoprolol succinate XL, 25 mg, oral, Daily  pantoprazole, 40 mg, oral, BID  [Held by provider] pregabalin, 300 mg, oral, BID  tamsulosin, 0.4 mg, oral, Nightly  vancomycin, 1,500 mg, intravenous, q24h      Continuous medications     PRN medications  PRN medications: acetaminophen, diphenhydrAMINE, diphenhydramine-zinc acetate, HYDROmorphone, labetaloL, oxyCODONE-acetaminophen, polyethylene glycol, traZODone, vancomycin  Results for orders placed or performed during the hospital encounter of 02/24/25 (from the past 24 hours)   Sars-CoV-2 and Influenza A/B PCR   Result Value Ref Range    Flu A Result Not Detected Not Detected    Flu B Result Not Detected Not Detected    Coronavirus 2019, PCR Not Detected Not Detected   CBC and Auto Differential   Result Value Ref Range    WBC 7.2 4.4 - 11.3 x10*3/uL    nRBC 0.0 0.0 - 0.0 /100 WBCs    RBC 4.23 (L) 4.50 - 5.90 x10*6/uL    Hemoglobin 12.1 (L) 13.5 - 17.5 g/dL    Hematocrit 37.1 (L) 41.0 - 52.0 %    MCV 88 80 - 100 fL    MCH 28.6 26.0 - 34.0 pg    MCHC 32.6 32.0 - 36.0 g/dL    RDW 15.8 (H) 11.5 - 14.5 %    Platelets 224 150 - 450 x10*3/uL    Neutrophils % 67.2 40.0 - 80.0 %    Immature Granulocytes %, Automated 0.3 0.0 - 0.9 %    Lymphocytes % 22.1 13.0 - 44.0 %    Monocytes % 7.2 2.0 - 10.0 %    Eosinophils % 2.1 0.0 - 6.0 %    Basophils % 1.1 0.0 - 2.0 %    Neutrophils Absolute 4.87 1.60 - 5.50 x10*3/uL    Immature Granulocytes Absolute, Automated 0.02 0.00 - 0.50 x10*3/uL    Lymphocytes Absolute 1.60 0.80 - 3.00 x10*3/uL    Monocytes Absolute 0.52 0.05 - 0.80 x10*3/uL    Eosinophils Absolute 0.15 0.00 - 0.40 x10*3/uL    Basophils Absolute 0.08 0.00 - 0.10 x10*3/uL   Comprehensive metabolic panel   Result Value Ref Range    Glucose 120 (H) 74 - 99 mg/dL    Sodium 139 136 - 145 mmol/L    Potassium 3.7 3.5 - 5.3 mmol/L    Chloride 105 98 - 107 mmol/L    Bicarbonate 24 21 - 32 mmol/L     Anion Gap 14 10 - 20 mmol/L    Urea Nitrogen 18 6 - 23 mg/dL    Creatinine 1.35 (H) 0.50 - 1.30 mg/dL    eGFR 53 (L) >60 mL/min/1.73m*2    Calcium 9.5 8.6 - 10.3 mg/dL    Albumin 3.9 3.4 - 5.0 g/dL    Alkaline Phosphatase 115 33 - 136 U/L    Total Protein 7.1 6.4 - 8.2 g/dL    AST 17 9 - 39 U/L    Bilirubin, Total 0.8 0.0 - 1.2 mg/dL    ALT 6 (L) 10 - 52 U/L   Lactate   Result Value Ref Range    Lactate 1.7 0.4 - 2.0 mmol/L   Magnesium   Result Value Ref Range    Magnesium 0.86 (L) 1.60 - 2.40 mg/dL   Troponin I, High Sensitivity   Result Value Ref Range    Troponin I, High Sensitivity 10 0 - 20 ng/L   Sedimentation rate, automated   Result Value Ref Range    Sedimentation Rate 59 (H) 0 - 20 mm/h   C-reactive protein   Result Value Ref Range    C-Reactive Protein 1.46 (H) <1.00 mg/dL   ECG 12 lead   Result Value Ref Range    Ventricular Rate 79 BPM    Atrial Rate 79 BPM    IL Interval 203 ms    QRS Duration 146 ms    QT Interval 400 ms    QTC Calculation(Bazett) 459 ms    P Axis 30 degrees    R Axis -52 degrees    T Axis 8 degrees    QRS Count 13 beats    Q Onset 253 ms    T Offset 453 ms    QTC Fredericia 438 ms   Urinalysis with Reflex Culture and Microscopic   Result Value Ref Range    Color, Urine Yellow Light-Yellow, Yellow, Dark-Yellow    Appearance, Urine Clear Clear    Specific Gravity, Urine 1.018 1.005 - 1.035    pH, Urine 5.5 5.0, 5.5, 6.0, 6.5, 7.0, 7.5, 8.0    Protein, Urine 10 (TRACE) NEGATIVE, 10 (TRACE), 20 (TRACE) mg/dL    Glucose, Urine Normal Normal mg/dL    Blood, Urine NEGATIVE NEGATIVE mg/dL    Ketones, Urine NEGATIVE NEGATIVE mg/dL    Bilirubin, Urine NEGATIVE NEGATIVE mg/dL    Urobilinogen, Urine Normal Normal mg/dL    Nitrite, Urine NEGATIVE NEGATIVE    Leukocyte Esterase, Urine 250 Mario/uL (A) NEGATIVE   Extra Urine Gray Tube   Result Value Ref Range    Extra Tube Hold for add-ons.    Microscopic Only, Urine   Result Value Ref Range    WBC, Urine 21-50 (A) 1-5, NONE /HPF    WBC Clumps,  Urine RARE Reference range not established. /HPF    RBC, Urine 3-5 NONE, 1-2, 3-5 /HPF    Squamous Epithelial Cells, Urine 1-9 (SPARSE) Reference range not established. /HPF    Bacteria, Urine 1+ (A) NONE SEEN /HPF    Mucus, Urine FEW Reference range not established. /LPF    Hyaline Casts, Urine 3+ (A) NONE /LPF   Uric Acid   Result Value Ref Range    Uric Acid 2.6 (L) 4.0 - 7.5 mg/dL   CBC   Result Value Ref Range    WBC 8.4 4.4 - 11.3 x10*3/uL    nRBC 0.0 0.0 - 0.0 /100 WBCs    RBC 3.91 (L) 4.50 - 5.90 x10*6/uL    Hemoglobin 11.2 (L) 13.5 - 17.5 g/dL    Hematocrit 35.3 (L) 41.0 - 52.0 %    MCV 90 80 - 100 fL    MCH 28.6 26.0 - 34.0 pg    MCHC 31.7 (L) 32.0 - 36.0 g/dL    RDW 15.9 (H) 11.5 - 14.5 %    Platelets 210 150 - 450 x10*3/uL   Renal Function Panel   Result Value Ref Range    Glucose 119 (H) 74 - 99 mg/dL    Sodium 140 136 - 145 mmol/L    Potassium 3.9 3.5 - 5.3 mmol/L    Chloride 104 98 - 107 mmol/L    Bicarbonate 25 21 - 32 mmol/L    Anion Gap 15 10 - 20 mmol/L    Urea Nitrogen 17 6 - 23 mg/dL    Creatinine 1.39 (H) 0.50 - 1.30 mg/dL    eGFR 51 (L) >60 mL/min/1.73m*2    Calcium 9.7 8.6 - 10.3 mg/dL    Phosphorus 3.8 2.5 - 4.9 mg/dL    Albumin 3.7 3.4 - 5.0 g/dL   Magnesium   Result Value Ref Range    Magnesium 2.09 1.60 - 2.40 mg/dL        Assessment/Plan     Status post right total knee arthroplasty.  History of recent scant serous drainage.  No signs of infection or mechanical abnormality.  Recommend observation.  Do not recommend aspiration or any intervention at this time.  Antibiotic treatment could mask underlying occult infection however I think this is very unlikely in the overall appearance of this gentleman's knee.  White count is normal and    mild elevation of sed rate and CRP could still be related to postop as well as current UTI.  Okay to continue ambulation with cane or walker.  Follow-up appointment already scheduled with his orthopedic surgeon team.  Happy to see both here and  postdischarge as needed.    Davis Clarke MD

## 2025-02-26 ENCOUNTER — HOME CARE VISIT (OUTPATIENT)
Dept: HOME HEALTH SERVICES | Facility: HOME HEALTH | Age: 80
End: 2025-02-26
Payer: MEDICARE

## 2025-02-26 ENCOUNTER — PATIENT OUTREACH (OUTPATIENT)
Dept: PRIMARY CARE | Facility: CLINIC | Age: 80
End: 2025-02-26
Payer: MEDICARE

## 2025-02-26 PROCEDURE — G0151 HHCP-SERV OF PT,EA 15 MIN: HCPCS | Mod: HHH

## 2025-02-26 ASSESSMENT — ENCOUNTER SYMPTOMS
PAIN: 1
PAIN LOCATION: RIGHT KNEE
PAIN LOCATION - PAIN QUALITY: SHARP
PERSON REPORTING PAIN: PATIENT
HIGHEST PAIN SEVERITY IN PAST 24 HOURS: 8/10

## 2025-02-26 NOTE — PROGRESS NOTES
Discharge facility: [facility]  Discharge diagnosis: Generalized weakness, UTI, hypomagnesemia     Issues Requiring Follow-Up  Generalized weakness, UTI    Admission date: 2/24/25  Discharge date: 2/25/25    PCP Appointment Date: 3/10/25  Specialist Appointment Date: 2/28/25 ortho,   Hospital Encounter and Summary: Linked   ED to Hosp-Admission (Discharged) with Servando Crum DO; Blaine Rivera MD (02/24/2025)   See Discharge assessment below for further details    Wrap Up  Wrap Up Additional Comments: Spoke with patient's wife. She reports he continues to be weak. He is starting to eat  little bit more. She states he is showing some improvement but slowly. Home care to restart services tomorrow. She has all medications . Encouraged to increase water intake. She states patient likes to drink his tea. She denies any questions at this time. (2/26/2025  9:28 AM)    Medications  Medications reviewed with patient/caregiver?: Yes (Reviewed with patient's wife) (2/26/2025  9:28 AM)  Is the patient having any side effects they believe may be caused by any medication additions or changes?: No (2/26/2025  9:28 AM)  Does the patient have all medications ordered at discharge?: Yes (2/26/2025  9:28 AM)  Care Management Interventions: Provided patient education (2/26/2025  9:28 AM)  Prescription Comments: Prescriptions from [facility] for  amoxicillin-pot clavulanate (2/26/2025  9:28 AM)  Is the patient taking all medications as directed (includes completed medication regime)?: Yes (2/26/2025  9:28 AM)  Care Management Interventions: Provided patient education (2/26/2025  9:28 AM)  Medication Comments: Instructed new medicatons of amoxicillin-pot clavulanate 500-125 mg  tablet  Commonly known as: Augmentin  Take 1 tablet by mouth 2 times a day for 3 days.STOP taking:  apixaban 2.5 mg tablet (Eliquis)   naloxone 0.4 mg/mL injection (Narcan)   polyethylene glycol 17 gram/dose powder (Glycolax,  Miralax)  (2/26/2025  9:28 AM)  Follow Up Tasks: -- (n/a) (2/26/2025  9:28 AM)    Appointments  Does the patient have a primary care provider?: Yes (2/26/2025  9:28 AM)  Care Management Interventions: Verified appointment date/time/provider (3/10/25) (2/26/2025  9:28 AM)  Has the patient kept scheduled appointments due by today?: Yes (2/26/2025  9:28 AM)  Care Management Interventions: Advised patient to keep appointment; Educated on importance of keeping appointment (2/26/2025  9:28 AM)  Follow Up Tasks: -- (n/a) (2/26/2025  9:28 AM)    Self Management  What is the home health agency?:  [facility] (2/26/2025  9:28 AM)  Has home health visited the patient within 72 hours of discharge?: Call prior to 72 hours (2/26/2025  9:28 AM)  Home Health Interventions: -- (n/a) (2/26/2025  9:28 AM)  What Durable Medical Equipment (DME) was ordered?: n/a (2/26/2025  9:28 AM)  Has all Durable Medical Equipment (DME) been delivered?: -- (n/a) (2/26/2025  9:28 AM)  DME Interventions: -- (n/a) (2/26/2025  9:28 AM)  Care Management Interventions: Notified PCP/provider (2/26/2025  9:28 AM)  Follow Up Tasks: -- (n/a) (2/26/2025  9:28 AM)    Patient Teaching  Does the patient have access to their discharge instructions?: Yes (2/26/2025  9:28 AM)  Care Management Interventions: Reviewed instructions with patient (2/26/2025  9:28 AM)  What is the patient's perception of their health status since discharge?: Improving (2/26/2025  9:28 AM)  Is the patient/caregiver able to teach back the hierarchy of who to call/visit for symptoms/problems? PCP, Specialist, Home Health nurse, Urgent Care, ED, 911: Yes (2/26/2025  9:28 AM)  Patient/Caregiver Education Comments: Instructed on hospital discharge instructions. Instructed on new and changed medications. Instructed if increased shortness of breath or chest pains to call 911. Provided my contact information and encouraged to call with any questions. (2/26/2025  9:28 AM)

## 2025-02-27 ENCOUNTER — APPOINTMENT (OUTPATIENT)
Dept: ORTHOPEDIC SURGERY | Facility: CLINIC | Age: 80
End: 2025-02-27
Payer: MEDICARE

## 2025-02-27 ENCOUNTER — HOME CARE VISIT (OUTPATIENT)
Dept: HOME HEALTH SERVICES | Facility: HOME HEALTH | Age: 80
End: 2025-02-27
Payer: MEDICARE

## 2025-02-27 VITALS
RESPIRATION RATE: 20 BRPM | TEMPERATURE: 98 F | SYSTOLIC BLOOD PRESSURE: 102 MMHG | DIASTOLIC BLOOD PRESSURE: 60 MMHG | HEART RATE: 65 BPM | OXYGEN SATURATION: 96 %

## 2025-02-27 LAB — BACTERIA UR CULT: ABNORMAL

## 2025-02-27 PROCEDURE — G0300 HHS/HOSPICE OF LPN EA 15 MIN: HCPCS | Mod: HHH

## 2025-02-27 SDOH — ECONOMIC STABILITY: GENERAL

## 2025-02-27 ASSESSMENT — ENCOUNTER SYMPTOMS
APPETITE LEVEL: POOR
PAIN LOCATION - PAIN SEVERITY: 6/10
PAIN: 1
CHANGE IN APPETITE: UNCHANGED
LAST BOWEL MOVEMENT: 67261
PAIN LOCATION - PAIN QUALITY: ACHING
PAIN LOCATION - RELIEVING FACTORS: TYLENOL
PAIN LOCATION: RIGHT KNEE

## 2025-02-27 ASSESSMENT — ACTIVITIES OF DAILY LIVING (ADL): MONEY MANAGEMENT (EXPENSES/BILLS): INDEPENDENT

## 2025-02-28 ENCOUNTER — HOSPITAL ENCOUNTER (OUTPATIENT)
Dept: RADIOLOGY | Facility: CLINIC | Age: 80
Discharge: HOME | End: 2025-02-28
Payer: MEDICARE

## 2025-02-28 ENCOUNTER — OFFICE VISIT (OUTPATIENT)
Dept: ORTHOPEDIC SURGERY | Facility: CLINIC | Age: 80
End: 2025-02-28
Payer: MEDICARE

## 2025-02-28 ENCOUNTER — APPOINTMENT (OUTPATIENT)
Dept: ORTHOPEDIC SURGERY | Facility: CLINIC | Age: 80
End: 2025-02-28
Payer: MEDICARE

## 2025-02-28 DIAGNOSIS — Z47.1 AFTERCARE FOLLOWING LEFT KNEE JOINT REPLACEMENT SURGERY: ICD-10-CM

## 2025-02-28 DIAGNOSIS — Z47.1 AFTERCARE FOLLOWING RIGHT KNEE JOINT REPLACEMENT SURGERY: ICD-10-CM

## 2025-02-28 DIAGNOSIS — Z96.652 AFTERCARE FOLLOWING LEFT KNEE JOINT REPLACEMENT SURGERY: ICD-10-CM

## 2025-02-28 DIAGNOSIS — Z96.651 AFTERCARE FOLLOWING RIGHT KNEE JOINT REPLACEMENT SURGERY: ICD-10-CM

## 2025-02-28 LAB
ATRIAL RATE: 79 BPM
P AXIS: 30 DEGREES
PR INTERVAL: 203 MS
Q ONSET: 253 MS
QRS COUNT: 13 BEATS
QRS DURATION: 146 MS
QT INTERVAL: 400 MS
QTC CALCULATION(BAZETT): 459 MS
QTC FREDERICIA: 438 MS
R AXIS: -52 DEGREES
T AXIS: 8 DEGREES
T OFFSET: 453 MS
VENTRICULAR RATE: 79 BPM

## 2025-02-28 PROCEDURE — 99211 OFF/OP EST MAY X REQ PHY/QHP: CPT | Performed by: PHYSICIAN ASSISTANT

## 2025-02-28 PROCEDURE — 1036F TOBACCO NON-USER: CPT | Performed by: PHYSICIAN ASSISTANT

## 2025-02-28 PROCEDURE — 1159F MED LIST DOCD IN RCRD: CPT | Performed by: PHYSICIAN ASSISTANT

## 2025-02-28 PROCEDURE — 1111F DSCHRG MED/CURRENT MED MERGE: CPT | Performed by: PHYSICIAN ASSISTANT

## 2025-02-28 PROCEDURE — 73562 X-RAY EXAM OF KNEE 3: CPT | Mod: LT

## 2025-02-28 PROCEDURE — 1160F RVW MEDS BY RX/DR IN RCRD: CPT | Performed by: PHYSICIAN ASSISTANT

## 2025-02-28 PROCEDURE — 1123F ACP DISCUSS/DSCN MKR DOCD: CPT | Performed by: PHYSICIAN ASSISTANT
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
## 2025-03-05 ENCOUNTER — HOME CARE VISIT (OUTPATIENT)
Dept: HOME HEALTH SERVICES | Facility: HOME HEALTH | Age: 80
End: 2025-03-05
Payer: MEDICARE

## 2025-03-05 PROCEDURE — G0151 HHCP-SERV OF PT,EA 15 MIN: HCPCS | Mod: HHH

## 2025-03-05 ASSESSMENT — GAIT ASSESSMENTS
TRUNK: 0 - MARKED SWAY OR USES WALKING AID
BALANCE AND GAIT SCORE: 21
PATH SCORE: 1
WALKING STANCE: 0 - HEELS APART
STEP SYMMETRY: 1 - RIGHT AND LEFT STEP LENGTH APPEAR EQUAL
PATH: 1 - MILD/MODERATE DEVIATION OR USES WALKING AID
STEP CONTINUITY: 1 - STEPS APPEAR CONTINUOUS
TRUNK SCORE: 0
INITIATION OF GAIT IMMEDIATELY AFTER GO: 1 - NO HESITANCY
GAIT SCORE: 8

## 2025-03-05 ASSESSMENT — ENCOUNTER SYMPTOMS
SUBJECTIVE PAIN PROGRESSION: WAXING AND WANING
PERSON REPORTING PAIN: PATIENT
PAIN LOCATION - PAIN SEVERITY: 4/10
PAIN: 1
PAIN LOCATION: RIGHT KNEE

## 2025-03-05 ASSESSMENT — BALANCE ASSESSMENTS
SITTING DOWN: 1 - USES ARMS OR NOT SMOOTH MOTION
TURNING 360 DEGREES STEPS: 1 - CONTINUOUS STEPS
ARISING SCORE: 1
STANDING BALANCE: 1 - STEADY BUT WIDE STANCE AND USES CANE OR OTHER SUPPORT
EYES CLOSED AT MAXIMUM POSITION NUDGED: 1 - STEADY
BALANCE SCORE: 13
NUDGED SCORE: 2
NUDGED: 2 - STEADY
IMMEDIATE STANDING BALANCE FIRST 5 SECONDS: 2 - STEADY WITHOUT WALKER OR OTHER SUPPORT
SITTING BALANCE: 1 - STEADY, SAFE
ARISES: 1 - ABLE, USES ARMS TO HELP
ATTEMPTS TO ARISE: 2 - ABLE TO RISE, ONE ATTEMPT

## 2025-03-07 ENCOUNTER — HOME CARE VISIT (OUTPATIENT)
Dept: HOME HEALTH SERVICES | Facility: HOME HEALTH | Age: 80
End: 2025-03-07
Payer: MEDICARE

## 2025-03-10 ENCOUNTER — APPOINTMENT (OUTPATIENT)
Dept: PRIMARY CARE | Facility: CLINIC | Age: 80
End: 2025-03-10
Payer: MEDICARE

## 2025-03-10 VITALS
DIASTOLIC BLOOD PRESSURE: 62 MMHG | BODY MASS INDEX: 28.95 KG/M2 | HEIGHT: 68 IN | TEMPERATURE: 96.7 F | OXYGEN SATURATION: 97 % | HEART RATE: 83 BPM | SYSTOLIC BLOOD PRESSURE: 96 MMHG | WEIGHT: 191 LBS

## 2025-03-10 DIAGNOSIS — R11.0 POSTPRANDIAL NAUSEA: Primary | ICD-10-CM

## 2025-03-10 DIAGNOSIS — R11.0 NAUSEA: ICD-10-CM

## 2025-03-10 PROCEDURE — 99495 TRANSJ CARE MGMT MOD F2F 14D: CPT | Performed by: FAMILY MEDICINE

## 2025-03-10 PROCEDURE — 3078F DIAST BP <80 MM HG: CPT | Performed by: FAMILY MEDICINE

## 2025-03-10 PROCEDURE — 1123F ACP DISCUSS/DSCN MKR DOCD: CPT | Performed by: FAMILY MEDICINE

## 2025-03-10 PROCEDURE — 3074F SYST BP LT 130 MM HG: CPT | Performed by: FAMILY MEDICINE

## 2025-03-10 PROCEDURE — 1036F TOBACCO NON-USER: CPT | Performed by: FAMILY MEDICINE

## 2025-03-10 PROCEDURE — 1159F MED LIST DOCD IN RCRD: CPT | Performed by: FAMILY MEDICINE

## 2025-03-10 PROCEDURE — 1111F DSCHRG MED/CURRENT MED MERGE: CPT | Performed by: FAMILY MEDICINE

## 2025-03-10 PROCEDURE — 1160F RVW MEDS BY RX/DR IN RCRD: CPT | Performed by: FAMILY MEDICINE

## 2025-03-10 RX ORDER — PANTOPRAZOLE SODIUM 40 MG/1
40 TABLET, DELAYED RELEASE ORAL
COMMUNITY

## 2025-03-10 RX ORDER — ONDANSETRON 4 MG/1
4 TABLET, FILM COATED ORAL EVERY 8 HOURS PRN
Qty: 20 TABLET | Refills: 0 | Status: SHIPPED | OUTPATIENT
Start: 2025-03-10

## 2025-03-10 ASSESSMENT — PATIENT HEALTH QUESTIONNAIRE - PHQ9
2. FEELING DOWN, DEPRESSED OR HOPELESS: SEVERAL DAYS
1. LITTLE INTEREST OR PLEASURE IN DOING THINGS: SEVERAL DAYS
SUM OF ALL RESPONSES TO PHQ9 QUESTIONS 1 & 2: 2
10. IF YOU CHECKED OFF ANY PROBLEMS, HOW DIFFICULT HAVE THESE PROBLEMS MADE IT FOR YOU TO DO YOUR WORK, TAKE CARE OF THINGS AT HOME, OR GET ALONG WITH OTHER PEOPLE: NOT DIFFICULT AT ALL
SUM OF ALL RESPONSES TO PHQ9 QUESTIONS 1 & 2: 0
2. FEELING DOWN, DEPRESSED OR HOPELESS: NOT AT ALL
1. LITTLE INTEREST OR PLEASURE IN DOING THINGS: NOT AT ALL

## 2025-03-10 NOTE — PROGRESS NOTES
"80-year-old male recently discharged from  after being admitted for generalized weakness hypomagnesemia.  He is status post right total knee arthroplasty in January.  He had some nonpurulent drainage from the surgical site was evaluated  By Ortho.  X-ray showed hardware in place.  Urine culture was obtained which showed 80,000 CFU's Enterococcus susceptible to penicillin and ampicillin.  He was discharged on Augmentin and is here for follow-up    H/o GERD Hiatal Hernia  Esophageal stricture dilation 2014 ( Dr Anderson)    Since TKA 1/7 / 25 has had poor po intake with post prandial nausea    Denies abdominal pain diarrhea fever chills hematochezia emesis dysphagia odynophagia dysuria flank pain cheat pain diaphoresis SOB    CT abdn / pelvis 1/13/25 reviewed      BP 96/62   Pulse 83   Temp 35.9 °C (96.7 °F)   Ht 1.727 m (5' 8\")   Wt 86.6 kg (191 lb)   SpO2 97%   BMI 29.04 kg/m²     Appears comfortable  No retractions  Skin without pallor petechia icterus cyanosis  Neck without JVD thyromegaly bruits  Chest wall nontender  Chest clear to auscultation without rales rhonchi wheeze  Heart regular rate and rhythm without murmur  Abdomen soft nondistended nontender without organomegaly or mass  Extremities without erythema edema Homans or cord  Peripheral pulses palpable  Neuro intact      "

## 2025-03-11 ENCOUNTER — TELEPHONE (OUTPATIENT)
Dept: PRIMARY CARE | Facility: CLINIC | Age: 80
End: 2025-03-11
Payer: MEDICARE

## 2025-03-11 ENCOUNTER — PATIENT OUTREACH (OUTPATIENT)
Dept: PRIMARY CARE | Facility: CLINIC | Age: 80
End: 2025-03-11
Payer: MEDICARE

## 2025-03-11 DIAGNOSIS — N18.32 STAGE 3B CHRONIC KIDNEY DISEASE (MULTI): Primary | ICD-10-CM

## 2025-03-11 LAB
ALBUMIN SERPL-MCNC: 4.2 G/DL (ref 3.6–5.1)
ALP SERPL-CCNC: 131 U/L (ref 35–144)
ALT SERPL-CCNC: 5 U/L (ref 9–46)
ANION GAP SERPL CALCULATED.4IONS-SCNC: 16 MMOL/L (CALC) (ref 7–17)
AST SERPL-CCNC: 15 U/L (ref 10–35)
BASOPHILS # BLD AUTO: 118 CELLS/UL (ref 0–200)
BASOPHILS NFR BLD AUTO: 1.4 %
BILIRUB SERPL-MCNC: 0.5 MG/DL (ref 0.2–1.2)
BUN SERPL-MCNC: 30 MG/DL (ref 7–25)
CALCIUM SERPL-MCNC: 8.9 MG/DL (ref 8.6–10.3)
CHLORIDE SERPL-SCNC: 104 MMOL/L (ref 98–110)
CO2 SERPL-SCNC: 21 MMOL/L (ref 20–32)
CREAT SERPL-MCNC: 2.89 MG/DL (ref 0.7–1.22)
EGFRCR SERPLBLD CKD-EPI 2021: 21 ML/MIN/1.73M2
EOSINOPHIL # BLD AUTO: 252 CELLS/UL (ref 15–500)
EOSINOPHIL NFR BLD AUTO: 3 %
ERYTHROCYTE [DISTWIDTH] IN BLOOD BY AUTOMATED COUNT: 15.7 % (ref 11–15)
GLUCOSE SERPL-MCNC: 138 MG/DL (ref 65–139)
HCT VFR BLD AUTO: 40.6 % (ref 38.5–50)
HGB BLD-MCNC: 13.2 G/DL (ref 13.2–17.1)
LIPASE SERPL-CCNC: 29 U/L (ref 7–60)
LYMPHOCYTES # BLD AUTO: 2352 CELLS/UL (ref 850–3900)
LYMPHOCYTES NFR BLD AUTO: 28 %
MCH RBC QN AUTO: 29 PG (ref 27–33)
MCHC RBC AUTO-ENTMCNC: 32.5 G/DL (ref 32–36)
MCV RBC AUTO: 89.2 FL (ref 80–100)
MONOCYTES # BLD AUTO: 647 CELLS/UL (ref 200–950)
MONOCYTES NFR BLD AUTO: 7.7 %
NEUTROPHILS # BLD AUTO: 5032 CELLS/UL (ref 1500–7800)
NEUTROPHILS NFR BLD AUTO: 59.9 %
PLATELET # BLD AUTO: 347 THOUSAND/UL (ref 140–400)
PMV BLD REES-ECKER: 10.2 FL (ref 7.5–12.5)
POTASSIUM SERPL-SCNC: 4.3 MMOL/L (ref 3.5–5.3)
PROT SERPL-MCNC: 7.1 G/DL (ref 6.1–8.1)
RBC # BLD AUTO: 4.55 MILLION/UL (ref 4.2–5.8)
SODIUM SERPL-SCNC: 141 MMOL/L (ref 135–146)
WBC # BLD AUTO: 8.4 THOUSAND/UL (ref 3.8–10.8)

## 2025-03-11 NOTE — PROGRESS NOTES
Physical Therapy    Physical Therapy Evaluation    Patient Name: Mikhail Moses  MRN: [id number]  Today's Date: 3/12/2025  Time Calculation  Start Time: 0915  Stop Time: 1000  Time Calculation (min): 45 min    Problem List Items Addressed This Visit    None  Visit Diagnoses         Codes    Aftercare following right knee joint replacement surgery     Z47.1, Z96.651    Relevant Orders    Follow Up In Physical Therapy            Insurance:  Visit number: 1 of [place]  Insurance Type: Payor: MEDICARE / Plan: MEDICARE PART A AND B / Product Type: *No Product type* /   Authorization or Plan of Care date Range: 3/12/25 - 6/10/25    General:  Reason for visit: R TKA on 1/7/25; The patient was at a SNF,  he developed a UTI after surgery and was seen at [facility]. He is now on Augmentin. He also had a small stitch abscess at the top of his incision that is now resolving.  He was doing HH therapies from: 2/14 - 2/27/25  Surgery: Right TKA  Date of Last Surgery: 1/8/2025   Post-Op Days: 63   Referred by: Hamida Padilla PA-C  / surgeon: Dr. Sanchez Shaw MD appt:  3/14/25     Precautions:  Deconditioned - Frequent rest brreaks required.  CAD, HTN, HLD, CKD, COPD, GERD, DVT on eliquis     Fall Risk: Low     Assessment:   R TKA on 1/7/25; The patient was at a SNF,  he developed a UTI after surgery and was seen at [facility]. He is now on Augmentin. He also had a small stitch abscess at the top of his incision that is now resolving.  He was doing HH therapies from: 2/14 - 2/27/25.  He is having GI issues - difficulty eating - following up with GI this week.  GI issues are chronic.  Per wife, he has significantly declined with his ability to walk and stand since this surgery.  He has also lost about 30# since surgery due to GI.    Clinical Presentation: Stable and/or uncomplicated characteristics    Impairments: Pain, Active ROM, Passive ROM, Strength, Balance, Activity limitations, Fall risk, Flexibility, Edema, Motor  function/control, Joint mobility, and Decreased functional level    Functional Limitations: Sleep is interrupted., Stair negotiation, Walking >   minutes, and Standing >   minutes    Recommended Treatment:  Therapeutic exercise, Gait training, Home program instruction and progression, Neuromuscular re-education, Therapeutic activities, Self care and home management, Instruction in activity modification, and Cryotherapy      Plan:  Plan of care was developed with input and agreement by the patient.  2 x week x 6 weeks    Rehab Potential:   Good to achieve goals.    Goals:  Short Term Goals:   - Patient to be Indep in [facility] for self management of symptoms    Long Term Goals:   - LEFS: 54/80 to indicate a significant improvement in overall function.      - Patient will demo 4/5 hip strength (all planes) to allow for correct gait and stair mechanics     - Patient will demo mild to no limitation AROM right Knee to allow for correct mechanics with functional mobility.    - Patient to demonstrate gait with least restrictive device for all ADLS and does not demonstrate significant deviations that may contribute to discomfort in the future    - Patient to negotiate stairs reciprocally and descend with near equal eccentric control with use of hand rail.     - TUG without device = 15 seconds or less indicating reduced fall risk    - 5 x sit to stand < 15 seconds indicating reduced fall risk       Subjective:  CC:  elective R TKA due to chronic OA  PAIN - Location: R knee Worst(past 24 hours): 4  Least(past 24 hours): 2  Pain Quality: aching and intermittent sharp pain.  PAIN - Alleviating: nothing  Aggravating: random sharp pain in Ues and Les - not associated with weight bearing.  MEDICAL MANAGEMENT: Referred to Physical Therapy, Radiographs, Prescribed medication, OTC medication, and Orthopedic specialist  PLOF: Chronic pain significantly affecting function  FUNCTIONAL LIMITATIONS: Sleep is interrupted., Stair negotiation,  Walking >   minutes, and Standing >   minutes   LIVING ENVIRONMENT: Pt lives at home with his wife in a one story home with 12 steps to basement where patient has shower that he uses   EXERCISE: Patient irregularly exercises.  Irregularly completes recommended HEP.  Patient Stated Goal: restore function, walk, and get around better.    Medical History Form: Reviewed (scanned into chart)    Objective:     -GAIT: Modified independent. Ambulates with a cane Lacks heel strike, Decreased velocity, Decreased stride length, Shuffling, Wide base of support, Downward gaze, and Forward flexed    -STAIRS:  Ascends and descends reciprocally with the use of one rail.    -INCISION: healing well, 1 stitch abscess remains.    -SENSATION: Grossly intact to light touch in the LE extremities    -RANGE OF MOTION:  Knee ROM Right: lacking 5 degrees to full extension  117 degrees flexion     STRENGTH  Manual Muscle Test Right Knee: .  Knee Flexion R: 3+  Knee Extension R: 4    -BALANCE:   Balance Special Testing:   The 5 Times x Sit to Stand is used to assess balance, transitional movement, LE strength, and fall risk.    Age matched norms:   60-69: 11.4 seconds  70-79: 12.6 seconds  80-89: 14.8 seconds  For Geriatric patients there is a need for further assessment of fall risk if the patients time is > 11 seconds.  Patients are at an increased risk for recurrent falls with a score > 15 seconds.  Vestibular Disorders: Increased fall risk if > 15 seconds.  Parkinsons Disease: Increased fall risk if > 16 seconds.  Mikhail Moses performed this test in 22 seconds WITH SP CANE indicating he is at an elevated risk of falling.    The TUG is a standardized test whose results correlate with gait speed, balance, functional level, the ability to go out and can be followed over time.  Age Group      Normal Time in Seconds  (95% Confidence Interval)   60 - 69 years:        8.1 seconds                         (7.1 - 9.0)                  70 - 79  "years :       9.2 seconds                         (8.2 - 10.2)               80 - 99 years:       11.3 seconds                        (10.0 - 12.7)             Community Dwelling Frail Older Adults  > 12-14 seconds  associated with high fall risk                 Post-op hip fracture patients at time of discharge  > 24 seconds predictive of falls within 6 months after hip fracture                   Frail older adults > 30 predictive of requiring assistive device for ambulation and being dependent in ADLs                 Mikhail Moses scored UNABLE indicating he is at an increased fall risk.      Outcome Measures:  Other Measures  Lower Extremity Funtional Score (LEFS): 21    Treatment Performed: (\"NP\" = Not Performed)     Therapeutic Exercise:     30 minutes  Home exercise program instructed and issued.  Sit to stand from 22 inches 3 x 5   Nustep x 5 minutes, L2  Ues and Les; Seat 9, Ues 9.5   Fwd Step up: 4 inch 2 x 10 ea LE  HR: 2 x 10   Fwd static lunge: 2 x 10 ea LE    Manual Therapy:       minutes      Neuromuscular Re-education:      minutes      Gait Training:            minutes      Aquatics:            minutes      Therapeutic Activity:      minutes      Modalities:       Vasopneumatic Device       minutes  Electrical Stimulation          minutes  Ultrasound            minutes  Iontophoresis                     minutes  Cold Pack            minutes  Mechanical Traction           minutes    Self Care Home Management:    minutes    Canalith Reposition:          minutes     Education:          minutes    Other:       minutes      Evaluation Complexity: Low: 15 minutes; Moderate   minutes; Complex PT Evaluation Time Entry: 15;  minutes    Re-Evaluation:   minutes   "

## 2025-03-11 NOTE — TELEPHONE ENCOUNTER
----- Message from Josué Kay sent at 3/11/2025 11:14 AM EDT -----  Labs show kidney function is decreased this is probably due to poor p.o. intake in combination with his ramipril.  Stop ramipril for now.  Increase oral intake especially fluids and repeat nonfasting blood test in 1 week

## 2025-03-11 NOTE — PROGRESS NOTES
Call regarding appt. with PCP on  3/10/25 after hospitalization.  At time of outreach call, spoke with patient's wife, she feels as if his condition has improved .  She is trying to get him out a little more. She states he has been eating good the past few days. They are going to GI  3-13-25.  He is starting outpatient physical therapy tomorrow. He had blood work done.   Reviewed the PCP appointment with the pt and addressed any questions or concerns.

## 2025-03-12 ENCOUNTER — EVALUATION (OUTPATIENT)
Dept: PHYSICAL THERAPY | Facility: CLINIC | Age: 80
End: 2025-03-12
Payer: MEDICARE

## 2025-03-12 ENCOUNTER — HOME CARE VISIT (OUTPATIENT)
Dept: HOME HEALTH SERVICES | Facility: HOME HEALTH | Age: 80
End: 2025-03-12
Payer: MEDICARE

## 2025-03-12 DIAGNOSIS — Z96.651 AFTERCARE FOLLOWING RIGHT KNEE JOINT REPLACEMENT SURGERY: ICD-10-CM

## 2025-03-12 DIAGNOSIS — Z47.1 AFTERCARE FOLLOWING RIGHT KNEE JOINT REPLACEMENT SURGERY: ICD-10-CM

## 2025-03-12 PROCEDURE — 97110 THERAPEUTIC EXERCISES: CPT | Mod: GP | Performed by: PHYSICAL THERAPIST

## 2025-03-12 PROCEDURE — 97161 PT EVAL LOW COMPLEX 20 MIN: CPT | Mod: GP | Performed by: PHYSICAL THERAPIST

## 2025-03-12 ASSESSMENT — ACTIVITIES OF DAILY LIVING (ADL)
HOME_HEALTH_OASIS: 00
OASIS_M1830: 02

## 2025-03-12 ASSESSMENT — PATIENT HEALTH QUESTIONNAIRE - PHQ9
1. LITTLE INTEREST OR PLEASURE IN DOING THINGS: NOT AT ALL
2. FEELING DOWN, DEPRESSED OR HOPELESS: NOT AT ALL
SUM OF ALL RESPONSES TO PHQ9 QUESTIONS 1 AND 2: 0

## 2025-03-14 ENCOUNTER — TREATMENT (OUTPATIENT)
Dept: PHYSICAL THERAPY | Facility: CLINIC | Age: 80
End: 2025-03-14
Payer: MEDICARE

## 2025-03-14 ENCOUNTER — OFFICE VISIT (OUTPATIENT)
Dept: ORTHOPEDIC SURGERY | Facility: CLINIC | Age: 80
End: 2025-03-14
Payer: MEDICARE

## 2025-03-14 DIAGNOSIS — Z47.1 AFTERCARE FOLLOWING RIGHT KNEE JOINT REPLACEMENT SURGERY: ICD-10-CM

## 2025-03-14 DIAGNOSIS — Z96.651 S/P TKR (TOTAL KNEE REPLACEMENT) USING CEMENT, RIGHT: Primary | ICD-10-CM

## 2025-03-14 DIAGNOSIS — Z96.651 AFTERCARE FOLLOWING RIGHT KNEE JOINT REPLACEMENT SURGERY: ICD-10-CM

## 2025-03-14 PROCEDURE — 1111F DSCHRG MED/CURRENT MED MERGE: CPT | Performed by: PHYSICIAN ASSISTANT

## 2025-03-14 PROCEDURE — 97110 THERAPEUTIC EXERCISES: CPT | Mod: GP,CQ

## 2025-03-14 PROCEDURE — 99211 OFF/OP EST MAY X REQ PHY/QHP: CPT | Performed by: PHYSICIAN ASSISTANT

## 2025-03-14 PROCEDURE — 1123F ACP DISCUSS/DSCN MKR DOCD: CPT | Performed by: PHYSICIAN ASSISTANT

## 2025-03-14 PROCEDURE — 1159F MED LIST DOCD IN RCRD: CPT | Performed by: PHYSICIAN ASSISTANT

## 2025-03-14 ASSESSMENT — PAIN - FUNCTIONAL ASSESSMENT: PAIN_FUNCTIONAL_ASSESSMENT: NO/DENIES PAIN

## 2025-03-14 NOTE — PROGRESS NOTES
PHYSICAL THERAPY TREATMENT NOTE    Patient Name:  Mikhail Moses   Patient MRN: [id number]  Date: 03/14/25    Time Calculation  Start Time: 1430  Stop Time: 1500  Time Calculation (min): 30 min    Insurance:  Visit number: 2 of MN  Insurance Type: Payor: MEDICARE / Plan: MEDICARE PART A AND B / Product Type: *No Product type* /   Authorization or Plan of Care date Range: 3/12/25 - 6/10/25    Therapy diagnoses:   1. Aftercare following right knee joint replacement surgery  Follow Up In Physical Therapy           General:  Reason for visit: R TKA on 1/7/25; The patient was at a SNF,  he developed a UTI after surgery and was seen at [facility]. He is now on Augmentin. He also had a small stitch abscess at the top of his incision that is now resolving.  He was doing HH therapies from: 2/14 - 2/27/25  Surgery: Right TKA  Date of Last Surgery: 1/8/2025   Post-Op Days: 63   Referred by: Hamida Padilla PA-C  / surgeon: Dr. Sanchez Shaw MD appt:  3/14/25     Precautions:    Deconditioned - Frequent rest brreaks required.  CAD, HTN, HLD, CKD, COPD, GERD, DVT on eliquis      Fall Risk: Low    Assessment:  Education: Reviewed home exercise program. Provided verbal feedback to improve exercise technique.  Progress towards functional goals: Improved walking distance. Reduced pain level.  Response to interventions: Seated rest breaks given between exercises. Patient tolerated therapeutic interventions well and without any adverse events. Tolerated treatment session well without any increase in pain. Patient was appropriately fatigued with no complaints. Only performed 30 minutes of exercise since patient was fatigued from going to the [facility] and MD appt this morning.  Justification for continued skilled care: To address remaining functional, objective and subjective deficits to allow the patient to  "return to full independence with ADLs. Skilled intervention required to improve ROM which will improve function. Progressive strengthening to stabilize the R knee to improve function.    Plan:  Exercises targeting surrounding musculature to strengthen the R knee and improve function. Exercises to gradually increase flexibility and range of motion of the R knee.    Subjective:   Mikhail reports he feels like his condition is improving.  [place] painful in the front of the R knee.  Progress towards functional goal:  Improved walking distance. Reduced pain level.   Pain (0-10): 3    HEP adherence / understanding: patient reports compliance with the instructed home exercises.    Treatment Performed: (\"NP\" = Not Performed)     Therapeutic Exercise:     30 minutes    Sit to stand from 22 inches 2 x 10   Nustep x 6 minutes, L2  Ues and Les; Seat 9, Ues 9.5   Fwd Step up: 4 inch 2 x 10 ea LE  HR: 2 x 10   Fwd static lunge: 2 x 10 ea LE    Manual Therapy:       minutes      Neuromuscular Re-education:      minutes      Gait Training:            minutes      Aquatics:            minutes      Therapeutic Activity:      minutes      Gait Training:            minutes      Modalities:       Vasopneumatic Device       minutes  Electrical Stimulation          minutes  Ultrasound            minutes  Iontophoresis                     minutes  Cold Pack            minutes  Mechanical Traction           minutes    Self Care Home Management:    minutes    Canalith Reposition:          minutes     Education:          minutes    Other:       minutes      Evaluation Complexity: Low:   minutes; Moderate   minutes; Complex   minutes    Re-Evaluation:   minutes  "
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
## 2025-03-18 ENCOUNTER — APPOINTMENT (OUTPATIENT)
Dept: PHYSICAL THERAPY | Facility: CLINIC | Age: 80
End: 2025-03-18
Payer: MEDICARE

## 2025-03-20 PROBLEM — Z47.1 AFTERCARE FOLLOWING RIGHT KNEE JOINT REPLACEMENT SURGERY: Status: ACTIVE | Noted: 2025-03-20

## 2025-03-20 PROBLEM — Z96.651 AFTERCARE FOLLOWING RIGHT KNEE JOINT REPLACEMENT SURGERY: Status: ACTIVE | Noted: 2025-03-20

## 2025-03-20 NOTE — PROGRESS NOTES
Entered in Error.

## 2025-03-21 ENCOUNTER — DOCUMENTATION (OUTPATIENT)
Dept: PHYSICAL THERAPY | Facility: CLINIC | Age: 80
End: 2025-03-21
Payer: MEDICARE

## 2025-03-21 ENCOUNTER — TREATMENT (OUTPATIENT)
Dept: PHYSICAL THERAPY | Facility: CLINIC | Age: 80
End: 2025-03-21
Payer: MEDICARE

## 2025-03-21 DIAGNOSIS — Z96.651 AFTERCARE FOLLOWING RIGHT KNEE JOINT REPLACEMENT SURGERY: Primary | ICD-10-CM

## 2025-03-21 DIAGNOSIS — Z47.1 AFTERCARE FOLLOWING RIGHT KNEE JOINT REPLACEMENT SURGERY: Primary | ICD-10-CM

## 2025-03-21 NOTE — PROGRESS NOTES
Patient has not been eating.  He does not feel well.  He took medication for constipation today and does not feel like exercising.  Per his wife he has not been compliant with the HEP.  
home

## 2025-03-25 ENCOUNTER — TREATMENT (OUTPATIENT)
Dept: PHYSICAL THERAPY | Facility: CLINIC | Age: 80
End: 2025-03-25
Payer: MEDICARE

## 2025-03-25 DIAGNOSIS — Z47.1 AFTERCARE FOLLOWING RIGHT KNEE JOINT REPLACEMENT SURGERY: Primary | ICD-10-CM

## 2025-03-25 DIAGNOSIS — Z96.651 AFTERCARE FOLLOWING RIGHT KNEE JOINT REPLACEMENT SURGERY: Primary | ICD-10-CM

## 2025-03-25 PROCEDURE — 97110 THERAPEUTIC EXERCISES: CPT | Mod: GP | Performed by: PHYSICAL THERAPIST

## 2025-03-25 NOTE — PROGRESS NOTES
PHYSICAL THERAPY TREATMENT NOTE    Patient Name:  Mikhail Moses   Patient MRN: [id number]  Date: 03/25/25    Time Calculation  Start Time: 0200  Stop Time: 0230  Time Calculation (min): 30 min    Insurance:  Visit number: 3 of [place]  Insurance Type: Payor: MEDICARE / Plan: MEDICARE PART A AND B / Product Type: *No Product type* /   Authorization or Plan of Care date Range: 3/12/25 - 6/10/25    Therapy diagnoses:   1. Aftercare following right knee joint replacement surgery  Follow Up In Physical Therapy           General:  Reason for visit: R TKA on 1/7/25; The patient was at a SNF,  he developed a UTI after surgery and was seen at [facility]. He is now on Augmentin. He also had a small stitch abscess at the top of his incision that is now resolving.  He was doing HH therapies from: 2/14 - 2/27/25  Surgery: Right TKA  Date of Last Surgery: 1/8/2025   Post-Op Days: 63   Referred by: Hamida Padilla PA-C  / surgeon: Dr. Sanchez Shaw MD appt:  3/14/25     Precautions:    Deconditioned - Frequent rest brreaks required.  CAD, HTN, HLD, CKD, COPD, GERD, DVT on eliquis      Fall Risk: Low    Subjective:   Mikhail reports he feels like his condition is improving. He is able to eat a little bit.  His appetite is returning.  Pain (0-10): 2 /10 in the Right knee.   HEP adherence / understanding:  patient reports he has been walking at home.    Assessment:   Education: Reviewed home exercise program. Home exercise program instructed and issued.  Progress towards functional goals:  He is doing well, better than in prior sessions.   His appetite has returned.  He is keeping some food down.  He is in good spirits today.  Response to interventions:  Occ rest breaks  required due to fatigue.  Justification for continued skilled care: To address remaining functional, objective and subjective deficits to allow  "the patient to return to full independence with ADLs.    Plan:  Continue functional strengthening.  Build endurance  Rest breaks PRN.      Objective:       Treatment Performed: (\"NP\" = Not Performed)     Therapeutic Exercise:     30 minutes  Nustep x 10 minutes, L2  Ues and Les; Seat 9, Ues 9.5   Sit to stand from 22 inches 2 x 10   Fwd walking x 1 minute   Side stepping x 1 minute  Fwd Step up: 4 inch 2 x 10 ea LE  HR: 2 x 10   Fwd static lunge: 2 x 10 ea LE    Manual Therapy:       minutes      Neuromuscular Re-education:      minutes      Gait Training:            minutes      Aquatics:            minutes      Therapeutic Activity:      minutes      Gait Training:            minutes      Modalities:       Vasopneumatic Device       minutes  Electrical Stimulation          minutes  Ultrasound            minutes  Iontophoresis                     minutes  Cold Pack            minutes  Mechanical Traction           minutes    Self Care Home Management:    minutes    Canalith Reposition:          minutes     Education:          minutes    Other:       minutes      Evaluation Complexity: Low:   minutes; Moderate   minutes; Complex   minutes    Re-Evaluation:   minutes                                                                                                                           "

## 2025-03-28 ENCOUNTER — TREATMENT (OUTPATIENT)
Dept: PHYSICAL THERAPY | Facility: CLINIC | Age: 80
End: 2025-03-28
Payer: MEDICARE

## 2025-03-28 DIAGNOSIS — Z47.1 AFTERCARE FOLLOWING RIGHT KNEE JOINT REPLACEMENT SURGERY: ICD-10-CM

## 2025-03-28 DIAGNOSIS — Z96.651 AFTERCARE FOLLOWING RIGHT KNEE JOINT REPLACEMENT SURGERY: ICD-10-CM

## 2025-03-28 PROCEDURE — 97110 THERAPEUTIC EXERCISES: CPT | Mod: GP,CQ

## 2025-03-28 NOTE — PROGRESS NOTES
PHYSICAL THERAPY TREATMENT NOTE    Patient Name:  Mikhail Moses   Patient MRN: [id number]  Date: 03/28/25    Time Calculation  Start Time: 1000  Stop Time: 1026  Time Calculation (min): 26 min    Insurance:  Visit number: 4 of MN  Insurance Type: Payor: MEDICARE / Plan: MEDICARE PART A AND B / Product Type: *No Product type* /   Authorization or Plan of Care date Range: 3/12/25 - 6/10/25    Therapy diagnoses:   1. Aftercare following right knee joint replacement surgery  Follow Up In Physical Therapy         General:  Reason for visit: R TKA on 1/7/25; The patient was at a SNF,  he developed a UTI after surgery and was seen at [facility]. He is now on Augmentin. He also had a small stitch abscess at the top of his incision that is now resolving.  He was doing HH therapies from: 2/14 - 2/27/25  Surgery: Right TKA  Date of Last Surgery: 1/8/2025   Post-Op Days: 63   Referred by: Hamida Padilla PA-C  / surgeon: Dr. Sanchez Shaw MD appt:  3/14/25     Precautions:    Deconditioned - Frequent rest brreaks required.  CAD, HTN, HLD, CKD, COPD, GERD, DVT on eliquis      Fall Risk: Low    Subjective:   Mikhail reports he feels like his condition is improving. Has been getting his appetite back; going to fish [employer] after session.  Pain (0-10): 2-3/10 in the Right knee.   HEP adherence / understanding:  Walking at home.    Assessment:   Education: Reviewed home exercise program. Verbal cues given for exercise technique.  Progress towards functional goals:  Patient still requires seated rest breaks after each exercise, but for less time frame.    Response to interventions:  Patient preferred rest breaks after each exercise due to overall fatigue. Able to add timed standing marching which was challenging.  Justification for continued skilled care: To address remaining functional, objective and  "subjective deficits to allow the patient to return to full independence with ADLs.    Plan:  Continue functional strengthening.  Build endurance  Rest breaks PRN.      Objective:       Treatment Performed: (\"NP\" = Not Performed)     Therapeutic Exercise:     26 minutes  Nustep x 10 minutes, L2  Ues and Les; Seat 9, Ues 9.5   Sit to stand from 22 inches 2 x 10   Fwd walking x 1 minute   Side stepping x 1 minute  Marching x 1 minute  Fwd Step up: 4 inch 2 x 10 ea LE  HR: 2 x 10   Fwd static lunge: 2 x 10 ea LE    Manual Therapy:       minutes      Neuromuscular Re-education:      minutes      Gait Training:            minutes      Aquatics:            minutes      Therapeutic Activity:      minutes      Gait Training:            minutes      Modalities:       Vasopneumatic Device       minutes  Electrical Stimulation          minutes  Ultrasound            minutes  Iontophoresis                     minutes  Cold Pack            minutes  Mechanical Traction           minutes    Self Care Home Management:    minutes    Canalith Reposition:          minutes     Education:          minutes    Other:       minutes      Evaluation Complexity: Low:   minutes; Moderate   minutes; Complex   minutes    Re-Evaluation:   minutes                                                                                                                           "

## 2025-04-01 ENCOUNTER — TREATMENT (OUTPATIENT)
Dept: PHYSICAL THERAPY | Facility: CLINIC | Age: 80
End: 2025-04-01
Payer: MEDICARE

## 2025-04-01 DIAGNOSIS — Z47.1 AFTERCARE FOLLOWING RIGHT KNEE JOINT REPLACEMENT SURGERY: ICD-10-CM

## 2025-04-01 DIAGNOSIS — Z96.651 AFTERCARE FOLLOWING RIGHT KNEE JOINT REPLACEMENT SURGERY: ICD-10-CM

## 2025-04-01 PROCEDURE — 97110 THERAPEUTIC EXERCISES: CPT | Mod: GP,CQ

## 2025-04-01 NOTE — PROGRESS NOTES
PHYSICAL THERAPY TREATMENT NOTE    Patient Name:  Mikhail Moses   Patient MRN: [id number]  Date: 04/01/25    Time Calculation  Start Time: 0915  Stop Time: 0953  Time Calculation (min): 38 min    Insurance:  Visit number: 5 of [place]  Insurance Type: Payor: MEDICARE / Plan: MEDICARE PART A AND B / Product Type: *No Product type* /   Authorization or Plan of Care date Range: 3/12/25 - 6/10/25    Therapy diagnoses:   1. Aftercare following right knee joint replacement surgery  Follow Up In Physical Therapy         General:  Reason for visit: R TKA on 1/7/25; The patient was at a SNF, he developed a UTI after surgery and was seen at [facility]. He is now on Augmentin. He also had a small stitch abscess at the top of his incision that is now resolving.  He was doing HH therapies from: 2/14 - 2/27/25  Surgery: Right TKA  Date of Last Surgery: 1/8/2025   Post-Op Days: 63   Referred by: Hamida Padilla PA-C  / surgeon: Dr. Sanchez Shaw MD appt:  3/14/25     Precautions:    Deconditioned - Frequent rest breaks required.  CAD, HTN, HLD, CKD, COPD, GERD, DVT on eliquis      Fall Risk: Low    Subjective:   Mikhail reports he feels like his condition is improving. Says he has more pain in the R knee today; always has a rough morning.  Pain (0-10): 4-5/10 in the R anterolateral knee/distal quad   HEP adherence / understanding:  Walking at home.    Assessment:   Education: Reviewed home exercise program. Verbal cues given for exercise technique.  Progress towards functional goals:  Patient still requires seated rest breaks after each exercise, however, time is decreasing.    Response to interventions:  Fair tolerance to exercise this date. Patient prefers rest breaks after each exercise due to muscular fatigue. Able to increase step up height to 6 inches with good tolerance. Added theraband resistance to  "side stepping with verbal cues for keeping his body facing the mirror and added seated resisted hamstring curls. Performed LAQs and standing marching with light ankle weights. No c/o pain post treatment.  Justification for continued skilled care: To address remaining functional, objective and subjective deficits to allow the patient to return to full independence with ADLs.    Plan:  Continue functional strengthening.  Build endurance  Rest breaks PRN.      Objective:       Treatment Performed: (\"NP\" = Not Performed)     Therapeutic Exercise:     38 minutes  Nustep x 10 minutes, L2  Ues and Les; Seat 9, Ues 9.5   Sit to stand from 22 inches 2 x 10   Fwd/retro walking x 1 minute   Side stepping yellow x 1 minute  Marching 1# x 1 minute  Fwd Step up: 6 inch 2 x 10 ea LE  HR: 2 x 10   Fwd static lunge: 2 x 10 ea LE  LAQ 1# 2 x 10 ea LE  Hamstring curls red 2 x 10 ea LE    Manual Therapy:       minutes      Neuromuscular Re-education:      minutes      Gait Training:            minutes      Aquatics:            minutes      Therapeutic Activity:      minutes      Gait Training:            minutes      Modalities:       Vasopneumatic Device       minutes  Electrical Stimulation          minutes  Ultrasound            minutes  Iontophoresis                     minutes  Cold Pack            minutes  Mechanical Traction           minutes    Self Care Home Management:    minutes    Canalith Reposition:          minutes     Education:          minutes    Other:       minutes      Evaluation Complexity: Low:   minutes; Moderate   minutes; Complex   minutes    Re-Evaluation:   minutes                                                                                                                           "

## 2025-04-04 ENCOUNTER — APPOINTMENT (OUTPATIENT)
Dept: PHYSICAL THERAPY | Facility: CLINIC | Age: 80
End: 2025-04-04
Payer: MEDICARE

## 2025-04-08 ENCOUNTER — TREATMENT (OUTPATIENT)
Dept: PHYSICAL THERAPY | Facility: CLINIC | Age: 80
End: 2025-04-08
Payer: MEDICARE

## 2025-04-08 DIAGNOSIS — Z47.1 AFTERCARE FOLLOWING RIGHT KNEE JOINT REPLACEMENT SURGERY: Primary | ICD-10-CM

## 2025-04-08 DIAGNOSIS — Z96.651 AFTERCARE FOLLOWING RIGHT KNEE JOINT REPLACEMENT SURGERY: Primary | ICD-10-CM

## 2025-04-08 PROCEDURE — 97110 THERAPEUTIC EXERCISES: CPT | Mod: GP | Performed by: PHYSICAL THERAPIST

## 2025-04-08 NOTE — PROGRESS NOTES
"                                                                                                                         PHYSICAL THERAPY TREATMENT NOTE    Patient Name:  Mikhail Moses   Patient MRN: [id number]  Date: 04/08/25    Time Calculation  Start Time: 0200  Stop Time: 0240  Time Calculation (min): 40 min    Insurance:  Visit [place]: 6 of [place]  Insurance Type: Payor: MEDICARE / Plan: MEDICARE PART A AND B / Product Type: *No Product type* /   Authorization or Plan of Care date Range: 3/12/25 - 6/10/25    Therapy diagnoses:   1. Aftercare following right knee joint replacement surgery  Follow Up In Physical Therapy           General:  Reason for visit: R TKA on 1/7/25; The patient was at a SNF, he developed a UTI after surgery and was seen at [facility]. He is now on Augmentin. He also had a small stitch abscess at the top of his incision that is now resolving.  He was doing HH therapies from: 2/14 - 2/27/25  Surgery: Right TKA  Date of Last Surgery: 1/8/2025   Post-Op Days: 63   Referred by: Hamida Padilla PA-C  / surgeon: Dr. Sanchez Shaw MD appt:  3/14/25     Precautions:    Deconditioned - Frequent rest breaks required.  CAD, HTN, HLD, CKD, COPD, GERD, DVT on eliquis      Fall Risk: Low    Subjective:   Mikhail reports he feels like his condition is improving. Patient reports  appetite is back.  Feeling stronger.   Pain (0-10): 0    HEP adherence / understanding: patient reports non compliance with the instructed home exercises.    Assessment:   Education: Advised to continue with HEP and try to exercise daily.  Progress towards functional goals:   improved tolerance to activity today.  Response to interventions: GIULIANA scale rating of 14 with 30 seconds of marching.  Justification for continued skilled care: improve LE strength and endurance.    Plan:  Exercises targeting surrounding musculature to strengthen and improve function.    Objective:     Treatment Performed: (\"NP\" = Not Performed) " "    Therapeutic Exercise:     40 minutes  Nustep x 10 minutes, L2  Ues and Les; Seat 9, Ues 9.5   Fwd Step up: 6 inch 3 x 10 ea LE  HR: 2 x 10   Marching 30\" x 4   Sit to stand from 22 inches 3 x 10   Fwd/retro walking x 1 minute   Side stepping x 1 minute x 2      Fwd static lunge: 2 x 10 ea LE  LAQ 1# 2 x 10 ea LE  Hamstring curls red 2 x 10 ea LE    Manual Therapy:       minutes      Neuromuscular Re-education:      minutes      Gait Training:            minutes      Aquatics:            minutes      Therapeutic Activity:      minutes      Gait Training:            minutes      Modalities:       Vasopneumatic Device       minutes  Electrical Stimulation          minutes  Ultrasound            minutes  Iontophoresis                     minutes  Cold Pack            minutes  Mechanical Traction           minutes    Self Care Home Management:    minutes    Canalith Reposition:          minutes     Education:          minutes    Other:       minutes      Evaluation Complexity: Low:   minutes; Moderate   minutes; Complex   minutes    Re-Evaluation:   minutes                                                                                                                           "

## 2025-04-09 ENCOUNTER — PATIENT OUTREACH (OUTPATIENT)
Dept: PRIMARY CARE | Facility: CLINIC | Age: 80
End: 2025-04-09
Payer: MEDICARE

## 2025-04-11 ENCOUNTER — TREATMENT (OUTPATIENT)
Dept: PHYSICAL THERAPY | Facility: CLINIC | Age: 80
End: 2025-04-11
Payer: MEDICARE

## 2025-04-11 DIAGNOSIS — Z96.651 AFTERCARE FOLLOWING RIGHT KNEE JOINT REPLACEMENT SURGERY: ICD-10-CM

## 2025-04-11 DIAGNOSIS — Z47.1 AFTERCARE FOLLOWING RIGHT KNEE JOINT REPLACEMENT SURGERY: ICD-10-CM

## 2025-04-11 PROCEDURE — 97110 THERAPEUTIC EXERCISES: CPT | Mod: GP,CQ

## 2025-04-11 NOTE — PROGRESS NOTES
PHYSICAL THERAPY TREATMENT NOTE    Patient Name:  Mikhail Moses   Patient MRN: [id number]  Date: 04/11/25    Time Calculation  Start Time: 1000  Stop Time: 1038  Time Calculation (min): 38 min    Insurance:  Visit number: 7 of [place]  Insurance Type: Payor: MEDICARE / Plan: MEDICARE PART A AND B / Product Type: *No Product type* /   Authorization or Plan of Care date Range: 3/12/25 - 6/10/25    Therapy diagnoses:   1. Aftercare following right knee joint replacement surgery  Follow Up In Physical Therapy         General:  Reason for visit: R TKA on 1/7/25; The patient was at a SNF, he developed a UTI after surgery and was seen at [facility]. He is now on Augmentin. He also had a small stitch abscess at the top of his incision that is now resolving.  He was doing HH therapies from: 2/14 - 2/27/25  Surgery: Right TKA  Date of Last Surgery: 1/8/2025   Post-Op Days: 63   Referred by: Hamida Padilla PA-C  / surgeon: Dr. Sanchez Shaw MD appt:  3/14/25     Precautions:    Deconditioned - Frequent rest breaks required.  CAD, HTN, HLD, CKD, COPD, GERD, DVT on eliquis      Fall Risk: Low    Subjective:   Mikhail reports he feels like his condition is improving. Patient reports  he feels rough this morning.   Pain (0-10): 0    HEP adherence / understanding: patient reports non compliance with the instructed home exercises.    Assessment:   Education: Advised to continue with HEP and try to exercise daily.  Progress towards functional goals: Continues to exhibit increased tolerance and overall endurance with exercise, but still requires rest breaks as desired in seated.  Response to interventions: GIULIANA rating of 7-8 during interventions this date  Justification for continued skilled care: Needs continued skilled therapy to improve B LE strength and endurance.    Plan:  Exercises targeting surrounding  "musculature to strengthen and improve function.    Objective:     Treatment Performed: (\"NP\" = Not Performed)     Therapeutic Exercise:     38 minutes  Nustep x 10 minutes, L2  Ues and Les; Seat 9, Ues 9.5   Fwd Step up: 6 inch 3 x 10 ea LE  HR: 2 x 10   Marching 30\" x 4   Sit to stand from 22 inches 3 x 10   Fwd/retro walking x 1 minute   Side stepping x 1 minute x 2  Fwd static lunge: 2 x 10 ea LE  LAQ 1# 2 x 10 ea LE  Hamstring curls red 2 x 10 ea LE    Manual Therapy:       minutes      Neuromuscular Re-education:      minutes      Gait Training:            minutes      Aquatics:            minutes      Therapeutic Activity:      minutes      Gait Training:            minutes      Modalities:       Vasopneumatic Device       minutes  Electrical Stimulation          minutes  Ultrasound            minutes  Iontophoresis                     minutes  Cold Pack            minutes  Mechanical Traction           minutes    Self Care Home Management:    minutes    Canalith Reposition:          minutes     Education:          minutes    Other:       minutes      Evaluation Complexity: Low:   minutes; Moderate   minutes; Complex   minutes    Re-Evaluation:   minutes                                                                                                                           "

## 2025-04-15 ENCOUNTER — TREATMENT (OUTPATIENT)
Dept: PHYSICAL THERAPY | Facility: CLINIC | Age: 80
End: 2025-04-15
Payer: MEDICARE

## 2025-04-15 DIAGNOSIS — Z96.651 AFTERCARE FOLLOWING RIGHT KNEE JOINT REPLACEMENT SURGERY: Primary | ICD-10-CM

## 2025-04-15 DIAGNOSIS — Z47.1 AFTERCARE FOLLOWING RIGHT KNEE JOINT REPLACEMENT SURGERY: Primary | ICD-10-CM

## 2025-04-15 PROCEDURE — 97110 THERAPEUTIC EXERCISES: CPT | Mod: GP | Performed by: PHYSICAL THERAPIST

## 2025-04-15 NOTE — PROGRESS NOTES
PHYSICAL THERAPY TREATMENT NOTE    Patient Name:  Mikhail Moses   Patient MRN: [id number]  Date: 04/15/25    Time Calculation  Start Time: 0208  Stop Time: 0241  Time Calculation (min): 33 min    Insurance:  Visit number: 8 of [place]  Insurance Type: Payor: MEDICARE / Plan: MEDICARE PART A AND B / Product Type: *No Product type* /   Authorization or Plan of Care date Range: 3/12/25 - 6/10/25    Therapy diagnoses:   1. Aftercare following right knee joint replacement surgery  Follow Up In Physical Therapy           General:  Reason for visit: R TKA on 1/7/25; The patient was at a SNF, he developed a UTI after surgery and was seen at [facility]. He is now on Augmentin. He also had a small stitch abscess at the top of his incision that is now resolving.  He was doing HH therapies from: 2/14 - 2/27/25  Surgery: Right TKA  Date of Last Surgery: 1/8/2025   Post-Op Days: 63   Referred by: Hamida Padilla PA-C  / surgeon: Dr. Sanchez Shaw MD appt:  3/14/25     Precautions:    Deconditioned - Frequent rest breaks required.  CAD, HTN, HLD, CKD, COPD, GERD, DVT on eliquis      Fall Risk: Low    Subjective:   Mikhail reports he feels like his condition is improving. Patient reports  he feels rough this morning.   He reports his LEs were both sore for 2-3 days after last PT session.   Pain (0-10): 4/10 R knee pain.     HEP adherence / understanding: patient reports non compliance with the instructed home exercises.    Assessment:   Education: Reviewed home exercise program.  Progress towards functional goals:  improving LE strength.      Response to interventions:  Tolerates about 1 minute of continuous exercise then rest break is required due to SOB.    Justification for continued skilled care: To address remaining functional, objective and subjective deficits to allow the patient to return to full  "independence with ADLs.    Plan:  Exercises targeting surrounding musculature to strengthen and improve function.    Objective:     Treatment Performed: (\"NP\" = Not Performed)     Therapeutic Exercise:     33 minutes  Nustep x 10 minutes, L2  Ues and Les; Seat 9, Ues 9.5   Fwd Step up: 6 inch 3 x 10 ea LE  HR: 2 x 10   Marching 30\" x 4   Sit to stand from 22 inches 3 x 10   Fwd/retro walking x 1 minute   Side stepping x 1 minute x 2  Fwd static lunge: 2 x 10 ea LE  LAQ 1# 2 x 10 ea LE  Hamstring curls standing AROM 2 x 10 ea LE    Manual Therapy:       minutes      Neuromuscular Re-education:      minutes      Gait Training:            minutes      Aquatics:            minutes      Therapeutic Activity:      minutes      Gait Training:            minutes      Modalities:       Vasopneumatic Device       minutes  Electrical Stimulation          minutes  Ultrasound            minutes  Iontophoresis                     minutes  Cold Pack            minutes  Mechanical Traction           minutes    Self Care Home Management:    minutes    Canalith Reposition:          minutes     Education:          minutes    Other:       minutes      Evaluation Complexity: Low:   minutes; Moderate   minutes; Complex   minutes    Re-Evaluation:   minutes                                                                                                                           "

## 2025-04-18 ENCOUNTER — TREATMENT (OUTPATIENT)
Dept: PHYSICAL THERAPY | Facility: CLINIC | Age: 80
End: 2025-04-18
Payer: MEDICARE

## 2025-04-18 DIAGNOSIS — Z96.651 AFTERCARE FOLLOWING RIGHT KNEE JOINT REPLACEMENT SURGERY: Primary | ICD-10-CM

## 2025-04-18 DIAGNOSIS — Z47.1 AFTERCARE FOLLOWING RIGHT KNEE JOINT REPLACEMENT SURGERY: Primary | ICD-10-CM

## 2025-04-18 PROCEDURE — 97110 THERAPEUTIC EXERCISES: CPT | Mod: GP | Performed by: PHYSICAL THERAPIST

## 2025-04-18 NOTE — PROGRESS NOTES
PHYSICAL THERAPY TREATMENT NOTE    Patient Name:  Mikhail Moses   Patient MRN: [id number]  Date: 04/18/25    Time Calculation  Start Time: 1000  Stop Time: 1045  Time Calculation (min): 45 min    Insurance:  Visit number: 9 of [place]  Insurance Type: Payor: MEDICARE / Plan: MEDICARE PART A AND B / Product Type: *No Product type* /   Authorization or Plan of Care date Range: 3/12/25 - 6/10/25    Therapy diagnoses:   1. Aftercare following right knee joint replacement surgery  Follow Up In Physical Therapy         General:  Reason for visit: R TKA on 1/7/25; The patient was at a SNF, he developed a UTI after surgery and was seen at [facility]. He is now on Augmentin. He also had a small stitch abscess at the top of his incision that is now resolving.  He was doing HH therapies from: 2/14 - 2/27/25  Surgery: Right TKA  Date of Last Surgery: 1/8/2025   Post-Op Days: 63   Referred by: Hamida Padilla PA-C  / surgeon: Dr. Sanchez Shaw MD appt:  3/14/25     Precautions:    Deconditioned - Frequent rest breaks required.  CAD, HTN, HLD, CKD, COPD, GERD, DVT on eliquis      Fall Risk: Low    Subjective:   Mikhail reports he feels like his condition is improving. Patient reports he feels like his R knee should not ache.  He reports his LEs were both sore for 2-3 days after last PT session.   Pain (0-10): 4/10 R knee pain.     HEP adherence / understanding: patient reports non compliance with the instructed home exercises.    Assessment:   Education: Reviewed home exercise program.  Advised him to continue to try to increase his walking duration and activity as tolerated.  He does need to rest due to shortness of breath.  Progress towards functional goals:  Improving LE strength.   Improved endurance.  Functionally limited by SOB.  He may need to f/u with his cardiologist.  His wife wants him to see a  "cardiologist.       Response to interventions:  Tolerates about 1 minute of continuous exercise then rest break is required due to SOB.    Justification for continued skilled care: To address remaining functional, objective and subjective deficits to allow the patient to return to full independence with ADLs.    Plan:  Exercises targeting surrounding musculature to strengthen and improve function.    Objective:     Treatment Performed: (\"NP\" = Not Performed)     Therapeutic Exercise:     45 minutes  Nustep x 10 minutes, L2  Ues and Les; Seat 9, Ues 9.5   Fwd Step up: 6 inch 3 x 10 ea LE  HR: 2 x 15   Marching 30\" x 4   Sit to stand from 22 inches 3 x 10   Fwd/retro walking x 1 minute   Side stepping x 1 minute x 2  Fwd static lunge: 2 x 10 ea LE  LAQ 2# 2 x 10 ea LE 2\" hold  Hamstring curls standing AROM 2 x 10 ea LE    Manual Therapy:       minutes      Neuromuscular Re-education:      minutes      Gait Training:            minutes      Aquatics:            minutes      Therapeutic Activity:      minutes      Gait Training:            minutes      Modalities:       Vasopneumatic Device       minutes  Electrical Stimulation          minutes  Ultrasound            minutes  Iontophoresis                     minutes  Cold Pack            minutes  Mechanical Traction           minutes    Self Care Home Management:    minutes    Canalith Reposition:          minutes     Education:          minutes    Other:       minutes      Evaluation Complexity: Low:   minutes; Moderate   minutes; Complex   minutes    Re-Evaluation:   minutes                                                                                                                           "

## 2025-04-22 ENCOUNTER — APPOINTMENT (OUTPATIENT)
Dept: PHYSICAL THERAPY | Facility: CLINIC | Age: 80
End: 2025-04-22
Payer: MEDICARE

## 2025-04-23 ENCOUNTER — TREATMENT (OUTPATIENT)
Dept: PHYSICAL THERAPY | Facility: CLINIC | Age: 80
End: 2025-04-23
Payer: MEDICARE

## 2025-04-23 DIAGNOSIS — Z96.651 AFTERCARE FOLLOWING RIGHT KNEE JOINT REPLACEMENT SURGERY: ICD-10-CM

## 2025-04-23 DIAGNOSIS — Z47.1 AFTERCARE FOLLOWING RIGHT KNEE JOINT REPLACEMENT SURGERY: ICD-10-CM

## 2025-04-23 PROCEDURE — 97110 THERAPEUTIC EXERCISES: CPT | Mod: GP | Performed by: PHYSICAL THERAPIST

## 2025-04-23 NOTE — PROGRESS NOTES
PHYSICAL THERAPY TREATMENT NOTE    Patient Name:  Mikhail Moses   Patient MRN: [id number]  Date: 04/23/25    Time Calculation  Start Time: 0200  Stop Time: 0230  Time Calculation (min): 30 min    Insurance:  Visit number: 10 [facility]  Insurance Type: Payor: MEDICARE / Plan: MEDICARE PART A AND B / Product Type: *No Product type* /   Authorization or Plan of Care date Range: 3/12/25 - 6/10/25    Therapy diagnoses:   1. Aftercare following right knee joint replacement surgery  Follow Up In Physical Therapy           General:  Reason for visit: R TKA on 1/7/25; The patient was at a SNF, he developed a UTI after surgery and was seen at [facility]. He is now on Augmentin. He also had a small stitch abscess at the top of his incision that is now resolving.  He was doing HH therapies from: 2/14 - 2/27/25  Surgery: Right TKA  Date of Last Surgery: 1/8/2025   Post-Op Days: 63   Referred by: Hamida Padilla PA-C  / surgeon: Dr. Sanchez Shaw MD appt:  3/14/25     Precautions:    Deconditioned - Frequent rest breaks required.  CAD, HTN, HLD, CKD, COPD, GERD, DVT on eliquis      Fall Risk: Low    Subjective:   Mikhail reports he feels like his condition is improving.    Pain (0-10): 0-4/10.  Occasional lateral knee pain when he twists.     Reassessment:   Mikhail Moses has completed 10 physical therapy sessions from 3/12/2024 to 4/23/25 to address R TKA and deconditioning.  Treatment has included Therapeutic exercise, Manual therapy, Gait training, Home program instruction and progression, Neuromuscular re-education, Therapeutic activities, Self care and home management, and Instruction in activity modification.  he reports patient reports compliance with the instructed home exercises..  Mikhail has not made significant progress towards the established therapy goals.  At this time I recommend Mikhail be  "discharged from physical therapy and continue with home exercises.    Goals:  Short Term Goals:   - Patient to be Indep in [facility] for self management of symptoms  25:  he walks, does not exercise other than tht     Long Term Goals:   - LEFS: 54/80 to indicate a significant improvement in overall function.    25: not met.     - Patient will demo 4/5 hip strength (all planes) to allow for correct gait and stair mechanics   25: MET    - Patient will demo mild to no limitation AROM right Knee to allow for correct mechanics with functional mobility.  25:  MET     - Patient to demonstrate gait with least restrictive device for all ADLS and does not demonstrate significant deviations that may contribute to discomfort in the future   25: Indep and MET     - Patient to negotiate stairs reciprocally and descend with near equal eccentric control with use of hand rail.   25: negotiates into basement without difficulty using rail.    - TUG without device = 15 seconds or less indicating reduced fall risk   25:  not met.    - 5 x sit to stand < 15 seconds indicating reduced fall risk    25: not met.    Outcome Measures:  Other Measures  Lower Extremity Funtional Score (LEFS): 13    Objective:   TU seconds  5 x sit to stand: 20 seconds - fatigued.  LEFS = 13  Knee ROM 0 - 120    Treatment Performed: (\"NP\" = Not Performed)     Therapeutic Exercise:     30 minutes  Nustep x 10 minutes, L2  Ues and Les; Seat 9, Ues 9.5   Fwd Step up: 6 inch 3 x 10 ea LE  HR: 2 x 15   Marching 30\" x 4   Sit to stand from 22 inches 3 x 10   Fwd/retro walking x 1 minute   Side stepping x 1 minute x 2  Fwd static lunge: 2 x 10 ea LE  LAQ 2# 2 x 10 ea LE 2\" hold  Hamstring curls standing AROM 2 x 10 ea LE    Manual Therapy:       minutes      Neuromuscular Re-education:      minutes      Gait Training:            minutes      Aquatics:            minutes      Therapeutic Activity:      minutes      Gait Training:   "          minutes      Modalities:       Vasopneumatic Device       minutes  Electrical Stimulation          minutes  Ultrasound            minutes  Iontophoresis                     minutes  Cold Pack            minutes  Mechanical Traction           minutes    Self Care Home Management:    minutes    Canalith Reposition:          minutes     Education:          minutes    Other:       minutes      Evaluation Complexity: Low:   minutes; Moderate   minutes; Complex   minutes    Re-Evaluation:   minutes

## 2025-05-13 ENCOUNTER — PATIENT OUTREACH (OUTPATIENT)
Dept: PRIMARY CARE | Facility: CLINIC | Age: 80
End: 2025-05-13
Payer: MEDICARE

## 2025-05-13 NOTE — PROGRESS NOTES
Final follow up call. Spoke with patient's wife. She states her  is doing much better. He is eating better. He is getting around slowly but getting around better. She states he is acting back as his usual self. No questions or concerns at this time.

## 2025-05-19 ENCOUNTER — TELEPHONE (OUTPATIENT)
Dept: PRIMARY CARE | Facility: CLINIC | Age: 80
End: 2025-05-19
Payer: MEDICARE

## 2025-05-19 NOTE — TELEPHONE ENCOUNTER
Patient coming in on Wednesday for follow up appt, he would like to know if he can also get cortisone injection in right shoulder?

## 2025-05-21 ENCOUNTER — APPOINTMENT (OUTPATIENT)
Dept: PRIMARY CARE | Facility: CLINIC | Age: 80
End: 2025-05-21
Payer: MEDICARE

## 2025-05-21 VITALS
WEIGHT: 186.9 LBS | SYSTOLIC BLOOD PRESSURE: 133 MMHG | DIASTOLIC BLOOD PRESSURE: 74 MMHG | HEART RATE: 70 BPM | BODY MASS INDEX: 28.33 KG/M2 | HEIGHT: 68 IN | TEMPERATURE: 97.8 F | OXYGEN SATURATION: 94 %

## 2025-05-21 DIAGNOSIS — N18.32 STAGE 3B CHRONIC KIDNEY DISEASE (MULTI): ICD-10-CM

## 2025-05-21 DIAGNOSIS — I10 BENIGN ESSENTIAL HTN: ICD-10-CM

## 2025-05-21 DIAGNOSIS — N18.31 CHRONIC KIDNEY DISEASE, STAGE 3A (MULTI): ICD-10-CM

## 2025-05-21 DIAGNOSIS — M75.21 BICEPS TENDINITIS OF RIGHT UPPER EXTREMITY: Primary | ICD-10-CM

## 2025-05-21 DIAGNOSIS — I25.119 CORONARY ARTERY DISEASE INVOLVING NATIVE CORONARY ARTERY OF NATIVE HEART WITH ANGINA PECTORIS: ICD-10-CM

## 2025-05-21 DIAGNOSIS — J43.2 CENTRILOBULAR EMPHYSEMA (MULTI): ICD-10-CM

## 2025-05-21 DIAGNOSIS — M75.101 TEAR OF RIGHT ROTATOR CUFF, UNSPECIFIED TEAR EXTENT, UNSPECIFIED WHETHER TRAUMATIC: ICD-10-CM

## 2025-05-21 PROBLEM — A41.9 SEPSIS WITH ACUTE HYPOXIC RESPIRATORY FAILURE WITHOUT SEPTIC SHOCK, DUE TO UNSPECIFIED ORGANISM: Status: RESOLVED | Noted: 2024-10-24 | Resolved: 2025-05-21

## 2025-05-21 PROBLEM — J96.01 ACUTE HYPOXIC RESPIRATORY FAILURE: Status: RESOLVED | Noted: 2025-01-09 | Resolved: 2025-05-21

## 2025-05-21 PROBLEM — I27.21 PULMONARY ARTERIAL HYPERTENSION (MULTI): Status: RESOLVED | Noted: 2023-10-27 | Resolved: 2025-05-21

## 2025-05-21 PROBLEM — J96.01 SEPSIS WITH ACUTE HYPOXIC RESPIRATORY FAILURE WITHOUT SEPTIC SHOCK, DUE TO UNSPECIFIED ORGANISM: Status: RESOLVED | Noted: 2024-10-24 | Resolved: 2025-05-21

## 2025-05-21 PROBLEM — R57.9 SHOCK (MULTI): Status: RESOLVED | Noted: 2024-10-14 | Resolved: 2025-05-21

## 2025-05-21 PROBLEM — R65.20 SEPSIS WITH ACUTE HYPOXIC RESPIRATORY FAILURE WITHOUT SEPTIC SHOCK, DUE TO UNSPECIFIED ORGANISM: Status: RESOLVED | Noted: 2024-10-24 | Resolved: 2025-05-21

## 2025-05-21 RX ORDER — TRIAMCINOLONE ACETONIDE 40 MG/ML
40 INJECTION, SUSPENSION INTRA-ARTICULAR; INTRAMUSCULAR ONCE
Status: COMPLETED | OUTPATIENT
Start: 2025-05-21 | End: 2025-05-21

## 2025-05-21 RX ADMIN — TRIAMCINOLONE ACETONIDE 40 MG: 40 INJECTION, SUSPENSION INTRA-ARTICULAR; INTRAMUSCULAR at 12:59

## 2025-05-21 NOTE — PROGRESS NOTES
"80-year-old male with history of right rotator cuff tear seen by orthopedics complains of anterior shoulder pain requesting cortisone injection.    Has history of CAD hypertension hyperlipidemia.  denies chest pain SOB EID diaphoresis nausea palpitations syncope presyncope orthopnea edema leg pain dysarthria aphasia focal weakness headache numbness tingling  visual disturbance gait disturbance polydipsia polyuria weight loss tremor epistaxis melena rectal bleeding fatigue weight change temperature intolerance.        /74   Pulse 70   Temp 36.6 °C (97.8 °F)   Ht 1.727 m (5' 8\")   Wt 84.8 kg (186 lb 14.4 oz)   SpO2 94%   BMI 28.42 kg/m²       Appears comfortable  No retractions  Skin without pallor petechia icterus cyanosis  Neck without JVD thyromegaly bruits  Chest clear to auscultation without rales rhonchi wheeze  Heart regular rate and rhythm without murmur  Abdomen soft nondistended nontender without organomegaly or mass  Extremities without erythema edema Homans or cord  Right shoulder with reduced range of motion.  Positive speeds sign.  Tenderness over right anterior bicipital groove  Peripheral pulses palpable  Neuro intact      Verbal consent obtained  after risks and benefits discussed  R  shoulder sterilely prepped and draped in customary fashion with Betadine    1 cc of lidocaine without epi mixed with 40 mg of triamcinolone and injected using anterior approach under biceps tendon sheath patient tolerated procedure well.  Blood loss none  Pressure applied Band-Aid applied  Wound instructions given  Call immediately if there is increased pain, swelling, redness or fever.    Continue ramipril atorvastatin furosemide metoprolol aspirin      "

## 2025-05-30 ENCOUNTER — APPOINTMENT (OUTPATIENT)
Dept: PRIMARY CARE | Facility: CLINIC | Age: 80
End: 2025-05-30
Payer: MEDICARE

## (undated) DEVICE — TAPE, SURGICAL, FOAM, MICROFOAM, HYPOALLERGENIC, 3 IN X 5.5 YD

## (undated) DEVICE — Device

## (undated) DEVICE — SUTURE, CTD, VICRYL, 2-0, UND, BR, CT-2

## (undated) DEVICE — SALINE NORMAL (100/PK)

## (undated) DEVICE — DRESSING, MEPILEX BORDER, POST-OP AG, 4 X 10 IN

## (undated) DEVICE — SYRINGE, 50 CC, LUER LOCK

## (undated) DEVICE — DRAPE, INCISE, STERI DRAPE, LARGE, 23 X 17 IN, DISPOSABLE, STERILE

## (undated) DEVICE — CUFF, TOURNIQUET, 30 X 4, DUAL PORT/SNGL BLADDER, DISP, LF

## (undated) DEVICE — DRAIN, WOUND, ROUND, W/TROCAR, HOLE PATTERN, 10 IN, MEDIUM/LARGE, 3/16 X 49 IN

## (undated) DEVICE — GLOVE, SURGICAL, PROTEXIS PI ORTHO, 7.0, PF, LF

## (undated) DEVICE — BLADE, SAW, DUAL SAGITTAL, 1.9 X 90 X 30

## (undated) DEVICE — SUTURE, MONOCRYL, 3-0, 27 IN, PS-2, UNDYED

## (undated) DEVICE — SOLUTION, IRRIGATION, STERILE WATER, 1000 ML, POUR BOTTLE

## (undated) DEVICE — INTRODUCER KIT, HEMOSTASIS, VALVE/SIDEPORT, W/GUIDEWIRE, 0.035 IN, 8.5 FR, 10 CM, LF

## (undated) DEVICE — EVACUATOR, WOUND, CLOSED, 3 SPRING, 400 CC, Y CONNECTING TUBE

## (undated) DEVICE — GLOVE, SURGICAL, PROTEXIS PI MICRO, 7.5, PF, LF

## (undated) DEVICE — DRAPE, IRRIGATION, W/POUCH, ADHESIVE STRIP, STERI DRAPE, 19 X 23 IN, DISPOSABLE, STERILE

## (undated) DEVICE — CATHETER, THERMODILUTION, SWAN GANZ, VIP, 5 LUMEN, 7.5 FR, 110 CM

## (undated) DEVICE — DRAPE, ISOLATION, ANTIMICROBIAL, W/POUCH, IOBAN 2, STERI DRAPE, 125 X 83 IN, DISPOSABLE, STERILE

## (undated) DEVICE — SUTURE, ETHIBOND, P2, V-37, 30 IN, GREEN

## (undated) DEVICE — NEEDLE, SPINAL, QUINCKE, 18 G X 3.5 IN, PINK HUB

## (undated) DEVICE — SOLUTION, IRRIGATION, USP, SODIUM CHLORIDE 0.9%, .9 NACL, 1000 ML, BAG

## (undated) DEVICE — BLADE, RECIPROCATING, LONG, HEAVY DUTY, FIXED POINT .14

## (undated) DEVICE — ADHESIVE, SKIN, DERMABOND ADVANCED, 15CM, PEN-STYLE

## (undated) DEVICE — SUTURE, VICRYL, 0, 18 IN, CT-1, UNDYED

## (undated) DEVICE — GLOVE, SURGICAL, PROTEXIS PI MICRO, 6.5, PF, LF

## (undated) DEVICE — GLOVE, SURGICAL, PROTEXIS PI ORTHO, 8.0, PF, LF

## (undated) DEVICE — STRIP, SKIN CLOSURE, STERI STRIP, REINFORCED, 0.5 X 4 IN

## (undated) DEVICE — HOOD, SURGICAL, FLYTE SURGICOOL

## (undated) DEVICE — BLADE, SAW, RECIPROCATING, DOUBLE-SIDED, 70 X 12.5 X 1 MM, STAINLESS STEEL

## (undated) DEVICE — TOWEL, SURGICAL, NEURO, O/R, 16 X 26, BLUE, STERILE

## (undated) DEVICE — SUTURE, VICRYL PLUS, 0, 8X18IN, CT-1, UND, BRAIDED

## (undated) DEVICE — SOLUTION, IRRIGATION, SODIUM CHLORIDE 0.9%, 1000 ML, POUR BOTTLE

## (undated) DEVICE — ACCESS KIT, S-MAK MINI, 4FR 10CM 0.018IN 40CM, NT/PT, ECHO ENHANCE NEEDLE

## (undated) DEVICE — HOOD, STERISHIELD T4 SYSTEM

## (undated) DEVICE — ADHESIVE, SKIN, LIQUIBAND EXCEED